# Patient Record
Sex: FEMALE | Race: WHITE | HISPANIC OR LATINO | Employment: PART TIME | URBAN - METROPOLITAN AREA
[De-identification: names, ages, dates, MRNs, and addresses within clinical notes are randomized per-mention and may not be internally consistent; named-entity substitution may affect disease eponyms.]

---

## 2017-03-02 ENCOUNTER — ALLSCRIPTS OFFICE VISIT (OUTPATIENT)
Dept: OTHER | Facility: OTHER | Age: 27
End: 2017-03-02

## 2017-03-02 ENCOUNTER — LAB REQUISITION (OUTPATIENT)
Dept: LAB | Facility: HOSPITAL | Age: 27
End: 2017-03-02
Payer: COMMERCIAL

## 2017-03-02 DIAGNOSIS — R30.0 DYSURIA: ICD-10-CM

## 2017-03-02 LAB
BACTERIA UR QL AUTO: NORMAL
CLUE CELL (HISTORICAL): NORMAL
GLUCOSE (HISTORICAL): NORMAL
HGB UR QL STRIP.AUTO: NORMAL
HYPHAL YEAST (HISTORICAL): NORMAL
KOH PREP (HISTORICAL): NORMAL
LEUKOCYTE ESTERASE UR QL STRIP: NORMAL
NITRITE UR QL STRIP: NORMAL
PH UR STRIP.AUTO: 4.5 [PH]
PROT UR STRIP-MCNC: NORMAL MG/DL
TRICHOMONAS (HISTORICAL): NORMAL
YEAST (HISTORICAL): NORMAL

## 2017-03-02 PROCEDURE — 87086 URINE CULTURE/COLONY COUNT: CPT | Performed by: NURSE PRACTITIONER

## 2017-03-02 PROCEDURE — 87591 N.GONORRHOEAE DNA AMP PROB: CPT | Performed by: NURSE PRACTITIONER

## 2017-03-02 PROCEDURE — 87491 CHLMYD TRACH DNA AMP PROBE: CPT | Performed by: NURSE PRACTITIONER

## 2017-03-03 LAB
CHLAMYDIA DNA CVX QL NAA+PROBE: NORMAL
N GONORRHOEA DNA GENITAL QL NAA+PROBE: NORMAL

## 2017-03-04 ENCOUNTER — GENERIC CONVERSION - ENCOUNTER (OUTPATIENT)
Dept: OTHER | Facility: OTHER | Age: 27
End: 2017-03-04

## 2017-03-04 LAB — BACTERIA UR CULT: NORMAL

## 2017-07-27 ENCOUNTER — ALLSCRIPTS OFFICE VISIT (OUTPATIENT)
Dept: OTHER | Facility: OTHER | Age: 27
End: 2017-07-27

## 2017-08-24 ENCOUNTER — ALLSCRIPTS OFFICE VISIT (OUTPATIENT)
Dept: OTHER | Facility: OTHER | Age: 27
End: 2017-08-24

## 2017-10-16 ENCOUNTER — GENERIC CONVERSION - ENCOUNTER (OUTPATIENT)
Dept: OTHER | Facility: OTHER | Age: 27
End: 2017-10-16

## 2017-10-16 DIAGNOSIS — Z11.3 ENCOUNTER FOR SCREENING FOR INFECTIONS WITH PREDOMINANTLY SEXUAL MODE OF TRANSMISSION: ICD-10-CM

## 2017-10-17 ENCOUNTER — LAB REQUISITION (OUTPATIENT)
Dept: LAB | Facility: HOSPITAL | Age: 27
End: 2017-10-17
Payer: COMMERCIAL

## 2017-10-17 DIAGNOSIS — Z11.3 ENCOUNTER FOR SCREENING FOR INFECTIONS WITH PREDOMINANTLY SEXUAL MODE OF TRANSMISSION: ICD-10-CM

## 2017-10-17 PROCEDURE — 87491 CHLMYD TRACH DNA AMP PROBE: CPT | Performed by: OBSTETRICS & GYNECOLOGY

## 2017-10-17 PROCEDURE — 87591 N.GONORRHOEAE DNA AMP PROB: CPT | Performed by: OBSTETRICS & GYNECOLOGY

## 2017-10-18 LAB
CHLAMYDIA DNA CVX QL NAA+PROBE: NORMAL
N GONORRHOEA DNA GENITAL QL NAA+PROBE: NORMAL

## 2018-01-09 NOTE — MISCELLANEOUS
Provider Comments  Provider Comments:   PATIENT NO SHOW FOR HER ANNUAL APPOINTMENT  CALLED PATIENT AND LEFT A VOICEMAIL TO RESCHEDULE        Signatures   Electronically signed by : Gil Simon, ; Aug 28 2017 10:28AM EST                       (Author)

## 2018-01-11 NOTE — RESULT NOTES
Message   Labs reviewed potassium slightly low patient advised to increase oral intake of potassium through diet  Remainder labs reviewed with patient     Verified Results  (1) RPR [id number] 04:36PM José Miguel March    Order Number: [id number]     Test Name Result Flag Reference   RPR Non-Reactive  Non-Reactive     (1) CHRONIC HEPATITIS PANEL 13Jun2016 04:36PM José Miguel [facility] [place] Order Number: [id number]  TW Order Number: [id number]     Test Name Result Flag Reference   HEPATITIS B SURFACE ANTIGEN Non-reactive  Non-reactive, NonReactive - Confirmed   HEPATITIS C ANTIBODY Non-reactive  Non-reactive   HEPATITIS B CORE IGM ANTIBODY Non-reactive  Non-reactive   HEPATITIS B CORE TOTAL ANTIBODY Non-reactive  Non-reactive     (1) RAPID HIV-1 AND HIV-2 ANTIBODIES 13Jun2016 04:36PM José Miguel March    Order Number: [id number]     Test Name Result Flag Reference   RAPID HIV 1 AND 2 Non-Reactive  Non-Reactive   HIV-1 P24 ANTIGEN SCREEN Non-Reactive  Non-Reactive   Negative for HIV-1 p24 Antigen  Negative for HIV-1 and/or HIV-2 Antibody  (1) CBC/PLT/DIFF 02DIG9028 04:36PM José iMguel March    Order Number: [id number]  TW Order Number: [id number]     Test Name Result Flag Reference   WBC COUNT 10 37 Thousand/uL H 4 31-10 16   RBC COUNT 4 40 Million/uL  3 81-5 12   HEMOGLOBIN 13 3 g/dL  11 5-15 4   HEMATOCRIT 36 9 %  34 8-46  1   MCV 84 fL  82-98   MCH 30 2 pg  26 8-34 3   MCHC 36 0 g/dL  31 4-37 4   RDW 12 5 %  11 6-15 1   MPV 9 9 fL  8 9-12 7   PLATELET COUNT 724 Thousands/uL  149-390   nRBC AUTOMATED 0 /100 WBCs     NEUTROPHILS RELATIVE PERCENT 61 %  43-75   LYMPHOCYTES RELATIVE PERCENT 32 %  14-44   MONOCYTES RELATIVE PERCENT 4 %  4-12   EOSINOPHILS RELATIVE PERCENT 3 %  0-6   BASOPHILS RELATIVE PERCENT 0 %  0-1   NEUTROPHILS ABSOLUTE COUNT 6 36 Thousands/?L  1 85-7 62   LYMPHOCYTES ABSOLUTE COUNT 3 31 Thousands/?L  0 60-4 47   MONOCYTES ABSOLUTE COUNT 0 36 Thousand/?L  0 17-1 22   EOSINOPHILS ABSOLUTE COUNT 0 29 Thousand/?L  0 00-0 61   BASOPHILS ABSOLUTE COUNT 0 03 Thousands/?L  0 00-0 10     (1) COMPREHENSIVE METABOLIC PANEL 11KFA6764 59:96JE José Miguel Dickey   TW Order Number: [id number]  TW Order Number: [id number] Order Number: [id number]     Test Name Result Flag Reference   GLUCOSE,RANDM 80 mg/dL     If the patient is fasting, the ADA then defines impaired fasting glucose as > 100 mg/dL and diabetes as > or equal to 123 mg/dL  SODIUM 138 mmol/L  136-145   POTASSIUM 3 4 mmol/L L 3 5-5 3   CHLORIDE 105 mmol/L  100-108   CARBON DIOXIDE 25 mmol/L  21-32   ANION GAP (CALC) 8 mmol/L  4-13   BLOOD UREA NITROGEN 9 mg/dL  5-25   CREATININE 0 53 mg/dL L 0 60-1 30   Standardized to IDMS reference method   CALCIUM 9 4 mg/dL  8 3-10 1   BILI, TOTAL 0 86 mg/dL  0 20-1 00   ALK PHOSPHATAS 113 U/L     ALT (SGPT) 28 U/L  12-78   AST(SGOT) 25 U/L  5-45   ALBUMIN 4 5 g/dL  3 5-5 0   TOTAL PROTEIN 8 3 g/dL H 6 4-8 2   eGFR Non-African American      >60 0 ml/min/1 73sq [facility] Disease Education Program recommendations are as follows:  GFR calculation is accurate only with a steady state creatinine  Chronic Kidney disease less than 60 ml/min/1 73 sq  meters  Kidney failure less than 15 ml/min/1 73 sq  meters  (1) LIPID PANEL, FASTING 13Jun2016 04:36PM José Miguel Dickey   TW Order Number: [id number]  TW Order Number: [id number] Order Number: [id number]     Test Name Result Flag Reference   CHOLESTEROL 171 mg/dL     HDL,DIRECT 39 mg/dL L 40-60   Specimen collection should occur prior to Metamizole administration due to the potential for falsely depressed results     LDL CHOLESTEROL CALCULATED 111 mg/dL H 0-100   Triglyceride:         Normal              <150 mg/dl       Borderline High    150-199 mg/dl       High               200-499 mg/dl       Very High >499 mg/dl  Cholesterol:         Desirable        <200 mg/dl      Borderline High  200-239 mg/dl      High             >239 mg/dl  HDL Cholesterol:        High    >59 mg/dL      Low     <41 mg/dL  LDL CALCULATED:    This screening LDL is a calculated result  It does not have the accuracy of the Direct Measured LDL in the monitoring of patients with hyperlipidemia and/or statin therapy  Direct Measure LDL (AKX804) must be ordered separately in these patients  TRIGLYCERIDES 107 mg/dL  <=150   Specimen collection should occur prior to N-Acetylcysteine or Metamizole administration due to the potential for falsely depressed results  Plan  Health Maintenance    · Follow-up visit in 6 months Evaluation and Treatment  Follow-up  Status: Complete   Done: 77TKD5029   · (1) CBC/PLT/DIFF; Status:Complete;   Done: 62CBC4405 04:36PM   · (1) COMPREHENSIVE METABOLIC PANEL; Status:Complete;   Done: 63OLR5620  04:36PM   · (1) LIPID PANEL, FASTING; Status:Complete;   Done: 92SPZ1940 04:36PM  Health Maintenance, Screen for STD (sexually transmitted disease)    · *1 - [facility] (Obstetrics/ Gynecology) Physician Referral  Consult  Status:  Active  Requested for: 02WSG2786  Care Summary provided  : Yes  Screen for STD (sexually transmitted disease)    · (1) CHLAMYDIA/GC AMPLIFIED DNA, PCR; Source:Urine, Unspecified Source;  Status:Active; Requested AQR:05MXU2456;    · (1) CHRONIC HEPATITIS PANEL; Status:Complete;   Done: 63WYF0811 04:36PM   · (1) RAPID HIV-1 AND HIV-2 ANTIBODIES; [Do Not Release];  Status:Complete;   Done:  75VHH0437 04:36PM   · (1) RPR; Status:Complete;   Done: 33CUJ8337 04:36PM    Signatures   Electronically signed by : Justino Billingsley MD; Paulo 15 2016  8:45AM EST                       (Author)

## 2018-01-11 NOTE — PROGRESS NOTES
Chief Complaint  Pt was given depo injection today  Left side  Active Problems    1  Birth control (V25 9) (Z30 9)   2  Encounter for Depo-Provera contraception (V25 49) (Z30 42)   3  Need for prophylactic vaccination and inoculation against influenza (V04 81) (Z23)   4  Pregnancy (V22 2) (Z33 1)   5  Screen for STD (sexually transmitted disease) (V74 5) (Z11 3)   6  Yeast infection of nipple, postpartum (675 04,112 89) (O91 02,B37 89)    Current Meds   1  MedroxyPROGESTERone Acetate 150 MG/ML Intramuscular Suspension; Inject 150mg   IM Q11 weeks; Recurring Schedule:17Nov2015 to (Exp:20Sep2016); Status: IN   PROGRESS - Order Generated Ordered   2  MedroxyPROGESTERone Acetate 150 MG/ML Intramuscular Suspension; Inject 150mg   IM Q11 weeks; To Be Done: 19Apr2016; Status: HOLD   FOR - Administration Ordered   3  Prenatal Vitamins TABS; Therapy: (Recorded:12Tlo6511) to Recorded    Allergies    1  No Known Drug Allergies    2  No Known Environmental Allergies   3   No Known Food Allergies    Signatures   Electronically signed by : JANUSZ Way ; Jun 22 2016  4:17PM EST

## 2018-01-14 VITALS
WEIGHT: 112.41 LBS | SYSTOLIC BLOOD PRESSURE: 90 MMHG | TEMPERATURE: 97.7 F | HEIGHT: 57 IN | DIASTOLIC BLOOD PRESSURE: 68 MMHG | BODY MASS INDEX: 24.25 KG/M2 | HEART RATE: 88 BPM

## 2018-01-14 VITALS — DIASTOLIC BLOOD PRESSURE: 71 MMHG | SYSTOLIC BLOOD PRESSURE: 111 MMHG | BODY MASS INDEX: 24.24 KG/M2 | WEIGHT: 114 LBS

## 2018-01-15 NOTE — PROGRESS NOTES
Chief Complaint  Patient here for depo  Next depo 4/6/16  History of Present Illness  Hospital Based Practices Required Assessment:   Pain Assessment   the patient states they do not have pain  Abuse And Domestic Violence Screen    Yes, the patient is safe at home  The patient states no one is hurting them  Active Problems    1  Birth control (V25 9) (Z30 9)   2  Need for prophylactic vaccination and inoculation against influenza (V04 81) (Z23)   3  Pregnancy (V22 2) (Z33 1)   4  Yeast infection of nipple, postpartum (675 04,112 89) (O91 019,B49)    Current Meds   1  MedroxyPROGESTERone Acetate 150 MG/ML Intramuscular Suspension; Inject 150mg   IM Q11 weeks; Recurring Schedule:17Nov2015 to (Exp:20Sep2016); Status: IN   PROGRESS - Order Generated Ordered   2  Nystatin 338236 UNIT/GM External Ointment; Apply sparingly to each nipple after breast   feeding; Therapy: 49KEM7916 to (Last Rx:20Oct2015)  Requested for: 20Oct2015 Ordered   3  Prenatal Vitamins TABS; Therapy: (Recorded:54Nhw1611) to Recorded    Allergies    1  No Known Drug Allergies    2  No Known Environmental Allergies   3   No Known Food Allergies    Vitals  Signs [Data Includes: Current Encounter]    Systolic: 847  Diastolic: 70  Height: 4 ft 10 in  Weight: 119 lb   BMI Calculated: 24 87  BSA Calculated: 1 46    Future Appointments    Date/Time Provider Specialty Site   02/03/2016 12:30 PM [address] [facility], Injection Schedule  [facility] [place]   04/05/2016 03:40 PM [facility] [facility], Injection Schedule  [facility] [place] [address]     Signatures   Electronically signed by : Tanja Jerome MD; Jan 25 2016  2:00PM EST

## 2018-01-17 NOTE — RESULT NOTES
Message   treated at time of visit       Verified Results  (1) URINE CULTURE 02Mar2017 08:32AM Esperanza Odom Order Number: [id number]     Test Name Result Flag Reference   CLINICAL REPORT (Report)     Test:        Urine culture  Specimen Type:   Urine  Specimen Date:   3/2/2017 8:32 AM  Result Date:    3/4/2017 10:58 AM  Result Status:   Final result  Resulting Lab:   [facility] [address] [place] [address]            Tel: [phone]      CULTURE                                       ------------------                                   50,000-59,000 cfu/ml Candida sp  presumptively albicans

## 2018-01-18 NOTE — PROGRESS NOTES
Assessment    1  Screen for STD (sexually transmitted disease) (V74 5) (Z11 3)   2  Encounter for preventive health examination (V70 0) (Z00 00)    Plan  Health Maintenance    · Follow-up visit in 6 months Evaluation and Treatment  Follow-up  Status: Hold For -  Scheduling  Requested for: 44VOG1566   · (1) CBC/PLT/DIFF; Status:Active; Requested USW:71DRT8041;    · (1) COMPREHENSIVE METABOLIC PANEL; Status:Active; Requested BBT:55HDG0420;    · (1) LIPID PANEL, FASTING; Status:Active; Requested XYX:65QHB4476; Health Maintenance, Screen for STD (sexually transmitted disease)    · *1 - [facility] (Obstetrics/ Gynecology) Physician Referral  Consult  Status:  Hold For - Scheduling  Requested for: 41YPC5921  Care Summary provided  : Yes  Screen for STD (sexually transmitted disease)    · (1) CHLAMYDIA/GC AMPLIFIED DNA, PCR; Source:Urine, Unspecified Source;  Status:Active; Requested HMF:98KRA3250;    · (1) CHRONIC HEPATITIS PANEL; Status:Active; Requested OWO:93WIZ7538;    · (1) RAPID HIV-1 AND HIV-2 ANTIBODIES; [Do Not Release]; Status:Active; Requested  TZV:24UXB5407;    · (1) RPR; Status:Active; Requested WRC:11DEJ3648;     Discussion/Summary    #1  We will screen for possibility of STD which included tests for syphilis, chlamydia, gonorrhea, HIV and hepatitis  Patient was advised to have protected sex  According to her, she doesn't have a relationship anymore with her ex-boyfriend who was a womanizer  #2  Health maintenance: We will send for basic labs  Patient needs to follow-up with us to continue with medical care  Chief Complaint  Patient is here for annual physical, denies any complaints  Patient is requesting referral for GYN and STD testing  History of Present Illness  HPI: The patient is a 76-year-old female, came into the office today to establish care with us  Patient told me that the only time she goes to a doctor is when she gave birth approximately 10 months ago   Otherwise, she hasn't seen a doctor for health maintenance needs  According to her, that is when she came in here is that she's worried because her ex-boyfriend she learned had been having sex with other women  Patient was to be tested for possibility of sexually transmitted diseases  Presently, patient denies any symptoms  Patient denies any significant vaginal discharge, vaginal pains, urinary problems like burning or pain on urination  Patient denies any fever or chills  Patient also asked for a referral to a gynecologist for a Pap smear test done  Otherwise, patient doesn't have any other complaints or any other symptoms at all  Review of Systems    Constitutional: No fever, no chills, feels well, no tiredness, no recent weight gain or weight loss  Eyes: No complaints of eye pain, no red eyes, no eyesight problems, no discharge, no dry eyes, no itching of eyes  ENT: no complaints of earache, no loss of hearing, no nose bleeds, no nasal discharge, no sore throat, no hoarseness  Cardiovascular: No complaints of slow heart rate, no fast heart rate, no chest pain, no palpitations, no leg claudication, no lower extremity edema  Respiratory: No complaints of shortness of breath, no wheezing, no cough, no SOB on exertion, no orthopnea, no PND  Gastrointestinal: No complaints of abdominal pain, no constipation, no nausea or vomiting, no diarrhea, no bloody stools  Genitourinary: No complaints of dysuria, no incontinence, no pelvic pain, no dysmenorrhea, no vaginal discharge or bleeding  Musculoskeletal: No complaints of arthralgias, no myalgias, no joint swelling or stiffness, no limb pain or swelling  Integumentary: No complaints of skin rash or lesions, no itching, no skin wounds, no breast pain or lump  Neurological: No complaints of headache, no confusion, no convulsions, no numbness, no dizziness or fainting, no tingling, no limb weakness, no difficulty walking     Psychiatric: Not suicidal, no sleep disturbance, no anxiety or depression, no change in personality, no emotional problems  Endocrine: No complaints of proptosis, no hot flashes, no muscle weakness, no deepening of the voice, no feelings of weakness  Hematologic/Lymphatic: No complaints of swollen glands, no swollen glands in the neck, does not bleed easily, does not bruise easily  ROS reviewed  Active Problems    1  Birth control (V25 9) (Z30 9)   2  Encounter for Depo-Provera contraception (V25 49) (Z30 42)   3  Need for prophylactic vaccination and inoculation against influenza (V04 81) (Z23)   4  Pregnancy (V22 2) (Z33 1)   5  Yeast infection of nipple, postpartum (675 04,112 89) (O91 02,B37 89)    Family History  Mother    · No pertinent family history    Social History    · Denied: History of Alcohol use   · Denied: History of Drug use   · Never a smoker    Current Meds   1  MedroxyPROGESTERone Acetate 150 MG/ML Intramuscular Suspension; Inject 150mg   IM Q11 weeks; Recurring Schedule:17Nov2015 to (Exp:20Sep2016); Status: IN   PROGRESS - Order Generated Ordered   2  MedroxyPROGESTERone Acetate 150 MG/ML Intramuscular Suspension; Inject 150mg   IM Q11 weeks; To Be Done: 19Apr2016; Status: HOLD   FOR - Administration Ordered   3  Prenatal Vitamins TABS; Therapy: (Recorded:44Fdm0873) to Recorded    Allergies    1  No Known Drug Allergies    2  No Known Environmental Allergies   3  No Known Food Allergies    Vitals   Recorded: 31PGN0294 03:23PM   Heart Rate 64, L Radial   Pulse Quality Normal, L Radial   Respiration 14   Respiration Quality Normal   Systolic 444, LUE, Sitting   Diastolic 68, LUE, Sitting   Height 4 ft 10 in   Weight 121 lb    BMI Calculated 25 29   BSA Calculated 1 47   O2 Saturation 99, RA     Physical Exam    Constitutional   General appearance: No acute distress, well appearing and well nourished  Head and Face   Head and face: Normal     Palpation of the face and sinuses: No sinus tenderness      Eyes Conjunctiva and lids: No swelling, erythema or discharge  Pupils and irises: Equal, round, reactive to light  Ears, Nose, Mouth, and Throat   External inspection of ears and nose: Normal     Otoscopic examination: Tympanic membranes translucent with normal light reflex  Canals patent without erythema  Hearing: Normal     Nasal mucosa, septum, and turbinates: Normal without edema or erythema  Lips, teeth, and gums: Normal, good dentition  Oropharynx: Normal with no erythema, edema, exudate or lesions  Neck   Neck: Supple, symmetric, trachea midline, no masses  Thyroid: Normal, no thyromegaly  Pulmonary   Respiratory effort: No increased work of breathing or signs of respiratory distress  Auscultation of lungs: Clear to auscultation  Cardiovascular   Auscultation of heart: Normal rate and rhythm, normal S1 and S2, no murmurs  Carotid pulses: 2+ bilaterally  Examination of extremities for edema and/or varicosities: Normal     Chest   Chest: Normal     Abdomen   Abdomen: Non-tender, no masses  Lymphatic   Palpation of lymph nodes in neck: No lymphadenopathy  Musculoskeletal   Gait and station: Normal     Digits and nails: Normal without clubbing or cyanosis  Joints, bones, and muscles: Normal     Range of motion: Normal     Stability: Normal     Muscle strength/tone: Normal     Skin   Skin and subcutaneous tissue: Normal without rashes or lesions  Palpation of skin and subcutaneous tissue: Normal turgor  Neurologic   Cranial nerves: Cranial nerves II-XII intact  Cortical function: Normal mental status  Sensation: No sensory loss  Coordination: Normal finger to nose and heel to shin  Psychiatric   Judgment and insight: Normal     Orientation to person, place, and time: Normal     Recent and remote memory: Intact      Mood and affect: Normal        Results/Data  PHQ-2 Adult Depression Screening 10Jun2016 03:25PM User, Rodney     Test Name Result Flag Reference PHQ-2 Adult Depression Score 0     Over the last two weeks, how often have you been bothered by any of the following problems?   Little interest or pleasure in doing things: Not at all - 0  Feeling down, depressed, or hopeless: Not at all - 0   PHQ-2 Adult Depression Screening Negative         Future Appointments    Date/Time Provider Specialty Site   06/21/2016 03:40 PM [address] [facility], Injection Schedule  [facility]  [address]     Signatures   Electronically signed by : JANUSZ Aguayo ; Paulo 10 2016  3:53PM EST                       (Author)

## 2018-01-22 VITALS
BODY MASS INDEX: 23.73 KG/M2 | DIASTOLIC BLOOD PRESSURE: 75 MMHG | HEIGHT: 57 IN | HEART RATE: 60 BPM | SYSTOLIC BLOOD PRESSURE: 111 MMHG | WEIGHT: 110 LBS

## 2018-02-02 LAB — EXTERNAL HIV-1 ANTIBODY: NORMAL

## 2018-04-01 ENCOUNTER — HOSPITAL ENCOUNTER (EMERGENCY)
Facility: HOSPITAL | Age: 28
Discharge: HOME | End: 2018-04-01
Attending: EMERGENCY MEDICINE | Admitting: EMERGENCY MEDICINE
Payer: COMMERCIAL

## 2018-04-01 VITALS
SYSTOLIC BLOOD PRESSURE: 115 MMHG | DIASTOLIC BLOOD PRESSURE: 64 MMHG | OXYGEN SATURATION: 98 % | HEART RATE: 80 BPM | RESPIRATION RATE: 18 BRPM | TEMPERATURE: 98.1 F | WEIGHT: 120 LBS | BODY MASS INDEX: 25.97 KG/M2

## 2018-04-01 DIAGNOSIS — H66.90 OTITIS MEDIA: Primary | ICD-10-CM

## 2018-04-01 PROCEDURE — 99282 EMERGENCY DEPT VISIT SF MDM: CPT

## 2018-04-01 RX ORDER — AMOXICILLIN 500 MG/1
500 CAPSULE ORAL EVERY 12 HOURS SCHEDULED
Qty: 14 CAPSULE | Refills: 0 | Status: SHIPPED | OUTPATIENT
Start: 2018-04-01 | End: 2018-04-01

## 2018-04-01 RX ORDER — AMOXICILLIN 500 MG/1
500 CAPSULE ORAL EVERY 12 HOURS SCHEDULED
Qty: 14 CAPSULE | Refills: 0 | Status: SHIPPED | OUTPATIENT
Start: 2018-04-01 | End: 2018-04-08

## 2018-04-01 NOTE — DISCHARGE INSTRUCTIONS
Otitis Media   LO QUE NECESITA SABER:   La otitis media es ike infección en el oído  INSTRUCCIONES SOBRE EL [facility]:   Medicamentos:  · El ibuprofeno o el acetaminofeno  ayuda a disminuir el dolor y la [place]  Están disponibles sin receta médica  Consulte con wilson médico cuál medicamento es el adecuado para usted  Pregunte la cantidad y la frecuencia con que debe tomarlos  Estos medicamentos pueden provocar sangrado estomacal si no se edith correctamente  El ibuprofeno puede provocar daño al Tanda Lord  No tome ibuprofeno si usted tiene enfermedad de los riñones, Cynda Bachelor o si es alérgico a la aspirina  El acetaminofeno puede dañar el hígado  No ingiera alcohol si lionel acetaminofeno  · Gotas para los oídos  ayudan a tratar el dolor de oído  · Antibióticos  ayudan a tratar la infección bacterial que provocó la infección de oído  · [employer] kelvin medicamentos cata se le haya indicado  Consulte con wilson médico si usted hannah que wilson medicamento no le está ayudando o si presenta efectos secundarios  Infórmele si es alérgico a cualquier medicamento  Mantenga ike lista actualizada de los [place], las vitaminas y los productos herbales que lionel  Incluya los siguientes datos de los medicamentos: cantidad, frecuencia y motivo de administración  Traiga con usted la lista o los envases de la píldoras a kelvin citas de seguimiento  Lleve la lista de los medicamentos con usted en riddhi de ike emergencia  Calor o hielo:   · El calor  puede usarse para disminuir wilson dolor  Coloque un pañuelo húmedo y tibio en el oído  Aplíquelo por 15 a 20 minutos, de 3 a 4 veces al día  · El hielo  ayuda a disminuir la inflamación y el dolor  Use ike bolsa con hielo o ponga hielo triturado en ike bolsa de plástico  Idalia Lolly la bolsa con ike toalla y colóquela en el oído por 15 a 20 minutos, de 3 a 4 veces por 2 días  Prevenga la otitis media:   · Lávese las [place] frecuentemente  Utilice agua y [place]   [address] usar el baño, cambiarle el pañal a un renee o estornudar  Lávese las raleigh antes de comer o preparar alimentos  · Aléjese de las personas enfermas  Algunos microbios se propagan fácil y rápidamente con el contacto  Regreso a la escuela o al Charmayne Shutter:  Roula Jimenez podría regresar al Charmayne Shutter o a la escuela cuando desaparezca la fiebre  Acuda a kelvin consultas de control con elam médico según le indicaron  Anote kelvin preguntas para que se acuerde de hacerlas catherine kelvin visitas  Pregúntele a elam Francoise Embs vitaminas y minerales son adecuados para usted  · El dolor del oído empeora o no desaparece, incluso después del [place]  · La pare exterior del oído está remedios o inflamada  · Tiene vómitos o diarrea  · Tiene líquido saliendo del oído  · Usted tiene preguntas o inquietudes acerca de elam condición o cuidado  Regrese a la hermilo de emergencias si:   · Usted sufre ike convulsión  · Tiene fiebre y rigidez en el ruth  © 2017 [address] [employer] Information is for End User's use only and may not be sold, redistributed or otherwise used for commercial purposes  All illustrations and images included in CareNotes® are the copyrighted property of A D A M , Inc  or Saul [place]brook  Esta información es sólo para uso en educación  Elam intención no es darle un consejo médico sobre enfermedades o tratamientos  Colsulte con elam Alessandra Cabot farmacéutico antes de seguir cualquier régimen médico para saber si es seguro y efectivo para usted

## 2018-04-01 NOTE — ED ATTENDING ATTESTATION
I, [address], DO, saw and evaluated the patient  I have discussed the patient with the resident/non-physician practitioner and agree with the resident's/non-physician practitioner's findings, Plan of Care, and MDM as documented in the resident's/non-physician practitioner's note, except where noted  All available labs and Radiology studies were reviewed  At this point I agree with the current assessment done in the Emergency Department  I have conducted an independent evaluation of this patient a history and physical is as follows:    30yo female presents with ear pain, worse on right  No fevers  Had uri symptoms  On exam - nad, right TM erythema, heart reg, no resp distress    Plan - treat for OM    Critical Care Time  CritCare Time    Procedures

## 2018-04-05 NOTE — ED PROVIDER NOTES
History  Chief Complaint   Patient presents with    Earache     pt has bilateral ear pain  Right ear hurts more then left  pt states she had pain for 1 day  HPI     25year old female presents for Right ear pain  Onset was this morning  Now has left ear pain as starting  Denies fever chills nausea vomiting diarrhea  No neck pain headache  No mastoid pain  No other modifying factors no other associated symptoms  On exam the patient has a right tympanic membrane that is erythematous with some bulging  No mastoid tenderness  Neck is supple  Throat is clear  Assessment plan:  Otitis Media treat  None       History reviewed  No pertinent past medical history  History reviewed  No pertinent surgical history  History reviewed  No pertinent family history  I have reviewed and agree with the history as documented  Social History   Substance Use Topics    Smoking status: Never Smoker    Smokeless tobacco: Never Used    Alcohol use No        Review of Systems   Constitutional: Negative for diaphoresis, fatigue and fever  HENT: Negative for facial swelling and nosebleeds  Eyes: Negative for pain and visual disturbance  Respiratory: Negative for apnea, cough, shortness of breath and wheezing  Cardiovascular: Negative for chest pain and leg swelling  Gastrointestinal: Negative for abdominal distention, abdominal pain, anal bleeding, blood in stool, nausea, rectal pain and vomiting  Genitourinary: Negative for difficulty urinating, dysuria and flank pain  Musculoskeletal: Negative for back pain, neck pain and neck stiffness  Neurological: Negative for dizziness, syncope, weakness, light-headedness and headaches  All other systems reviewed and are negative        Physical Exam  ED Triage Vitals [04/01/18 0628]   Temperature Pulse Respirations Blood Pressure SpO2   98 1 °F (36 7 °C) 80 18 115/64 98 %      Temp Source Heart Rate Source Patient Position - Orthostatic VS BP Location FiO2 (%)   Oral Monitor Sitting Left arm --      Pain Score       Worst Possible Pain           Orthostatic Vital Signs  Vitals:    04/01/18 0628   BP: 115/64   Pulse: 80   Patient Position - Orthostatic VS: Sitting       Physical Exam   Constitutional: She is oriented to person, place, and time  She appears well-developed and well-nourished  No distress  HENT:   Head: Normocephalic and atraumatic  Nose: Nose normal    Eyes: Conjunctivae and EOM are normal  Pupils are equal, round, and reactive to light  No scleral icterus  Neck: Normal range of motion  Neck supple  No JVD present  No tracheal deviation present  No thyromegaly present  Cardiovascular: Normal rate, regular rhythm, normal heart sounds and intact distal pulses  Exam reveals no gallop and no friction rub  Pulmonary/Chest: Effort normal and breath sounds normal  No respiratory distress  She has no wheezes  She has no rales  She exhibits no tenderness  Abdominal: Soft  Bowel sounds are normal  She exhibits no distension and no mass  There is no tenderness  There is no rebound and no guarding  No hernia  Musculoskeletal: Normal range of motion  She exhibits no edema, tenderness or deformity  Neurological: She is alert and oriented to person, place, and time  She has normal reflexes  No cranial nerve deficit  Coordination normal    Skin: Skin is warm and dry  She is not diaphoretic  No erythema  Psychiatric: She has a normal mood and affect  Her behavior is normal    Nursing note and vitals reviewed        ED Medications  Medications - No data to display    Diagnostic Studies  Results Reviewed     None                 No orders to display         Procedures  Procedures      Phone Consults  ED Phone Contact    ED Course  ED Course                                MDM  Number of Diagnoses or Management Options  Otitis media: new and requires workup    CritCare Time    Disposition  Final diagnoses:   Otitis media     Time reflects when diagnosis was documented in both MDM as applicable and the Disposition within this note     Time User Action Codes Description Comment    4/1/2018  6:36 AM Binta Matos Add [H66 90] Otitis media       ED Disposition     ED Disposition Condition Comment    Discharge  Shahid Riddle discharge to home/self care  Condition at discharge: Good        Follow-up Information    None       Discharge Medication List as of 4/1/2018  6:37 AM      START taking these medications    Details   amoxicillin (AMOXIL) 500 mg capsule Take 1 capsule (500 mg total) by mouth every 12 (twelve) hours for 7 days, Starting Sun 4/1/2018, Until Sun 4/8/2018, Normal           No discharge procedures on file  ED Provider  Attending physically available and evaluated Shahid Riddle I managed the patient along with the ED Attending      Electronically Signed by         Roc Bourgeois DO  04/05/18 0025

## 2018-07-24 ENCOUNTER — ULTRASOUND (OUTPATIENT)
Dept: OBGYN CLINIC | Facility: HOSPITAL | Age: 28
End: 2018-07-24
Payer: COMMERCIAL

## 2018-07-24 VITALS
HEART RATE: 82 BPM | HEIGHT: 58 IN | WEIGHT: 111 LBS | BODY MASS INDEX: 23.3 KG/M2 | SYSTOLIC BLOOD PRESSURE: 121 MMHG | DIASTOLIC BLOOD PRESSURE: 70 MMHG

## 2018-07-24 DIAGNOSIS — Z34.90 EARLY STAGE OF PREGNANCY: ICD-10-CM

## 2018-07-24 DIAGNOSIS — N91.2 AMENORRHEA: Primary | ICD-10-CM

## 2018-07-24 LAB — SL AMB POCT URINE HCG: POSITIVE

## 2018-07-24 PROCEDURE — 99213 OFFICE O/P EST LOW 20 MIN: CPT | Performed by: OBSTETRICS & GYNECOLOGY

## 2018-07-24 PROCEDURE — 81025 URINE PREGNANCY TEST: CPT | Performed by: OBSTETRICS & GYNECOLOGY

## 2018-07-24 NOTE — PROGRESS NOTES
This is a 25year old  who presents for viability scan  Her LMP was 16 which would put her at a gestational age of 6w9d  Today, she is accompanied by possible FOB who appears uncomfortable and in shock regarding this pregnancy  She endorses nausea and vomiting occurring every other day for the last month  She has tolerated PO intake and has not lost weight over this time  She denies fever, chills, CP, SOB, and abdominal pain  /70 (BP Location: Left arm)   Pulse 82   Ht 4' 10" (1 473 m)   Wt 50 3 kg (111 lb)   LMP 2018 (Approximate)   BMI 23 20 kg/m²       Ob Hx:   7664- Uncomplicated pregnancy ending in a Term 1LTCS for NRFHT  She is thinking about TOLAC, but it unsure at this time  Gyn Hx:  - No hx of abnormal pap smears or STDs  PMHx:  None    Meds:  Prenatal vitamin    Surg Hx:   LTCS x 1    Soc Hx:  - Never smoker  - Social drinker (no EtOH consumption since positive pregnancy test)  - No recreational drug use      Assessment: This is a 24 yo  at 6w9d by LMP giving her an RUTH 19 here for viability scan  Plan:  1   IUP at [facility] confirms LMP dating  - Patient instructed to continue taking Prenatal Vitamins  - Patient will return for prenatal intake and H&P

## 2018-07-24 NOTE — PROGRESS NOTES
I have reviewed the notes, assessments, and/or procedures performed by Dr Jessica Small, I concur with her/his documentation of Dorette Brunner Regan Spalding, MD

## 2018-07-30 ENCOUNTER — PATIENT OUTREACH (OUTPATIENT)
Dept: OBGYN CLINIC | Facility: HOSPITAL | Age: 28
End: 2018-07-30

## 2018-07-30 ENCOUNTER — INITIAL PRENATAL (OUTPATIENT)
Dept: OBGYN CLINIC | Facility: HOSPITAL | Age: 28
End: 2018-07-30
Payer: COMMERCIAL

## 2018-07-30 VITALS
DIASTOLIC BLOOD PRESSURE: 80 MMHG | HEART RATE: 99 BPM | WEIGHT: 109 LBS | SYSTOLIC BLOOD PRESSURE: 114 MMHG | HEIGHT: 59 IN | BODY MASS INDEX: 21.97 KG/M2

## 2018-07-30 DIAGNOSIS — Z3A.09 9 WEEKS GESTATION OF PREGNANCY: Primary | ICD-10-CM

## 2018-07-30 PROCEDURE — 99211 OFF/OP EST MAY X REQ PHY/QHP: CPT

## 2018-07-30 NOTE — PROGRESS NOTES
MHR=99    OB Intake  o Patient presents for OB intake interview  o Accompanied by: Mother, son and niece   o   - Hx of  delivery prior to 36 weeks 6 days: no  o LMP: Patient's last menstrual period was 2018 (approximate)  o U/S date: 2018   - 8 weeks  5 days on 2018  o Estimated Date of Delivery: 19    - confirmed by LMP   o Signs and Symptoms of pregnancy:  - breast tenderness, fatigue, frequent urination, nausea and positive home pregnancy test  - Constipation: yes  - Headaches: yes  - Cramping/spotting: yes- some mild cramping occasionally  - PICA cravings: no  - Diabetes: If you answer yes, please order 1 hr gtt testing, 50grams   History of gestational diabetes no   BMI >35  no   Advance maternal age >35 no   First degree relative with type 2 diabetes no   History of PCOS no   Current metformin use no   Prior history of macrosomia or LGA no  o Immunization Record  Immunization History   Administered Date(s) Administered    Influenza Quadrivalent Preservative Free 3 years and older IM 10/20/2015    Tdap 2015   -   o Tdap:  - Counseled to be given after 28 weeks  - Influenza vaccine discussed  o MRSA questionnaire: negative  o Dental visit within last 6 months  - No- If no, recommendations discussed  Nurse Family Partnership: No- not first baby  ONAF form submitted: No- pt is currently self-pay, is in the process of getting Medical Assistance         - Lab slips given for PN panel and Hgb Electrophoresis         - Referrals made to [facility] for sequential screen and Social Work (EPDS=16)  Keily Landeros, met with pt during PN intake to discuss pt's family issues  - Pt did not have enough time to finish filling out her Registration paperwork- she was instructed to bring the paperwork filled out to her next appt on 18  Interview education:  Tatyana Serrano Pregnancy Essentials booklet given to patient  Reviewed and explained      Handouts given at todays visit  o [facility] me phone application guide  o [facility] Me support center  o CDCs Response to [address] [place] Maternal Fetal Medicine  - Sequential screening pamphlet- info given and referral made to MFM  - Cystic fibrosis pamphlet- info given, pt declined testing at this time  o RUTH letter given    - [facility] activated (not 1518 years of age)  - No  - Code provided: Yes, on AVS
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
## 2018-07-30 NOTE — LETTER
Nic Beltrán is a patient and under our care in our office  Priscilla Pak Estimated Date of Delivery: 2/28/19  Any questions or concerns feel free to contact our office  Thank you,    [facility]  [facility], [address] [place] [address]  [phone]      [address] [place] [address]/Re[address]  [place]  [phone]     [facility]   [phone]      [facility]  [phone]     [place]   [address], [place]

## 2018-07-30 NOTE — LETTER
Joel Shea is a patient at our facility  Joel Shea Estimated Date of Delivery: 2/28/19       Any questions or concerns, please feel free to contact our office      Sincerely,     [facility]   [address]   [place], [place]   [phone]

## 2018-08-03 ENCOUNTER — APPOINTMENT (OUTPATIENT)
Dept: LAB | Facility: HOSPITAL | Age: 28
End: 2018-08-03
Payer: COMMERCIAL

## 2018-08-03 DIAGNOSIS — Z3A.09 9 WEEKS GESTATION OF PREGNANCY: ICD-10-CM

## 2018-08-03 LAB
ABO GROUP BLD: NORMAL
BASOPHILS # BLD AUTO: 0.02 THOUSANDS/ΜL (ref 0–0.1)
BASOPHILS NFR BLD AUTO: 0 % (ref 0–1)
BILIRUB UR QL STRIP: NEGATIVE
BLD GP AB SCN SERPL QL: NEGATIVE
CLARITY UR: CLEAR
COLOR UR: YELLOW
EOSINOPHIL # BLD AUTO: 0.25 THOUSAND/ΜL (ref 0–0.61)
EOSINOPHIL NFR BLD AUTO: 3 % (ref 0–6)
ERYTHROCYTE [DISTWIDTH] IN BLOOD BY AUTOMATED COUNT: 12.2 % (ref 11.6–15.1)
EXTERNAL HIV-1 ANTIBODY: NORMAL
GLUCOSE UR STRIP-MCNC: NEGATIVE MG/DL
HCT VFR BLD AUTO: 29.8 % (ref 34.8–46.1)
HGB BLD-MCNC: 10.4 G/DL (ref 11.5–15.4)
HGB UR QL STRIP.AUTO: NEGATIVE
IMM GRANULOCYTES # BLD AUTO: 0.03 THOUSAND/UL (ref 0–0.2)
IMM GRANULOCYTES NFR BLD AUTO: 0 % (ref 0–2)
KETONES UR STRIP-MCNC: NEGATIVE MG/DL
LEUKOCYTE ESTERASE UR QL STRIP: NEGATIVE
LYMPHOCYTES # BLD AUTO: 2.03 THOUSANDS/ΜL (ref 0.6–4.47)
LYMPHOCYTES NFR BLD AUTO: 26 % (ref 14–44)
MCH RBC QN AUTO: 30.7 PG (ref 26.8–34.3)
MCHC RBC AUTO-ENTMCNC: 34.9 G/DL (ref 31.4–37.4)
MCV RBC AUTO: 88 FL (ref 82–98)
MONOCYTES # BLD AUTO: 0.41 THOUSAND/ΜL (ref 0.17–1.22)
MONOCYTES NFR BLD AUTO: 5 % (ref 4–12)
NEUTROPHILS # BLD AUTO: 5.18 THOUSANDS/ΜL (ref 1.85–7.62)
NEUTS SEG NFR BLD AUTO: 66 % (ref 43–75)
NITRITE UR QL STRIP: NEGATIVE
NRBC BLD AUTO-RTO: 0 /100 WBCS
PH UR STRIP.AUTO: 7.5 [PH] (ref 4.5–8)
PLATELET # BLD AUTO: 270 THOUSANDS/UL (ref 149–390)
PMV BLD AUTO: 9.9 FL (ref 8.9–12.7)
PROT UR STRIP-MCNC: NEGATIVE MG/DL
RBC # BLD AUTO: 3.39 MILLION/UL (ref 3.81–5.12)
RH BLD: POSITIVE
RUBV IGG SERPL IA-ACNC: >175 IU/ML
SP GR UR STRIP.AUTO: 1.01 (ref 1–1.03)
SPECIMEN EXPIRATION DATE: NORMAL
UROBILINOGEN UR QL STRIP.AUTO: 0.2 E.U./DL
WBC # BLD AUTO: 7.92 THOUSAND/UL (ref 4.31–10.16)

## 2018-08-03 PROCEDURE — 87186 SC STD MICRODIL/AGAR DIL: CPT

## 2018-08-03 PROCEDURE — 81003 URINALYSIS AUTO W/O SCOPE: CPT

## 2018-08-03 PROCEDURE — 87086 URINE CULTURE/COLONY COUNT: CPT

## 2018-08-03 PROCEDURE — 87077 CULTURE AEROBIC IDENTIFY: CPT

## 2018-08-03 PROCEDURE — 83020 HEMOGLOBIN ELECTROPHORESIS: CPT

## 2018-08-03 PROCEDURE — 36415 COLL VENOUS BLD VENIPUNCTURE: CPT

## 2018-08-03 PROCEDURE — 80081 OBSTETRIC PANEL INC HIV TSTG: CPT

## 2018-08-04 LAB — HBV SURFACE AG SER QL: NORMAL

## 2018-08-05 LAB — BACTERIA UR CULT: ABNORMAL

## 2018-08-06 ENCOUNTER — ROUTINE PRENATAL (OUTPATIENT)
Dept: OBGYN CLINIC | Facility: HOSPITAL | Age: 28
End: 2018-08-06
Payer: COMMERCIAL

## 2018-08-06 VITALS — BODY MASS INDEX: 22.22 KG/M2 | SYSTOLIC BLOOD PRESSURE: 104 MMHG | WEIGHT: 110 LBS | DIASTOLIC BLOOD PRESSURE: 69 MMHG

## 2018-08-06 DIAGNOSIS — Z3A.10 10 WEEKS GESTATION OF PREGNANCY: ICD-10-CM

## 2018-08-06 DIAGNOSIS — Z98.891 HX OF CESAREAN SECTION: ICD-10-CM

## 2018-08-06 DIAGNOSIS — N30.00 ACUTE CYSTITIS WITHOUT HEMATURIA: Primary | ICD-10-CM

## 2018-08-06 DIAGNOSIS — Z11.3 SCREENING EXAMINATION FOR STD (SEXUALLY TRANSMITTED DISEASE): ICD-10-CM

## 2018-08-06 LAB
HIV 1+2 AB+HIV1 P24 AG SERPL QL IA: NORMAL
RPR SER QL: NORMAL

## 2018-08-06 PROCEDURE — 87591 N.GONORRHOEAE DNA AMP PROB: CPT | Performed by: OBSTETRICS & GYNECOLOGY

## 2018-08-06 PROCEDURE — 87491 CHLMYD TRACH DNA AMP PROBE: CPT | Performed by: OBSTETRICS & GYNECOLOGY

## 2018-08-06 PROCEDURE — 99213 OFFICE O/P EST LOW 20 MIN: CPT | Performed by: OBSTETRICS & GYNECOLOGY

## 2018-08-06 RX ORDER — NITROFURANTOIN 25; 75 MG/1; MG/1
100 CAPSULE ORAL 2 TIMES DAILY
Qty: 14 CAPSULE | Refills: 0 | Status: SHIPPED | OUTPATIENT
Start: 2018-08-06 | End: 2018-08-13

## 2018-08-06 NOTE — PROGRESS NOTES
Assessment/Plan:      Diagnoses and all orders for this visit:    Acute cystitis without hematuria  -     nitrofurantoin (MACROBID) 100 mg capsule; Take 1 capsule (100 mg total) by mouth 2 (two) times a day for 7 days    Screening examination for STD (sexually transmitted disease)  -     Chlamydia/GC amplified DNA by PCR; Future  -     Chlamydia/GC amplified DNA by PCR    10 weeks gestation of pregnancy  [facility] new prenatal   Follow-up genetic screening  Hx of  section  Patient desires to try for a vaginal delivery, readdress periodically during the pregnancy  She was counseled on risks of uterine rupture being 1%  Anemia:  Prenatal panel with hemoglobin of 10 4, recommend high iron diet, consider starting iron tablets if new CBC at 28 weeks is low  Subjective:     Patient ID: Nic Beltrán is a 25 y o  female  HPI   25-year-old  001 with history of prior  section in  for the indication of category 2 tracing and failure to progress  Patient otherwise healthy, denies any significant past medical history  Currently taking her prenatal vitamins  Patient desires to try for a vaginal delivery this time  She was counseled on options for genetic screening, she has information for the  Center and will call for an appointment  Patient has a history of a Pap smear on  which was normal, new Pap smear not indicated on this visit  Denies any significant nausea or vomiting  Denies any vaginal bleeding, loss of fluid or contractions  Patient was found to have urinary tract infection with more than 100,000 UFC on urine culture  Prenatal Panel labs within normal limits except for slightly low hemoglobin of 10 4, recommended high iron diet,  Blood type Rh positive with negative antibody  Review of Systems   Constitutional: Negative  HENT: Negative  Respiratory: Negative  Cardiovascular: Negative  Gastrointestinal: Negative  Genitourinary: Negative  All other systems reviewed and are negative  Objective:     Physical Exam   Constitutional: She is oriented to person, place, and time  She appears well-developed and well-nourished  HENT:   Head: Normocephalic and atraumatic  Neck: Normal range of motion  Neck supple  Cardiovascular: Normal rate, regular rhythm and normal heart sounds  Pulmonary/Chest: Effort normal and breath sounds normal  No respiratory distress  She has no wheezes  She has no rales  Abdominal: Soft  Bowel sounds are normal  She exhibits no distension  There is no tenderness  There is no rebound and no guarding  Genitourinary: Vagina normal and uterus normal    Genitourinary Comments: Normal appearing cervix, closed, no vaginal bleeding or masses  Musculoskeletal: Normal range of motion  She exhibits no edema or deformity  Neurological: She is alert and oriented to person, place, and time  No cranial nerve deficit         Transabdominal ultrasound with active fetus and a heart rate of 164 beats per minute

## 2018-08-07 LAB
DEPRECATED HGB OTHER BLD-IMP: 0 %
HGB A MFR BLD: 2.6 % (ref 1.8–3.2)
HGB A MFR BLD: 97.4 % (ref 96.4–98.8)
HGB C MFR BLD: 0 %
HGB F MFR BLD: 0 % (ref 0–2)
HGB FRACT BLD-IMP: NORMAL
HGB S BLD QL SOLY: NEGATIVE
HGB S MFR BLD: 0 %

## 2018-08-08 LAB
CHLAMYDIA DNA CVX QL NAA+PROBE: NORMAL
N GONORRHOEA DNA GENITAL QL NAA+PROBE: NORMAL

## 2018-08-20 ENCOUNTER — ROUTINE PRENATAL (OUTPATIENT)
Dept: PERINATAL CARE | Facility: CLINIC | Age: 28
End: 2018-08-20
Payer: COMMERCIAL

## 2018-08-20 VITALS
BODY MASS INDEX: 21.77 KG/M2 | HEIGHT: 59 IN | HEART RATE: 83 BPM | SYSTOLIC BLOOD PRESSURE: 105 MMHG | WEIGHT: 108 LBS | DIASTOLIC BLOOD PRESSURE: 67 MMHG

## 2018-08-20 DIAGNOSIS — Z3A.12 12 WEEKS GESTATION OF PREGNANCY: ICD-10-CM

## 2018-08-20 DIAGNOSIS — Z36.82 ENCOUNTER FOR NUCHAL TRANSLUCENCY TESTING: ICD-10-CM

## 2018-08-20 DIAGNOSIS — O34.211 MATERNAL CARE DUE TO LOW TRANSVERSE UTERINE SCAR FROM PREVIOUS CESAREAN DELIVERY: Primary | ICD-10-CM

## 2018-08-20 PROCEDURE — 76813 OB US NUCHAL MEAS 1 GEST: CPT | Performed by: OBSTETRICS & GYNECOLOGY

## 2018-08-20 PROCEDURE — 99201 PR OFFICE OUTPATIENT NEW 10 MINUTES: CPT | Performed by: OBSTETRICS & GYNECOLOGY

## 2018-08-20 PROCEDURE — 76801 OB US < 14 WKS SINGLE FETUS: CPT | Performed by: OBSTETRICS & GYNECOLOGY

## 2018-08-27 ENCOUNTER — PATIENT OUTREACH (OUTPATIENT)
Dept: OBGYN CLINIC | Facility: HOSPITAL | Age: 28
End: 2018-08-27

## 2018-08-27 NOTE — PROGRESS NOTES
SW MET WITH PT ON HER REQUEST TO ASSIST WITH RESOURCES FOR HER 3 Y/O SON  INFORMATION FOR EARLY EDUCATION GIVEN  PT REPORTED IS FEELING BETTER  ISSUES AT HOME ARE BETTER  HAS MA  STATES FOB IS NOT INVOLVED BUT SHE IS COPING WELL  PT WITH QUESTION REGARDING OUTSTANDING BILLS  WAS DIRECTED TO MEET WITH FIN  COUNSELOR TODAY TO ADDRESS THEM  NO OTHER CONCERN AT THIS TIME

## 2018-08-30 ENCOUNTER — TELEPHONE (OUTPATIENT)
Dept: PERINATAL CARE | Facility: CLINIC | Age: 28
End: 2018-08-30

## 2018-09-07 ENCOUNTER — HOSPITAL ENCOUNTER (EMERGENCY)
Facility: HOSPITAL | Age: 28
Discharge: HOME | End: 2018-09-07
Attending: EMERGENCY MEDICINE | Admitting: EMERGENCY MEDICINE
Payer: COMMERCIAL

## 2018-09-07 ENCOUNTER — ROUTINE PRENATAL (OUTPATIENT)
Dept: OBGYN CLINIC | Facility: HOSPITAL | Age: 28
End: 2018-09-07
Payer: COMMERCIAL

## 2018-09-07 VITALS
SYSTOLIC BLOOD PRESSURE: 106 MMHG | HEART RATE: 73 BPM | HEIGHT: 58 IN | RESPIRATION RATE: 17 BRPM | DIASTOLIC BLOOD PRESSURE: 60 MMHG | TEMPERATURE: 96 F | OXYGEN SATURATION: 100 % | BODY MASS INDEX: 23.79 KG/M2 | WEIGHT: 113.32 LBS

## 2018-09-07 VITALS
HEART RATE: 77 BPM | WEIGHT: 113.4 LBS | SYSTOLIC BLOOD PRESSURE: 93 MMHG | DIASTOLIC BLOOD PRESSURE: 56 MMHG | HEIGHT: 58 IN | BODY MASS INDEX: 23.8 KG/M2

## 2018-09-07 DIAGNOSIS — B35.3 ATHLETE'S FOOT: Primary | ICD-10-CM

## 2018-09-07 DIAGNOSIS — Z3A.15 15 WEEKS GESTATION OF PREGNANCY: Primary | ICD-10-CM

## 2018-09-07 DIAGNOSIS — B36.9 FUNGAL SKIN INFECTION: ICD-10-CM

## 2018-09-07 DIAGNOSIS — N39.0 URINARY TRACT INFECTION WITHOUT HEMATURIA, SITE UNSPECIFIED: ICD-10-CM

## 2018-09-07 PROCEDURE — 99282 EMERGENCY DEPT VISIT SF MDM: CPT

## 2018-09-07 PROCEDURE — 87086 URINE CULTURE/COLONY COUNT: CPT | Performed by: OBSTETRICS & GYNECOLOGY

## 2018-09-07 PROCEDURE — 99213 OFFICE O/P EST LOW 20 MIN: CPT | Performed by: OBSTETRICS & GYNECOLOGY

## 2018-09-07 RX ORDER — CLOTRIMAZOLE 1 %
CREAM (GRAM) TOPICAL
Qty: 15 G | Refills: 0 | Status: SHIPPED | OUTPATIENT
Start: 2018-09-07 | End: 2018-10-05 | Stop reason: ALTCHOICE

## 2018-09-07 NOTE — DISCHARGE INSTRUCTIONS
Pie de atleta   LO QUE NECESITA SABER:   El pie de atleta es ike infección del pie causada por un hongo  INSTRUCCIONES SOBRE EL [facility] HOSPITALARIA:   Regrese a la hermilo de emergencias si:   · Usted tiene fiebre o escalofríos  · Usted tiene ike mike remedios que le sube por la pierna  Pregúntele a wilson Daphney Rumps vitaminas y minerales son adecuados para usted  · El sarpullido se propaga a otras partes del cuerpo  · Wilson infección no ha carlie en 14 días o no ha desaparecido por completo en 90 días  · La piel en wilson pie o pierna se ve enrojecida y se siente caliente  · Usted tiene preguntas o inquietudes acerca de wilson condición o cuidado  Medicamentos:   · Los medicamentos antimicóticos  pueden administrarse en forma de crema o píldora  Usted podría necesitar ike receta médica para lance medicamento  Use el medicamento hasta que lo termine, aunque kelvin pies aparenten estar sanos  · [place] kelvin medicamentos cata se le haya indicado  Consulte con wilson médico si usted hannah que wilson medicamento no le está ayudando o si presenta efectos secundarios  Infórmele si es alérgico a cualquier medicamento  Mantenga ike lista actualizada de los [place], las vitaminas y los productos herbales que lionel  Incluya los siguientes datos de los medicamentos: cantidad, frecuencia y motivo de administración  Traiga con usted la lista o los envases de la píldoras a kelvin citas de seguimiento  Lleve la lista de los medicamentos con usted en riddhi de ike emergencia  Acuda a kelvin consultas de control con wilson médico según le indicaron  Anote kelvin preguntas para que se acuerde de hacerlas catherine kelvin visitas  Evite el contagio del pie de atleta:   · Evite la propagación de la infección a otras partes del cuerpo  Al ducharse, seque el área de la katherine y otras partes del cuerpo antes de [employer]  · Mantenga kelvin pies limpios y secos  Lávese los pies todos los días y séquelos hansa, especialmente entre kelvin dedos   Después que kelvin pies estén secos, aplique polvo en kelvin pies y [employer] de kelvin dedos  Use calcetines limpios de algodón o obed  Póngase los calcetines liz para que no propague la infección a otras áreas de elam cuerpo  Use sandalias, zapatillas de francoise u otros zapatos que permiten que fluya el aire a kelvin pies  Cantrall ayudará a mantener kelvin pies secos  No use zapatos apretados o de plástico o de goma  · Remoje los pies en ike solución astringente (que seca) cata se le haya indicado  si usted tiene ampollas  Es posible que deba hacerlo por 20 a 30 minutos, 2 veces al día para ayudar a que se [address]  · Use zapatos en áreas públicas  Use sandalias de baño o sandalias en áreas cálidas y húmedas  Cantrall incluye cabinas de duchas, cerca de piscinas y vestuarios  No comparta calcetines o zapatos  No utilice piscinas públicas  © 2017 [facility] Information is for End User's use only and may not be sold, redistributed or otherwise used for commercial purposes  All illustrations and images included in CareNotes® are the copyrighted property of A D A M , Inc  or Saul [place]  Esta información es sólo para uso en educación  Elam intención no es darle un consejo médico sobre enfermedades o tratamientos  Colsulte con elam [place] Maribell farmacéutico antes de seguir cualquier régimen médico para saber si es seguro y efectivo para usted
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
## 2018-09-07 NOTE — PROGRESS NOTES
Brianda Cash is a 25 y o  female being seen today for her obstetrical visit  She is at 15w1d gestation  Patient reports no bleeding, no contractions, no cramping and no leaking  Fetal movement: normal     First trimester ultrasound at [facility] done on 18 showing variable presentation, normal fetal growth, anterior placenta  First part of sequential screen was also done at that time  Pt to get anatomy scan at 20 weeks  Menstrual History:  OB History      Para Term  AB Living    2 1 1     1    SAB TAB Ectopic Multiple Live Births            1        Patient's last menstrual period was 2018 (approximate)  Review of Systems  Pertinent items are noted in HPI  Objective     LMP 2018 (Approximate)   Uterine Size: 14 cm    FHR:  165    Assessment      26 yo  with Pregnancy at 15 and 1/7 weeks     Plan    Problem list reviewed and updated  Labs reviewed  Follow up in 4 weeks  First trimester ultrasound at [facility] done on 18 showing variable presentation, normal fetal growth, anterior placenta  First part of sequential screen was also done at that time  Pt to get anatomy scan and 2nd part of sequential screen at 20 weeks  Hx of UTI treated with Macrobid 4 weeks ago  RAYMOND done today

## 2018-09-08 LAB — BACTERIA UR CULT: ABNORMAL

## 2018-10-05 ENCOUNTER — ROUTINE PRENATAL (OUTPATIENT)
Dept: OBGYN CLINIC | Facility: HOSPITAL | Age: 28
End: 2018-10-05
Payer: COMMERCIAL

## 2018-10-05 VITALS
HEART RATE: 80 BPM | WEIGHT: 120 LBS | BODY MASS INDEX: 25.19 KG/M2 | HEIGHT: 58 IN | DIASTOLIC BLOOD PRESSURE: 63 MMHG | SYSTOLIC BLOOD PRESSURE: 98 MMHG

## 2018-10-05 DIAGNOSIS — Z3A.19 19 WEEKS GESTATION OF PREGNANCY: Primary | ICD-10-CM

## 2018-10-05 DIAGNOSIS — Z98.891 HX OF CESAREAN SECTION: ICD-10-CM

## 2018-10-05 DIAGNOSIS — Z34.92 PRENATAL CARE IN SECOND TRIMESTER: ICD-10-CM

## 2018-10-05 PROBLEM — Z34.90 EARLY STAGE OF PREGNANCY: Status: RESOLVED | Noted: 2018-07-24 | Resolved: 2018-10-05

## 2018-10-05 PROCEDURE — 99213 OFFICE O/P EST LOW 20 MIN: CPT | Performed by: OBSTETRICS & GYNECOLOGY

## 2018-10-05 NOTE — ASSESSMENT & PLAN NOTE
Continue routine prenatal care  Level II ultrasound scheduled for 10/12/18 -- follow up results  Yeast and urinary symptoms have resolved  Will send RAYMOND urine culture  Patient to RTC in 4 weeks for prenatal appointment at 23 weeks

## 2018-10-05 NOTE — PROGRESS NOTES
Patient has no complaints today  Denies contractions, vaginal bleeding, and LOF  Reports some fetal movement  Not consistent  Problem List Items Addressed This Visit        Other    Hx of  section     Discussed TOLAC vs planned repeat  section with patient  First  due to arrest of dilation  MFMu  success probability calculator 47 4% successful   - Discussion had with patient regarding risks of TOLAC including risk of uterine rupture of 0 4% and increased risk with induction agents such as pitocin  Explained to patient that a uterine rupture would be an obstetrical emergency and she would require a second  section not only for fetal safety but also for maternal safety as well  Explained to patient that her probability of success is slightly decreased due to the previous  secondary to arrest of dilation, however it is not guaranteed to fail nor can we guarantee success with her TOLAC  Explained to patient that due to her increased risks with a vaginal labor course, she would be monitored closely during her labor course, and may have earlier intervention of IUPC placement to monitor her contraction strength especially if pitocin was to be used  Patient expressed understanding of all that was discussed  Patient remains unsure of route of delivery at this time  Information regarding  vs repeat  section provided to patient  Please address at future prenatal visits         19 weeks gestation of pregnancy - Primary     Continue routine prenatal care  Level II ultrasound scheduled for 10/12/18 -- follow up results  Yeast and urinary symptoms have resolved  Will send RAYMOND urine culture  Patient to RTC in 4 weeks for prenatal appointment at 23 weeks  Relevant Orders    Urine culture      Other Visit Diagnoses     Prenatal care in second trimester            Casi Quiñones MD  OBGYN, [address]  10/5/2018 8:50 AM

## 2018-10-05 NOTE — PATIENT INSTRUCTIONS
Choosing Between Vaginal Birth After  or Repeat    WHAT YOU NEED TO KNOW:   What may increase my chance of having a vaginal birth after  ()? · Your  incision is in the lower part of your abdomen  · You have not had other surgeries on your uterus  · You have had a normal pregnancy  · You are younger than 40 years  · You have had more than 1 vaginal delivery  · Your labor begins on its own without the help of medicines  What may decrease my chance of having a ? · You have had more than 1   · You have a high vertical (up and down) incision in your abdomen    · Your uterus has ruptured during a previous delivery  · Your baby is in the breech position (bottom facing down)  · You have pregnancy problems or a medical condition that makes a vaginal delivery dangerous  · Your baby is 9 pounds or larger  · You are past your due date  What are some benefits of a ? · You will have a faster recovery  You can often go home within a couple of days after delivery  You may have less pain, and it may go away sooner  Your body may recover more quickly  You may be able to return to your daily activities sooner  · There are fewer health risks  Infection and injury to your organs are less likely  There is a lower risk of heavy bleeding  You may be able to walk around sooner  This may decrease the risk for blood clots  What are some risks of a ? The  scar on your uterus may rupture during delivery  This can cause serious health problems for you and your baby  The delivery may not go as planned and you may need another   What are some benefits of a ? A  is a safer option if you have a vertical incision in your upper abdomen from your previous   It may also be a safer option if you have certain pregnancy problems or medical conditions   If you want your tubes tied to prevent future pregnancies, it may be done at the same time as the   What are some risks of a ? You may bleed more than expected or get an infection  Your bladder or intestines may be injured  This can cause bleeding and lead to problems with your unborn baby  You may get a blood clot in your leg  This may become life-threatening  Multiple C-sections increase your risk of problems in future pregnancies  Your risk for placenta previa is increased if you have had more than 1   Placenta previa is a condition that causes your placenta to cover your cervix  CARE AGREEMENT:   You have the right to help plan your care  Learn about your health condition and how it may be treated  Discuss treatment options with your caregivers to decide what care you want to receive  You always have the right to refuse treatment  The above information is an  only  It is not intended as medical advice for individual conditions or treatments  Talk to your doctor, nurse or pharmacist before following any medical regimen to see if it is safe and effective for you  ©  [employer]  Information is for End User's use only and may not be sold, redistributed or otherwise used for commercial purposes  All illustrations and images included in CareNotes® are the copyrighted property of A D A M [employer]  or [employer]

## 2018-10-05 NOTE — ASSESSMENT & PLAN NOTE
Discussed TOLAC vs planned repeat  section with patient  First  due to arrest of dilation  MFMu  success probability calculator 47 4% successful   - Discussion had with patient regarding risks of TOLAC including risk of uterine rupture of 0 4% and increased risk with induction agents such as pitocin  Explained to patient that a uterine rupture would be an obstetrical emergency and she would require a second  section not only for fetal safety but also for maternal safety as well  Explained to patient that her probability of success is slightly decreased due to the previous  secondary to arrest of dilation, however it is not guaranteed to fail nor can we guarantee success with her TOLAC  Explained to patient that due to her increased risks with a vaginal labor course, she would be monitored closely during her labor course, and may have earlier intervention of IUPC placement to monitor her contraction strength especially if pitocin was to be used  Patient expressed understanding of all that was discussed  Patient remains unsure of route of delivery at this time  Information regarding  vs repeat  section provided to patient       Please address at future prenatal visits

## 2018-10-12 ENCOUNTER — ROUTINE PRENATAL (OUTPATIENT)
Dept: PERINATAL CARE | Facility: CLINIC | Age: 28
End: 2018-10-12
Payer: COMMERCIAL

## 2018-10-12 ENCOUNTER — LAB (OUTPATIENT)
Dept: LAB | Facility: HOSPITAL | Age: 28
End: 2018-10-12
Attending: OBSTETRICS & GYNECOLOGY
Payer: COMMERCIAL

## 2018-10-12 ENCOUNTER — TRANSCRIBE ORDERS (OUTPATIENT)
Dept: LAB | Facility: HOSPITAL | Age: 28
End: 2018-10-12

## 2018-10-12 VITALS
WEIGHT: 120.2 LBS | HEART RATE: 76 BPM | SYSTOLIC BLOOD PRESSURE: 93 MMHG | BODY MASS INDEX: 25.23 KG/M2 | DIASTOLIC BLOOD PRESSURE: 59 MMHG | HEIGHT: 58 IN

## 2018-10-12 DIAGNOSIS — Z3A.20 20 WEEKS GESTATION OF PREGNANCY: ICD-10-CM

## 2018-10-12 DIAGNOSIS — Z33.1 PREGNANT STATE, INCIDENTAL: Primary | ICD-10-CM

## 2018-10-12 DIAGNOSIS — Z36.9 RESEARCH REQUESTED ANTENATAL ULTRASOUND SCAN: ICD-10-CM

## 2018-10-12 DIAGNOSIS — Z3A.19 19 WEEKS GESTATION OF PREGNANCY: ICD-10-CM

## 2018-10-12 DIAGNOSIS — Z11.3 ENCOUNTER FOR SCREENING FOR INFECTIONS WITH PREDOMINANTLY SEXUAL MODE OF TRANSMISSION: ICD-10-CM

## 2018-10-12 DIAGNOSIS — Z98.891 HX OF CESAREAN SECTION: ICD-10-CM

## 2018-10-12 DIAGNOSIS — Z33.1 PREGNANT STATE, INCIDENTAL: ICD-10-CM

## 2018-10-12 LAB — HBV SURFACE AG SER QL: NORMAL

## 2018-10-12 PROCEDURE — 99213 OFFICE O/P EST LOW 20 MIN: CPT | Performed by: OBSTETRICS & GYNECOLOGY

## 2018-10-12 PROCEDURE — 87389 HIV-1 AG W/HIV-1&-2 AB AG IA: CPT

## 2018-10-12 PROCEDURE — 76811 OB US DETAILED SNGL FETUS: CPT | Performed by: OBSTETRICS & GYNECOLOGY

## 2018-10-12 PROCEDURE — 87077 CULTURE AEROBIC IDENTIFY: CPT

## 2018-10-12 PROCEDURE — 87186 SC STD MICRODIL/AGAR DIL: CPT

## 2018-10-12 PROCEDURE — 87086 URINE CULTURE/COLONY COUNT: CPT

## 2018-10-12 PROCEDURE — 36415 COLL VENOUS BLD VENIPUNCTURE: CPT

## 2018-10-12 PROCEDURE — 76817 TRANSVAGINAL US OBSTETRIC: CPT | Performed by: OBSTETRICS & GYNECOLOGY

## 2018-10-12 PROCEDURE — 86592 SYPHILIS TEST NON-TREP QUAL: CPT

## 2018-10-12 PROCEDURE — 87340 HEPATITIS B SURFACE AG IA: CPT

## 2018-10-12 NOTE — PROGRESS NOTES
A transvaginal ultrasound was performed  Sonographer note on use of High Level Disinfection Process (Trophon) for transvaginal probe# 1 used, serial [id number]    Lauren Guerra RDMS

## 2018-10-13 LAB — SCAN RESULT: NORMAL

## 2018-10-14 LAB — BACTERIA UR CULT: ABNORMAL

## 2018-10-15 LAB
HIV 1+2 AB+HIV1 P24 AG SERPL QL IA: NORMAL
RPR SER QL: NORMAL

## 2018-10-16 ENCOUNTER — TELEPHONE (OUTPATIENT)
Dept: PERINATAL CARE | Facility: CLINIC | Age: 28
End: 2018-10-16

## 2018-11-02 ENCOUNTER — ULTRASOUND (OUTPATIENT)
Dept: PERINATAL CARE | Facility: CLINIC | Age: 28
End: 2018-11-02
Payer: COMMERCIAL

## 2018-11-02 ENCOUNTER — ROUTINE PRENATAL (OUTPATIENT)
Dept: OBGYN CLINIC | Facility: HOSPITAL | Age: 28
End: 2018-11-02
Payer: COMMERCIAL

## 2018-11-02 VITALS
HEIGHT: 58 IN | BODY MASS INDEX: 25.48 KG/M2 | HEART RATE: 82 BPM | SYSTOLIC BLOOD PRESSURE: 96 MMHG | WEIGHT: 121.4 LBS | DIASTOLIC BLOOD PRESSURE: 51 MMHG

## 2018-11-02 VITALS
WEIGHT: 121.6 LBS | DIASTOLIC BLOOD PRESSURE: 58 MMHG | HEART RATE: 87 BPM | BODY MASS INDEX: 25.53 KG/M2 | HEIGHT: 58 IN | SYSTOLIC BLOOD PRESSURE: 95 MMHG

## 2018-11-02 DIAGNOSIS — Z34.92 PRENATAL CARE IN SECOND TRIMESTER: ICD-10-CM

## 2018-11-02 DIAGNOSIS — O34.219 HISTORY OF CESAREAN DELIVERY, ANTEPARTUM: ICD-10-CM

## 2018-11-02 DIAGNOSIS — Z3A.23 23 WEEKS GESTATION OF PREGNANCY: ICD-10-CM

## 2018-11-02 DIAGNOSIS — IMO0002 EVALUATE ANATOMY NOT SEEN ON PRIOR SONOGRAM: Primary | ICD-10-CM

## 2018-11-02 DIAGNOSIS — Z3A.23 23 WEEKS GESTATION OF PREGNANCY: Primary | ICD-10-CM

## 2018-11-02 DIAGNOSIS — Z98.891 HX OF CESAREAN SECTION: ICD-10-CM

## 2018-11-02 PROCEDURE — 76816 OB US FOLLOW-UP PER FETUS: CPT | Performed by: OBSTETRICS & GYNECOLOGY

## 2018-11-02 PROCEDURE — 99213 OFFICE O/P EST LOW 20 MIN: CPT | Performed by: NURSE PRACTITIONER

## 2018-11-02 PROCEDURE — 90471 IMMUNIZATION ADMIN: CPT

## 2018-11-02 PROCEDURE — 90686 IIV4 VACC NO PRSV 0.5 ML IM: CPT

## 2018-11-02 NOTE — LETTER
November 2, 2018     Patient: Cesar Jon   YOB: 1990   Date of Visit: 11/2/2018       To Whom it May Concern:    Cesar Jon is under my professional care  She was seen in my office on 11/2/2018  She is currently 23 weeks pregnant  With an estimated due date of 2/28/19  We recommend all pregnant women have a well ventilated work space, wear supportive low healed shoes, working no more than 40 hr per week, had a 15 min break every 2 hr and at least 30 min for a meal daily, have easy access to bathrooms and water  Patient should have assistance lifting, moving or transferring any item greater than 20 lb  If you have any questions or concerns, please don't hesitate to call           Sincerely,          ASHISH Javier        CC: No Recipients

## 2018-11-02 NOTE — PROGRESS NOTES
Denies loss of fluid, vaginal bleeding and abdominal pain  Confirms frequent fetal movement  Tolerating prenatal vitamin well  Recommendations reviewed for flu vaccine, patient agreeable to administration today  Requesting letter for work to be more specific regarding requiring assistance lifting greater than 20 lb  Patient states she gave preprinted work letter however employer is not following it given wording of recommended   Center ultrasound reviewed- anatomy scan- breech presentation, normal appearing fetal growth, anterior placenta, RABIA -WNL and EFW 11 oz  Recommendations for follow-up in 3-4 weeks for missed anatomy  Plan:  1  Continue prenatal vitamins daily  2  Follow-up with  Center as scheduled for missed anatomy  3  Flu vaccine today  4  28 week labs provided  Reviewed with patient to have labs drawn 1-2 days prior to next appointment  Blood type is O positive, does not need RhoGAM  5  Birth plan provided- will review at next visit  6  Work note provided requesting assistance lifting, transferring any items greater than 20 lb  7   Prior  section-  written information provided regarding repeat  section versus TOLAC  8   Common discomforts of pregnancy and precautions including  labor reviewed  RTO 4 weeks f/u  28 week labs, birth plan, delivery plan and ultrasound

## 2018-11-02 NOTE — PATIENT INSTRUCTIONS
Choosing Between Vaginal Birth After  or Repeat    WHAT YOU NEED TO KNOW:   What may increase my chance of having a vaginal birth after  ()? · Your  incision is in the lower part of your abdomen  · You have not had other surgeries on your uterus  · You have had a normal pregnancy  · You are younger than 40 years  · You have had more than 1 vaginal delivery  · Your labor begins on its own without the help of medicines  What may decrease my chance of having a ? · You have had more than 1   · You have a high vertical (up and down) incision in your abdomen    · Your uterus has ruptured during a previous delivery  · Your baby is in the breech position (bottom facing down)  · You have pregnancy problems or a medical condition that makes a vaginal delivery dangerous  · Your baby is 9 pounds or larger  · You are past your due date  What are some benefits of a ? · You will have a faster recovery  You can often go home within a couple of days after delivery  You may have less pain, and it may go away sooner  Your body may recover more quickly  You may be able to return to your daily activities sooner  · There are fewer health risks  Infection and injury to your organs are less likely  There is a lower risk of heavy bleeding  You may be able to walk around sooner  This may decrease the risk for blood clots  What are some risks of a ? The  scar on your uterus may rupture during delivery  This can cause serious health problems for you and your baby  The delivery may not go as planned and you may need another   What are some benefits of a ? A  is a safer option if you have a vertical incision in your upper abdomen from your previous   It may also be a safer option if you have certain pregnancy problems or medical conditions   If you want your tubes tied to prevent future pregnancies, it may be done at the same time as the   What are some risks of a ? You may bleed more than expected or get an infection  Your bladder or intestines may be injured  This can cause bleeding and lead to problems with your unborn baby  You may get a blood clot in your leg  This may become life-threatening  Multiple C-sections increase your risk of problems in future pregnancies  Your risk for placenta previa is increased if you have had more than 1   Placenta previa is a condition that causes your placenta to cover your cervix  CARE AGREEMENT:   You have the right to help plan your care  Learn about your health condition and how it may be treated  Discuss treatment options with your caregivers to decide what care you want to receive  You always have the right to refuse treatment  The above information is an  only  It is not intended as medical advice for individual conditions or treatments  Talk to your doctor, nurse or pharmacist before following any medical regimen to see if it is safe and effective for you  ©  [address] [facility] Information is for End User's use only and may not be sold, redistributed or otherwise used for commercial purposes  All illustrations and images included in CareNotes® are the copyrighted property of [employer]  or [employer]  Pregnancy at 23 to 26 [address]:   What changes are happening in my body? You are now close to or at the beginning of the third trimester  The third trimester starts at 24 weeks and ends with delivery  As your baby gets larger, you may develop certain symptoms  These may include pain in your back or down the sides of your abdomen  You may also have stretch marks on your abdomen, breasts, thighs, or buttocks  You may also have constipation  How do I care for myself at this stage of my pregnancy? · Eat a variety of healthy foods    Healthy foods include fruits, vegetables, whole-grain breads, low-fat dairy foods, beans, lean meats, and fish  Drink liquids as directed  Ask how much liquid to drink each day and which liquids are best for you  Limit caffeine to less than 200 milligrams each day  Limit your intake of fish to 2 servings each week  Choose fish low in mercury such as canned light tuna, shrimp, salmon, cod, or tilapia  Do not  eat fish high in mercury such as swordfish, tilefish, batool mackerel, and shark  · Manage back pain  Do not stand for long periods of time or lift heavy items  Use good posture while you stand, squat, or bend  Wear low-heeled shoes with good support  Rest may also help to relieve back pain  Ask your healthcare provider about exercises you can do to strengthen your back muscles  · Take prenatal vitamins as directed  Your need for certain vitamins and minerals, such as folic acid, increases during pregnancy  Prenatal vitamins provide some of the extra vitamins and minerals you need  Prenatal vitamins may also help to decrease the risk of certain birth defects  · Talk to your healthcare provider about exercise  Moderate exercise can help you stay fit  Your healthcare provider will help you plan an exercise program that is safe for you during pregnancy  · Do not smoke  If you smoke, it is never too late to quit  Smoking increases your risk of a miscarriage and other health problems during your pregnancy  Smoking can cause your baby to be born too early or weigh less at birth  Ask your healthcare provider for information if you need help quitting  · Do not drink alcohol  Alcohol passes from your body to your baby through the placenta  It can affect your baby's brain development and cause fetal alcohol syndrome (FAS)  FAS is a group of conditions that causes mental, behavior, and growth problems  · Talk to your healthcare provider before you take any medicines    Many medicines may harm your baby if you take them when you are pregnant  Do not take any medicines, vitamins, herbs, or supplements without first talking to your healthcare provider  Never use illegal or street drugs (such as marijuana or cocaine) while you are pregnant  What are some safety tips during pregnancy? · Avoid hot tubs and saunas  Do not use a hot tub or sauna while you are pregnant, especially during your first trimester  Hot tubs and saunas may raise your baby's temperature and increase the risk of birth defects  · Avoid toxoplasmosis  This is an infection caused by eating raw meat or being around infected cat feces  It can cause birth defects, miscarriages, and other problems  Wash your hands after you touch raw meat  Make sure any meat is well-cooked before you eat it  Avoid raw eggs and unpasteurized milk  Use gloves or ask someone else to clean your cat's litter box while you are pregnant  What changes are happening with my baby? By 26 weeks, your baby will weigh about 2 pounds  Your baby will be about 10 inches long from the top of the head to the rump (baby's bottom)  Your baby's movements are much stronger now  Your baby's eyes are almost completely formed and can partially open  Your baby also sleeps and wakes up  What do I need to know about prenatal care? Your healthcare provider will check your blood pressure and weight  You may also need the following:  · A urine test  may also be done to check for sugar and protein  These can be signs of gestational diabetes or infection  Protein in your urine may also be a sign of preeclampsia  Preeclampsia is a condition that can develop during week 20 or later of your pregnancy  It causes high blood pressure, and it can cause problems with your kidneys and other organs  · Fundal height  is a measurement of your uterus to check your baby's growth  This number is usually the same as the number of weeks that you have been pregnant  · Your baby's heart rate  will be checked    When should I seek immediate care? · You develop a severe headache that does not go away  · You have new or increased vision changes, such as blurred or spotted vision  · You have new or increased swelling in your face or hands  · You have vaginal spotting or bleeding  · Your water broke or you feel warm water gushing or trickling from your vagina  When should I contact my healthcare provider? · You have abdominal cramps, pressure, or tightening  · You have a change in vaginal discharge  · You have light bleeding  · You have chills or a fever  · You have vaginal itching, burning, or pain  · You have yellow, green, white, or foul-smelling vaginal discharge  · You have pain or burning when you urinate, less urine than usual, or pink or bloody urine  · You have questions or concerns about your condition or care  CARE AGREEMENT:   You have the right to help plan your care  Learn about your health condition and how it may be treated  Discuss treatment options with your caregivers to decide what care you want to receive  You always have the right to refuse treatment  The above information is an  only  It is not intended as medical advice for individual conditions or treatments  Talk to your doctor, nurse or pharmacist before following any medical regimen to see if it is safe and effective for you  © 2017 [employer] [employer] Information is for End User's use only and may not be sold, redistributed or otherwise used for commercial purposes  All illustrations and images included in CareNotes® are the copyrighted property of A D A M [employer]  or [employer]

## 2018-11-23 ENCOUNTER — APPOINTMENT (OUTPATIENT)
Dept: LAB | Facility: HOSPITAL | Age: 28
End: 2018-11-23
Payer: COMMERCIAL

## 2018-11-23 DIAGNOSIS — Z3A.23 23 WEEKS GESTATION OF PREGNANCY: ICD-10-CM

## 2018-11-23 DIAGNOSIS — Z34.92 PRENATAL CARE IN SECOND TRIMESTER: ICD-10-CM

## 2018-11-23 LAB
BASOPHILS # BLD AUTO: 0.03 THOUSANDS/ΜL (ref 0–0.1)
BASOPHILS NFR BLD AUTO: 0 % (ref 0–1)
EOSINOPHIL # BLD AUTO: 0.41 THOUSAND/ΜL (ref 0–0.61)
EOSINOPHIL NFR BLD AUTO: 4 % (ref 0–6)
ERYTHROCYTE [DISTWIDTH] IN BLOOD BY AUTOMATED COUNT: 12.8 % (ref 11.6–15.1)
GLUCOSE 1H P 50 G GLC PO SERPL-MCNC: 103 MG/DL
HCT VFR BLD AUTO: 31.2 % (ref 34.8–46.1)
HGB BLD-MCNC: 10.4 G/DL (ref 11.5–15.4)
IMM GRANULOCYTES # BLD AUTO: 0.11 THOUSAND/UL (ref 0–0.2)
IMM GRANULOCYTES NFR BLD AUTO: 1 % (ref 0–2)
LYMPHOCYTES # BLD AUTO: 1.83 THOUSANDS/ΜL (ref 0.6–4.47)
LYMPHOCYTES NFR BLD AUTO: 19 % (ref 14–44)
MCH RBC QN AUTO: 31.7 PG (ref 26.8–34.3)
MCHC RBC AUTO-ENTMCNC: 33.3 G/DL (ref 31.4–37.4)
MCV RBC AUTO: 95 FL (ref 82–98)
MONOCYTES # BLD AUTO: 0.6 THOUSAND/ΜL (ref 0.17–1.22)
MONOCYTES NFR BLD AUTO: 6 % (ref 4–12)
NEUTROPHILS # BLD AUTO: 6.81 THOUSANDS/ΜL (ref 1.85–7.62)
NEUTS SEG NFR BLD AUTO: 70 % (ref 43–75)
NRBC BLD AUTO-RTO: 0 /100 WBCS
PLATELET # BLD AUTO: 275 THOUSANDS/UL (ref 149–390)
PMV BLD AUTO: 9.9 FL (ref 8.9–12.7)
RBC # BLD AUTO: 3.28 MILLION/UL (ref 3.81–5.12)
WBC # BLD AUTO: 9.79 THOUSAND/UL (ref 4.31–10.16)

## 2018-11-23 PROCEDURE — 36415 COLL VENOUS BLD VENIPUNCTURE: CPT

## 2018-11-23 PROCEDURE — 85025 COMPLETE CBC W/AUTO DIFF WBC: CPT

## 2018-11-23 PROCEDURE — 82950 GLUCOSE TEST: CPT

## 2018-11-23 PROCEDURE — 86592 SYPHILIS TEST NON-TREP QUAL: CPT

## 2018-11-24 LAB — RPR SER QL: NORMAL

## 2018-11-26 DIAGNOSIS — O99.012 ANEMIA DURING PREGNANCY IN SECOND TRIMESTER: Primary | ICD-10-CM

## 2018-11-26 RX ORDER — DOCUSATE SODIUM 100 MG/1
100 CAPSULE, LIQUID FILLED ORAL 2 TIMES DAILY
Qty: 60 CAPSULE | Refills: 4 | Status: SHIPPED | OUTPATIENT
Start: 2018-11-26 | End: 2019-01-25

## 2018-11-26 RX ORDER — FERROUS SULFATE TAB EC 324 MG (65 MG FE EQUIVALENT) 324 (65 FE) MG
324 TABLET DELAYED RESPONSE ORAL
Qty: 60 TABLET | Refills: 4 | Status: SHIPPED | OUTPATIENT
Start: 2018-11-26 | End: 2018-12-28 | Stop reason: SDUPTHER

## 2018-11-30 ENCOUNTER — ROUTINE PRENATAL (OUTPATIENT)
Dept: OBGYN CLINIC | Facility: HOSPITAL | Age: 28
End: 2018-11-30
Payer: COMMERCIAL

## 2018-11-30 VITALS
HEIGHT: 58 IN | WEIGHT: 129.4 LBS | DIASTOLIC BLOOD PRESSURE: 60 MMHG | HEART RATE: 89 BPM | BODY MASS INDEX: 27.16 KG/M2 | SYSTOLIC BLOOD PRESSURE: 101 MMHG

## 2018-11-30 DIAGNOSIS — Z23 NEED FOR TDAP VACCINATION: ICD-10-CM

## 2018-11-30 DIAGNOSIS — Z34.92 PRENATAL CARE IN SECOND TRIMESTER: ICD-10-CM

## 2018-11-30 DIAGNOSIS — O23.42 URINARY TRACT INFECTION IN MOTHER DURING SECOND TRIMESTER OF PREGNANCY: ICD-10-CM

## 2018-11-30 DIAGNOSIS — Z3A.27 27 WEEKS GESTATION OF PREGNANCY: Primary | ICD-10-CM

## 2018-11-30 DIAGNOSIS — O99.012 ANEMIA DURING PREGNANCY IN SECOND TRIMESTER: ICD-10-CM

## 2018-11-30 PROCEDURE — 90715 TDAP VACCINE 7 YRS/> IM: CPT

## 2018-11-30 PROCEDURE — 99213 OFFICE O/P EST LOW 20 MIN: CPT

## 2018-11-30 PROCEDURE — 90471 IMMUNIZATION ADMIN: CPT

## 2018-11-30 RX ORDER — NITROFURANTOIN 25; 75 MG/1; MG/1
100 CAPSULE ORAL 2 TIMES DAILY
Qty: 14 CAPSULE | Refills: 0 | Status: SHIPPED | OUTPATIENT
Start: 2018-11-30 | End: 2018-12-07

## 2018-11-30 NOTE — PROGRESS NOTES
Denies loss of fluid, vaginal bleeding and abdominal pain  Confirms frequent fetal movement  Tolerating prenatal vitamin well  Twenty-eight week labs reviewed-significant for maternal anemia  Reviewed recommendation for iron twice a day on an empty stomach with orange juice  Increase iron in diet  Colace as needed for constipation  Reviewed recommendations for Tdap vaccine, patient is agreeable to vaccination today  Positive nitrites on urine dip this morning  Patient denies urinary symptoms  Patient plans include both breast and formula feeding, repeat  section and Depo-Provera for postpartum contraception  Plan:  1  Continue prenatal vitamin daily  2  Maternal anemia     -start iron twice a day on an empty stomach with orange juice     -Colace as needed for constipation  3  Tdap vaccine today  4  UTI in pregnancy-Positive nitrites on urine dip in office today     -urine culture ordered     -Rx Macrobid 100 mg 1 tablet twice a day for 7 days  5  Follow-up  Center as scheduled 19  5  Common discomforts of pregnancy and precautions including  labor reviewed    RTO 2 weeks f/u birth plan

## 2018-11-30 NOTE — PATIENT INSTRUCTIONS
Pregnancy at 32 to 28 [address]:   What changes are happening in my body? You may notice new symptoms such as shortness of breath, heartburn, or swelling of your ankles and feet  You may also have trouble sleeping or contractions  How do I care for myself at this stage of my pregnancy? · Eat a variety of healthy foods  Healthy foods include fruits, vegetables, whole-grain breads, low-fat dairy foods, beans, lean meats, and fish  Drink liquids as directed  Ask how much liquid to drink each day and which liquids are best for you  Limit caffeine to less than 200 milligrams each day  Limit your intake of fish to 2 servings each week  Choose fish low in mercury such as canned light tuna, shrimp, salmon, cod, or tilapia  Do not  eat fish high in mercury such as swordfish, tilefish, [place] mackerel, and shark  · Manage heartburn  by eating 4 or 5 small meals each day instead of large meals  Avoid spicy food  · Manage swelling  by lying down and putting your feet up  · Take prenatal vitamins as directed  Your need for certain vitamins and minerals, such as folic acid, increases during pregnancy  Prenatal vitamins provide some of the extra vitamins and minerals you need  Prenatal vitamins may also help to decrease the risk of certain birth defects  · Talk to your healthcare provider about exercise  Moderate exercise can help you stay fit  Your healthcare provider will help you plan an exercise program that is safe for you during pregnancy  · Do not smoke  If you smoke, it is never too late to quit  Smoking increases your risk of a miscarriage and other health problems during your pregnancy  Smoking can cause your baby to be born too early or weigh less at birth  Ask your healthcare provider for information if you need help quitting  · Do not drink alcohol  Alcohol passes from your body to your baby through the placenta   It can affect your baby's brain development and cause fetal alcohol syndrome (FAS)  FAS is a group of conditions that causes mental, behavior, and growth problems  · Talk to your healthcare provider before you take any medicines  Many medicines may harm your baby if you take them when you are pregnant  Do not take any medicines, vitamins, herbs, or supplements without first talking to your healthcare provider  Never use illegal or street drugs (such as marijuana or cocaine) while you are pregnant  What are some safety tips during pregnancy? · Avoid hot tubs and saunas  Do not use a hot tub or sauna while you are pregnant, especially during your first trimester  Hot tubs and saunas may raise your baby's temperature and increase the risk of birth defects  · Avoid toxoplasmosis  This is an infection caused by eating raw meat or being around infected cat feces  It can cause birth defects, miscarriages, and other problems  Wash your hands after you touch raw meat  Make sure any meat is well-cooked before you eat it  Avoid raw eggs and unpasteurized milk  Use gloves or ask someone else to clean your cat's litter box while you are pregnant  What changes are happening with my baby? By 30 weeks, your baby may weigh more than 3 pounds  Your baby may be about 11 inches long from the top of the head to the rump (baby's bottom)  Your baby's eyes open and close now  Your baby's kicks and movements are more forceful at this time  What do I need to know about prenatal care? Your healthcare provider will check your blood pressure and weight  You may also need the following:  · Blood tests  may be done to check for anemia or blood type  · A urine test  may also be done to check for sugar and protein  These can be signs of gestational diabetes or infection  Protein in your urine may also be a sign of preeclampsia  Preeclampsia is a condition that can develop during week 20 or later of your pregnancy   It causes high blood pressure, and it can cause problems with your kidneys and other organs  · A Tdap vaccine and flu vaccine  may be recommended by your healthcare provider  · A gestational diabetes screen  will be done using an oral glucose tolerance test (OGTT)  An OGTT starts with a blood sugar level check after you have not eaten for 8 hours  You are then given a glucose drink  Your blood sugar level is checked after 1 hour, 2 hours, and sometimes 3 hours  Healthcare providers look at how much your blood sugar level increases from the first check  · Fundal height  is a measurement of your uterus to check your baby's growth  This number is usually the same as the number of weeks that you have been pregnant  Your healthcare provider may also check your baby's position  · Your baby's heart rate  will be checked  When should I seek immediate care? · You develop a severe headache that does not go away  · You have new or increased vision changes, such as blurred or spotted vision  · You have new or increased swelling in your face or hands  · You have vaginal spotting or bleeding  · Your water broke or you feel warm water gushing or trickling from your vagina  When should I contact my healthcare provider? · You have more than 5 contractions in 1 hour  · You notice any changes in your baby's movements  · You have abdominal cramps, pressure, or tightening  · You have a change in vaginal discharge  · You have chills or a fever  · You have vaginal itching, burning, or pain  · You have yellow, green, white, or foul-smelling vaginal discharge  · You have pain or burning when you urinate, less urine than usual, or pink or bloody urine  · You have questions or concerns about your condition or care  CARE AGREEMENT:   You have the right to help plan your care  Learn about your health condition and how it may be treated  Discuss treatment options with your caregivers to decide what care you want to receive   You always have the right to refuse treatment  The above information is an  only  It is not intended as medical advice for individual conditions or treatments  Talk to your doctor, nurse or pharmacist before following any medical regimen to see if it is safe and effective for you  2017 [address] Information is for End User's use only and may not be sold, redistributed or otherwise used for commercial purposes  All illustrations and images included in CareNotes® are the copyrighted property of [employer]  or [employer] [place]  Choosing Between Vaginal Birth After  or Repeat    WHAT YOU NEED TO KNOW:   What may increase my chance of having a vaginal birth after  ()? · Your  incision is in the lower part of your abdomen  · You have not had other surgeries on your uterus  · You have had a normal pregnancy  · You are younger than 40 years  · You have had more than 1 vaginal delivery  · Your labor begins on its own without the help of medicines  What may decrease my chance of having a ? · You have had more than 1   · You have a high vertical (up and down) incision in your abdomen    · Your uterus has ruptured during a previous delivery  · Your baby is in the breech position (bottom facing down)  · You have pregnancy problems or a medical condition that makes a vaginal delivery dangerous  · Your baby is 9 pounds or larger  · You are past your due date  What are some benefits of a ? · You will have a faster recovery  You can often go home within a couple of days after delivery  You may have less pain, and it may go away sooner  Your body may recover more quickly  You may be able to return to your daily activities sooner  · There are fewer health risks  Infection and injury to your organs are less likely  There is a lower risk of heavy bleeding  You may be able to walk around sooner   This may decrease the risk for blood clots  What are some risks of a ? The  scar on your uterus may rupture during delivery  This can cause serious health problems for you and your baby  The delivery may not go as planned and you may need another   What are some benefits of a ? A  is a safer option if you have a vertical incision in your upper abdomen from your previous   It may also be a safer option if you have certain pregnancy problems or medical conditions  If you want your tubes tied to prevent future pregnancies, it may be done at the same time as the   What are some risks of a ? You may bleed more than expected or get an infection  Your bladder or intestines may be injured  This can cause bleeding and lead to problems with your unborn baby  You may get a blood clot in your leg  This may become life-threatening  Multiple C-sections increase your risk of problems in future pregnancies  Your risk for placenta previa is increased if you have had more than 1   Placenta previa is a condition that causes your placenta to cover your cervix  CARE AGREEMENT:   You have the right to help plan your care  Learn about your health condition and how it may be treated  Discuss treatment options with your caregivers to decide what care you want to receive  You always have the right to refuse treatment  The above information is an  only  It is not intended as medical advice for individual conditions or treatments  Talk to your doctor, nurse or pharmacist before following any medical regimen to see if it is safe and effective for you  2017 [address]  Information is for End User's use only and may not be sold, redistributed or otherwise used for commercial purposes  All illustrations and images included in CareNotes® are the copyrighted property of [employer] M [employer]  or [employer]    Laxative, Stool Softeners (By mouth) Treats constipation by helping you have a bowel movement  Brand Name(s): Col-Rite, Colace, Colace Clear, DSS, Diocto, Diocto Liquid, Doc-Q-Lace, Docuprene, Docusil, Dok, Dulcolax, Fleet Sof-Lax, [facility] Docusate Calcium, [facility] Stool Softener, [facility] Stool Softner   There may be other brand names for this medicine  When This Medicine Should Not Be Used: You should not use this medicine if you have severe stomach pain, nausea, or vomiting  Stool softeners should not be used if you have severe stomach pain and do not know the cause  How to Use This Medicine:   Capsule, Tablet, Liquid, Liquid Filled Capsule  · Your doctor will tell you how much medicine to use  Do not use more than directed  · Follow the instructions on the medicine label if you are using this medicine without a prescription  · Drink 6 to 8 glasses of water daily while using any laxative  · To make the oral liquid taste better, you may mix it with one-half glass of milk or fruit juice  · Measure the oral liquid medicine with a marked measuring spoon, oral syringe, medicine cup, or medicine dropper  If a dose is missed:   · Use the missed dose as soon as possible  · If you do not remember the missed dose until the next day, skip the missed dose and go back to your regular dosing schedule  · You should not use two doses at the same time  How to Store and Dispose of This Medicine:   · Store the medicine in a tightly closed container at room temperature, away from heat and moisture  Do not store liquid-filled capsules in the refrigerator  · Keep all medicine out of the reach of children  Drugs and Foods to Avoid:   Ask your doctor or pharmacist before using any other medicine, including over-the-counter medicines, vitamins, and herbal products  · You should not use mineral oil while you are using a stool softener    · You should not use a stool softener within 2 hours before or after taking any other medicines  Laxatives can keep other medicines from working correctly  Warnings While Using This Medicine:   · If you are pregnant or breastfeeding, talk to your doctor before taking this medicine  · Do not give laxatives to children under 10years old unless you talk to your doctor  · You should not use this laxative for longer than 1 week unless approved by your doctor  Laxatives may be habit-forming and can harm your bowels if you use them too long  · Stool softeners usually work in 1 to 2 days, but for some people, results can take as long as 3 to 5 days  Possible Side Effects While Using This Medicine: If you notice these less serious side effects, talk with your doctor:  · Nausea  · Sore throat  · Skin rash  If you notice other side effects that you think are caused by this medicine, tell your doctor  Call your doctor for medical advice about side effects  You may report side effects to FDA at 7-626-FDA-9222  © 2017 [address] Information is for End User's use only and may not be sold, redistributed or otherwise used for commercial purposes  The above information is an  only  It is not intended as medical advice for individual conditions or treatments  Talk to your doctor, nurse or pharmacist before following any medical regimen to see if it is safe and effective for you  Iron Deficiency Anemia   WHAT YOU NEED TO KNOW:   What is iron deficiency anemia? Iron deficiency anemia (CALLY) is low levels of red blood cells and hemoglobin caused by a lack of iron in the blood  Iron helps make hemoglobin  Hemoglobin is part of your red blood cell and helps carry oxygen to your body  The most common causes are blood loss and not enough iron in the foods you eat  What increases my risk for CALLY?    · A woman's monthly period    · Donating blood more than 5 times a year    · Pregnancy and breastfeeding    · A vegan diet    · NSAIDs such as ibuprofen or aspirin    · Trauma or bleeding in your intestines  What are the signs and symptoms of CALLY? · Weakness and tiredness    · Shortness of breath with activity    · Fast heartbeat or dizziness    · Headaches or trouble concentrating    · Pale skin    · Sore or swollen tongue and mouth    · Nails that break easily    · An urge to eat ice, paint, starch, or dirt  How is CALLY diagnosed? · Blood tests  will show how much iron is in your blood and how your body uses the iron  · A bowel movement sample  will show any blood in your bowel movement  · An endoscopy  is a procedure used to check for bleeding in your esophagus or stomach  An endoscope is a bendable tube with a light and camera on the end  It is put into your esophagus through your mouth and throat  · A colonoscopy  is a procedure used to check for bleeding in your intestines  A scope is put into your rectum  How is CALLY treated? · Iron or folic acid supplements  help increase hemoglobin and red blood cell levels  · Bowel movement softeners  may be needed if the iron supplements cause constipation  · A blood transfusion  may be needed if your anemia is severe  This will help replace the blood and iron you have lost   How can I manage my symptoms? · Eat foods rich in iron and protein  Nuts, meat, dark leafy green vegetables, and beans are high in iron and protein  Do not drink coffee, tea, or other liquids with caffeine  Limit milk to 2 cups a day  You may need to meet with a dietitian to create the right food plan for you  · Drink liquids as directed  to help prevent constipation  Ask how much liquid to drink each day and which liquids are best for you  Call 911 for any of the following:   · You have shortness of breath, even when you rest     When should I seek immediate care? · You have dark or bloody bowel movements  · You vomit blood  · You are too dizzy to stand up      · You have trouble swallowing because of the pain in your mouth and throat  When should I contact my healthcare provider? · You have heartburn, constipation, or diarrhea  · You have nausea or are vomiting  · You are dizzy or very tired  · You have questions or concerns about your condition or care  CARE AGREEMENT:   You have the right to help plan your care  Learn about your health condition and how it may be treated  Discuss treatment options with your caregivers to decide what care you want to receive  You always have the right to refuse treatment  The above information is an  only  It is not intended as medical advice for individual conditions or treatments  Talk to your doctor, nurse or pharmacist before following any medical regimen to see if it is safe and effective for you  © 2017 [address]  Information is for End User's use only and may not be sold, redistributed or otherwise used for commercial purposes  All illustrations and images included in CareNotes® are the copyrighted property of [employer] M [employer]  or [employer]  Nitrofurantoin Combination (By mouth)   Nitrofurantoin Monohydrate (fnd-sdlu-fqqe-AN-toyn vqc-yz-VEA-drate), Nitrofurantoin, Macrocrystals (nff-nbdm-jlwa-AN-toyn GPH-gmn-vjpw-tals)  Treats urinary tract infections caused by bacteria  This medicine is an antibiotic  Brand Name(s): Macrobid   There may be other brand names for this medicine  When This Medicine Should Not Be Used: You should not use this medicine if you have had an allergic reaction to nitrofurantoin or if you are in your last weeks of pregnancy (week 38 or later)  You should not use this medicine if you have severe kidney disease, if you are unable to urinate, or if you have a decreased amount of urine  Do not use this medicine if you have a history of liver disease with nitrofurantoin  How to Use This Medicine:   Capsule  · Your doctor will tell you how much medicine to use  Do not use more than directed    · It is best to take this medicine with food or milk  · Take all of the medicine in your prescription to clear up your infection, even if you feel better after the first few doses  If a dose is missed:   · Take a dose as soon as you remember  If it is almost time for your next dose, wait until then and take a regular dose  Do not take extra medicine to make up for a missed dose  How to Store and Dispose of This Medicine:   · Store the medicine in a closed container at room temperature, away from heat, moisture, and direct light  · Ask your pharmacist, doctor, or health caregiver about the best way to dispose of any outdated medicine or medicine no longer needed  · Keep all medicine out of the reach of children  Never share your medicine with anyone  Drugs and Foods to Avoid:   Ask your doctor or pharmacist before using any other medicine, including over-the-counter medicines, vitamins, and herbal products  · Make sure your doctor knows if you are also using probenecid (Benemid®) or sulfinpyrazone (Anturane®)  · It is best not to use antacids containing magnesium trisilicate (such as Foamicon® or Gaviscon®) while you are using nitrofurantoin  Warnings While Using This Medicine:   · Make sure your doctor knows if you are pregnant or breastfeeding, or if you have liver disease, heart disease, lung disease, anemia, diabetes, a mineral imbalance in the blood, or vitamin B deficiency  Make sure your doctor knows if you have a condition called [id number]  · This medicine may cause your urine to be a brown color  This is normal and will not affect how the medicine works  · This medicine can cause diarrhea  Call your doctor if the diarrhea becomes severe, does not stop, or is bloody  Do not take any medicine to stop diarrhea until you have talked to your doctor  Diarrhea can occur 2 months or more after you stop taking this medicine  · Use this medicine only to treat the infection you now have   This medicine will not work for colds, flu, or other virus infections  Possible Side Effects While Using This Medicine:   Call your doctor right away if you notice any of these side effects:  · Allergic reaction: Itching or hives, swelling in your face or hands, swelling or tingling in your mouth or throat, chest tightness, trouble breathing  · Blisters, peeling, or red skin rash  · Cough, fever, chills, weakness, shortness of breath, or chest pain  · Dark-colored urine or pale stools  · Diarrhea or loose, watery stools that may contain blood  · Nausea, vomiting, loss of appetite, or pain in your upper stomach  · Numbness, tingling, or burning pain in your hands, arms, legs, or feet  · Yellowing of your skin or the whites of your eyes  If you notice these less serious side effects, talk with your doctor:   · Dizziness, headache, or blurred vision  · Hair loss  · Mild nausea, vomiting, constipation, stomach upset, or pain  · Vaginal itching or fluids  If you notice other side effects that you think are caused by this medicine, tell your doctor  Call your doctor for medical advice about side effects  You may report side effects to FDA at 2-940-FDA-1068  © 2017 [address] Information is for End User's use only and may not be sold, redistributed or otherwise used for commercial purposes  The above information is an  only  It is not intended as medical advice for individual conditions or treatments  Talk to your doctor, nurse or pharmacist before following any medical regimen to see if it is safe and effective for you

## 2018-12-14 ENCOUNTER — PATIENT OUTREACH (OUTPATIENT)
Dept: OBGYN CLINIC | Facility: HOSPITAL | Age: 28
End: 2018-12-14

## 2018-12-14 ENCOUNTER — ROUTINE PRENATAL (OUTPATIENT)
Dept: OBGYN CLINIC | Facility: HOSPITAL | Age: 28
End: 2018-12-14
Payer: COMMERCIAL

## 2018-12-14 VITALS
HEART RATE: 93 BPM | BODY MASS INDEX: 27.66 KG/M2 | HEIGHT: 58 IN | SYSTOLIC BLOOD PRESSURE: 107 MMHG | WEIGHT: 131.8 LBS | DIASTOLIC BLOOD PRESSURE: 67 MMHG

## 2018-12-14 DIAGNOSIS — Z98.891 HX OF CESAREAN SECTION: ICD-10-CM

## 2018-12-14 DIAGNOSIS — O99.013 ANEMIA DURING PREGNANCY IN THIRD TRIMESTER: ICD-10-CM

## 2018-12-14 DIAGNOSIS — Z34.93 PRENATAL CARE IN THIRD TRIMESTER: Primary | ICD-10-CM

## 2018-12-14 DIAGNOSIS — Z3A.29 29 WEEKS GESTATION OF PREGNANCY: ICD-10-CM

## 2018-12-14 PROCEDURE — 99215 OFFICE O/P EST HI 40 MIN: CPT | Performed by: NURSE PRACTITIONER

## 2018-12-14 NOTE — PATIENT INSTRUCTIONS
Kick Counts in Pregnancy   AMBULATORY CARE:   Kick counts  measure how much your baby is moving in your womb  A kick from your baby can be felt as a twist, turn, swish, roll, or jab  It is common to feel your baby kicking at 26 to 28 weeks of pregnancy  You may feel your baby kick as early as 20 weeks of pregnancy  Seek care immediately if:   · You feel your baby kick less as the day goes on      · You do not feel any kicks in a day  Contact your healthcare provider if:   · You feel a change in the number of kicks or movements of your baby  · You feel fewer than 10 kicks within 2 hours after counting twice  · You have questions or concerns about your baby's movements  Why measure kick counts:  Your baby's movement may provide information about your baby's health  He may move less, or not at all, if there are problems  He may move less if he does not have enough room to grow in your uterus (womb)  He may also move less if he is not getting enough oxygen or nutrition from the placenta  Tell your healthcare provider as soon as you feel a change in your baby's movements  Problems that are found earlier are easier to treat  When to measure kick counts:   · Measure kick counts at the same time every day  · Measure kick counts when your baby is awake and most active  Your baby may be most active in the evening  · Measure kick counts after a meal or snack  Your baby may be more active after you eat  Wait 2 hours after you drink liquids that contain caffeine  Caffeine can make your baby more active than usual     · You should not smoke while you are pregnant  Smoking increases the risk of health problems for you and for your baby during your pregnancy  If you do smoke, wait 2 hours to measure kick counts  Nicotine can make your baby more active than usual   How to measure kick counts:  Check that your baby is awake before you measure kick counts   You can wake up your baby by lightly pushing on your belly, walking, or drinking something cold  Your healthcare provider may tell you different ways to measure kick counts  He may tell you to do the following:  · Use a chart or clock to keep track of the time you start and finish counting  · Sit in a chair or lie on your left side  · Place your hands on the largest part of your belly  · Count until you reach 10 kicks  Write down how much time it takes to count 10 kicks  · It may take 30 minutes to 2 hours to count 10 kicks  It should not take more than 2 hours to count 10 kicks  · If you do not feel 10 kicks within 2 hours, wait 1 hour and count again  Your baby can sleep for up to 40 minutes at one time  Follow up with your healthcare provider as directed:  Write down your questions so you remember to ask them during your visits  © 2017 [employer] Information is for End User's use only and may not be sold, redistributed or otherwise used for commercial purposes  All illustrations and images included in CareNotes® are the copyrighted property of [employer]  or Saul Westbrook  The above information is an  only  It is not intended as medical advice for individual conditions or treatments  Talk to your doctor, nurse or pharmacist before following any medical regimen to see if it is safe and effective for you  Pregnancy at 32 to 30 [address]:   What changes are happening in my body? You may notice new symptoms such as shortness of breath, heartburn, or swelling of your ankles and feet  You may also have trouble sleeping or contractions  How do I care for myself at this stage of my pregnancy? · Eat a variety of healthy foods  Healthy foods include fruits, vegetables, whole-grain breads, low-fat dairy foods, beans, lean meats, and fish  Drink liquids as directed  Ask how much liquid to drink each day and which liquids are best for you   Limit caffeine to less than 200 milligrams each day  Limit your intake of fish to 2 servings each week  Choose fish low in mercury such as canned light tuna, shrimp, salmon, cod, or tilapia  Do not  eat fish high in mercury such as swordfish, tilefish, batool mackerel, and shark  · Manage heartburn  by eating 4 or 5 small meals each day instead of large meals  Avoid spicy food  · Manage swelling  by lying down and putting your feet up  · Take prenatal vitamins as directed  Your need for certain vitamins and minerals, such as folic acid, increases during pregnancy  Prenatal vitamins provide some of the extra vitamins and minerals you need  Prenatal vitamins may also help to decrease the risk of certain birth defects  · Talk to your healthcare provider about exercise  Moderate exercise can help you stay fit  Your healthcare provider will help you plan an exercise program that is safe for you during pregnancy  · Do not smoke  If you smoke, it is never too late to quit  Smoking increases your risk of a miscarriage and other health problems during your pregnancy  Smoking can cause your baby to be born too early or weigh less at birth  Ask your healthcare provider for information if you need help quitting  · Do not drink alcohol  Alcohol passes from your body to your baby through the placenta  It can affect your baby's brain development and cause fetal alcohol syndrome (FAS)  FAS is a group of conditions that causes mental, behavior, and growth problems  · Talk to your healthcare provider before you take any medicines  Many medicines may harm your baby if you take them when you are pregnant  Do not take any medicines, vitamins, herbs, or supplements without first talking to your healthcare provider  Never use illegal or street drugs (such as marijuana or cocaine) while you are pregnant  What are some safety tips during pregnancy? · Avoid hot tubs and saunas    Do not use a hot tub or sauna while you are pregnant, especially during your first trimester  Hot tubs and saunas may raise your baby's temperature and increase the risk of birth defects  · Avoid toxoplasmosis  This is an infection caused by eating raw meat or being around infected cat feces  It can cause birth defects, miscarriages, and other problems  Wash your hands after you touch raw meat  Make sure any meat is well-cooked before you eat it  Avoid raw eggs and unpasteurized milk  Use gloves or ask someone else to clean your cat's litter box while you are pregnant  What changes are happening with my baby? By 30 weeks, your baby may weigh more than 3 pounds  Your baby may be about 11 inches long from the top of the head to the rump (baby's bottom)  Your baby's eyes open and close now  Your baby's kicks and movements are more forceful at this time  What do I need to know about prenatal care? Your healthcare provider will check your blood pressure and weight  You may also need the following:  · Blood tests  may be done to check for anemia or blood type  · A urine test  may also be done to check for sugar and protein  These can be signs of gestational diabetes or infection  Protein in your urine may also be a sign of preeclampsia  Preeclampsia is a condition that can develop during week 20 or later of your pregnancy  It causes high blood pressure, and it can cause problems with your kidneys and other organs  · A Tdap vaccine and flu vaccine  may be recommended by your healthcare provider  · A gestational diabetes screen  will be done using an oral glucose tolerance test (OGTT)  An OGTT starts with a blood sugar level check after you have not eaten for 8 hours  You are then given a glucose drink  Your blood sugar level is checked after 1 hour, 2 hours, and sometimes 3 hours  Healthcare providers look at how much your blood sugar level increases from the first check  · Fundal height  is a measurement of your uterus to check your baby's growth   This number is usually the same as the number of weeks that you have been pregnant  Your healthcare provider may also check your baby's position  · Your baby's heart rate  will be checked  When should I seek immediate care? · You develop a severe headache that does not go away  · You have new or increased vision changes, such as blurred or spotted vision  · You have new or increased swelling in your face or hands  · You have vaginal spotting or bleeding  · Your water broke or you feel warm water gushing or trickling from your vagina  When should I contact my healthcare provider? · You have more than 5 contractions in 1 hour  · You notice any changes in your baby's movements  · You have abdominal cramps, pressure, or tightening  · You have a change in vaginal discharge  · You have chills or a fever  · You have vaginal itching, burning, or pain  · You have yellow, green, white, or foul-smelling vaginal discharge  · You have pain or burning when you urinate, less urine than usual, or pink or bloody urine  · You have questions or concerns about your condition or care  CARE AGREEMENT:   You have the right to help plan your care  Learn about your health condition and how it may be treated  Discuss treatment options with your caregivers to decide what care you want to receive  You always have the right to refuse treatment  The above information is an  only  It is not intended as medical advice for individual conditions or treatments  Talk to your doctor, nurse or pharmacist before following any medical regimen to see if it is safe and effective for you  © 2017 [employer]  Information is for End User's use only and may not be sold, redistributed or otherwise used for commercial purposes  All illustrations and images included in CareNotes® are the copyrighted property of [employer]  or Saul Westbrook

## 2018-12-14 NOTE — PROGRESS NOTES
Denies loss of fluid, vaginal bleeding and abdominal pain  Confirms frequent fetal movement  Tolerating prenatal vitamin and iron well  Has not needed to take Colace for constipation  Denies urinary symptoms  States she completed entire course of Macrobid  Urine culture reviewed [id number] CFU E coli  Plan: 1  Continue prenatal vitamin daily  2  Fetal kick counts reviewed, encouraged daily and written information provided  3  Maternal anemia-continue iron twice a day and Colace as needed for constipation  4   center follow-up scheduled for 19  5   Common discomforts of pregnancy and precautions including  labor reviewed  RTO 2 weeks f/u birth plan

## 2018-12-14 NOTE — PROGRESS NOTES
SW MET WITH PT FOR FOLLOW UP  PT REPORTED IS DOING GOOD  STATES FOB IS SOMEWHAT INVOLVED  PT DENIES CURRENT ISSUES AT HOME  STATES IS GETTING READY WITH BABY ITEMS FOR THE UNBORN BABY GIRL  PT DENIES CONCERN AT THIS TIME  SW WILL FOLLOW UP AS NEEDED

## 2018-12-28 ENCOUNTER — ROUTINE PRENATAL (OUTPATIENT)
Dept: OBGYN CLINIC | Facility: HOSPITAL | Age: 28
End: 2018-12-28
Payer: COMMERCIAL

## 2018-12-28 VITALS
DIASTOLIC BLOOD PRESSURE: 54 MMHG | SYSTOLIC BLOOD PRESSURE: 101 MMHG | WEIGHT: 132 LBS | BODY MASS INDEX: 27.71 KG/M2 | HEART RATE: 84 BPM | HEIGHT: 58 IN

## 2018-12-28 DIAGNOSIS — O34.219 PREVIOUS CESAREAN SECTION COMPLICATING PREGNANCY: Primary | ICD-10-CM

## 2018-12-28 DIAGNOSIS — Z3A.31 31 WEEKS GESTATION OF PREGNANCY: ICD-10-CM

## 2018-12-28 DIAGNOSIS — O99.012 ANEMIA DURING PREGNANCY IN SECOND TRIMESTER: ICD-10-CM

## 2018-12-28 PROCEDURE — 99215 OFFICE O/P EST HI 40 MIN: CPT | Performed by: NURSE PRACTITIONER

## 2018-12-28 RX ORDER — FERROUS SULFATE TAB EC 324 MG (65 MG FE EQUIVALENT) 324 (65 FE) MG
324 TABLET DELAYED RESPONSE ORAL
Qty: 60 TABLET | Refills: 0 | Status: SHIPPED | OUTPATIENT
Start: 2018-12-28 | End: 2019-03-15

## 2018-12-28 NOTE — PROGRESS NOTES
Denies loss of fluid, vaginal bleeding and abdominal pain  Confirms frequent fetal movement  Tolerating prenatal vitamin and iron well, needs refill of iron  Has not needed to take Colace for constipation  No questions or concerns today  Does not have completed birth plan at today's visit  Plan:  1  Continue prenatal vitamin daily  2  Continue fetal kick counts daily  3  Maternal anemia-continue iron twice a day and Colace as needed for constipation  Refill of iron sent electronically  4   center follow-up scheduled 19  5   Common discomforts of pregnancy and precautions including   labor reviewed  RTO 2 weeks f/u US and birth plan

## 2018-12-28 NOTE — PATIENT INSTRUCTIONS
Kick Counts in Pregnancy   AMBULATORY CARE:   Kick counts  measure how much your baby is moving in your womb  A kick from your baby can be felt as a twist, turn, swish, roll, or jab  It is common to feel your baby kicking at 26 to 28 weeks of pregnancy  You may feel your baby kick as early as 20 weeks of pregnancy  Seek care immediately if:   · You feel your baby kick less as the day goes on      · You do not feel any kicks in a day  Contact your healthcare provider if:   · You feel a change in the number of kicks or movements of your baby  · You feel fewer than 10 kicks within 2 hours after counting twice  · You have questions or concerns about your baby's movements  Why measure kick counts:  Your baby's movement may provide information about your baby's health  He may move less, or not at all, if there are problems  He may move less if he does not have enough room to grow in your uterus (womb)  He may also move less if he is not getting enough oxygen or nutrition from the placenta  Tell your healthcare provider as soon as you feel a change in your baby's movements  Problems that are found earlier are easier to treat  When to measure kick counts:   · Measure kick counts at the same time every day  · Measure kick counts when your baby is awake and most active  Your baby may be most active in the evening  · Measure kick counts after a meal or snack  Your baby may be more active after you eat  Wait 2 hours after you drink liquids that contain caffeine  Caffeine can make your baby more active than usual     · You should not smoke while you are pregnant  Smoking increases the risk of health problems for you and for your baby during your pregnancy  If you do smoke, wait 2 hours to measure kick counts  Nicotine can make your baby more active than usual   How to measure kick counts:  Check that your baby is awake before you measure kick counts   You can wake up your baby by lightly pushing on your belly, walking, or drinking something cold  Your healthcare provider may tell you different ways to measure kick counts  He may tell you to do the following:  · Use a chart or clock to keep track of the time you start and finish counting  · Sit in a chair or lie on your left side  · Place your hands on the largest part of your belly  · Count until you reach 10 kicks  Write down how much time it takes to count 10 kicks  · It may take 30 minutes to 2 hours to count 10 kicks  It should not take more than 2 hours to count 10 kicks  · If you do not feel 10 kicks within 2 hours, wait 1 hour and count again  Your baby can sleep for up to 40 minutes at one time  Follow up with your healthcare provider as directed:  Write down your questions so you remember to ask them during your visits  © 2017 [employer] Information is for End User's use only and may not be sold, redistributed or otherwise used for commercial purposes  All illustrations and images included in CareNotes® are the copyrighted property of [employer]  or Saul Westbrook  The above information is an  only  It is not intended as medical advice for individual conditions or treatments  Talk to your doctor, nurse or pharmacist before following any medical regimen to see if it is safe and effective for you  Pregnancy at [address] to [address]:   What changes are happening in my body? You may continue to have symptoms such as shortness of breath, heartburn, contractions, or swelling of your ankles and feet  You may be gaining about 1 pound a week now  How do I care for myself at this stage of my pregnancy? · Eat a variety of healthy foods  Healthy foods include fruits, vegetables, whole-grain breads, low-fat dairy foods, beans, lean meats, and fish  Drink liquids as directed  Ask how much liquid to drink each day and which liquids are best for you   Limit caffeine to less than 200 milligrams each day  Limit your intake of fish to 2 servings each week  Choose fish low in mercury such as canned light tuna, shrimp, salmon, cod, or tilapia  Do not  eat fish high in mercury such as swordfish, tilefish, batool mackerel, and shark  · Manage heartburn  by eating 4 or 5 small meals each day instead of large meals  Avoid spicy food  · Manage swelling  by lying down and putting your feet up  · Take prenatal vitamins as directed  Your need for certain vitamins and minerals, such as folic acid, increases during pregnancy  Prenatal vitamins provide some of the extra vitamins and minerals you need  Prenatal vitamins may also help to decrease the risk of certain birth defects  · Talk to your healthcare provider about exercise  Moderate exercise can help you stay fit  Your healthcare provider will help you plan an exercise program that is safe for you during pregnancy  · Do not smoke  If you smoke, it is never too late to quit  Smoking increases your risk of a miscarriage and other health problems during your pregnancy  Smoking can cause your baby to be born too early or weigh less at birth  Ask your healthcare provider for information if you need help quitting  · Do not drink alcohol  Alcohol passes from your body to your baby through the placenta  It can affect your baby's brain development and cause fetal alcohol syndrome (FAS)  FAS is a group of conditions that causes mental, behavior, and growth problems  · Talk to your healthcare provider before you take any medicines  Many medicines may harm your baby if you take them when you are pregnant  Do not take any medicines, vitamins, herbs, or supplements without first talking to your healthcare provider  Never use illegal or street drugs (such as marijuana or cocaine) while you are pregnant  What are some safety tips during pregnancy? · Avoid hot tubs and saunas    Do not use a hot tub or sauna while you are pregnant, especially during your first trimester  Hot tubs and saunas may raise your baby's temperature and increase the risk of birth defects  · Avoid toxoplasmosis  This is an infection caused by eating raw meat or being around infected cat feces  It can cause birth defects, miscarriages, and other problems  Wash your hands after you touch raw meat  Make sure any meat is well-cooked before you eat it  Avoid raw eggs and unpasteurized milk  Use gloves or ask someone else to clean your cat's litter box while you are pregnant  What changes are happening with my baby? By 34 weeks, your baby may weigh more than 5 pounds  Your baby will be about 12 ½ inches long from the top of the head to the rump (baby's bottom)  Your baby is gaining about ½ pound a week  Your baby's eyes open and close now  Your baby's kicks and movements are more forceful at this time  What do I need to know about prenatal care? Your healthcare provider will check your blood pressure and weight  You may also need the following:  · A urine test  may also be done to check for sugar and protein  These can be signs of gestational diabetes or infection  Protein in your urine may also be a sign of preeclampsia  Preeclampsia is a condition that can develop during week 20 or later of your pregnancy  It causes high blood pressure, and it can cause problems with your kidneys and other organs  · A Tdap vaccine  may be recommended by your healthcare provider  · Fundal height  is a measurement of your uterus to check your baby's growth  This number is usually the same as the number of weeks that you have been pregnant  Your healthcare provider may also check your baby's position  · Your baby's heart rate  will be checked  When should I seek immediate care? · You develop a severe headache that does not go away  · You have new or increased vision changes, such as blurred or spotted vision      · You have new or increased swelling in your face or hands     · You have vaginal spotting or bleeding  · Your water broke or you feel warm water gushing or trickling from your vagina  When should I contact my healthcare provider? · You have more than 5 contractions in 1 hour  · You notice any changes in your baby's movements  · You have abdominal cramps, pressure, or tightening  · You have a change in vaginal discharge  · You have chills or a fever  · You have vaginal itching, burning, or pain  · You have yellow, green, white, or foul-smelling vaginal discharge  · You have pain or burning when you urinate, less urine than usual, or pink or bloody urine  · You have questions or concerns about your condition or care  CARE AGREEMENT:   You have the right to help plan your care  Learn about your health condition and how it may be treated  Discuss treatment options with your caregivers to decide what care you want to receive  You always have the right to refuse treatment  The above information is an  only  It is not intended as medical advice for individual conditions or treatments  Talk to your doctor, nurse or pharmacist before following any medical regimen to see if it is safe and effective for you  © 2017 [employer] Information is for End User's use only and may not be sold, redistributed or otherwise used for commercial purposes  All illustrations and images included in CareNotes® are the copyrighted property of [employer]  or Saul Westbrook

## 2019-01-03 PROBLEM — Z3A.32 32 WEEKS GESTATION OF PREGNANCY: Status: ACTIVE | Noted: 2018-08-20

## 2019-01-04 ENCOUNTER — ULTRASOUND (OUTPATIENT)
Dept: PERINATAL CARE | Facility: CLINIC | Age: 29
End: 2019-01-04
Payer: COMMERCIAL

## 2019-01-04 VITALS
DIASTOLIC BLOOD PRESSURE: 59 MMHG | HEIGHT: 58 IN | BODY MASS INDEX: 28.42 KG/M2 | HEART RATE: 89 BPM | WEIGHT: 135.4 LBS | SYSTOLIC BLOOD PRESSURE: 95 MMHG

## 2019-01-04 DIAGNOSIS — O34.219 PREVIOUS CESAREAN SECTION COMPLICATING PREGNANCY: Primary | ICD-10-CM

## 2019-01-04 DIAGNOSIS — Z36.89 ENCOUNTER FOR ULTRASOUND TO CHECK FETAL GROWTH: ICD-10-CM

## 2019-01-04 DIAGNOSIS — Z3A.32 32 WEEKS GESTATION OF PREGNANCY: ICD-10-CM

## 2019-01-04 PROCEDURE — 76816 OB US FOLLOW-UP PER FETUS: CPT | Performed by: OBSTETRICS & GYNECOLOGY

## 2019-01-04 NOTE — PROGRESS NOTES
[id number] [facility] [[email or web address] [address]: Ms Carlisle Snellen was seen today at 32w1d for fetal growth assessment ultrasound  See ultrasound report under "OB Procedures" tab  Please don't hesitate to contact our office with any concerns or questions    Mel Kilpatrick MD

## 2019-01-04 NOTE — PATIENT INSTRUCTIONS
Thank you for choosing [facility] for your  care today  If you have any questions about your ultrasound or care, please do not hesitate to contact us or your primary obstetrician  At this time, no additional ultrasounds are advised through the  center, however, if your doctors would like you to have any additional ultrasounds, they will let us know

## 2019-01-11 ENCOUNTER — ROUTINE PRENATAL (OUTPATIENT)
Dept: OBGYN CLINIC | Facility: CLINIC | Age: 29
End: 2019-01-11

## 2019-01-11 VITALS
DIASTOLIC BLOOD PRESSURE: 55 MMHG | HEART RATE: 87 BPM | WEIGHT: 137.8 LBS | HEIGHT: 58 IN | SYSTOLIC BLOOD PRESSURE: 107 MMHG | BODY MASS INDEX: 28.92 KG/M2

## 2019-01-11 DIAGNOSIS — Z3A.33 33 WEEKS GESTATION OF PREGNANCY: Primary | ICD-10-CM

## 2019-01-11 PROCEDURE — 99213 OFFICE O/P EST LOW 20 MIN: CPT | Performed by: OBSTETRICS & GYNECOLOGY

## 2019-01-11 NOTE — PROGRESS NOTES
A/P    26 y/o Female  currently at 33w1d presents for prenatal appointment  1  Third Trimester Pregnancy  -Growth scan from 19: vertex, and anterior placenta  -FMLA form filled out and placed in resident basket    -continue with ferrous sulfate BID along with colace prn for constipation   -unsure of postpartum contraception at this time   -Precautions and kick counts reviewed with patient    F/u in 2 weeks  Subjective:   26 y/o F  presents for prenatal appointment  Patient denies any vaginal bleeding, vaginal discharge, LOF, or contractions  Fetal movement present  She brings papers to fill out for FMLA  She desires repeat   She is taking her prenatal vitamins and iron supplements  Objective:   Vitals:    19 1051   BP: 107/55   Pulse: 87     Physical Exam   Constitutional: She is oriented to person, place, and time  She appears well-developed and well-nourished  No distress  HENT:   Head: Normocephalic and atraumatic  Right Ear: External ear normal    Eyes: EOM are normal  Right eye exhibits no discharge  Left eye exhibits no discharge  Cardiovascular: Normal rate, regular rhythm and intact distal pulses  Pulmonary/Chest: Effort normal  No respiratory distress  Abdominal: Soft  She exhibits distension  There is no tenderness  FHR: 144  FH: 33   Musculoskeletal: Normal range of motion  She exhibits no edema  Neurological: She is alert and oriented to person, place, and time  Skin: Skin is warm  Psychiatric: She has a normal mood and affect   Her behavior is normal      OB History      Para Term  AB Living    2 1 1     1    SAB TAB Ectopic Multiple Live Births            1

## 2019-01-25 ENCOUNTER — ROUTINE PRENATAL (OUTPATIENT)
Dept: OBGYN CLINIC | Facility: CLINIC | Age: 29
End: 2019-01-25

## 2019-01-25 VITALS
WEIGHT: 137 LBS | BODY MASS INDEX: 28.76 KG/M2 | HEART RATE: 74 BPM | SYSTOLIC BLOOD PRESSURE: 102 MMHG | HEIGHT: 58 IN | DIASTOLIC BLOOD PRESSURE: 65 MMHG

## 2019-01-25 DIAGNOSIS — O23.43 URINARY TRACT INFECTION IN MOTHER DURING THIRD TRIMESTER OF PREGNANCY: ICD-10-CM

## 2019-01-25 DIAGNOSIS — Z3A.35 35 WEEKS GESTATION OF PREGNANCY: ICD-10-CM

## 2019-01-25 DIAGNOSIS — O99.013 ANEMIA DURING PREGNANCY IN THIRD TRIMESTER: Primary | ICD-10-CM

## 2019-01-25 DIAGNOSIS — O34.219 PREVIOUS CESAREAN SECTION COMPLICATING PREGNANCY: ICD-10-CM

## 2019-01-25 PROCEDURE — 87077 CULTURE AEROBIC IDENTIFY: CPT | Performed by: NURSE PRACTITIONER

## 2019-01-25 PROCEDURE — 87086 URINE CULTURE/COLONY COUNT: CPT | Performed by: NURSE PRACTITIONER

## 2019-01-25 PROCEDURE — 99215 OFFICE O/P EST HI 40 MIN: CPT | Performed by: NURSE PRACTITIONER

## 2019-01-25 PROCEDURE — 87186 SC STD MICRODIL/AGAR DIL: CPT | Performed by: NURSE PRACTITIONER

## 2019-01-25 RX ORDER — NITROFURANTOIN 25; 75 MG/1; MG/1
100 CAPSULE ORAL 2 TIMES DAILY
Qty: 14 CAPSULE | Refills: 0 | Status: SHIPPED | OUTPATIENT
Start: 2019-01-25 | End: 2019-02-01

## 2019-01-25 NOTE — PATIENT INSTRUCTIONS
Kick Counts in Pregnancy   AMBULATORY CARE:   Kick counts  measure how much your baby is moving in your womb  A kick from your baby can be felt as a twist, turn, swish, roll, or jab  It is common to feel your baby kicking at 26 to 28 weeks of pregnancy  You may feel your baby kick as early as 20 weeks of pregnancy  Seek care immediately if:   · You feel your baby kick less as the day goes on      · You do not feel any kicks in a day  Contact your healthcare provider if:   · You feel a change in the number of kicks or movements of your baby  · You feel fewer than 10 kicks within 2 hours after counting twice  · You have questions or concerns about your baby's movements  Why measure kick counts:  Your baby's movement may provide information about your baby's health  He may move less, or not at all, if there are problems  He may move less if he does not have enough room to grow in your uterus (womb)  He may also move less if he is not getting enough oxygen or nutrition from the placenta  Tell your healthcare provider as soon as you feel a change in your baby's movements  Problems that are found earlier are easier to treat  When to measure kick counts:   · Measure kick counts at the same time every day  · Measure kick counts when your baby is awake and most active  Your baby may be most active in the evening  · Measure kick counts after a meal or snack  Your baby may be more active after you eat  Wait 2 hours after you drink liquids that contain caffeine  Caffeine can make your baby more active than usual     · You should not smoke while you are pregnant  Smoking increases the risk of health problems for you and for your baby during your pregnancy  If you do smoke, wait 2 hours to measure kick counts  Nicotine can make your baby more active than usual   How to measure kick counts:  Check that your baby is awake before you measure kick counts   You can wake up your baby by lightly pushing on your belly, walking, or drinking something cold  Your healthcare provider may tell you different ways to measure kick counts  He may tell you to do the following:  · Use a chart or clock to keep track of the time you start and finish counting  · Sit in a chair or lie on your left side  · Place your hands on the largest part of your belly  · Count until you reach 10 kicks  Write down how much time it takes to count 10 kicks  · It may take 30 minutes to 2 hours to count 10 kicks  It should not take more than 2 hours to count 10 kicks  · If you do not feel 10 kicks within 2 hours, wait 1 hour and count again  Your baby can sleep for up to 40 minutes at one time  Follow up with your healthcare provider as directed:  Write down your questions so you remember to ask them during your visits  © 2017 [address] Information is for End User's use only and may not be sold, redistributed or otherwise used for commercial purposes  All illustrations and images included in CareNotes® are the copyrighted property of [employer]  or Saul Westbrook  The above information is an  only  It is not intended as medical advice for individual conditions or treatments  Talk to your doctor, nurse or pharmacist before following any medical regimen to see if it is safe and effective for you  Pregnancy at 28 to [address]:   What changes are happening in my body? You are considered full term at the beginning of 37 weeks  Your breathing may be easier if your baby has moved down into a head-down position  You may need to urinate more often because the baby may be pressing on your bladder  You may also feel more discomfort and get tired easily  How do I care for myself at this stage of my pregnancy? · Eat a variety of healthy foods  Healthy foods include fruits, vegetables, whole-grain breads, low-fat dairy foods, beans, lean meats, and fish   Drink liquids as directed  Ask how much liquid to drink each day and which liquids are best for you  Limit caffeine to less than 200 milligrams each day  Limit your intake of fish to 2 servings each week  Choose fish low in mercury such as canned light tuna, shrimp, salmon, cod, or tilapia  Do not  eat fish high in mercury such as swordfish, tilefish, [place] mackerel, and shark  · Take prenatal vitamins as directed  Your need for certain vitamins and minerals, such as folic acid, increases during pregnancy  Prenatal vitamins provide some of the extra vitamins and minerals you need  Prenatal vitamins may also help to decrease the risk of certain birth defects  · Rest as needed  Put your feet up if you have swelling in your ankles and feet  · Do not smoke  If you smoke, it is never too late to quit  Smoking increases your risk of a miscarriage and other health problems during your pregnancy  Smoking can cause your baby to be born too early or weigh less at birth  Ask your healthcare provider for information if you need help quitting  · Do not drink alcohol  Alcohol passes from your body to your baby through the placenta  It can affect your baby's brain development and cause fetal alcohol syndrome (FAS)  FAS is a group of conditions that causes mental, behavior, and growth problems  · Talk to your healthcare provider before you take any medicines  Many medicines may harm your baby if you take them when you are pregnant  Do not take any medicines, vitamins, herbs, or supplements without first talking to your healthcare provider  Never use illegal or street drugs (such as marijuana or cocaine) while you are pregnant  · Talk to your healthcare provider before you travel  You may not be able to travel in an airplane after 36 weeks  He may also recommend that you avoid long road trips  What are some safety tips during pregnancy? · Avoid hot tubs and saunas    Do not use a hot tub or sauna while you are pregnant, especially during your first trimester  Hot tubs and saunas may raise your baby's temperature and increase the risk of birth defects  · Avoid toxoplasmosis  This is an infection caused by eating raw meat or being around infected cat feces  It can cause birth defects, miscarriages, and other problems  Wash your hands after you touch raw meat  Make sure any meat is well-cooked before you eat it  Avoid raw eggs and unpasteurized milk  Use gloves or ask someone else to clean your cat's litter box while you are pregnant  · Ask your healthcare provider about travel  The most comfortable time to travel is during the second trimester  Ask your healthcare provider if you can travel after 36 weeks  You may not be able to travel in an airplane after 36 weeks  He may also recommend that you avoid long road trips  What changes are happening with my baby? By 38 weeks, your baby may weigh between 6 and 9 pounds  Your baby may be about 14 inches long from the top of the head to the rump (baby's bottom)  Your baby hears well enough to know your voice  As your baby gets larger, you may feel fewer kicks and more stretching and rolling  Your baby may move into a head-down position  Your baby will also rest lower in your abdomen  What do I need to know about prenatal care? Your healthcare provider will check your blood pressure and weight  You may also need the following:  · A urine test  may also be done to check for sugar and protein  These can be signs of gestational diabetes or infection  Protein in your urine may also be a sign of preeclampsia  Preeclampsia is a condition that can develop during week 20 or later of your pregnancy  It causes high blood pressure, and it can cause problems with your kidneys and other organs  · A blood test  may be done to check for anemia (low iron level)  · A Tdap vaccine  may be recommended by your healthcare provider       · A group B strep test  is a test that is done to check for group B strep infection  Group B strep is a type of bacteria that may be found in the vagina or rectum  It can be passed to your baby during delivery if you have it  Your healthcare provider will take swab your vagina or rectum and send the sample to the lab for tests  · Fundal height  is a measurement of your uterus to check your baby's growth  This number is usually the same as the number of weeks that you have been pregnant  Your healthcare provider may also check your baby's position  · Your baby's heart rate  will be checked  When should I seek immediate care? · You develop a severe headache that does not go away  · You have new or increased vision changes, such as blurred or spotted vision  · You have new or increased swelling in your face or hands  · You have vaginal spotting or bleeding  · Your water broke or you feel warm water gushing or trickling from your vagina  When should I contact my healthcare provider? · You have more than 5 contractions in 1 hour  · You notice any changes in your baby's movements  · You have abdominal cramps, pressure, or tightening  · You have a change in vaginal discharge  · You have chills or a fever  · You have vaginal itching, burning, or pain  · You have yellow, green, white, or foul-smelling vaginal discharge  · You have pain or burning when you urinate, less urine than usual, or pink or bloody urine  · You have questions or concerns about your condition or care  CARE AGREEMENT:   You have the right to help plan your care  Learn about your health condition and how it may be treated  Discuss treatment options with your caregivers to decide what care you want to receive  You always have the right to refuse treatment  The above information is an  only  It is not intended as medical advice for individual conditions or treatments   Talk to your doctor, nurse or pharmacist before following any medical regimen to see if it is safe and effective for you  © 2017 [address] Information is for End User's use only and may not be sold, redistributed or otherwise used for commercial purposes  All illustrations and images included in CareNotes® are the copyrighted property of [employer]  or [employer]brook

## 2019-01-25 NOTE — PROGRESS NOTES
Denies loss of fluid, vaginal bleeding and abdominal pain  Confirms frequent fetal movement  Tolerating prenatal vitamin and iron well  Has not needed to take Colace for constipation  No questions or concerns today  No questions or concerns today  Urine dip in office was positive for nitrates will send urine for culture and treat today  Plan:  1  Continue prenatal vitamin daily  2  Continue fetal kick counts daily  3  Maternal anemia-continue iron twice a day and Colace as needed for constipation  4  Suspected urinary tract infection     - clean-catch urine sent     - Rx Macrobid 100 mg 1 tablet every 12 hr x7 days  5  Desires repeat  section     - repeat  section scheduled 19 @10am     - preoperative soap and showering instructions provided  6   Common discomforts of pregnancy and precautions including   labor reviewed patient verbalized understanding how to reach [facility] on-call after office hours  RTO 1 weeks GBS

## 2019-01-25 NOTE — LETTER
2019     Patient: Maria Del Carmen Erickson   YOB: 1990   Date of Visit: 2019       To Whom it May Concern:    Maria Del Carmen Erickson is under my professional care  She was seen in my office on 2019  She is scheduled for repeat  section on 19 and will need off of work  If you have any questions or concerns, please don't hesitate to call           Sincerely,          ASHISH Reid        CC: No Recipients

## 2019-01-27 LAB — BACTERIA UR CULT: ABNORMAL

## 2019-02-01 ENCOUNTER — ROUTINE PRENATAL (OUTPATIENT)
Dept: OBGYN CLINIC | Facility: CLINIC | Age: 29
End: 2019-02-01

## 2019-02-01 VITALS
HEART RATE: 90 BPM | WEIGHT: 139 LBS | SYSTOLIC BLOOD PRESSURE: 103 MMHG | BODY MASS INDEX: 29.18 KG/M2 | DIASTOLIC BLOOD PRESSURE: 66 MMHG | HEIGHT: 58 IN

## 2019-02-01 DIAGNOSIS — O99.013 ANEMIA DURING PREGNANCY IN THIRD TRIMESTER: Primary | ICD-10-CM

## 2019-02-01 DIAGNOSIS — Z3A.36 36 WEEKS GESTATION OF PREGNANCY: ICD-10-CM

## 2019-02-01 DIAGNOSIS — O34.219 PREVIOUS CESAREAN SECTION COMPLICATING PREGNANCY: ICD-10-CM

## 2019-02-01 PROBLEM — Z34.93 PRENATAL CARE IN THIRD TRIMESTER: Status: RESOLVED | Noted: 2018-11-02 | Resolved: 2019-02-01

## 2019-02-01 PROCEDURE — 99215 OFFICE O/P EST HI 40 MIN: CPT | Performed by: NURSE PRACTITIONER

## 2019-02-01 PROCEDURE — 87653 STREP B DNA AMP PROBE: CPT | Performed by: NURSE PRACTITIONER

## 2019-02-01 RX ORDER — FERROUS SULFATE 325(65) MG
1 TABLET ORAL
Refills: 4 | COMMUNITY
Start: 2019-01-24 | End: 2019-02-01

## 2019-02-01 NOTE — PROGRESS NOTES
Denies loss of fluid, vaginal bleeding and abdominal pain  Confirms frequent fetal movement  Tolerating prenatal vitamin and iron well  Has not needed to take Colace for constipation  Currently taking Macrobid for UTI >100,000 CFU E coli  Has 2 or 3 days left  Will send urine at next visit  No questions or concerns today  Plan:  1  Continue prenatal vitamin daily  2  Continue fetal kick counts daily  3  Maternal anemia-continue iron twice a day and Colace as needed for constipation  4  UTI- >100,000 cfu E coli     - encouraged to complete antibiotic course  Will send urine at next visit  5  Desires repeat  section     - repeat  section scheduled 19 @10am  6  GBS collected-no penicillin allergy  7  Common discomforts of pregnancy and precautions including   labor reviewed patient verbalized understanding how to reach [facility] on-call after office hours  RTO 1 weeks

## 2019-02-01 NOTE — PATIENT INSTRUCTIONS
Group B Streptococcus screen, rapid   GENERAL INFORMATION:  What is this test?  This test rapidly detects the presence of bacteria called group B streptococcus (GBS)  It is used when GBS carrier status (to be infected with this bacteria and not show any symptoms) is suspected in women  What are other names for this test?  · Streptococcus agalactiae latex screen  · Streptococcus Group B latex screen  Why do I need this test?  Laboratory tests may be done for many reasons  Tests are performed for routine health screenings or if a disease or toxicity is suspected  Lab tests may be used to determine if a medical condition is improving or worsening  Lab tests may also be used to measure the success or failure of a medication or treatment plan  Lab tests may be ordered for professional or legal reasons  You may need this test if you have:   · Group B Streptococcus carrier  When and how often should I have this test?  When and how often laboratory tests are done may depend on many factors  The timing of laboratory tests may rely on the results or completion of other tests, procedures, or treatments  Lab tests may be performed immediately in an emergency, or tests may be delayed as a condition is treated or monitored  A test may be suggested or become necessary when certain signs or symptoms appear  Due to changes in the way your body naturally functions through the course of a day, lab tests may need to be performed at a certain time of day  If you have prepared for a test by changing your food or fluid intake, lab tests may be timed in accordance with those changes  Timing of tests may be based on increased and decreased levels of medications, drugs or other substances in the body  The age or gender of the person being tested may affect when and how often a lab test is required  Chronic or progressive conditions may need ongoing monitoring through the use of lab tests   Conditions that worsen and improve may also need frequent monitoring  Certain tests may be repeated to obtain a series of results, or tests may need to be repeated to confirm or disprove results  Timing and frequency of lab tests may vary if they are performed for professional or legal reasons  How should I get ready for the test?  Ask the healthcare worker for information about how to prepare for this test    How is the test done? A sample of vaginal cells and discharge, or anal cells may be collected for this test  Both of these samples may be needed for this test   Vaginal cells/discharge:   A vaginal swab is done to collect a sample from the lower part of your vagina  You will be asked to lie on your back with your legs spread and feet placed on stirrups  A special kind of swab will be inserted into your lower vagina or just near the entrance of the vagina  The swab will be rotated gently and then remain still for a few seconds before it is removed  This is done make sure enough secretions have been collected for the test  The sample is then sent for testing  Anal cells: An anal swab is done to collect a sample from your rear end  You may be asked to lie on your back with your legs spread and feet placed in stirrups  A special kind of swab will be inserted an inch into your rear end  The swab will be rotated gently before it is removed  The sample is then sent for testing  How will the test feel? The amount of discomfort you feel will depend on many factors, including your sensitivity to pain  Communicate how you are feeling with the person doing the test  Inform the person doing the test if you feel that you cannot continue with the test   Vaginal cells/discharge:   During a vaginal swab, you may feel discomfort when the swab moves in your vagina  Anal cells:   During an anal swab, you may feel discomfort when the swab moves in your rear end    What should I do after the test?  There are no special instructions to follow after this test    What are the risks? Vaginal cells/discharge: Ask the healthcare worker to explain any risks of this test to you before it is performed  Anal cells: Ask the healthcare worker to explain any risks of this test to you before it is performed  What are normal results for this test?  Laboratory test results may vary depending on your age, gender, health history, the method used for the test, and many other factors  If your results are different from the results suggested below, this may not mean that you have a disease  Contact your healthcare worker if you have any questions  The following is considered to be a normal result for this test:  What follow up should I do after this test?  Ask your healthcare worker how you will be informed of the test results  You may be asked to call for results, schedule an appointment to discuss results, or notified of results by mail  Follow up care varies depending on many factors related to your test  Sometimes there is no follow up after you have been notified of test results  At other times follow up may be suggested or necessary  Some examples of follow up care include changes to medication or treatment plans, referral to a specialist, more or less frequent monitoring, and additional tests or procedures  Talk with your healthcare worker about any concerns or questions you have regarding follow up care or instructions  Where can I get more information? Related Companies   ? Centers for Disease Control and Prevention (CDC) - [employer] it  ? American Academy [employer]icians - http://www  aafp org    CARE AGREEMENT:   You have the right to help plan your care  To help with this plan, you must learn about your health condition and how it may be treated  You can then discuss treatment options with your caregivers  Work with them to decide what care may be used to treat you  You always have the right to refuse treatment       © Copyright [employer] 2018  Information is for End User's use only and may not be sold, redistributed or otherwise used for commercial purposes  The above information is an  only  It is not intended as a substitute for medical advice for your individual conditions or treatments  Talk to your doctor, nurse or pharmacist before following any medical regimen to see if it is safe and effective for you  Kick Counts in Pregnancy   AMBULATORY CARE:   Kick counts  measure how much your baby is moving in your womb  A kick from your baby can be felt as a twist, turn, swish, roll, or jab  It is common to feel your baby kicking at 26 to 28 weeks of pregnancy  You may feel your baby kick as early as 20 weeks of pregnancy  Seek care immediately if:   · You feel your baby kick less as the day goes on      · You do not feel any kicks in a day  Contact your healthcare provider if:   · You feel a change in the number of kicks or movements of your baby  · You feel fewer than 10 kicks within 2 hours after counting twice  · You have questions or concerns about your baby's movements  Why measure kick counts:  Your baby's movement may provide information about your baby's health  He may move less, or not at all, if there are problems  He may move less if he does not have enough room to grow in your uterus (womb)  He may also move less if he is not getting enough oxygen or nutrition from the placenta  Tell your healthcare provider as soon as you feel a change in your baby's movements  Problems that are found earlier are easier to treat  When to measure kick counts:   · Measure kick counts at the same time every day  · Measure kick counts when your baby is awake and most active  Your baby may be most active in the evening  · Measure kick counts after a meal or snack  Your baby may be more active after you eat  Wait 2 hours after you drink liquids that contain caffeine   Caffeine can make your baby more active than usual     · You should not smoke while you are pregnant  Smoking increases the risk of health problems for you and for your baby during your pregnancy  If you do smoke, wait 2 hours to measure kick counts  Nicotine can make your baby more active than usual   How to measure kick counts:  Check that your baby is awake before you measure kick counts  You can wake up your baby by lightly pushing on your belly, walking, or drinking something cold  Your healthcare provider may tell you different ways to measure kick counts  He may tell you to do the following:  · Use a chart or clock to keep track of the time you start and finish counting  · Sit in a chair or lie on your left side  · Place your hands on the largest part of your belly  · Count until you reach 10 kicks  Write down how much time it takes to count 10 kicks  · It may take 30 minutes to 2 hours to count 10 kicks  It should not take more than 2 hours to count 10 kicks  · If you do not feel 10 kicks within 2 hours, wait 1 hour and count again  Your baby can sleep for up to 40 minutes at one time  Follow up with your healthcare provider as directed:  Write down your questions so you remember to ask them during your visits  © 2017 [facility] Information is for End User's use only and may not be sold, redistributed or otherwise used for commercial purposes  All illustrations and images included in CareNotes® are the copyrighted property of [employer] M [employer]  or [employer]brook  The above information is an  only  It is not intended as medical advice for individual conditions or treatments  Talk to your doctor, nurse or pharmacist before following any medical regimen to see if it is safe and effective for you  Pregnancy at [address] to [address]:   What changes are happening in my body? You are considered full term at the beginning of 37 weeks  Your breathing may be easier if your baby has moved down into a head-down position  You may need to urinate more often because the baby may be pressing on your bladder  You may also feel more discomfort and get tired easily  How do I care for myself at this stage of my pregnancy? · Eat a variety of healthy foods  Healthy foods include fruits, vegetables, whole-grain breads, low-fat dairy foods, beans, lean meats, and fish  Drink liquids as directed  Ask how much liquid to drink each day and which liquids are best for you  Limit caffeine to less than 200 milligrams each day  Limit your intake of fish to 2 servings each week  Choose fish low in mercury such as canned light tuna, shrimp, salmon, cod, or tilapia  Do not  eat fish high in mercury such as swordfish, tilefish, [place] mackerel, and shark  · Take prenatal vitamins as directed  Your need for certain vitamins and minerals, such as folic acid, increases during pregnancy  Prenatal vitamins provide some of the extra vitamins and minerals you need  Prenatal vitamins may also help to decrease the risk of certain birth defects  · Rest as needed  Put your feet up if you have swelling in your ankles and feet  · Do not smoke  If you smoke, it is never too late to quit  Smoking increases your risk of a miscarriage and other health problems during your pregnancy  Smoking can cause your baby to be born too early or weigh less at birth  Ask your healthcare provider for information if you need help quitting  · Do not drink alcohol  Alcohol passes from your body to your baby through the placenta  It can affect your baby's brain development and cause fetal alcohol syndrome (FAS)  FAS is a group of conditions that causes mental, behavior, and growth problems  · Talk to your healthcare provider before you take any medicines  Many medicines may harm your baby if you take them when you are pregnant  Do not take any medicines, vitamins, herbs, or supplements without first talking to your healthcare provider   Never use illegal or street drugs (such as marijuana or cocaine) while you are pregnant  · Talk to your healthcare provider before you travel  You may not be able to travel in an airplane after 36 weeks  He may also recommend that you avoid long road trips  What are some safety tips during pregnancy? · Avoid hot tubs and saunas  Do not use a hot tub or sauna while you are pregnant, especially during your first trimester  Hot tubs and saunas may raise your baby's temperature and increase the risk of birth defects  · Avoid toxoplasmosis  This is an infection caused by eating raw meat or being around infected cat feces  It can cause birth defects, miscarriages, and other problems  Wash your hands after you touch raw meat  Make sure any meat is well-cooked before you eat it  Avoid raw eggs and unpasteurized milk  Use gloves or ask someone else to clean your cat's litter box while you are pregnant  · Ask your healthcare provider about travel  The most comfortable time to travel is during the second trimester  Ask your healthcare provider if you can travel after 36 weeks  You may not be able to travel in an airplane after 36 weeks  He may also recommend that you avoid long road trips  What changes are happening with my baby? By 38 weeks, your baby may weigh between 6 and 9 pounds  Your baby may be about 14 inches long from the top of the head to the rump (baby's bottom)  Your baby hears well enough to know your voice  As your baby gets larger, you may feel fewer kicks and more stretching and rolling  Your baby may move into a head-down position  Your baby will also rest lower in your abdomen  What do I need to know about prenatal care? Your healthcare provider will check your blood pressure and weight  You may also need the following:  · A urine test  may also be done to check for sugar and protein  These can be signs of gestational diabetes or infection  Protein in your urine may also be a sign of preeclampsia   Preeclampsia is a condition that can develop during week 20 or later of your pregnancy  It causes high blood pressure, and it can cause problems with your kidneys and other organs  · A blood test  may be done to check for anemia (low iron level)  · A Tdap vaccine  may be recommended by your healthcare provider  · A group B strep test  is a test that is done to check for group B strep infection  Group B strep is a type of bacteria that may be found in the vagina or rectum  It can be passed to your baby during delivery if you have it  Your healthcare provider will take swab your vagina or rectum and send the sample to the lab for tests  · Fundal height  is a measurement of your uterus to check your baby's growth  This number is usually the same as the number of weeks that you have been pregnant  Your healthcare provider may also check your baby's position  · Your baby's heart rate  will be checked  When should I seek immediate care? · You develop a severe headache that does not go away  · You have new or increased vision changes, such as blurred or spotted vision  · You have new or increased swelling in your face or hands  · You have vaginal spotting or bleeding  · Your water broke or you feel warm water gushing or trickling from your vagina  When should I contact my healthcare provider? · You have more than 5 contractions in 1 hour  · You notice any changes in your baby's movements  · You have abdominal cramps, pressure, or tightening  · You have a change in vaginal discharge  · You have chills or a fever  · You have vaginal itching, burning, or pain  · You have yellow, green, white, or foul-smelling vaginal discharge  · You have pain or burning when you urinate, less urine than usual, or pink or bloody urine  · You have questions or concerns about your condition or care  CARE AGREEMENT:   You have the right to help plan your care   Learn about your health condition and how it may be treated  Discuss treatment options with your caregivers to decide what care you want to receive  You always have the right to refuse treatment  The above information is an  only  It is not intended as medical advice for individual conditions or treatments  Talk to your doctor, nurse or pharmacist before following any medical regimen to see if it is safe and effective for you  © 2017 [address] [employer] Information is for End User's use only and may not be sold, redistributed or otherwise used for commercial purposes  All illustrations and images included in CareNotes® are the copyrighted property of [employer] M [employer] Inc  or [employer] Westbrook

## 2019-02-03 LAB — GP B STREP DNA SPEC QL NAA+PROBE: ABNORMAL

## 2019-02-04 PROBLEM — B95.1 POSITIVE GBS TEST: Status: ACTIVE | Noted: 2019-02-04

## 2019-02-08 ENCOUNTER — PATIENT OUTREACH (OUTPATIENT)
Dept: OBGYN CLINIC | Facility: CLINIC | Age: 29
End: 2019-02-08

## 2019-02-08 ENCOUNTER — ROUTINE PRENATAL (OUTPATIENT)
Dept: OBGYN CLINIC | Facility: CLINIC | Age: 29
End: 2019-02-08

## 2019-02-08 VITALS
WEIGHT: 142.6 LBS | SYSTOLIC BLOOD PRESSURE: 100 MMHG | BODY MASS INDEX: 29.93 KG/M2 | DIASTOLIC BLOOD PRESSURE: 59 MMHG | HEART RATE: 80 BPM | HEIGHT: 58 IN

## 2019-02-08 DIAGNOSIS — Z3A.37 37 WEEKS GESTATION OF PREGNANCY: ICD-10-CM

## 2019-02-08 DIAGNOSIS — O99.013 ANEMIA DURING PREGNANCY IN THIRD TRIMESTER: ICD-10-CM

## 2019-02-08 DIAGNOSIS — O34.219 PREVIOUS CESAREAN SECTION COMPLICATING PREGNANCY: Primary | ICD-10-CM

## 2019-02-08 DIAGNOSIS — O23.42 URINARY TRACT INFECTION IN MOTHER DURING SECOND TRIMESTER OF PREGNANCY: ICD-10-CM

## 2019-02-08 PROCEDURE — 99215 OFFICE O/P EST HI 40 MIN: CPT | Performed by: NURSE PRACTITIONER

## 2019-02-08 PROCEDURE — 87077 CULTURE AEROBIC IDENTIFY: CPT | Performed by: NURSE PRACTITIONER

## 2019-02-08 PROCEDURE — 87186 SC STD MICRODIL/AGAR DIL: CPT | Performed by: NURSE PRACTITIONER

## 2019-02-08 PROCEDURE — 87086 URINE CULTURE/COLONY COUNT: CPT | Performed by: NURSE PRACTITIONER

## 2019-02-08 NOTE — PATIENT INSTRUCTIONS
Kick Counts in Pregnancy   AMBULATORY CARE:   Kick counts  measure how much your baby is moving in your womb  A kick from your baby can be felt as a twist, turn, swish, roll, or jab  It is common to feel your baby kicking at 26 to 28 weeks of pregnancy  You may feel your baby kick as early as 20 weeks of pregnancy  Seek care immediately if:   · You feel your baby kick less as the day goes on      · You do not feel any kicks in a day  Contact your healthcare provider if:   · You feel a change in the number of kicks or movements of your baby  · You feel fewer than 10 kicks within 2 hours after counting twice  · You have questions or concerns about your baby's movements  Why measure kick counts:  Your baby's movement may provide information about your baby's health  He may move less, or not at all, if there are problems  He may move less if he does not have enough room to grow in your uterus (womb)  He may also move less if he is not getting enough oxygen or nutrition from the placenta  Tell your healthcare provider as soon as you feel a change in your baby's movements  Problems that are found earlier are easier to treat  When to measure kick counts:   · Measure kick counts at the same time every day  · Measure kick counts when your baby is awake and most active  Your baby may be most active in the evening  · Measure kick counts after a meal or snack  Your baby may be more active after you eat  Wait 2 hours after you drink liquids that contain caffeine  Caffeine can make your baby more active than usual     · You should not smoke while you are pregnant  Smoking increases the risk of health problems for you and for your baby during your pregnancy  If you do smoke, wait 2 hours to measure kick counts  Nicotine can make your baby more active than usual   How to measure kick counts:  Check that your baby is awake before you measure kick counts   You can wake up your baby by lightly pushing on your belly, walking, or drinking something cold  Your healthcare provider may tell you different ways to measure kick counts  He may tell you to do the following:  · Use a chart or clock to keep track of the time you start and finish counting  · Sit in a chair or lie on your left side  · Place your hands on the largest part of your belly  · Count until you reach 10 kicks  Write down how much time it takes to count 10 kicks  · It may take 30 minutes to 2 hours to count 10 kicks  It should not take more than 2 hours to count 10 kicks  · If you do not feel 10 kicks within 2 hours, wait 1 hour and count again  Your baby can sleep for up to 40 minutes at one time  Follow up with your healthcare provider as directed:  Write down your questions so you remember to ask them during your visits  © 2017 [address] Information is for End User's use only and may not be sold, redistributed or otherwise used for commercial purposes  All illustrations and images included in CareNotes® are the copyrighted property of [employer]  or Saul Westbrook  The above information is an  only  It is not intended as medical advice for individual conditions or treatments  Talk to your doctor, nurse or pharmacist before following any medical regimen to see if it is safe and effective for you  Pregnancy at 28 to [address]:   You are considered full term at the beginning of 37 weeks  Your breathing may be easier if your baby has moved down into a head-down position  You may need to urinate more often because the baby may be pressing on your bladder  You may also feel more discomfort and get tired easily  DISCHARGE INSTRUCTIONS:   Seek care immediately if:   · You develop a severe headache that does not go away  · You have new or increased vision changes, such as blurred or spotted vision  · You have new or increased swelling in your face or hands      · You have vaginal spotting or bleeding  · Your water broke or you feel warm water gushing or trickling from your vagina  Contact your healthcare provider if:   · You have more than 5 contractions in 1 hour  · You notice any changes in your baby's movements  · You have abdominal cramps, pressure, or tightening  · You have a change in vaginal discharge  · You have chills or a fever  · You have vaginal itching, burning, or pain  · You have yellow, green, white, or foul-smelling vaginal discharge  · You have pain or burning when you urinate, less urine than usual, or pink or bloody urine  · You have questions or concerns about your condition or care  How to care for yourself at this stage of your pregnancy:   · Eat a variety of healthy foods  Healthy foods include fruits, vegetables, whole-grain breads, low-fat dairy foods, beans, lean meats, and fish  Drink liquids as directed  Ask how much liquid to drink each day and which liquids are best for you  Limit caffeine to less than 200 milligrams each day  Limit your intake of fish to 2 servings each week  Choose fish low in mercury such as canned light tuna, shrimp, salmon, cod, or tilapia  Do not  eat fish high in mercury such as swordfish, tilefish, batool mackerel, and shark  · Take prenatal vitamins as directed  Your need for certain vitamins and minerals, such as folic acid, increases during pregnancy  Prenatal vitamins provide some of the extra vitamins and minerals you need  Prenatal vitamins may also help to decrease the risk of certain birth defects  · Rest as needed  Put your feet up if you have swelling in your ankles and feet  · Do not smoke  If you smoke, it is never too late to quit  Smoking increases your risk of a miscarriage and other health problems during your pregnancy  Smoking can cause your baby to be born too early or weigh less at birth   Ask your healthcare provider for information if you need help quitting  · Do not drink alcohol  Alcohol passes from your body to your baby through the placenta  It can affect your baby's brain development and cause fetal alcohol syndrome (FAS)  FAS is a group of conditions that causes mental, behavior, and growth problems  · Talk to your healthcare provider before you take any medicines  Many medicines may harm your baby if you take them when you are pregnant  Do not take any medicines, vitamins, herbs, or supplements without first talking to your healthcare provider  Never use illegal or street drugs (such as marijuana or cocaine) while you are pregnant  · Talk to your healthcare provider before you travel  You may not be able to travel in an airplane after 36 weeks  He may also recommend that you avoid long road trips  Safety tips:   · Avoid hot tubs and saunas  Do not use a hot tub or sauna while you are pregnant, especially during your first trimester  Hot tubs and saunas may raise your baby's temperature and increase the risk of birth defects  · Avoid toxoplasmosis  This is an infection caused by eating raw meat or being around infected cat feces  It can cause birth defects, miscarriages, and other problems  Wash your hands after you touch raw meat  Make sure any meat is well-cooked before you eat it  Avoid raw eggs and unpasteurized milk  Use gloves or ask someone else to clean your cat's litter box while you are pregnant  · Ask your healthcare provider about travel  The most comfortable time to travel is during the second trimester  Ask your healthcare provider if you can travel after 36 weeks  You may not be able to travel in an airplane after 36 weeks  He may also recommend that you avoid long road trips  Changes that are happening with your baby:  By 38 weeks, your baby may weigh between 6 and 9 pounds  Your baby may be about 14 inches long from the top of the head to the rump (baby's bottom)  Your baby hears well enough to know your voice  As your baby gets larger, you may feel fewer kicks and more stretching and rolling  Your baby may move into a head-down position  Your baby will also rest lower in your abdomen  What you need to know about prenatal care: Your healthcare provider will check your blood pressure and weight  You may also need the following:  · A urine test  may also be done to check for sugar and protein  These can be signs of gestational diabetes or infection  Protein in your urine may also be a sign of preeclampsia  Preeclampsia is a condition that can develop during week 20 or later of your pregnancy  It causes high blood pressure, and it can cause problems with your kidneys and other organs  · A blood test  may be done to check for anemia (low iron level)  · A Tdap vaccine  may be recommended by your healthcare provider  · A group B strep test  is a test that is done to check for group B strep infection  Group B strep is a type of bacteria that may be found in the vagina or rectum  It can be passed to your baby during delivery if you have it  Your healthcare provider will take swab your vagina or rectum and send the sample to the lab for tests  · Fundal height  is a measurement of your uterus to check your baby's growth  This number is usually the same as the number of weeks that you have been pregnant  Your healthcare provider may also check your baby's position  · Your baby's heart rate  will be checked  © 2017 [address] Information is for End User's use only and may not be sold, redistributed or otherwise used for commercial purposes  All illustrations and images included in CareNotes® are the copyrighted property of [employer]  or Saul Westbrook  The above information is an  only  It is not intended as medical advice for individual conditions or treatments   Talk to your doctor, nurse or pharmacist before following any medical regimen to see if it is safe and effective for you

## 2019-02-08 NOTE — PROGRESS NOTES
SW MET WITH PATIENT FOR FOLLOW UP  PT REPORTED HAS NEEDED ITEMS FOR UNBORN BABY  FOB IS INVOLVED  SW DISCUSSED ACKNOWLEDGEMENT OF PATERNITY FORM AND POST PARTUM DEPRESSION SIGNS AND SYMPTOMS  PATIENT VERBALIZED UNDERSTANDING  DENIES CONCERN AT THIS TIME  SW WILL FOLLOW AFTER DELIVERY

## 2019-02-08 NOTE — PROGRESS NOTES
Denies loss of fluid, vaginal bleeding and abdominal pain  Confirms frequent fetal movement  Tolerating prenatal vitamin and iron well  No questions or concerns today  Plan:  1  Continue prenatal vitamin daily  2  Continue fetal kick counts daily  3  Maternal anemia-continue iron twice a day and Colace as needed  4  Urine RAYMOND sent today  5  GBS positive reviewed with patient  6  Common discomforts of pregnancy and precautions reviewed    RTO 1 week

## 2019-02-10 LAB — BACTERIA UR CULT: ABNORMAL

## 2019-02-11 DIAGNOSIS — O23.43 URINARY TRACT INFECTION IN MOTHER DURING THIRD TRIMESTER OF PREGNANCY: Primary | ICD-10-CM

## 2019-02-11 RX ORDER — NITROFURANTOIN 25; 75 MG/1; MG/1
100 CAPSULE ORAL 2 TIMES DAILY
Qty: 14 CAPSULE | Refills: 0 | Status: SHIPPED | OUTPATIENT
Start: 2019-02-11 | End: 2019-02-18

## 2019-02-15 ENCOUNTER — ROUTINE PRENATAL (OUTPATIENT)
Dept: OBGYN CLINIC | Facility: CLINIC | Age: 29
End: 2019-02-15

## 2019-02-15 VITALS
BODY MASS INDEX: 29.6 KG/M2 | HEIGHT: 58 IN | DIASTOLIC BLOOD PRESSURE: 72 MMHG | SYSTOLIC BLOOD PRESSURE: 112 MMHG | HEART RATE: 78 BPM | WEIGHT: 141 LBS

## 2019-02-15 DIAGNOSIS — O23.43 URINARY TRACT INFECTION IN MOTHER DURING THIRD TRIMESTER OF PREGNANCY: Primary | ICD-10-CM

## 2019-02-15 DIAGNOSIS — O34.219 PREVIOUS CESAREAN SECTION COMPLICATING PREGNANCY: ICD-10-CM

## 2019-02-15 DIAGNOSIS — Z3A.38 38 WEEKS GESTATION OF PREGNANCY: ICD-10-CM

## 2019-02-15 DIAGNOSIS — O99.013 ANEMIA DURING PREGNANCY IN THIRD TRIMESTER: ICD-10-CM

## 2019-02-15 PROCEDURE — 99215 OFFICE O/P EST HI 40 MIN: CPT | Performed by: NURSE PRACTITIONER

## 2019-02-15 NOTE — PROGRESS NOTES
Denies loss of fluid, vaginal bleeding and abdominal pain  Confirms frequent fetal movement  Tolerating prenatal vitamin and iron well  Reviewed results from urine culture positive greater than 100,000 CFU E coli  Reviewed with patient to take Macrobid twice a day for the next 7 days  Patient states she does not need the medication as she is not feeling symptoms  Reviewed with patient even without symptoms urine infection concern into a kidney infection, patient is agreeable to take medication will pick it up today  Plan:  1  Continue prenatal vitamins daily  2  Continue fetal kick counts daily  3  Maternal anemia-continue iron twice a day and Colace as needed  4  UTI     - >100,000 cfu E coli 19     - RAYMOND >100,000 cfu E coli 19     - encouraged patient to take Macrobid as ordered 1 tablet twice a day for 7 days  5  Repeat  section scheduled for 19 @ 10 am   6  Common discomforts of pregnancy and precautions reviewed    RTO 2 weeks for incision check

## 2019-02-15 NOTE — PATIENT INSTRUCTIONS
Kick Counts in Pregnancy   AMBULATORY CARE:   Kick counts  measure how much your baby is moving in your womb  A kick from your baby can be felt as a twist, turn, swish, roll, or jab  It is common to feel your baby kicking at 26 to 28 weeks of pregnancy  You may feel your baby kick as early as 20 weeks of pregnancy  Seek care immediately if:   · You feel your baby kick less as the day goes on      · You do not feel any kicks in a day  Contact your healthcare provider if:   · You feel a change in the number of kicks or movements of your baby  · You feel fewer than 10 kicks within 2 hours after counting twice  · You have questions or concerns about your baby's movements  Why measure kick counts:  Your baby's movement may provide information about your baby's health  He may move less, or not at all, if there are problems  He may move less if he does not have enough room to grow in your uterus (womb)  He may also move less if he is not getting enough oxygen or nutrition from the placenta  Tell your healthcare provider as soon as you feel a change in your baby's movements  Problems that are found earlier are easier to treat  When to measure kick counts:   · Measure kick counts at the same time every day  · Measure kick counts when your baby is awake and most active  Your baby may be most active in the evening  · Measure kick counts after a meal or snack  Your baby may be more active after you eat  Wait 2 hours after you drink liquids that contain caffeine  Caffeine can make your baby more active than usual     · You should not smoke while you are pregnant  Smoking increases the risk of health problems for you and for your baby during your pregnancy  If you do smoke, wait 2 hours to measure kick counts  Nicotine can make your baby more active than usual   How to measure kick counts:  Check that your baby is awake before you measure kick counts   You can wake up your baby by lightly pushing on your belly, walking, or drinking something cold  Your healthcare provider may tell you different ways to measure kick counts  He may tell you to do the following:  · Use a chart or clock to keep track of the time you start and finish counting  · Sit in a chair or lie on your left side  · Place your hands on the largest part of your belly  · Count until you reach 10 kicks  Write down how much time it takes to count 10 kicks  · It may take 30 minutes to 2 hours to count 10 kicks  It should not take more than 2 hours to count 10 kicks  · If you do not feel 10 kicks within 2 hours, wait 1 hour and count again  Your baby can sleep for up to 40 minutes at one time  Follow up with your healthcare provider as directed:  Write down your questions so you remember to ask them during your visits  © 2017 [facility] Information is for End User's use only and may not be sold, redistributed or otherwise used for commercial purposes  All illustrations and images included in CareNotes® are the copyrighted property of [employer]  or Saul Westbrook  The above information is an  only  It is not intended as medical advice for individual conditions or treatments  Talk to your doctor, nurse or pharmacist before following any medical regimen to see if it is safe and effective for you  Pregnancy at 44 to 40 Weeks   [address]:   What changes are happening in my body? You are now getting close to your due date  Your due date is just an estimate of when your baby will be born  Your baby may be born before or after your due date  Your breathing may be easier if your baby has moved down into a head-down position  You may need to urinate more often because the baby may be pressing on your bladder  You may also feel more discomfort and tire easily  You may also be having trouble sleeping  How do I care for myself at this stage of my pregnancy?    · Eat a variety of healthy foods   Healthy foods include fruits, vegetables, whole-grain breads, low-fat dairy foods, beans, lean meats, and fish  Drink liquids as directed  Ask how much liquid to drink each day and which liquids are best for you  Limit caffeine to less than 200 milligrams each day  Limit your intake of fish to 2 servings each week  Choose fish low in mercury such as canned light tuna, shrimp, crab, salmon, cod, or tilapia  Do not  eat fish high in mercury such as swordfish, tilefish, batool mackerel, and shark  · Take prenatal vitamins as directed  Your need for certain vitamins and minerals, such as folic acid, increases during pregnancy  Prenatal vitamins provide some of the extra vitamins and minerals you need  Prenatal vitamins may also help to decrease the risk of certain birth defects  · Rest as needed  Put your feet up if you have swelling in your ankles and feet  · Do not smoke  If you smoke, it is never too late to quit  Smoking increases your risk of a miscarriage and other health problems during your pregnancy  Smoking can cause your baby to be born too early or weigh less at birth  Ask your healthcare provider for information if you need help quitting  · Do not drink alcohol  Alcohol passes from your body to your baby through the placenta  It can affect your baby's brain development and cause fetal alcohol syndrome (FAS)  FAS is a group of conditions that causes mental, behavior, and growth problems  · Talk to your healthcare provider before you take any medicines  Many medicines may harm your baby if you take them when you are pregnant  Do not take any medicines, vitamins, herbs, or supplements without first talking to your healthcare provider  Never use illegal or street drugs (such as marijuana or cocaine) while you are pregnant  · Talk to your healthcare provider before you travel  You may not be able to travel in an airplane after 36 weeks   He may also recommend that you avoid long road trips  What are some safety tips during pregnancy? · Avoid hot tubs and saunas  Do not use a hot tub or sauna while you are pregnant, especially during your first trimester  Hot tubs and saunas may raise your baby's temperature and increase the risk of birth defects  · Avoid toxoplasmosis  This is an infection caused by eating raw meat or being around infected cat feces  It can cause birth defects, miscarriages, and other problems  Wash your hands after you touch raw meat  Make sure any meat is well-cooked before you eat it  Avoid raw eggs and unpasteurized milk  Use gloves or ask someone else to clean your cat's litter box while you are pregnant  · Ask your healthcare provider about travel  The most comfortable time to travel is during the second trimester  Ask your healthcare provider if you can travel after 36 weeks  You may not be able to travel in an airplane after 36 weeks  He may also recommend that you avoid long road trips  What changes are happening with my baby? Your baby is ready to be born  At birth, your baby may weigh about 6 to 9 pounds and be about 19 to 21 inches long  Your baby may be in a head-down position  Your baby will also rest lower in your abdomen  What do I need to know about prenatal care? Your healthcare provider will check your blood pressure and weight  You may also need the following:  · A urine test  may also be done to check for sugar and protein  These can be signs of gestational diabetes or infection  Protein in your urine may also be a sign of preeclampsia  Preeclampsia is a condition that can develop during week 20 or later of your pregnancy  It causes high blood pressure, and it can cause problems with your kidneys and other organs  · Your baby's heart rate  will be checked  When should I seek immediate care? · You develop a severe headache that does not go away      · You have new or increased vision changes, such as blurred or spotted vision  · You have new or increased swelling in your face or hands  · You have vaginal spotting or bleeding  · Your water broke or you feel warm water gushing or trickling from your vagina  When should I contact my healthcare provider? · You have more than 5 contractions in 1 hour  · You notice any changes in your baby's movements  · You have abdominal cramps, pressure, or tightening  · You have a change in vaginal discharge  · You have chills or a fever  · You have vaginal itching, burning, or pain  · You have yellow, green, white, or foul-smelling vaginal discharge  · You have pain or burning when you urinate, less urine than usual, or pink or bloody urine  · You have questions or concerns about your condition or care  CARE AGREEMENT:   You have the right to help plan your care  Learn about your health condition and how it may be treated  Discuss treatment options with your caregivers to decide what care you want to receive  You always have the right to refuse treatment  The above information is an  only  It is not intended as medical advice for individual conditions or treatments  Talk to your doctor, nurse or pharmacist before following any medical regimen to see if it is safe and effective for you  © 2017 [facility] Information is for End User's use only and may not be sold, redistributed or otherwise used for commercial purposes  All illustrations and images included in CareNotes® are the copyrighted property of A D A M [employer] Inc  or Saul Westbrook

## 2019-02-21 ENCOUNTER — HOSPITAL ENCOUNTER (INPATIENT)
Facility: HOSPITAL | Age: 29
LOS: 3 days | Discharge: HOME | DRG: 540 | End: 2019-02-24
Attending: OBSTETRICS & GYNECOLOGY | Admitting: OBSTETRICS & GYNECOLOGY
Payer: COMMERCIAL

## 2019-02-21 ENCOUNTER — ANESTHESIA EVENT (INPATIENT)
Dept: LABOR AND DELIVERY | Facility: HOSPITAL | Age: 29
DRG: 540 | End: 2019-02-21
Payer: COMMERCIAL

## 2019-02-21 DIAGNOSIS — Z98.891 S/P CESAREAN SECTION: Primary | ICD-10-CM

## 2019-02-21 DIAGNOSIS — Z3A.38 38 WEEKS GESTATION OF PREGNANCY: ICD-10-CM

## 2019-02-21 PROBLEM — Z3A.39 39 WEEKS GESTATION OF PREGNANCY: Status: ACTIVE | Noted: 2018-08-20

## 2019-02-21 LAB
ABO GROUP BLD: NORMAL
BASE EXCESS BLDCOA CALC-SCNC: -2.6 MMOL/L (ref 3–11)
BASE EXCESS BLDCOV CALC-SCNC: -3.4 MMOL/L (ref 1–9)
BLD GP AB SCN SERPL QL: NEGATIVE
ERYTHROCYTE [DISTWIDTH] IN BLOOD BY AUTOMATED COUNT: 13.3 % (ref 11.6–15.1)
HCO3 BLDCOA-SCNC: 22.9 MMOL/L (ref 17.3–27.3)
HCO3 BLDCOV-SCNC: 22.9 MMOL/L (ref 12.2–28.6)
HCT VFR BLD AUTO: 34.4 % (ref 34.8–46.1)
HGB BLD-MCNC: 11.9 G/DL (ref 11.5–15.4)
MCH RBC QN AUTO: 31.9 PG (ref 26.8–34.3)
MCHC RBC AUTO-ENTMCNC: 34.6 G/DL (ref 31.4–37.4)
MCV RBC AUTO: 92 FL (ref 82–98)
O2 CT VFR BLDCOA CALC: 8.8 ML/DL
OXYHGB MFR BLDCOA: 45.4 %
OXYHGB MFR BLDCOV: 62.3 %
PCO2 BLDCOA: 42.1 MM[HG] (ref 30–60)
PCO2 BLDCOV: 45.8 MM HG (ref 27–43)
PH BLDCOA: 7.35 [PH] (ref 7.23–7.43)
PH BLDCOV: 7.32 [PH] (ref 7.19–7.49)
PLATELET # BLD AUTO: 211 THOUSANDS/UL (ref 149–390)
PMV BLD AUTO: 9.7 FL (ref 8.9–12.7)
PO2 BLDCOA: 22.6 MM HG (ref 5–25)
PO2 BLDCOV: 29 MM HG (ref 15–45)
RBC # BLD AUTO: 3.73 MILLION/UL (ref 3.81–5.12)
RH BLD: POSITIVE
RPR SER QL: NORMAL
SAO2 % BLDCOV: 12.2 ML/DL
SPECIMEN EXPIRATION DATE: NORMAL
WBC # BLD AUTO: 6.95 THOUSAND/UL (ref 4.31–10.16)

## 2019-02-21 PROCEDURE — 86592 SYPHILIS TEST NON-TREP QUAL: CPT | Performed by: OBSTETRICS & GYNECOLOGY

## 2019-02-21 PROCEDURE — 82805 BLOOD GASES W/O2 SATURATION: CPT | Performed by: OBSTETRICS & GYNECOLOGY

## 2019-02-21 PROCEDURE — 4A1HXCZ MONITORING OF PRODUCTS OF CONCEPTION, CARDIAC RATE, EXTERNAL APPROACH: ICD-10-PCS | Performed by: OBSTETRICS & GYNECOLOGY

## 2019-02-21 PROCEDURE — 86901 BLOOD TYPING SEROLOGIC RH(D): CPT | Performed by: OBSTETRICS & GYNECOLOGY

## 2019-02-21 PROCEDURE — 86850 RBC ANTIBODY SCREEN: CPT | Performed by: OBSTETRICS & GYNECOLOGY

## 2019-02-21 PROCEDURE — 86900 BLOOD TYPING SEROLOGIC ABO: CPT | Performed by: OBSTETRICS & GYNECOLOGY

## 2019-02-21 PROCEDURE — 59515 CESAREAN DELIVERY: CPT | Performed by: OBSTETRICS & GYNECOLOGY

## 2019-02-21 PROCEDURE — 85027 COMPLETE CBC AUTOMATED: CPT | Performed by: OBSTETRICS & GYNECOLOGY

## 2019-02-21 RX ORDER — ONDANSETRON 2 MG/ML
4 INJECTION INTRAMUSCULAR; INTRAVENOUS EVERY 8 HOURS PRN
Status: DISCONTINUED | OUTPATIENT
Start: 2019-02-21 | End: 2019-02-21

## 2019-02-21 RX ORDER — HYDROMORPHONE HCL/PF 1 MG/ML
0.5 SYRINGE (ML) INJECTION
Status: DISCONTINUED | OUTPATIENT
Start: 2019-02-21 | End: 2019-02-24 | Stop reason: HOSPADM

## 2019-02-21 RX ORDER — OXYTOCIN/RINGER'S LACTATE 30/500 ML
62.5 PLASTIC BAG, INJECTION (ML) INTRAVENOUS CONTINUOUS
Status: ACTIVE | OUTPATIENT
Start: 2019-02-21 | End: 2019-02-21

## 2019-02-21 RX ORDER — NALOXONE HYDROCHLORIDE 0.4 MG/ML
0.1 INJECTION, SOLUTION INTRAMUSCULAR; INTRAVENOUS; SUBCUTANEOUS
Status: ACTIVE | OUTPATIENT
Start: 2019-02-21 | End: 2019-02-22

## 2019-02-21 RX ORDER — FENTANYL CITRATE 50 UG/ML
INJECTION, SOLUTION INTRAMUSCULAR; INTRAVENOUS
Status: COMPLETED
Start: 2019-02-21 | End: 2019-02-21

## 2019-02-21 RX ORDER — KETOROLAC TROMETHAMINE 30 MG/ML
30 INJECTION, SOLUTION INTRAMUSCULAR; INTRAVENOUS EVERY 6 HOURS SCHEDULED
Status: DISCONTINUED | OUTPATIENT
Start: 2019-02-21 | End: 2019-02-23

## 2019-02-21 RX ORDER — MORPHINE SULFATE 0.5 MG/ML
INJECTION, SOLUTION EPIDURAL; INTRATHECAL; INTRAVENOUS AS NEEDED
Status: DISCONTINUED | OUTPATIENT
Start: 2019-02-21 | End: 2019-02-21 | Stop reason: SURG

## 2019-02-21 RX ORDER — BUPIVACAINE HYDROCHLORIDE 7.5 MG/ML
INJECTION, SOLUTION EPIDURAL; RETROBULBAR AS NEEDED
Status: DISCONTINUED | OUTPATIENT
Start: 2019-02-21 | End: 2019-02-21 | Stop reason: SURG

## 2019-02-21 RX ORDER — FENTANYL CITRATE/PF 50 MCG/ML
50 SYRINGE (ML) INJECTION
Status: DISCONTINUED | OUTPATIENT
Start: 2019-02-21 | End: 2019-02-24 | Stop reason: HOSPADM

## 2019-02-21 RX ORDER — ACETAMINOPHEN 325 MG/1
650 TABLET ORAL EVERY 6 HOURS PRN
Status: DISCONTINUED | OUTPATIENT
Start: 2019-02-21 | End: 2019-02-24 | Stop reason: HOSPADM

## 2019-02-21 RX ORDER — METOCLOPRAMIDE HYDROCHLORIDE 5 MG/ML
5 INJECTION INTRAMUSCULAR; INTRAVENOUS EVERY 6 HOURS PRN
Status: ACTIVE | OUTPATIENT
Start: 2019-02-21 | End: 2019-02-22

## 2019-02-21 RX ORDER — SODIUM CHLORIDE, SODIUM LACTATE, POTASSIUM CHLORIDE, CALCIUM CHLORIDE 600; 310; 30; 20 MG/100ML; MG/100ML; MG/100ML; MG/100ML
125 INJECTION, SOLUTION INTRAVENOUS CONTINUOUS
Status: DISCONTINUED | OUTPATIENT
Start: 2019-02-21 | End: 2019-02-22

## 2019-02-21 RX ORDER — KETOROLAC TROMETHAMINE 30 MG/ML
INJECTION, SOLUTION INTRAMUSCULAR; INTRAVENOUS AS NEEDED
Status: DISCONTINUED | OUTPATIENT
Start: 2019-02-21 | End: 2019-02-21 | Stop reason: SURG

## 2019-02-21 RX ORDER — ONDANSETRON 2 MG/ML
4 INJECTION INTRAMUSCULAR; INTRAVENOUS EVERY 4 HOURS PRN
Status: DISPENSED | OUTPATIENT
Start: 2019-02-21 | End: 2019-02-22

## 2019-02-21 RX ORDER — OXYTOCIN/RINGER'S LACTATE 30/500 ML
PLASTIC BAG, INJECTION (ML) INTRAVENOUS CONTINUOUS PRN
Status: DISCONTINUED | OUTPATIENT
Start: 2019-02-21 | End: 2019-02-21 | Stop reason: SURG

## 2019-02-21 RX ORDER — DOCUSATE SODIUM 100 MG/1
100 CAPSULE, LIQUID FILLED ORAL 2 TIMES DAILY
Status: DISCONTINUED | OUTPATIENT
Start: 2019-02-21 | End: 2019-02-21 | Stop reason: SDUPTHER

## 2019-02-21 RX ORDER — OXYCODONE HYDROCHLORIDE AND ACETAMINOPHEN 5; 325 MG/1; MG/1
2 TABLET ORAL EVERY 4 HOURS PRN
Status: DISCONTINUED | OUTPATIENT
Start: 2019-02-22 | End: 2019-02-22

## 2019-02-21 RX ORDER — DEXAMETHASONE SODIUM PHOSPHATE 4 MG/ML
8 INJECTION, SOLUTION INTRA-ARTICULAR; INTRALESIONAL; INTRAMUSCULAR; INTRAVENOUS; SOFT TISSUE ONCE AS NEEDED
Status: ACTIVE | OUTPATIENT
Start: 2019-02-21 | End: 2019-02-22

## 2019-02-21 RX ORDER — OXYTOCIN/RINGER'S LACTATE 30/500 ML
62.5 PLASTIC BAG, INJECTION (ML) INTRAVENOUS ONCE
Status: COMPLETED | OUTPATIENT
Start: 2019-02-21 | End: 2019-02-21

## 2019-02-21 RX ORDER — METOCLOPRAMIDE HYDROCHLORIDE 5 MG/ML
10 INJECTION INTRAMUSCULAR; INTRAVENOUS EVERY 6 HOURS PRN
Status: DISCONTINUED | OUTPATIENT
Start: 2019-02-22 | End: 2019-02-24 | Stop reason: HOSPADM

## 2019-02-21 RX ORDER — OXYCODONE HYDROCHLORIDE AND ACETAMINOPHEN 5; 325 MG/1; MG/1
1 TABLET ORAL EVERY 4 HOURS PRN
Status: DISCONTINUED | OUTPATIENT
Start: 2019-02-22 | End: 2019-02-22

## 2019-02-21 RX ORDER — CEFAZOLIN SODIUM 1 G/50ML
1000 SOLUTION INTRAVENOUS ONCE
Status: DISCONTINUED | OUTPATIENT
Start: 2019-02-21 | End: 2019-02-21

## 2019-02-21 RX ORDER — CALCIUM CARBONATE 200(500)MG
1000 TABLET,CHEWABLE ORAL DAILY PRN
Status: DISCONTINUED | OUTPATIENT
Start: 2019-02-21 | End: 2019-02-24 | Stop reason: HOSPADM

## 2019-02-21 RX ORDER — DIAPER,BRIEF,INFANT-TODD,DISP
1 EACH MISCELLANEOUS DAILY PRN
Status: DISCONTINUED | OUTPATIENT
Start: 2019-02-21 | End: 2019-02-21 | Stop reason: SDUPTHER

## 2019-02-21 RX ORDER — SENNOSIDES 8.6 MG
1 TABLET ORAL DAILY
Status: DISCONTINUED | OUTPATIENT
Start: 2019-02-22 | End: 2019-02-24 | Stop reason: HOSPADM

## 2019-02-21 RX ORDER — DIPHENHYDRAMINE HYDROCHLORIDE 50 MG/ML
25 INJECTION INTRAMUSCULAR; INTRAVENOUS EVERY 6 HOURS PRN
Status: ACTIVE | OUTPATIENT
Start: 2019-02-21 | End: 2019-02-22

## 2019-02-21 RX ORDER — DOCUSATE SODIUM 100 MG/1
100 CAPSULE, LIQUID FILLED ORAL 2 TIMES DAILY
Status: DISCONTINUED | OUTPATIENT
Start: 2019-02-21 | End: 2019-02-24 | Stop reason: HOSPADM

## 2019-02-21 RX ORDER — IBUPROFEN 600 MG/1
600 TABLET ORAL EVERY 6 HOURS PRN
Status: DISCONTINUED | OUTPATIENT
Start: 2019-02-21 | End: 2019-02-21 | Stop reason: SDUPTHER

## 2019-02-21 RX ORDER — CEFAZOLIN SODIUM 2 G/50ML
SOLUTION INTRAVENOUS AS NEEDED
Status: DISCONTINUED | OUTPATIENT
Start: 2019-02-21 | End: 2019-02-21 | Stop reason: SURG

## 2019-02-21 RX ORDER — SIMETHICONE 80 MG
80 TABLET,CHEWABLE ORAL 4 TIMES DAILY PRN
Status: DISCONTINUED | OUTPATIENT
Start: 2019-02-21 | End: 2019-02-24 | Stop reason: HOSPADM

## 2019-02-21 RX ORDER — DIPHENHYDRAMINE HCL 25 MG
25 TABLET ORAL EVERY 6 HOURS PRN
Status: DISCONTINUED | OUTPATIENT
Start: 2019-02-22 | End: 2019-02-24 | Stop reason: HOSPADM

## 2019-02-21 RX ORDER — ONDANSETRON 2 MG/ML
INJECTION INTRAMUSCULAR; INTRAVENOUS AS NEEDED
Status: DISCONTINUED | OUTPATIENT
Start: 2019-02-21 | End: 2019-02-21 | Stop reason: SURG

## 2019-02-21 RX ORDER — IBUPROFEN 600 MG/1
600 TABLET ORAL EVERY 6 HOURS PRN
Status: DISCONTINUED | OUTPATIENT
Start: 2019-02-21 | End: 2019-02-24 | Stop reason: HOSPADM

## 2019-02-21 RX ORDER — DIAPER,BRIEF,INFANT-TODD,DISP
1 EACH MISCELLANEOUS DAILY PRN
Status: DISCONTINUED | OUTPATIENT
Start: 2019-02-21 | End: 2019-02-24 | Stop reason: HOSPADM

## 2019-02-21 RX ORDER — ACETAMINOPHEN 325 MG/1
650 TABLET ORAL EVERY 6 HOURS PRN
Status: DISCONTINUED | OUTPATIENT
Start: 2019-02-21 | End: 2019-02-21 | Stop reason: SDUPTHER

## 2019-02-21 RX ORDER — ONDANSETRON 2 MG/ML
4 INJECTION INTRAMUSCULAR; INTRAVENOUS EVERY 8 HOURS PRN
Status: DISCONTINUED | OUTPATIENT
Start: 2019-02-22 | End: 2019-02-24 | Stop reason: HOSPADM

## 2019-02-21 RX ORDER — OXYCODONE HYDROCHLORIDE AND ACETAMINOPHEN 5; 325 MG/1; MG/1
2 TABLET ORAL EVERY 4 HOURS PRN
Status: DISCONTINUED | OUTPATIENT
Start: 2019-02-21 | End: 2019-02-21 | Stop reason: SDUPTHER

## 2019-02-21 RX ORDER — OXYCODONE HYDROCHLORIDE AND ACETAMINOPHEN 5; 325 MG/1; MG/1
1 TABLET ORAL EVERY 6 HOURS PRN
Status: DISCONTINUED | OUTPATIENT
Start: 2019-02-21 | End: 2019-02-22

## 2019-02-21 RX ORDER — NALBUPHINE HCL 10 MG/ML
5 AMPUL (ML) INJECTION
Status: DISPENSED | OUTPATIENT
Start: 2019-02-21 | End: 2019-02-22

## 2019-02-21 RX ORDER — METOCLOPRAMIDE HYDROCHLORIDE 5 MG/ML
10 INJECTION INTRAMUSCULAR; INTRAVENOUS ONCE AS NEEDED
Status: DISCONTINUED | OUTPATIENT
Start: 2019-02-21 | End: 2019-02-24 | Stop reason: HOSPADM

## 2019-02-21 RX ORDER — HYDROMORPHONE HCL/PF 1 MG/ML
1 SYRINGE (ML) INJECTION EVERY 2 HOUR PRN
Status: DISCONTINUED | OUTPATIENT
Start: 2019-02-22 | End: 2019-02-24 | Stop reason: HOSPADM

## 2019-02-21 RX ORDER — ACETAMINOPHEN 325 MG/1
650 TABLET ORAL EVERY 6 HOURS
Status: DISCONTINUED | OUTPATIENT
Start: 2019-02-21 | End: 2019-02-24 | Stop reason: HOSPADM

## 2019-02-21 RX ORDER — OXYCODONE HYDROCHLORIDE AND ACETAMINOPHEN 5; 325 MG/1; MG/1
1 TABLET ORAL EVERY 4 HOURS PRN
Status: DISCONTINUED | OUTPATIENT
Start: 2019-02-21 | End: 2019-02-21 | Stop reason: SDUPTHER

## 2019-02-21 RX ORDER — DIPHENHYDRAMINE HYDROCHLORIDE 50 MG/ML
25 INJECTION INTRAMUSCULAR; INTRAVENOUS EVERY 6 HOURS PRN
Status: DISCONTINUED | OUTPATIENT
Start: 2019-02-21 | End: 2019-02-21 | Stop reason: SDUPTHER

## 2019-02-21 RX ADMIN — NALBUPHINE HYDROCHLORIDE 5 MG: 10 INJECTION, SOLUTION INTRAMUSCULAR; INTRAVENOUS; SUBCUTANEOUS at 17:44

## 2019-02-21 RX ADMIN — PHENYLEPHRINE HYDROCHLORIDE 40 MCG/MIN: 10 INJECTION INTRAVENOUS at 10:43

## 2019-02-21 RX ADMIN — SODIUM CHLORIDE, SODIUM LACTATE, POTASSIUM CHLORIDE, AND CALCIUM CHLORIDE 125 ML/HR: .6; .31; .03; .02 INJECTION, SOLUTION INTRAVENOUS at 22:22

## 2019-02-21 RX ADMIN — HYDROMORPHONE HYDROCHLORIDE 0.5 MG: 1 INJECTION, SOLUTION INTRAMUSCULAR; INTRAVENOUS; SUBCUTANEOUS at 13:27

## 2019-02-21 RX ADMIN — FENTANYL CITRATE 50 MCG: 50 INJECTION, SOLUTION INTRAMUSCULAR; INTRAVENOUS at 12:45

## 2019-02-21 RX ADMIN — KETOROLAC TROMETHAMINE 30 MG: 30 INJECTION, SOLUTION INTRAMUSCULAR at 17:45

## 2019-02-21 RX ADMIN — ONDANSETRON 4 MG: 2 INJECTION INTRAMUSCULAR; INTRAVENOUS at 18:37

## 2019-02-21 RX ADMIN — ACETAMINOPHEN 650 MG: 325 TABLET, FILM COATED ORAL at 17:45

## 2019-02-21 RX ADMIN — SODIUM CHLORIDE, SODIUM LACTATE, POTASSIUM CHLORIDE, AND CALCIUM CHLORIDE 125 ML/HR: .6; .31; .03; .02 INJECTION, SOLUTION INTRAVENOUS at 13:30

## 2019-02-21 RX ADMIN — CEFAZOLIN SODIUM 2000 MG: 2 SOLUTION INTRAVENOUS at 10:31

## 2019-02-21 RX ADMIN — NALBUPHINE HYDROCHLORIDE 5 MG: 10 INJECTION, SOLUTION INTRAMUSCULAR; INTRAVENOUS; SUBCUTANEOUS at 22:18

## 2019-02-21 RX ADMIN — Medication 250 MILLI-UNITS/MIN: at 11:05

## 2019-02-21 RX ADMIN — FENTANYL CITRATE 50 MCG: 50 INJECTION, SOLUTION INTRAMUSCULAR; INTRAVENOUS at 12:26

## 2019-02-21 RX ADMIN — HYDROMORPHONE HYDROCHLORIDE 0.5 MG: 1 INJECTION, SOLUTION INTRAMUSCULAR; INTRAVENOUS; SUBCUTANEOUS at 13:02

## 2019-02-21 RX ADMIN — MORPHINE SULFATE 0.25 MG: 0.5 INJECTION, SOLUTION EPIDURAL; INTRATHECAL; INTRAVENOUS at 10:42

## 2019-02-21 RX ADMIN — SODIUM CHLORIDE, SODIUM LACTATE, POTASSIUM CHLORIDE, AND CALCIUM CHLORIDE 125 ML/HR: .6; .31; .03; .02 INJECTION, SOLUTION INTRAVENOUS at 09:24

## 2019-02-21 RX ADMIN — NALBUPHINE HYDROCHLORIDE 5 MG: 10 INJECTION, SOLUTION INTRAMUSCULAR; INTRAVENOUS; SUBCUTANEOUS at 12:39

## 2019-02-21 RX ADMIN — ONDANSETRON 4 MG: 2 INJECTION INTRAMUSCULAR; INTRAVENOUS at 11:20

## 2019-02-21 RX ADMIN — Medication 62.5 MILLI-UNITS/MIN: at 13:29

## 2019-02-21 RX ADMIN — SODIUM CHLORIDE, SODIUM LACTATE, POTASSIUM CHLORIDE, AND CALCIUM CHLORIDE 1000 ML: .6; .31; .03; .02 INJECTION, SOLUTION INTRAVENOUS at 08:54

## 2019-02-21 RX ADMIN — SODIUM CHLORIDE, SODIUM LACTATE, POTASSIUM CHLORIDE, AND CALCIUM CHLORIDE: .6; .31; .03; .02 INJECTION, SOLUTION INTRAVENOUS at 10:32

## 2019-02-21 RX ADMIN — BUPIVACAINE HYDROCHLORIDE 1.6 ML: 7.5 INJECTION, SOLUTION EPIDURAL; RETROBULBAR at 10:42

## 2019-02-21 RX ADMIN — OXYCODONE HYDROCHLORIDE AND ACETAMINOPHEN 1 TABLET: 5; 325 TABLET ORAL at 22:33

## 2019-02-21 RX ADMIN — KETOROLAC TROMETHAMINE 30 MG: 30 INJECTION, SOLUTION INTRAMUSCULAR at 11:30
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
## 2019-02-21 NOTE — OP NOTE
OPERATIVE REPORT  PATIENT NAME: Stefany Soriano    :  1990  MRN: [id number]  Pt Location: [facility] OR ROOM 02    SURGERY DATE: 2019    Surgeon(s) and Role:     * Mandie tAkinson MD - Primary     * Jigar Mina MD - Assisting     * Donaldo Palacios DO - Assisting    Preop Diagnosis:  Pregnancy at 39 weeks 0 Days  Hx 1LTCS  Desire for RLTCS     Procedure(s) (LRB):   SECTION () REPEAT (N/A)    Specimen(s):  ID Type Source Tests Collected by Time Destination   A : cord gases Cord Blood Cord BLOOD GAS, VENOUS, CORD, BLOOD GAS, ARTERIAL, CORD Mandie Atkinson MD 2019 1104    B : placenta for storage Tissue (Placenta on Hold) OB Only Placenta PLACENTA IN STORAGE Mandie Atkinson MD 2019 1106        Estimated Blood Loss:   400 mL    Drains:  Urethral Catheter Non-latex 16 Fr  (Active)   Site Assessment Clean;Skin intact 2019 10:45 AM   Collection Container Standard drainage bag 2019 10:45 AM   Securement Method Securing device (Describe) 2019 10:45 AM   Output (mL) 150 mL 2019  2:01 PM   Number of days: 0       Anesthesia Type:   Spinal    Operative Indications:  Pregnancy at 39 weeks 0 Days  Hx 1LTCS  Desire for RLTCS     Findings:  1  Delivery of viable female on 19 at 1103, weight 8 lbs 8 oz;  Apgar scores of 9 at one minute and 9 at five minutes  Umbilical artery pH 2 753 (base excess -2 6)  2  Normal appearing placenta with centrally-inserted 3 vessel cord  3  Clear amniotic fluid  4  Grossly normal uterus, tubes, and ovaries    Specimens:   1  Arterial and venous cord gases  2  Cord blood  3  Segment of umbilical cord  4  Placenta to storage     Estimated Blood Loss:  400 mL    Drains: Ryan catheter           Complications:  None; patient tolerated the procedure well  Disposition: PACU            Condition: stable    Procedure Details   Decision was made to proceed with  section due to desire for RLTCS   Patient was made aware of these findings and the proposed plan  Risks, benefits, possible complications, alternate treatment options, and expected outcomes were discussed with the patient  The patient agreed with the proposed plan and gave informed consent  The patient was taken to the operating room where she was properly identified to the OR staff and attending physician  She received spinal anesthesia preoperatively  Fetal heart tones were appreciated and found to be appropriate  A Ryan catheter was aseptically inserted and SCDs were placed  The vagina was prepped with betadine and the abdomen was prepped with Chloraprep  Following appropriate drying time, the patient was draped in the usual sterile manner for a Pfannenstiel incision  The patient had received Ancef 1 g IV pre-operatively for prophylaxis  A Time Out was held and the above information confirmed  The patient was identified as Antwan Lopez and the procedure verified as  Delivery  A Pfannenstiel incision was made and carried down through the underlying subcutaneous tissue to the fascia using a scalpel  Rectus fascia was then incised in the midline and extended laterally using Martinez scissors  The superior edge of the fascia was grasped with Kocher clamps, tented upward, and the underlying muscle was bluntly dissected off  The inferior edge was grasped with Kocher clamps and cleared in similar fashion  All anatomic layers were well-demarcated  The rectus muscles were  and the peritoneum was identified and subsequently entered and extended longitudinally with blunt dissection  The vesicouterine peritoneum was identified  The bladder blade was inserted  A low transverse uterine incision was made with the scalpel and extended laterally with blunt dissection  The amnion was entered sharply    Surgeons hand was inserted through the hysterotomy and the fetal head was palpated, elevated, and delivered through the uterine incision with the assistance of fundal pressure  Baby had spontaneous cry with good color and tone  The umbilical cord was clamped and cut  The infant was handed off to the  providers  Arterial and venous cord gases, cord blood, and a segment of umbilical cord were obtained for evaluation and promptly sent to the lab  The placenta delivered spontaneously with uterine fundal massage and was noted to have a centrally inserted 3 vessel cord  The uterus was exteriorized and a moist lap sponge was used to clear the cavity of clots and products of conception  The uterine incision was closed with a running locked suture of Monocryl  A second layer of the same suture was used to imbricate the first   Good hemostasis was confirmed upon uterine closure  Warmed normal saline solution was used to irrigate the posterior culdesac and the uterus was returned to the abdomen  The paracolic gutters were inspected and cleared of all clots and debris with irrigation and moist lap sponges  The fascia was closed with a running suture of 0 Vicryl    The skin was closed with 4-0 Monocryl in a subcuticular fashion, along with histoacryl  Sterile dressing was applied and an abdominal binder was then placed  At the conclusion of the procedure, all needle, sponge, and instrument counts were noted to be correct x2  The patient tolerated the procedure well and was transferred to her the recovery room in stable condition  Dr Radu Mayen and Dr Az Short were both present and participated in all key portions of the case       I was present for the entire procedure    Patient Disposition:  PACU     SIGNATURE: Daphnie Ivy DO  DATE: 2019  TIME: 2:26 PM

## 2019-02-21 NOTE — ANESTHESIA POSTPROCEDURE EVALUATION
Post-Op Assessment Note    CV Status:  Stable    Pain management: adequate     Mental Status:  Alert and awake   Hydration Status:  Euvolemic   PONV Controlled:  Controlled   Airway Patency:  Patent   Post Op Vitals Reviewed: Yes      Staff: CRNA, Anesthesiologist           BP   112/60   Temp      Pulse  68   Resp   12   SpO2   100

## 2019-02-21 NOTE — ANESTHESIA PROCEDURE NOTES
Spinal Block    Patient location during procedure: OB  Start time: 2/21/2019 10:21 AM  Staffing  Anesthesiologist: Fortunato Vieira MD  Performed: anesthesiologist   Preanesthetic Checklist  Completed: patient identified, site marked, surgical consent, pre-op evaluation, timeout performed, IV checked, risks and benefits discussed and monitors and equipment checked  Spinal Block  Patient position: sitting  Prep: Betadine  Patient monitoring: heart rate, continuous pulse ox and frequent blood pressure checks  Approach: midline  Location: L3-4  Injection technique: single-shot  Needle  Needle type: pencil-tip   Needle gauge: 25 G  Assessment  Sensory level: T4  Injection Assessment:  negative aspiration for heme, no paresthesia on injection and positive aspiration for clear CSF    Post-procedure:  site cleaned

## 2019-02-21 NOTE — PLAN OF CARE
Problem: BIRTH - VAGINAL/ SECTION  Goal: Fetal and maternal status remain reassuring during the birth process  Description  INTERVENTIONS:  - Monitor vital signs  - Monitor fetal heart rate  - Monitor uterine activity  - Monitor labor progression (vaginal delivery)  - DVT prophylaxis  - Antibiotic prophylaxis  Outcome: Progressing  Goal: Emotionally satisfying birthing experience for mother/fetus  Description  Interventions:  - Assess, plan, implement and evaluate the nursing care given to the patient in labor  - Advocate the philosophy that each childbirth experience is a unique experience and support the family's chosen level of involvement and control during the labor process   - Actively participate in both the patient's and family's teaching of the birth process  - Consider cultural, Jewish and age-specific factors and plan care for the patient in labor  Outcome: Progressing

## 2019-02-21 NOTE — PLAN OF CARE
Problem: BIRTH - VAGINAL/ SECTION  Goal: Fetal and maternal status remain reassuring during the birth process  Description  INTERVENTIONS:  - Monitor vital signs  - Monitor fetal heart rate  - Monitor uterine activity  - Monitor labor progression (vaginal delivery)  - DVT prophylaxis  - Antibiotic prophylaxis  Outcome: Completed  Goal: Emotionally satisfying birthing experience for mother/fetus  Description  Interventions:  - Assess, plan, implement and evaluate the nursing care given to the patient in labor  - Advocate the philosophy that each childbirth experience is a unique experience and support the family's chosen level of involvement and control during the labor process   - Actively participate in both the patient's and family's teaching of the birth process  - Consider cultural, Zoroastrianism and age-specific factors and plan care for the patient in labor  Outcome: Completed

## 2019-02-21 NOTE — PLAN OF CARE
Problem: PAIN - ADULT  Goal: Verbalizes/displays adequate comfort level or baseline comfort level  Description  Interventions:  - Encourage patient to monitor pain and request assistance  - Assess pain using appropriate pain scale  - Administer analgesics based on type and severity of pain and evaluate response  - Implement non-pharmacological measures as appropriate and evaluate response  - Consider cultural and social influences on pain and pain management  - Notify physician/advanced practitioner if interventions unsuccessful or patient reports new pain  Outcome: Progressing     Problem: INFECTION - ADULT  Goal: Absence or prevention of progression during hospitalization  Description  INTERVENTIONS:  - Assess and monitor for signs and symptoms of infection  - Monitor lab/diagnostic results  - Monitor all insertion sites, i e  indwelling lines, tubes, and drains  - Monitor endotracheal (as able) and nasal secretions for changes in amount and color  - [place] appropriate cooling/warming therapies per order  - Administer medications as ordered  - Instruct and encourage patient and family to use good hand hygiene technique  - Identify and instruct in appropriate isolation precautions for identified infection/condition  Outcome: Progressing  Goal: Absence of fever/infection during neutropenic period  Description  INTERVENTIONS:  - Monitor WBC  - Implement neutropenic guidelines  Outcome: Progressing     Problem: SAFETY ADULT  Goal: Patient will remain free of falls  Description  INTERVENTIONS:  - Assess patient frequently for physical needs  -  Identify cognitive and physical deficits and behaviors that affect risk of falls    -  [place] fall precautions as indicated by assessment   - Educate patient/family on patient safety including physical limitations  - Instruct patient to call for assistance with activity based on assessment  - Modify environment to reduce risk of injury  - Consider OT/PT consult to assist with strengthening/mobility  Outcome: Progressing  Goal: Maintain or return to baseline ADL function  Description  INTERVENTIONS:  -  Assess patient's ability to carry out ADLs; assess patient's baseline for ADL function and identify physical deficits which impact ability to perform ADLs (bathing, care of mouth/teeth, toileting, grooming, dressing, etc )  - Assess/evaluate cause of self-care deficits   - Assess range of motion  - Assess patient's mobility; develop plan if impaired  - Assess patient's need for assistive devices and provide as appropriate  - Encourage maximum independence but intervene and supervise when necessary  ¯ Involve family in performance of ADLs  ¯ Assess for home care needs following discharge   ¯ Request OT consult to assist with ADL evaluation and planning for discharge  ¯ Provide patient education as appropriate  Outcome: Progressing  Goal: Maintain or return mobility status to optimal level  Description  INTERVENTIONS:  - Assess patient's baseline mobility status (ambulation, transfers, stairs, etc )    - Identify cognitive and physical deficits and behaviors that affect mobility  - Identify mobility aids required to assist with transfers and/or ambulation (gait belt, sit-to-stand, lift, walker, cane, etc )  - [place] fall precautions as indicated by assessment  - Record patient progress and toleration of activity level on Mobility SBAR; progress patient to next Phase/Stage  - Instruct patient to call for assistance with activity based on assessment  - Request Rehabilitation consult to assist with strengthening/weightbearing, etc   Outcome: Progressing     Problem: Knowledge Deficit  Goal: Patient/family/caregiver demonstrates understanding of disease process, treatment plan, medications, and discharge instructions  Description  Complete learning assessment and assess knowledge base    Interventions:  - Provide teaching at level of understanding  - Provide teaching via preferred learning methods  Outcome: Progressing     Problem: DISCHARGE PLANNING  Goal: Discharge to home or other facility with appropriate resources  Description  INTERVENTIONS:  - Identify barriers to discharge w/patient and caregiver  - Arrange for needed discharge resources and transportation as appropriate  - Identify discharge learning needs (meds, wound care, etc )  - Arrange for interpretive services to assist at discharge as needed  - Refer to Case Management Department for coordinating discharge planning if the patient needs post-hospital services based on physician/advanced practitioner order or complex needs related to functional status, cognitive ability, or social support system  Outcome: Progressing     Problem: POSTPARTUM  Goal: Experiences normal postpartum course  Description  INTERVENTIONS:  - Monitor maternal vital signs  - Assess uterine involution and lochia  Outcome: Progressing  Goal: Appropriate maternal -  bonding  Description  INTERVENTIONS:  - Identify family support  - Assess for appropriate maternal/infant bonding   -Encourage maternal/infant bonding opportunities  - Referral to  or  as needed  Outcome: Progressing  Goal: Establishment of infant feeding pattern  Description  INTERVENTIONS:  - Assess breast/bottle feeding  - Refer to lactation as needed  Outcome: Progressing  Goal: Incision(s), wounds(s) or drain site(s) healing without S/S of infection  Description  INTERVENTIONS  - Assess and document risk factors for skin impairment   - Assess and document dressing, incision, wound bed, drain sites and surrounding tissue  - Initiate Nutrition services consult and/or wound management as needed  Outcome: Progressing

## 2019-02-21 NOTE — DISCHARGE INSTRUCTIONS
Cesárea   LO QUE USTED DEBE SABER:   Un parto por cesárea es ike cirugía abdominal que se realiza para extraer a wilson bebé  Existen muchas razones por las que usted podría necesitar ike cesárea  · [place] cesárea podría programarse antes del parto si usted tuvo otra cesárea con wilson último bebé  Ésta podría programarse si wilson bebé no está en ike posición normal, o si usted está embarazada con más de 1 bebé  · Wilson médico podría realizar ike cesárea de emergencia catherine el parto para evitar complicaciones que pongan en riesgo wilson lc o la del bebé  Ike cesárea podría realizarse si wilson cerviz no se dilata después de varias horas de [place]  · Otras razones para realizar ike cesárea incluyen infecciones maternales o con la placenta  DESPUÉS DE SER DADO DE WILLIAM:   Medicamentos:   · Podrían recetarle medicamentos para el dolor  Pregunte cómo tomarse lance medicamento de [employer]  · El acetaminofeno  disminuye el dolor y la [place]  Está disponible sin receta médica  Pregunte la cantidad que debe allison y con que frecuencia  [address]  El acetaminofeno puede provocar daño al hígado si no se lionel correctamente  · Los AINEs  ayudan a disminuir la inflamación y el dolor o la fiebre  Lance medicamento está disponible con o sin receta médica  Los AINEs puede provocar sangrado estomacal o problemas del [place] en ciertas personas  Si usted lionel medicamentos anticoagulantes siempre  consulte con wilson ginecólogo si los AINEs son seguros para usted  Siempre shanti la etiqueta del medicamento y Conklin Nirali instrucciones  · Gann wilson medicamento cata se le indique  Comuníquese con wilson ginecólogo si usted piensa que wilson medicamento no lo está ayudando o si tiene efectos secundarios  Infórmele si usted es alérgico a cualquier medicamento  Mantenga ike lista de los medicamentos, vitaminas y hierbas que lionel  [address] cantidades, así cata cuándo y por qué los lionel   Sherrlyn Ates con usted la lista o los envases de los medicamentos a christi citas de seguimiento  Lleve consigo la lista del medicamento en riddhi de ike emergencia  Programe iek mecca con elam ginecólogo cata se le indique:  Es posible que usted necesite regresar para que le quiten los puntos de sutura o las grapas  Escriba las preguntas que tenga para que recuerde hacerlas catherine christi citas  Cuidado de la herida:  Claudette Kraft cuidadosamente elam herida con Bessie Fernandez y leroy diariamente  Mantenga elam [place] y seca  Use ropa suelta y cómoda que no roce la herida  Pregunte a elam ginecólogo acerca de bañarse o ducharse  Ingiera suficientes líquidos:  Usted puede disminuir elam riesgo de un coágulo de yaya si paras suficientes líquidos  Pregunte cuánto líquido debe ingerir cada día y cuáles son los mejores para usted  Limite la actividad hasta que usted se recupere completamente de la cirugía:   · Pregunte cuando estará usted hábil para manejar, subir escaleras, levantar objetos pesados y tener relaciones sexuales  · Pregunte cuando es apropiado que ejercite, y qué tipos de ejercicios puede realizar  Comience lentamente y cynthia más conforme se sienta más micah  Comuníquese con elam ginecólogo si:   · Usted presenta sangrado vaginal que empapa 1 toalla higiénica o más en 1 hora  · Usted tiene fiebre  · Elam incisión está inflamada, enrojecida o drena pus  · Usted tiene preguntas o inquietudes acerca de usted o de elam bebé  Busque atención médica inmediatamente o llame al 911 si:   · La yaya empapa elam vendaje  · Christi puntos de sutura se desprenden  · Usted se siente mareado, con falta de aliento y tiene dolor en el pecho  · Usted tose yaya  · Elam brazo o pierna se sienten calientes, sensibles, y dolorosos  Se podría nichole inflamado y coy  · Usted tiene sangrado vaginal abundante (usted empapa ike toalla sanitaria en 1 a 2 horas consecutivas)    © 2014 [address] is for End User's use only and may not be sold, redistributed or otherwise used for commercial purposes  All illustrations and images included in CareNotes® are the copyrighted property of A D A M , Inc  or Saul Westbrook  Esta información es sólo para uso en educación  Elam intención no es darle un consejo médico sobre enfermedades o tratamientos  Colsulte con elam Lesleigh Mark farmacéutico antes de seguir cualquier régimen médico para saber si es seguro y efectivo para usted  Sangrado posparto   LO QUE NECESITA SABER:   El sangrado posparto es un sangrado vaginal después de ramirez a gerardo al bebé  Inez sangrado es normal tanto si tuvo un parto vaginal cata si se trata de ike cesárea  Se compone de [place] y del tejido que recubrían el interior de elam útero cuando estaba Hossein Richer  Sean Doom [facility]:   Qué esperar cuando hay sangrado posparto:  El sangrado posparto usualmente tiene Pita duración de 10 carley y podría durar hasta 6 semanas  Elam sangrado puede variar de leve (paula no larry ike toalla higiénica) a ser profuso (satura ike toalla higiénica en 1 hora)  Usualmente, usted tiene un sangrado profuso lexis después del parto, el cual disminuye en las próximas de semanas hasta que cesa completamente  El sangrado es coy o ray oscuro con coágulos por los primeros 1 a 3 días  Después se torna elizondo por varias semanas y luego se vuelve un flujo bangura o amarillo hasta que cesa por completo  Programe ike mecca con elam obstétrico cata se le indique:  No tenga relaciones sexuales hasta que elam médico lo autorice  Anote kelvin preguntas para que se acuerde de hacerlas catherine kelvin visitas  Comuníquese con elam médico o obstreta si:   · El [place] o usted tiene ike hemorragia que satura ike toalla higiénica en 1 hora por 2 horas seguidas  · Le salen grandes coágulos de [place]      · Usted está respirando más rápido que lo acostumbrado o elam corazón late más rápido que lo habitual     · Usted está orinando menos que de costumbre, o nada en lo absoluto  · Usted se siente mareado  · Usted tiene preguntas o inquietudes acerca de elam condición o cuidado  Busque atención médica de inmediato o llame al 911 si:   · A usted de repente le hace falta el aire y se siente desvanecer  · Tiene dolor repentino en el pecho  © 2017 [address] Shyam Banda Information is for End User's use only and may not be sold, redistributed or otherwise used for commercial purposes  All illustrations and images included in CareNotes® are the copyrighted property of A D A M , Inc  or Saul Westbrook  Esta información es sólo para uso en educación  Elam intención no es darle un consejo médico sobre enfermedades o tratamientos  Colsulte con elam Mary Kev farmacéutico antes de seguir cualquier régimen médico para saber si es seguro y efectivo para usted  Juancho Castaneda a elam bebé   LO QUE NECESITA SABER:   La lactancia materna nilay beneficios para elam bebé y para usted  Los expertos recomiendan que usted alimente sólo con [place] a elam bebé hasta que tenga 6 meses de lc  La lactancia materna [facility] primeros 6 meses puede reducir el riesgo de que elam bebé padezca enfermedades  Estas enfermedades incluyen las infecciones respiratorias (en los pulmones), alergias, asma y problemas estomacales  Los expertos además recomiendan que usted siga amamantado a elam bebé hasta que tenga 12 meses de lc por lo menos aún después que empiece a comer alimentos sólidos  Usted puede amamantar a elam bebé por más tiempo si así lo desea  INSTRUCCIONES SOBRE [facility]ITAL[facility]:   Busque atención médica de inmediato si:   · Usted se siente muy deprimida o tiene pensamientos de hacerle daño a elam bebé  Pregúntele a elam [place] vitaminas y minerales son adecuados para usted  · Elam bebé se alimenta menos de 8 veces al día  · Elam bebé tiene 4 o más días de nacido y [employer] de 6 pañales cada día      · El bebé tiene 4 o [place] días de nacido y tiene menos de 3 a 4 evacuaciones intestinales al día  · A usted le duelen los pezones cuando amamanta o entre las sesiones de lactancia  · Christi pezones se antoinette enrojecidos, secos, agrietados o presentan costras  · Usted siente un bulto en el pecho que se encuentra sensible  · Christi senos se vuelven dolorosos e inflamados  · Wilson bebé se pone ictérico (wilson piel y la parte farzad de christi ojos se torna amarillenta)  Acuda a christi consultas de control con wilson médico según le indicaron  Anote christi preguntas para que se acuerde de hacerlas catherine christi visitas  Los beneficios de la lactancia para wilson bebé   · La leche materna le nilay a wilson bebé la mejor nutrición  Christi senos liz producirán calostro  El calostro es rico en anticuerpos (proteínas que protegen el sistema inmunológico de wilson bebé)  La leche materna empieza a reemplazar al calostro de 2 a 4 días después de nacido wilson bebé  [address] [place] [address] proteínas, grasas, azúcares, vitaminas y minerales que wilson bebé necesita para crecer  Wilson bebé requerirá un suplemento de vitamina D después de nacer  Consulte con el médico sobre la cantidad y tipo de suplemento de vitamina D que es el mejor para wilson bebé  · [address] [place] [address] y fácil de digerir para wilson bebé  La leche materna no requiere preparación  · La leche materna sirve para que wilson bebé desarrolle un sistema inmunológico micah  El sistema inmunológico ayuda a combatir infecciones  La leche materna contiene anticuerpos y otras sustancias que ayudan a proteger el sistema inmunológico de wilson bebé  La leche materna además sirve para proteger a wilson bebé contra alergias e infecciones  Los bebés [employer] tienen digna riesgo de problemas de alergias cata eczema  El eczema provoca enrojecimiento, picazón e inflamación en la piel  La leche materna también puede facilitar la protección de wilson bebé contra las infecciones de oído, [place] e infecciones pulmonares      · La leche materna reduce los riesgos de wilson bebé de sufrir algunas condiciones médicas  Los bebés Qraved podrían correr Snider [place] riesgo del síndrome de muerte súbita del lactante (SMSL)  También corren Snider [place] riesgo de ser obesos o desarrollar diabetes, colesterol alto o presión arterial luis  Los beneficios de la lactancia para usted:   · Amamantar puede facilitar wilson recuperación después del alumbramiento  La lactancia materna lexis después del nacimiento de wilson bebé ayuda a detener el sangrado de wilson útero  También ayuda a reducir wilson útero al tamaño que tenía antes del Carmie Hatch  Si usted sigue ike dieta [address] es posible que baje de [place]  [place] sucede debido al exceso de calorías que wilson cuerpo necesita para amamantar  · La lactancia podría reducir el riesgo de sufrir depresión postparto y cierto tipo de [place]  La lactancia materna podría reducir wilson riesgo de depresión postparto y cáncer de mama y de ovario  Amamantar también reduce wilson riesgo de diabetes tipo 2 si usted no sufrió de diabetes gestacional catherine wilson embarazo  La lactancia puede fortalecer kelvin huesos  [place] puede ayudar a prevenir la osteoporosis y futuras fracturas  · Amamantar tiene otros beneficios  Amamantar es ike experiencia especial y es iek buena manera de empezar a crear un vínculo con wilson bebé  La lactancia materna puede ahorrarle tiempo y dinero porque usted no tiene que comprar y preparar la leche  Con qué frecuencia debe amamantar a wilson paras:   · Es posible que wilson bebé le deje saber cuando él esté listo para ser Qraved  Es posible que esté más despierto y se [place]  Es posible que se ponga las [place] en la boca  El llanto es normalmente ike señal tardía de que wilson bebé tiene [place]  Usted debe amamantar a wilson bebé entre 8 y 15 veces al día  [place] incluye despertarse para amamantarlo catherine la noche   Si wilson bebé está durmiendo y es hora de [address], pase wilson dedo suavemente sobre los labios de [place] bebé     · Puede amamantar a wilson bebé por unos 15 a 20 minutos en cada pecho  Algunos bebés se alimentan catherine ike cantidad de [employer] o Snider Cannon  Deje que wilson bebé se alimente de cada pecho hasta que se detenga por wilson propia cuenta  La lactancia en un bebé prematuro:   · Algunos bebés prematuros son incapaces de comer por wilson propia cuenta y requieren alimentación a través de ike sonda  Aún si wilson bebé prematuro no es capaz de comer directamente de wilson pecho, todavía es posible proporcionarle [facility]  Se puede extraer la [place] con wilson mano o con un extractor de [place] y después alimentar a wilson bebé con un biberón  A medida que wilson bebé crezca y se desarrolle, podría aprender a amamantar  Sharri Beans que nazca wilson bebé, es importante extraer la [place] para que pueda recibir los anticuerpos del calostro  Al extraer la [place] de kelvin senos, se estimulan para que produzcan [facility]  · La leche materna es especialmente beneficiosa para los bebés prematuros que tienen un peso muy bajo al nacer  Los bebés prematuros corren riesgo de sufrir problemas médicos debido a que wilson sistema inmunológico no está completamente formado  Los anticuerpos y nutrientes que se encuentran en el calostro y la [place] materna pueden ayudar a proteger a un bebé prematuro contra problemas médicos  La leche materna facilita el desarrollo de los ojos, cerebro y sistema digestivo de wilson bebé  Cuándo no debe amamantar a wilson bebé:   · Wilson bebé sufre de galactosemía, ike condición que impide que wilson cuerpo metabolice la galactosa (un tipo de azúcar que se encuentra en la [employer])  · Usted sufre de tuberculosis activa (TB) que no ha sido tratada catherine un mínimo de 2 semanas  · Usted sufre del Thera Lulu  · Usted [place] drogas ilegales o lionel alcohol con frecuencia o en cantidades grandes  Evitando problemas con la lactancia:   · [place] con wilson médico o con un especialista en lactancia    Anote las preguntas o inquietudes que tenga acerca de la lactancia para que recuerde hacerlas catherine kelvin citas  · Pregunte [employer]  Consulte con elam médico antes de allison cualquier medicamento  Step[place] incluye todos los [place] de prescripción y los de [place]  Algunos medicamentos podrían reducir la cantidad de [employer] usted produce  Es posible que otros medicamentos pasen a elam [employer] y por lo tanto afecten a elam bebé  · No fume  La nicotina se transmite a elam [employer]  Elam bebé está expuesto a éstos químicos por medio de la [employer] y la inhalación del humo del cigarrillo  Fumar también puede reducir la producción de [employer]  No use cigarrillos electrónicos o tabaco sin humo en vez de cigarrillos o para tratar de dejar de fumar  Todos estos aún contienen nicotina  Pida a elam médico información si usted fuma actualmente y [place] para dejar de hacerlo  · Limite o no consuma bebidas alcohólicas  El alcohol pasa al bebé por la [place]  Si usted elige beber alcohol, amamante a elam bebé antes de beberlo  No amamante a elam bebé por lo menos por 2 horas después de allison 1 bebida  Ananya bebida de alcohol equivale a 12 onzas de cerveza, 4 onzas de vino o 1½ onzas de licor  Cuídese mientras usted está amamantando:   · Descanse lo suficiente  Es posible que le resulte difícil descansar mientras está cuidando de elam bebé recién nacido  Pida ayuda a kelvin familiares y amigos para obtener el descanso que usted necesita  · Consuma alimentos saludables  Un plan de comida saludable puede ayudarle a elam cuerpo a producir suficiente [employer]  Usted necesita consumir calorías adicionales cada día mientras esté amamantando  Es posible que elam médico le indique allison vitaminas, incluyendo vitaminas para el embarazo y vitamina D  Consulte con él antes de allison cualquier vitamina o suplemento  · Controle el estrés  Un aumento del estrés puede disminuir la cantidad de [place] materna que usted produce   Romie Leaf relajación puede ayudarle a disminuir el estrés y a sentirse mejor  La respiración profunda, la meditación y escuchar música también pueden ayudarle a lidiar con el estrés  Hable con elam médico acerca de otras formas de controlar el estrés  Para recibir [de-identified] y más información acerca de la lactancia materna:   · American [employer]  [address], [place] [address]  Phone: [phone]  Web Address: http://UXCam/  · [facility]  [address] [facility] [place]  Phone: [phone]  Phone: [phone]  Web Address: http://DATY/  org  © 2017 2600 [facility] Information is for End User's use only and may not be sold, redistributed or otherwise used for commercial purposes  All illustrations and images included in CareNotes® are the copyrighted property of A D A M , Inc  or Saul Westbrook  Esta información es sólo para uso en educación  Elam intención no es darle un consejo médico sobre enfermedades o tratamientos  Colsulte con elam Freeda Burk farmacéutico antes de seguir cualquier régimen médico para saber si es seguro y efectivo para usted

## 2019-02-21 NOTE — ANESTHESIA PREPROCEDURE EVALUATION
Review of Systems/Medical History  Patient summary reviewed  Chart reviewed  No history of anesthetic complications     Cardiovascular  Exercise tolerance (METS): >4,     Pulmonary       GI/Hepatic            Endo/Other     GYN  Currently pregnant ,          Hematology  Anemia iron deficiency anemia,     Musculoskeletal       Neurology   Psychology           Physical Exam    Airway    Mallampati score: II  TM Distance: >3 FB  Neck ROM: full     Dental   No notable dental hx     Cardiovascular      Pulmonary      Other Findings        Anesthesia Plan  ASA Score- 2     Anesthesia Type- spinal with ASA Monitors  Additional Monitors:   Airway Plan:         Plan Factors-    Induction-     Postoperative Plan-     Informed Consent- Anesthetic plan and risks discussed with patient  I personally reviewed this patient with the CRNA  Discussed and agreed on the Anesthesia Plan with the CRNA  Christie Torres
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
## 2019-02-21 NOTE — PROGRESS NOTES
Progress Note - OB/GYN  Post-Op Check  Xochitl Steven 25 y o  MRN: [id number]  Unit/Bed#: -01 Encounter: [id number]      A/P:  Post-Op day # 0 status post Repeat low transverse  section  1) Routine Post-op Care: Continue   2) Diet: Advance as tolerated  3) Jara: D/c 12 hrs post-op then f/u 1st void  4) UOP: 500 cc charted over 5 hrs; color: clear yellow  5) Labs: f/u CBC in am  6) DVT ppx: Cont  SCDs  7) Monitor vital signs    SUBJECTIVE:  Pain: minimal --at incision site only  Tolerating Oral Intake: tolerating PO liquids, plans to attempt PO solids  Voiding: jara inserted  Flatus: no  Bowel Movement: no  Ambulating: No (jara still inserted)  Breastfeeding: Breastfeeding  Chest Pain: no  Shortness of Breath: no  Leg Pain/Discomfort: no  Lochia: minimal    Other:       OBJECTIVE:     Vitals:   Vitals:    19 1303 19 1315 19 1333 19 1350   BP: 130/61 131/60 113/56 117/62   BP Location: Right arm Right arm Right arm Right arm   Pulse: 81 84 78 87   Resp: 16 16 16 16   Temp:   98 2 °F (36 8 °C)    TempSrc:   Temporal    SpO2: 98% 98% 95% 98%   Weight:       Height:           I/O        07 -  0700  07 -  0700  07 -  0700    I V  (mL/kg)   1500 (23 4)    Total Intake(mL/kg)   1500 (23 4)    Urine (mL/kg/hr)   500    Blood   400    Total Output   900    Net   +600                 Physical exam:  Physical Exam   Constitutional: She is oriented to person, place, and time  She appears well-developed and well-nourished  No distress  Cardiovascular: Normal rate, regular rhythm, normal heart sounds and intact distal pulses  Pulmonary/Chest: Effort normal and breath sounds normal  No stridor  No respiratory distress  Abdominal: Soft  Bowel sounds are normal  She exhibits no distension  There is no tenderness  Incision clean, dry, and intact  Closed with running absorbable sutures      Neurological: She is alert and oriented to person, place, and time    Skin: Skin is warm and dry  She is not diaphoretic  Psychiatric: She has a normal mood and affect   Her behavior is normal          Results from last 7 days   Lab Units 02/21/19  0855   WBC Thousand/uL 6 95   HEMOGLOBIN g/dL 11 9   MCV fL 92   PLATELETS Thousands/uL 211                   Invalid input(s): GLU, CA        Invalid input(s): ALP        MEDS:   Current Facility-Administered Medications   Medication Dose Route Frequency    acetaminophen (TYLENOL) tablet 650 mg  650 mg Oral Q6H    dexamethasone (DECADRON) injection 8 mg  8 mg Intravenous Once PRN    diphenhydrAMINE (BENADRYL) injection 25 mg  25 mg Intravenous Q6H PRN    fentaNYL (SUBLIMAZE) injection 50 mcg  50 mcg Intravenous Q3 min PRN    HYDROmorphone (DILAUDID) injection 0 5 mg  0 5 mg Intravenous Q5 Min PRN    ketorolac (TORADOL) injection 30 mg  30 mg Intravenous Q6H [address]    lactated ringers infusion  125 mL/hr Intravenous Continuous    lactated ringers infusion  125 mL/hr Intravenous Continuous    metoclopramide (REGLAN) injection 10 mg  10 mg Intravenous Once PRN    metoclopramide (REGLAN) injection 5 mg  5 mg Intravenous Q6H PRN    nalbuphine (NUBAIN) injection 5 mg  5 mg Intravenous Q3H PRN    naloxone (NARCAN) injection 0 1 mg  0 1 mg Intravenous Q3 min PRN    ondansetron (ZOFRAN) injection 4 mg  4 mg Intravenous Q4H PRN    oxyCODONE-acetaminophen (PERCOCET) 5-325 mg per tablet 1 tablet  1 tablet Oral Q6H PRN    oxytocin (PITOCIN) 30 Units in lactated ringers 500 mL infusion  62 5 sarah-units/min Intravenous Continuous     Invasive Devices     Peripheral Intravenous Line            Peripheral IV 02/21/19 Left Forearm less than 1 day          Drain            Urethral Catheter Non-latex 16 Fr  less than 1 day              Medication Administration - last 24 hours from 02/20/2019 1616 to 02/21/2019 1616       Date/Time Order Dose Route Action Action by     02/21/2019 0923 lactated ringers bolus 1,000 mL 0 mL Intravenous Stopped Mia Flannery RN     02/21/2019 3470 lactated ringers bolus 1,000 mL 1,000 mL Intravenous [facility]     02/21/2019 1329 lactated ringers infusion 0 mL/hr Intravenous Stopped Tal Gallagher RN     02/21/2019 1134 lactated ringers infusion   Intravenous Anesthesia Volume Adjustment Barby Murray Minus, CRNA     00/45/2100 1032 lactated ringers infusion   Intravenous New Bag Barby Murray Minus, CRNA     87/02/3766 1237 lactated ringers infusion 125 mL/hr Intravenous [address]01 [address]     02/21/2019 1329 oxytocin (PITOCIN) 30 Units in lactated ringers 500 mL infusion 62 5 sarah-units/min Intravenous New R [facility]     02/21/2019 1330 lactated ringers infusion 125 mL/hr Intravenous [facility]     02/21/2019 1245 fentaNYL (SUBLIMAZE) injection 50 mcg 50 mcg Intravenous Given Tal Gallagher RN     02/21/2019 1226 fentaNYL (SUBLIMAZE) injection 50 mcg 50 mcg Intravenous Given Tal Gallagher RN     02/21/2019 1327 HYDROmorphone (DILAUDID) injection 0 5 mg 0 5 mg Intravenous Given Tal Gallagher RN     02/21/2019 1302 HYDROmorphone (DILAUDID) injection 0 5 mg 0 5 mg Intravenous Given Tal Gallagher RN     02/21/2019 1239 nalbuphine (NUBAIN) injection 5 mg 5 mg Intravenous Given Tal Gallagher RN              Signature / Title: Evonne Coyle DO, Resident - Ob/Gyn    Date: 2/21/2019  Time: 4:16 PM

## 2019-02-21 NOTE — DISCHARGE SUMMARY
Discharge Summary - Xochitl Steven 25 y o  female MRN: [id number]    Unit/Bed#: LD PACU-01 Encounter: [id number]    Admission Date: 2019     Discharge Date: 2019    Admitting Diagnosis:   1  Pregnancy at 39w0d  2  Prior  section x1  3  Anemia in pregnancy  4  Positive GBS status    Discharge Diagnosis: same, delivered    Procedures: repeat  section, low transverse incision    Attending: Nicci Byrd MD    Hospital Course: Xochitl Steven is a 25 y o  Threasa Guise at 39w0d wks who was initially admitted for scheduled repeat  section  She delivered a viable female  on 2019 at 1103  Weight 8lbs 8oz via repeat  section, low transverse incision  Apgars were 9 (1 min) and 9 (5 min)   was transferred to  nursery  Patient tolerated the procedure well and was transferred to recovery in stable condition  On day of discharge, she was ambulating and able to reasonably perform all ADLs  She was voiding and had appropriate bowel function  Pain was well controlled  She was discharged home on post-operative day #3 without complications  Patient was instructed to follow up with her OB as an outpatient and was given appropriate warnings to call provider if she develops signs of infection or uncontrolled pain  Complications: none apparent    Condition at discharge: stable     Discharge instructions/Information to patient and family:   See after visit summary for information provided to patient and family  Provisions for Follow-Up Care:  See after visit summary for information related to follow-up care and any pertinent home health orders        Disposition: Home    Planned Readmission: No    Cole Filter, DO

## 2019-02-22 LAB
BASOPHILS # BLD AUTO: 0.02 THOUSANDS/ΜL (ref 0–0.1)
BASOPHILS NFR BLD AUTO: 0 % (ref 0–1)
EOSINOPHIL # BLD AUTO: 0.15 THOUSAND/ΜL (ref 0–0.61)
EOSINOPHIL NFR BLD AUTO: 1 % (ref 0–6)
ERYTHROCYTE [DISTWIDTH] IN BLOOD BY AUTOMATED COUNT: 13 % (ref 11.6–15.1)
HCT VFR BLD AUTO: 29.4 % (ref 34.8–46.1)
HGB BLD-MCNC: 10 G/DL (ref 11.5–15.4)
IMM GRANULOCYTES # BLD AUTO: 0.06 THOUSAND/UL (ref 0–0.2)
IMM GRANULOCYTES NFR BLD AUTO: 1 % (ref 0–2)
LYMPHOCYTES # BLD AUTO: 1.73 THOUSANDS/ΜL (ref 0.6–4.47)
LYMPHOCYTES NFR BLD AUTO: 16 % (ref 14–44)
MCH RBC QN AUTO: 31.5 PG (ref 26.8–34.3)
MCHC RBC AUTO-ENTMCNC: 34 G/DL (ref 31.4–37.4)
MCV RBC AUTO: 93 FL (ref 82–98)
MONOCYTES # BLD AUTO: 0.6 THOUSAND/ΜL (ref 0.17–1.22)
MONOCYTES NFR BLD AUTO: 6 % (ref 4–12)
NEUTROPHILS # BLD AUTO: 7.96 THOUSANDS/ΜL (ref 1.85–7.62)
NEUTS SEG NFR BLD AUTO: 76 % (ref 43–75)
NRBC BLD AUTO-RTO: 0 /100 WBCS
PLATELET # BLD AUTO: 215 THOUSANDS/UL (ref 149–390)
PMV BLD AUTO: 9.9 FL (ref 8.9–12.7)
RBC # BLD AUTO: 3.17 MILLION/UL (ref 3.81–5.12)
WBC # BLD AUTO: 10.52 THOUSAND/UL (ref 4.31–10.16)

## 2019-02-22 PROCEDURE — 99024 POSTOP FOLLOW-UP VISIT: CPT | Performed by: OBSTETRICS & GYNECOLOGY

## 2019-02-22 PROCEDURE — 85025 COMPLETE CBC W/AUTO DIFF WBC: CPT | Performed by: OBSTETRICS & GYNECOLOGY

## 2019-02-22 RX ORDER — OXYCODONE HYDROCHLORIDE 5 MG/1
5 TABLET ORAL EVERY 4 HOURS PRN
Status: DISCONTINUED | OUTPATIENT
Start: 2019-02-22 | End: 2019-02-24 | Stop reason: HOSPADM

## 2019-02-22 RX ORDER — OXYCODONE HYDROCHLORIDE AND ACETAMINOPHEN 5; 325 MG/1; MG/1
2 TABLET ORAL EVERY 4 HOURS PRN
Status: COMPLETED | OUTPATIENT
Start: 2019-02-22 | End: 2019-02-22

## 2019-02-22 RX ORDER — OXYCODONE HYDROCHLORIDE 10 MG/1
10 TABLET ORAL EVERY 4 HOURS PRN
Status: DISCONTINUED | OUTPATIENT
Start: 2019-02-22 | End: 2019-02-24 | Stop reason: HOSPADM

## 2019-02-22 RX ADMIN — DOCUSATE SODIUM 100 MG: 100 CAPSULE, LIQUID FILLED ORAL at 18:57

## 2019-02-22 RX ADMIN — NALBUPHINE HYDROCHLORIDE 5 MG: 10 INJECTION, SOLUTION INTRAMUSCULAR; INTRAVENOUS; SUBCUTANEOUS at 03:40

## 2019-02-22 RX ADMIN — KETOROLAC TROMETHAMINE 30 MG: 30 INJECTION, SOLUTION INTRAMUSCULAR at 11:40

## 2019-02-22 RX ADMIN — ACETAMINOPHEN 650 MG: 325 TABLET, FILM COATED ORAL at 00:36

## 2019-02-22 RX ADMIN — KETOROLAC TROMETHAMINE 30 MG: 30 INJECTION, SOLUTION INTRAMUSCULAR at 18:58

## 2019-02-22 RX ADMIN — OXYCODONE HYDROCHLORIDE AND ACETAMINOPHEN 2 TABLET: 5; 325 TABLET ORAL at 08:27

## 2019-02-22 RX ADMIN — DOCUSATE SODIUM 100 MG: 100 CAPSULE, LIQUID FILLED ORAL at 08:27

## 2019-02-22 RX ADMIN — KETOROLAC TROMETHAMINE 30 MG: 30 INJECTION, SOLUTION INTRAMUSCULAR at 06:32

## 2019-02-22 RX ADMIN — STANDARDIZED SENNA CONCENTRATE 8.6 MG: 8.6 TABLET ORAL at 08:26

## 2019-02-22 RX ADMIN — OXYCODONE HYDROCHLORIDE 5 MG: 5 TABLET ORAL at 21:35

## 2019-02-22 RX ADMIN — ACETAMINOPHEN 650 MG: 325 TABLET, FILM COATED ORAL at 11:40

## 2019-02-22 RX ADMIN — OXYCODONE HYDROCHLORIDE AND ACETAMINOPHEN 2 TABLET: 5; 325 TABLET ORAL at 16:48

## 2019-02-22 RX ADMIN — ACETAMINOPHEN 650 MG: 325 TABLET, FILM COATED ORAL at 06:31

## 2019-02-22 RX ADMIN — KETOROLAC TROMETHAMINE 30 MG: 30 INJECTION, SOLUTION INTRAMUSCULAR at 00:37

## 2019-02-22 RX ADMIN — OXYCODONE HYDROCHLORIDE AND ACETAMINOPHEN 2 TABLET: 5; 325 TABLET ORAL at 13:05

## 2019-02-22 RX ADMIN — ACETAMINOPHEN 650 MG: 325 TABLET, FILM COATED ORAL at 18:58

## 2019-02-22 NOTE — PLAN OF CARE
Problem: PAIN - ADULT  Goal: Verbalizes/displays adequate comfort level or baseline comfort level  Description  Interventions:  - Encourage patient to monitor pain and request assistance  - Assess pain using appropriate pain scale  - Administer analgesics based on type and severity of pain and evaluate response  - Implement non-pharmacological measures as appropriate and evaluate response  - Consider cultural and social influences on pain and pain management  - Notify physician/advanced practitioner if interventions unsuccessful or patient reports new pain  2/22/2019 0221 by Gabriella Villegas RN  Outcome: Progressing  2/22/2019 0221 by Gabriella Villegas RN  Outcome: Progressing     Problem: INFECTION - ADULT  Goal: Absence or prevention of progression during hospitalization  Description  INTERVENTIONS:  - Assess and monitor for signs and symptoms of infection  - Monitor lab/diagnostic results  - Monitor all insertion sites, i e  indwelling lines, tubes, and drains  - Monitor endotracheal (as able) and nasal secretions for changes in amount and color  - [place] appropriate cooling/warming therapies per order  - Administer medications as ordered  - Instruct and encourage patient and family to use good hand hygiene technique  - Identify and instruct in appropriate isolation precautions for identified infection/condition  2/22/2019 0221 by Gabriella Villegas RN  Outcome: Progressing  2/22/2019 0221 by Gabriella Villegas RN  Outcome: Progressing  Goal: Absence of fever/infection during neutropenic period  Description  INTERVENTIONS:  - Monitor WBC  - Implement neutropenic guidelines  2/22/2019 0221 by Gabriella Villegas RN  Outcome: Progressing  2/22/2019 0221 by Gabriella Villegas RN  Outcome: Progressing     Problem: SAFETY ADULT  Goal: Patient will remain free of falls  Description  INTERVENTIONS:  - Assess patient frequently for physical needs  -  Identify cognitive and physical deficits and behaviors that affect risk of falls    -  [place] fall precautions as indicated by assessment   - Educate patient/family on patient safety including physical limitations  - Instruct patient to call for assistance with activity based on assessment  - Modify environment to reduce risk of injury  - Consider OT/PT consult to assist with strengthening/mobility  2/22/2019 0221 by Amberly Comer RN  Outcome: Progressing  2/22/2019 0221 by Amberly Comer RN  Outcome: Progressing  Goal: Maintain or return to baseline ADL function  Description  INTERVENTIONS:  -  Assess patient's ability to carry out ADLs; assess patient's baseline for ADL function and identify physical deficits which impact ability to perform ADLs (bathing, care of mouth/teeth, toileting, grooming, dressing, etc )  - Assess/evaluate cause of self-care deficits   - Assess range of motion  - Assess patient's mobility; develop plan if impaired  - Assess patient's need for assistive devices and provide as appropriate  - Encourage maximum independence but intervene and supervise when necessary  ¯ Involve family in performance of ADLs  ¯ Assess for home care needs following discharge   ¯ Request OT consult to assist with ADL evaluation and planning for discharge  ¯ Provide patient education as appropriate  2/22/2019 0221 by Amberly Comer RN  Outcome: Progressing  2/22/2019 0221 by Amberly Comer RN  Outcome: Progressing  Goal: Maintain or return mobility status to optimal level  Description  INTERVENTIONS:  - Assess patient's baseline mobility status (ambulation, transfers, stairs, etc )    - Identify cognitive and physical deficits and behaviors that affect mobility  - Identify mobility aids required to assist with transfers and/or ambulation (gait belt, sit-to-stand, lift, walker, cane, etc )  - Bra[place] fall precautions as indicated by assessment  - Record patient progress and toleration of activity level on Mobility SBAR; progress patient to next Phase/Stage  - Instruct patient to call for assistance with activity based on assessment  - Request Rehabilitation consult to assist with strengthening/weightbearing, etc   2019 by Ridge Green RN  Outcome: Progressing  2019 by Ridge Green RN  Outcome: Progressing     Problem: Knowledge Deficit  Goal: Patient/family/caregiver demonstrates understanding of disease process, treatment plan, medications, and discharge instructions  Description  Complete learning assessment and assess knowledge base    Interventions:  - Provide teaching at level of understanding  - Provide teaching via preferred learning methods  2019 by Ridge Green RN  Outcome: Progressing  2019 by Ridge Green RN  Outcome: Progressing     Problem: DISCHARGE PLANNING  Goal: Discharge to home or other facility with appropriate resources  Description  INTERVENTIONS:  - Identify barriers to discharge w/patient and caregiver  - Arrange for needed discharge resources and transportation as appropriate  - Identify discharge learning needs (meds, wound care, etc )  - Arrange for interpretive services to assist at discharge as needed  - Refer to Case Management Department for coordinating discharge planning if the patient needs post-hospital services based on physician/advanced practitioner order or complex needs related to functional status, cognitive ability, or social support system  2019 by Ridge Green RN  Outcome: Progressing  2019 by Ridge Green RN  Outcome: Progressing     Problem: POSTPARTUM  Goal: Experiences normal postpartum course  Description  INTERVENTIONS:  - Monitor maternal vital signs  - Assess uterine involution and lochia  2019 by Ridge Green RN  Outcome: Progressing  2019 by Ridge Green RN  Outcome: Progressing  Goal: Appropriate maternal -  bonding  Description  INTERVENTIONS:  - Identify family support  - Assess for appropriate maternal/infant bonding   -Encourage maternal/infant bonding opportunities  - Referral to  or  as needed  2/22/2019 0221 by Joaquín Alexander RN  Outcome: Progressing  2/22/2019 0221 by Joaquín Alexander RN  Outcome: Progressing  Goal: Establishment of infant feeding pattern  Description  INTERVENTIONS:  - Assess breast/bottle feeding  - Refer to lactation as needed  2/22/2019 0221 by Joaquín Alexander RN  Outcome: Progressing  2/22/2019 0221 by Joaquín Alexander RN  Outcome: Progressing  Goal: Incision(s), wounds(s) or drain site(s) healing without S/S of infection  Description  INTERVENTIONS  - Assess and document risk factors for skin impairment   - Assess and document dressing, incision, wound bed, drain sites and surrounding tissue  - Initiate Nutrition services consult and/or wound management as needed  2/22/2019 0221 by Joaquín Alexander RN  Outcome: Progressing  2/22/2019 0221 by Joaquín Alexander RN  Outcome: Progressing     Problem: Prexisting or High Potential for Compromised Skin Integrity  Goal: Skin integrity is maintained or improved  Description  INTERVENTIONS:  - Identify patients at risk for skin breakdown  - Assess and monitor skin integrity  - Assess and monitor nutrition and hydration status  - Monitor labs (i e  albumin)  - Assess for incontinence   - Turn and reposition patient  - Assist with mobility/ambulation  - Relieve pressure over bony prominences  - Avoid friction and shearing  - Provide appropriate hygiene as needed including keeping skin clean and dry  - Evaluate need for skin moisturizer/barrier cream  - Collaborate with interdisciplinary team (i e  Nutrition, Rehabilitation, etc )   - Patient/family teaching  2/22/2019 0221 by Joaquín Alexander RN  Outcome: Progressing  2/22/2019 0221 by Joaquín Alexander RN  Outcome: Progressing     Problem: DISCHARGE PLANNING - CARE MANAGEMENT  Goal: Discharge to post-acute care or home with appropriate resources  Description  INTERVENTIONS:  - Conduct assessment to determine patient/family and health care team treatment goals, and need for post-acute services based on payer coverage, community resources, and patient preferences, and barriers to discharge  - Address psychosocial, clinical, and financial barriers to discharge as identified in assessment in conjunction with the patient/family and health care team  - Arrange appropriate level of post-acute services according to patient?s   needs and preference and payer coverage in collaboration with the physician and health care team  - Communicate with and update the patient/family, physician, and health care team regarding progress on the discharge plan  - Arrange appropriate transportation to post-acute venues  2/22/2019 0221 by Jc Carlos RN  Outcome: Progressing  2/22/2019 0221 by Jc Carlos RN  Outcome: Progressing
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
## 2019-02-23 PROCEDURE — 99024 POSTOP FOLLOW-UP VISIT: CPT | Performed by: OBSTETRICS & GYNECOLOGY

## 2019-02-23 RX ADMIN — OXYCODONE HYDROCHLORIDE 10 MG: 10 TABLET ORAL at 11:50

## 2019-02-23 RX ADMIN — SIMETHICONE CHEW TAB 80 MG 80 MG: 80 TABLET ORAL at 07:52

## 2019-02-23 RX ADMIN — DOCUSATE SODIUM 100 MG: 100 CAPSULE, LIQUID FILLED ORAL at 09:51

## 2019-02-23 RX ADMIN — SIMETHICONE CHEW TAB 80 MG 80 MG: 80 TABLET ORAL at 03:43

## 2019-02-23 RX ADMIN — OXYCODONE HYDROCHLORIDE 10 MG: 10 TABLET ORAL at 21:31

## 2019-02-23 RX ADMIN — OXYCODONE HYDROCHLORIDE 10 MG: 10 TABLET ORAL at 03:15

## 2019-02-23 RX ADMIN — OXYCODONE HYDROCHLORIDE 10 MG: 10 TABLET ORAL at 17:37

## 2019-02-23 RX ADMIN — ACETAMINOPHEN 650 MG: 325 TABLET, FILM COATED ORAL at 12:50

## 2019-02-23 RX ADMIN — STANDARDIZED SENNA CONCENTRATE 8.6 MG: 8.6 TABLET ORAL at 09:51

## 2019-02-23 RX ADMIN — OXYCODONE HYDROCHLORIDE 10 MG: 10 TABLET ORAL at 07:52

## 2019-02-23 RX ADMIN — SIMETHICONE CHEW TAB 80 MG 80 MG: 80 TABLET ORAL at 17:37

## 2019-02-23 RX ADMIN — ACETAMINOPHEN 650 MG: 325 TABLET, FILM COATED ORAL at 17:37

## 2019-02-23 RX ADMIN — DOCUSATE SODIUM 100 MG: 100 CAPSULE, LIQUID FILLED ORAL at 17:37

## 2019-02-23 NOTE — LACTATION NOTE
This note was copied from a baby's chart  CONSULT - LACTATION  Baby Girl  Irisqueenie Mason) Suha Campoverde 1 days female MRN: [id number]    [facility] Room / Bed: (N)/(N) Encounter: [id number]    Maternal Information     MOTHER:  Nisha Chong  Maternal Age: 25 y o    OB History: #: 1, Date: 08/28/15, Sex: Male, Weight: 4054 g (8 lb 15 oz), GA: 40w0d, Delivery: , Unspecified, Apgar1: None, Apgar5: None, Living: Living, Birth Comments: Per pt, she was told that she had  due to "placenta infection" and fetal heart rate was not stable  #: 2, Date: 19, Sex: Female, Weight: 3856 g (8 lb 8 oz), GA: 39w0d, Delivery: , Low Transverse, Apgar1: 9, Apgar5: 9, Living: Living, Birth Comments: None   Previouse breast reduction surgery? No    Lactation history:   Has patient previously breast fed: Yes   How long had patient previously breast fed: 8 months   Previous breast feeding complications: Low milk supply     Past Surgical History:   Procedure Laterality Date     SECTION  2015       Birth information:  YOB: 2021   Time of birth: 11:03 AM   Sex: female   Delivery type: , Low Transverse   Birth Weight: 3856 g (8 lb 8 oz)   Percent of Weight Change: -3%     Gestational Age: 39w0d   [unfilled]    Assessment     Breast and nipple assessment: normal assessment    [place] Assessment: normal assessment    Feeding assessment: feeding well  LATCH:  Latch: Grasps breast, tongue down, lips flanged, rhythmic sucking   Audible Swallowing: Spontaneous and intermittent (24 hours old)   Type of Nipple: Everted (After stimulation)   Comfort (Breast/Nipple): Soft/non-tender   Hold (Positioning): Partial assist, teach one side, mother does other, staff holds   LATCH Score: 9          Feeding recommendations:  breast feed on demand    Information on hand expression given   Discussed benefits of knowing how to manually express breast including stimulating milk supply, softening nipple for latch and evacuating breast in the event of engorgement  Met with mother  Provided mother with Ready, Set, Baby booklet  Discussed Skin to Skin contact an benefits to mom and baby  Talked about the delay of the first bath until baby has adjusted  Spoke about the benefits of rooming in  Feeding on cue and what that means for recognizing infant's hunger  Avoidance of pacifiers for the first month discussed  Talked about exclusive breastfeeding for the first 6 months  Positioning and latch reviewed as well as showing images of other feeding positions  Discussed the properties of a good latch in any position  Reviewed hand/manual expression  Discussed s/s that baby is getting enough milk and some s/s that breastfeeding dyad may need further help  Gave information on common concerns, what to expect the first few weeks after delivery, preparing for other caregivers, and how partners can help  Resources for support also provided  Encouraged parents to watch breastfeeding class in the education area of My Chart Bedside  Power point handout for breastfeeding class in My Chart Bedside given to family so they can follow along and write down questions they may have  Spent time working on different positions that would facilitate better transfer of breastmilk  Gave suggestions on how to accomplish deep latch by starting latch with infant's nose at the nipple  Then, stroke the upper lip with the nipple  As infant opens mouth, apply the areola and nipple on on top of the tongue so that the nipple impacts with the soft palate to increase comfort with the feeding and to keep infant interested in the feeding longer  Encouraged parents to call for assistance, questions, and concerns about breastfeeding  Extension provided    Karena Merritt RN 2/22/2019 7:09 PM

## 2019-02-23 NOTE — PLAN OF CARE
Problem: PAIN - ADULT  Goal: Verbalizes/displays adequate comfort level or baseline comfort level  Description  Interventions:  - Encourage patient to monitor pain and request assistance  - Assess pain using appropriate pain scale  - Administer analgesics based on type and severity of pain and evaluate response  - Implement non-pharmacological measures as appropriate and evaluate response  - Consider cultural and social influences on pain and pain management  - Notify physician/advanced practitioner if interventions unsuccessful or patient reports new pain  Outcome: Progressing     Problem: INFECTION - ADULT  Goal: Absence or prevention of progression during hospitalization  Description  INTERVENTIONS:  - Assess and monitor for signs and symptoms of infection  - Monitor lab/diagnostic results  - Monitor all insertion sites, i e  indwelling lines, tubes, and drains  - Monitor endotracheal (as able) and nasal secretions for changes in amount and color  - [place] appropriate cooling/warming therapies per order  - Administer medications as ordered  - Instruct and encourage patient and family to use good hand hygiene technique  - Identify and instruct in appropriate isolation precautions for identified infection/condition  Outcome: Progressing  Goal: Absence of fever/infection during neutropenic period  Description  INTERVENTIONS:  - Monitor WBC  - Implement neutropenic guidelines  Outcome: Progressing     Problem: SAFETY ADULT  Goal: Patient will remain free of falls  Description  INTERVENTIONS:  - Assess patient frequently for physical needs  -  Identify cognitive and physical deficits and behaviors that affect risk of falls    -  [place] fall precautions as indicated by assessment   - Educate patient/family on patient safety including physical limitations  - Instruct patient to call for assistance with activity based on assessment  - Modify environment to reduce risk of injury  - Consider OT/PT consult to assist with strengthening/mobility  Outcome: Progressing  Goal: Maintain or return to baseline ADL function  Description  INTERVENTIONS:  -  Assess patient's ability to carry out ADLs; assess patient's baseline for ADL function and identify physical deficits which impact ability to perform ADLs (bathing, care of mouth/teeth, toileting, grooming, dressing, etc )  - Assess/evaluate cause of self-care deficits   - Assess range of motion  - Assess patient's mobility; develop plan if impaired  - Assess patient's need for assistive devices and provide as appropriate  - Encourage maximum independence but intervene and supervise when necessary  ¯ Involve family in performance of ADLs  ¯ Assess for home care needs following discharge   ¯ Request OT consult to assist with ADL evaluation and planning for discharge  ¯ Provide patient education as appropriate  Outcome: Progressing  Goal: Maintain or return mobility status to optimal level  Description  INTERVENTIONS:  - Assess patient's baseline mobility status (ambulation, transfers, stairs, etc )    - Identify cognitive and physical deficits and behaviors that affect mobility  - Identify mobility aids required to assist with transfers and/or ambulation (gait belt, sit-to-stand, lift, walker, cane, etc )  - [place] fall precautions as indicated by assessment  - Record patient progress and toleration of activity level on Mobility SBAR; progress patient to next Phase/Stage  - Instruct patient to call for assistance with activity based on assessment  - Request Rehabilitation consult to assist with strengthening/weightbearing, etc   Outcome: Progressing     Problem: Knowledge Deficit  Goal: Patient/family/caregiver demonstrates understanding of disease process, treatment plan, medications, and discharge instructions  Description  Complete learning assessment and assess knowledge base    Interventions:  - Provide teaching at level of understanding  - Provide teaching via preferred learning methods  Outcome: Progressing     Problem: DISCHARGE PLANNING  Goal: Discharge to home or other facility with appropriate resources  Description  INTERVENTIONS:  - Identify barriers to discharge w/patient and caregiver  - Arrange for needed discharge resources and transportation as appropriate  - Identify discharge learning needs (meds, wound care, etc )  - Arrange for interpretive services to assist at discharge as needed  - Refer to Case Management Department for coordinating discharge planning if the patient needs post-hospital services based on physician/advanced practitioner order or complex needs related to functional status, cognitive ability, or social support system  Outcome: Progressing     Problem: POSTPARTUM  Goal: Experiences normal postpartum course  Description  INTERVENTIONS:  - Monitor maternal vital signs  - Assess uterine involution and lochia  Outcome: Progressing  Goal: Appropriate maternal -  bonding  Description  INTERVENTIONS:  - Identify family support  - Assess for appropriate maternal/infant bonding   -Encourage maternal/infant bonding opportunities  - Referral to  or  as needed  Outcome: Progressing  Goal: Establishment of infant feeding pattern  Description  INTERVENTIONS:  - Assess breast/bottle feeding  - Refer to lactation as needed  Outcome: Progressing  Goal: Incision(s), wounds(s) or drain site(s) healing without S/S of infection  Description  INTERVENTIONS  - Assess and document risk factors for skin impairment   - Assess and document dressing, incision, wound bed, drain sites and surrounding tissue  - Initiate Nutrition services consult and/or wound management as needed  Outcome: Progressing     Problem: Prexisting or High Potential for Compromised Skin Integrity  Goal: Skin integrity is maintained or improved  Description  INTERVENTIONS:  - Identify patients at risk for skin breakdown  - Assess and monitor skin integrity  - Assess and monitor nutrition and hydration status  - Monitor labs (i e  albumin)  - Assess for incontinence   - Turn and reposition patient  - Assist with mobility/ambulation  - Relieve pressure over bony prominences  - Avoid friction and shearing  - Provide appropriate hygiene as needed including keeping skin clean and dry  - Evaluate need for skin moisturizer/barrier cream  - Collaborate with interdisciplinary team (i e  Nutrition, Rehabilitation, etc )   - Patient/family teaching  Outcome: Progressing     Problem: DISCHARGE PLANNING - CARE MANAGEMENT  Goal: Discharge to post-acute care or home with appropriate resources  Description  INTERVENTIONS:  - Conduct assessment to determine patient/family and health care team treatment goals, and need for post-acute services based on payer coverage, community resources, and patient preferences, and barriers to discharge  - Address psychosocial, clinical, and financial barriers to discharge as identified in assessment in conjunction with the patient/family and health care team  - Arrange appropriate level of post-acute services according to patient?s   needs and preference and payer coverage in collaboration with the physician and health care team  - Communicate with and update the patient/family, physician, and health care team regarding progress on the discharge plan  - Arrange appropriate transportation to post-acute venues  Outcome: Progressing

## 2019-02-23 NOTE — PROGRESS NOTES
Progress Note - OB/GYN   Brionna Olivarez 25 y o  female MRN: [id number]  Unit/Bed#:  323-01 Encounter: [id number]    Assessment:  POD#2 s/p Repeat low transverse  section, stable    Plan:  Contraception: ongoing discussion  Continue routine postoperative care  Encourage ambulation  Encourage breastfeeding  Pain control as needed-aqua K pad for shoulder discomfort     Subjective/Objective   Chief Complaint:     POD#2 s/p Repeat low transverse  section    Subjective:     Pain: current 8/10, reporting discomfort at incision site and right shoulder  Tolerating PO: yes  Voiding: yes  Flatus: yes  BM: no  Ambulating: yes  Breastfeeding: Breastfeeding  Chest pain: no  Shortness of breath: no  Leg pain: no  Lochia: scant     Objective:     Vitals:  Vitals:    19 1607 19 2029 19 0000 19 0500   BP: 123/56 135/73 134/58 117/59   BP Location: Right arm Left arm Right arm Right arm   Pulse: 82 82 78 78   Resp: 18 18 18 18   Temp: 97 7 °F (36 5 °C) 98 5 °F (36 9 °C) 98 °F (36 7 °C) 98 2 °F (36 8 °C)   TempSrc: Oral Oral Oral Oral   SpO2:       Weight:       Height:           Physical Exam:     Physical Exam   Constitutional: She is oriented to person, place, and time  She appears well-developed and well-nourished  No distress  Cardiovascular: Normal rate, regular rhythm, normal heart sounds and intact distal pulses  Pulmonary/Chest: Effort normal and breath sounds normal  No stridor  No respiratory distress  Abdominal: Soft  Bowel sounds are normal  She exhibits no distension  There is no tenderness  Incision clean, dry, and intact  Closed with running absorbable    Neurological: She is alert and oriented to person, place, and time  Skin: Skin is warm  She is not diaphoretic  Psychiatric: She has a normal mood and affect   Her behavior is normal    Uterine fundus firm and non-tender, -1 cm below the umbilicus       Lab, Imaging and other studies: I have personally reviewed pertinent reports        Lab Results   Component Value Date    WBC 10 52 (H) 02/22/2019    HGB 10 0 (L) 02/22/2019    HCT 29 4 (L) 02/22/2019    MCV 93 02/22/2019     02/22/2019               Lindsay Postal, DO  02/23/19

## 2019-02-23 NOTE — PLAN OF CARE
Problem: PAIN - ADULT  Goal: Verbalizes/displays adequate comfort level or baseline comfort level  Description  Interventions:  - Encourage patient to monitor pain and request assistance  - Assess pain using appropriate pain scale  - Administer analgesics based on type and severity of pain and evaluate response  - Implement non-pharmacological measures as appropriate and evaluate response  - Consider cultural and social influences on pain and pain management  - Notify physician/advanced practitioner if interventions unsuccessful or patient reports new pain  Outcome: Progressing     Problem: INFECTION - ADULT  Goal: Absence or prevention of progression during hospitalization  Description  INTERVENTIONS:  - Assess and monitor for signs and symptoms of infection  - Monitor lab/diagnostic results  - Monitor all insertion sites, i e  indwelling lines, tubes, and drains  - Monitor endotracheal (as able) and nasal secretions for changes in amount and color  - [place] appropriate cooling/warming therapies per order  - Administer medications as ordered  - Instruct and encourage patient and family to use good hand hygiene technique  - Identify and instruct in appropriate isolation precautions for identified infection/condition  Outcome: Progressing  Goal: Absence of fever/infection during neutropenic period  Description  INTERVENTIONS:  - Monitor WBC  - Implement neutropenic guidelines  Outcome: Progressing     Problem: SAFETY ADULT  Goal: Patient will remain free of falls  Description  INTERVENTIONS:  - Assess patient frequently for physical needs  -  Identify cognitive and physical deficits and behaviors that affect risk of falls    -  [place] fall precautions as indicated by assessment   - Educate patient/family on patient safety including physical limitations  - Instruct patient to call for assistance with activity based on assessment  - Modify environment to reduce risk of injury  - Consider OT/PT consult to assist with strengthening/mobility  Outcome: Progressing  Goal: Maintain or return to baseline ADL function  Description  INTERVENTIONS:  -  Assess patient's ability to carry out ADLs; assess patient's baseline for ADL function and identify physical deficits which impact ability to perform ADLs (bathing, care of mouth/teeth, toileting, grooming, dressing, etc )  - Assess/evaluate cause of self-care deficits   - Assess range of motion  - Assess patient's mobility; develop plan if impaired  - Assess patient's need for assistive devices and provide as appropriate  - Encourage maximum independence but intervene and supervise when necessary  ¯ Involve family in performance of ADLs  ¯ Assess for home care needs following discharge   ¯ Request OT consult to assist with ADL evaluation and planning for discharge  ¯ Provide patient education as appropriate  Outcome: Progressing  Goal: Maintain or return mobility status to optimal level  Description  INTERVENTIONS:  - Assess patient's baseline mobility status (ambulation, transfers, stairs, etc )    - Identify cognitive and physical deficits and behaviors that affect mobility  - Identify mobility aids required to assist with transfers and/or ambulation (gait belt, sit-to-stand, lift, walker, cane, etc )  - [place] fall precautions as indicated by assessment  - Record patient progress and toleration of activity level on Mobility SBAR; progress patient to next Phase/Stage  - Instruct patient to call for assistance with activity based on assessment  - Request Rehabilitation consult to assist with strengthening/weightbearing, etc   Outcome: Progressing     Problem: Knowledge Deficit  Goal: Patient/family/caregiver demonstrates understanding of disease process, treatment plan, medications, and discharge instructions  Description  Complete learning assessment and assess knowledge base    Interventions:  - Provide teaching at level of understanding  - Provide teaching via preferred learning methods  Outcome: Progressing     Problem: DISCHARGE PLANNING  Goal: Discharge to home or other facility with appropriate resources  Description  INTERVENTIONS:  - Identify barriers to discharge w/patient and caregiver  - Arrange for needed discharge resources and transportation as appropriate  - Identify discharge learning needs (meds, wound care, etc )  - Arrange for interpretive services to assist at discharge as needed  - Refer to Case Management Department for coordinating discharge planning if the patient needs post-hospital services based on physician/advanced practitioner order or complex needs related to functional status, cognitive ability, or social support system  Outcome: Progressing     Problem: POSTPARTUM  Goal: Experiences normal postpartum course  Description  INTERVENTIONS:  - Monitor maternal vital signs  - Assess uterine involution and lochia  Outcome: Progressing  Goal: Appropriate maternal -  bonding  Description  INTERVENTIONS:  - Identify family support  - Assess for appropriate maternal/infant bonding   -Encourage maternal/infant bonding opportunities  - Referral to  or  as needed  Outcome: Progressing  Goal: Establishment of infant feeding pattern  Description  INTERVENTIONS:  - Assess breast/bottle feeding  - Refer to lactation as needed  Outcome: Progressing  Goal: Incision(s), wounds(s) or drain site(s) healing without S/S of infection  Description  INTERVENTIONS  - Assess and document risk factors for skin impairment   - Assess and document dressing, incision, wound bed, drain sites and surrounding tissue  - Initiate Nutrition services consult and/or wound management as needed  Outcome: Progressing     Problem: Prexisting or High Potential for Compromised Skin Integrity  Goal: Skin integrity is maintained or improved  Description  INTERVENTIONS:  - Identify patients at risk for skin breakdown  - Assess and monitor skin integrity  - Assess and monitor nutrition and hydration status  - Monitor labs (i e  albumin)  - Assess for incontinence   - Turn and reposition patient  - Assist with mobility/ambulation  - Relieve pressure over bony prominences  - Avoid friction and shearing  - Provide appropriate hygiene as needed including keeping skin clean and dry  - Evaluate need for skin moisturizer/barrier cream  - Collaborate with interdisciplinary team (i e  Nutrition, Rehabilitation, etc )   - Patient/family teaching  Outcome: Progressing     Problem: DISCHARGE PLANNING - CARE MANAGEMENT  Goal: Discharge to post-acute care or home with appropriate resources  Description  INTERVENTIONS:  - Conduct assessment to determine patient/family and health care team treatment goals, and need for post-acute services based on payer coverage, community resources, and patient preferences, and barriers to discharge  - Address psychosocial, clinical, and financial barriers to discharge as identified in assessment in conjunction with the patient/family and health care team  - Arrange appropriate level of post-acute services according to patient?s   needs and preference and payer coverage in collaboration with the physician and health care team  - Communicate with and update the patient/family, physician, and health care team regarding progress on the discharge plan  - Arrange appropriate transportation to post-acute venues  Outcome: Progressing

## 2019-02-23 NOTE — PLAN OF CARE
Problem: PAIN - ADULT  Goal: Verbalizes/displays adequate comfort level or baseline comfort level  Description  Interventions:  - Encourage patient to monitor pain and request assistance  - Assess pain using appropriate pain scale  - Administer analgesics based on type and severity of pain and evaluate response  - Implement non-pharmacological measures as appropriate and evaluate response  - Consider cultural and social influences on pain and pain management  - Notify physician/advanced practitioner if interventions unsuccessful or patient reports new pain  2/23/2019 0908 by Ileana Monteiro RN  Outcome: Progressing  2/22/2019 2030 by Ileana Monteiro RN  Outcome: Progressing     Problem: INFECTION - ADULT  Goal: Absence or prevention of progression during hospitalization  Description  INTERVENTIONS:  - Assess and monitor for signs and symptoms of infection  - Monitor lab/diagnostic results  - Monitor all insertion sites, i e  indwelling lines, tubes, and drains  - Monitor endotracheal (as able) and nasal secretions for changes in amount and color  - [place] appropriate cooling/warming therapies per order  - Administer medications as ordered  - Instruct and encourage patient and family to use good hand hygiene technique  - Identify and instruct in appropriate isolation precautions for identified infection/condition  2/23/2019 0908 by Ileana Monteiro RN  Outcome: Progressing  2/22/2019 2030 by Ileana Monteiro RN  Outcome: Progressing  Goal: Absence of fever/infection during neutropenic period  Description  INTERVENTIONS:  - Monitor WBC  - Implement neutropenic guidelines  2/23/2019 0908 by Ileana Monteiro RN  Outcome: Progressing  2/22/2019 2030 by Ileana Monteiro RN  Outcome: Progressing     Problem: SAFETY ADULT  Goal: Patient will remain free of falls  Description  INTERVENTIONS:  - Assess patient frequently for physical needs  -  Identify cognitive and physical deficits and behaviors that affect risk of falls    -  [place] fall precautions as indicated by assessment   - Educate patient/family on patient safety including physical limitations  - Instruct patient to call for assistance with activity based on assessment  - Modify environment to reduce risk of injury  - Consider OT/PT consult to assist with strengthening/mobility  2/23/2019 0908 by Sadaf Crandall RN  Outcome: Progressing  2/22/2019 2030 by Sadaf Crandall RN  Outcome: Progressing  Goal: Maintain or return to baseline ADL function  Description  INTERVENTIONS:  -  Assess patient's ability to carry out ADLs; assess patient's baseline for ADL function and identify physical deficits which impact ability to perform ADLs (bathing, care of mouth/teeth, toileting, grooming, dressing, etc )  - Assess/evaluate cause of self-care deficits   - Assess range of motion  - Assess patient's mobility; develop plan if impaired  - Assess patient's need for assistive devices and provide as appropriate  - Encourage maximum independence but intervene and supervise when necessary  ¯ Involve family in performance of ADLs  ¯ Assess for home care needs following discharge   ¯ Request OT consult to assist with ADL evaluation and planning for discharge  ¯ Provide patient education as appropriate  2/23/2019 0908 by Sadaf Crandall RN  Outcome: Progressing  2/22/2019 2030 by Sadaf Crandall RN  Outcome: Progressing  Goal: Maintain or return mobility status to optimal level  Description  INTERVENTIONS:  - Assess patient's baseline mobility status (ambulation, transfers, stairs, etc )    - Identify cognitive and physical deficits and behaviors that affect mobility  - Identify mobility aids required to assist with transfers and/or ambulation (gait belt, sit-to-stand, lift, walker, cane, etc )  - [place] fall precautions as indicated by assessment  - Record patient progress and toleration of activity level on Mobility SBAR; progress patient to next Phase/Stage  - Instruct patient to call for assistance with activity based on assessment  - Request Rehabilitation consult to assist with strengthening/weightbearing, etc   2019 by Henry Marshall RN  Outcome: Progressing  2019 by Henry Marshall RN  Outcome: Progressing     Problem: Knowledge Deficit  Goal: Patient/family/caregiver demonstrates understanding of disease process, treatment plan, medications, and discharge instructions  Description  Complete learning assessment and assess knowledge base    Interventions:  - Provide teaching at level of understanding  - Provide teaching via preferred learning methods  2019 by Henry Marshall RN  Outcome: Progressing  2019 by Henry Marshall RN  Outcome: Progressing     Problem: DISCHARGE PLANNING  Goal: Discharge to home or other facility with appropriate resources  Description  INTERVENTIONS:  - Identify barriers to discharge w/patient and caregiver  - Arrange for needed discharge resources and transportation as appropriate  - Identify discharge learning needs (meds, wound care, etc )  - Arrange for interpretive services to assist at discharge as needed  - Refer to Case Management Department for coordinating discharge planning if the patient needs post-hospital services based on physician/advanced practitioner order or complex needs related to functional status, cognitive ability, or social support system  2019 by Henry Marshall RN  Outcome: Progressing  2019 by Henry Marshall RN  Outcome: Progressing     Problem: POSTPARTUM  Goal: Experiences normal postpartum course  Description  INTERVENTIONS:  - Monitor maternal vital signs  - Assess uterine involution and lochia  2019 by Henry Marshall RN  Outcome: Progressing  2019 by Henry Marshall RN  Outcome: Progressing  Goal: Appropriate maternal -  bonding  Description  INTERVENTIONS:  - Identify family support  - Assess for appropriate maternal/infant bonding   -Encourage maternal/infant bonding opportunities  - Referral to social worker or  as needed  2/23/2019 0908 by Candelaria Taylor RN  Outcome: Progressing  2/22/2019 2030 by Candelaria Taylor RN  Outcome: Progressing  Goal: Establishment of infant feeding pattern  Description  INTERVENTIONS:  - Assess breast/bottle feeding  - Refer to lactation as needed  2/23/2019 0908 by Candelaria Taylor RN  Outcome: Progressing  2/22/2019 2030 by Candelaria Taylor RN  Outcome: Progressing  Goal: Incision(s), wounds(s) or drain site(s) healing without S/S of infection  Description  INTERVENTIONS  - Assess and document risk factors for skin impairment   - Assess and document dressing, incision, wound bed, drain sites and surrounding tissue  - Initiate Nutrition services consult and/or wound management as needed  2/23/2019 0908 by Candelaria Taylor RN  Outcome: Progressing  2/22/2019 2030 by Candelaria Taylor RN  Outcome: Progressing     Problem: Prexisting or High Potential for Compromised Skin Integrity  Goal: Skin integrity is maintained or improved  Description  INTERVENTIONS:  - Identify patients at risk for skin breakdown  - Assess and monitor skin integrity  - Assess and monitor nutrition and hydration status  - Monitor labs (i e  albumin)  - Assess for incontinence   - Turn and reposition patient  - Assist with mobility/ambulation  - Relieve pressure over bony prominences  - Avoid friction and shearing  - Provide appropriate hygiene as needed including keeping skin clean and dry  - Evaluate need for skin moisturizer/barrier cream  - Collaborate with interdisciplinary team (i e  Nutrition, Rehabilitation, etc )   - Patient/family teaching  2/23/2019 0908 by Candelaria Taylor RN  Outcome: Progressing  2/22/2019 2030 by Candelaria Taylor RN  Outcome: Progressing     Problem: DISCHARGE PLANNING - CARE MANAGEMENT  Goal: Discharge to post-acute care or home with appropriate resources  Description  INTERVENTIONS:  - Conduct assessment to determine patient/family and health care team treatment goals, and need for post-acute services based on payer coverage, community resources, and patient preferences, and barriers to discharge  - Address psychosocial, clinical, and financial barriers to discharge as identified in assessment in conjunction with the patient/family and health care team  - Arrange appropriate level of post-acute services according to patient?s   needs and preference and payer coverage in collaboration with the physician and health care team  - Communicate with and update the patient/family, physician, and health care team regarding progress on the discharge plan  - Arrange appropriate transportation to post-acute venues  2/23/2019 0908 by Yahaira Muñoz, RN  Outcome: Progressing  2/22/2019 2030 by Yahaira Muñoz, RN  Outcome: Progressing

## 2019-02-24 VITALS
HEART RATE: 82 BPM | OXYGEN SATURATION: 97 % | RESPIRATION RATE: 18 BRPM | WEIGHT: 141 LBS | HEIGHT: 58 IN | BODY MASS INDEX: 29.6 KG/M2 | SYSTOLIC BLOOD PRESSURE: 131 MMHG | DIASTOLIC BLOOD PRESSURE: 65 MMHG | TEMPERATURE: 98.1 F

## 2019-02-24 PROCEDURE — 99024 POSTOP FOLLOW-UP VISIT: CPT | Performed by: OBSTETRICS & GYNECOLOGY

## 2019-02-24 RX ORDER — DOCUSATE SODIUM 100 MG/1
100 CAPSULE, LIQUID FILLED ORAL 2 TIMES DAILY
Qty: 10 CAPSULE | Refills: 0 | Status: SHIPPED | OUTPATIENT
Start: 2019-02-24 | End: 2019-03-15

## 2019-02-24 RX ORDER — ACETAMINOPHEN 325 MG/1
650 TABLET ORAL EVERY 6 HOURS PRN
Qty: 30 TABLET | Refills: 0 | Status: SHIPPED | OUTPATIENT
Start: 2019-02-24 | End: 2021-03-26 | Stop reason: ALTCHOICE

## 2019-02-24 RX ORDER — DIAPER,BRIEF,INFANT-TODD,DISP
1 EACH MISCELLANEOUS DAILY PRN
Qty: 30 G | Refills: 0 | Status: SHIPPED | OUTPATIENT
Start: 2019-02-24 | End: 2019-03-15

## 2019-02-24 RX ORDER — OXYCODONE HYDROCHLORIDE 10 MG/1
10 TABLET ORAL EVERY 4 HOURS PRN
Qty: 10 TABLET | Refills: 0 | Status: SHIPPED | OUTPATIENT
Start: 2019-02-24 | End: 2019-03-06

## 2019-02-24 RX ADMIN — OXYCODONE HYDROCHLORIDE 10 MG: 10 TABLET ORAL at 05:39

## 2019-02-24 RX ADMIN — IBUPROFEN 600 MG: 600 TABLET ORAL at 08:44

## 2019-02-24 RX ADMIN — STANDARDIZED SENNA CONCENTRATE 8.6 MG: 8.6 TABLET ORAL at 08:44

## 2019-02-24 RX ADMIN — SIMETHICONE CHEW TAB 80 MG 80 MG: 80 TABLET ORAL at 08:44

## 2019-02-24 RX ADMIN — OXYCODONE HYDROCHLORIDE 10 MG: 10 TABLET ORAL at 01:31

## 2019-02-24 RX ADMIN — ACETAMINOPHEN 650 MG: 325 TABLET, FILM COATED ORAL at 08:45

## 2019-02-24 RX ADMIN — DOCUSATE SODIUM 100 MG: 100 CAPSULE, LIQUID FILLED ORAL at 08:44

## 2019-02-24 RX ADMIN — SIMETHICONE CHEW TAB 80 MG 80 MG: 80 TABLET ORAL at 05:39

## 2019-02-24 NOTE — PLAN OF CARE
Problem: PAIN - ADULT  Goal: Verbalizes/displays adequate comfort level or baseline comfort level  Description  Interventions:  - Encourage patient to monitor pain and request assistance  - Assess pain using appropriate pain scale  - Administer analgesics based on type and severity of pain and evaluate response  - Implement non-pharmacological measures as appropriate and evaluate response  - Consider cultural and social influences on pain and pain management  - Notify physician/advanced practitioner if interventions unsuccessful or patient reports new pain  Outcome: Completed     Problem: INFECTION - ADULT  Goal: Absence or prevention of progression during hospitalization  Description  INTERVENTIONS:  - Assess and monitor for signs and symptoms of infection  - Monitor lab/diagnostic results  - Monitor all insertion sites, i e  indwelling lines, tubes, and drains  - Monitor endotracheal (as able) and nasal secretions for changes in amount and color  - [place] appropriate cooling/warming therapies per order  - Administer medications as ordered  - Instruct and encourage patient and family to use good hand hygiene technique  - Identify and instruct in appropriate isolation precautions for identified infection/condition  Outcome: Completed  Goal: Absence of fever/infection during neutropenic period  Description  INTERVENTIONS:  - Monitor WBC  - Implement neutropenic guidelines  Outcome: Completed     Problem: SAFETY ADULT  Goal: Patient will remain free of falls  Description  INTERVENTIONS:  - Assess patient frequently for physical needs  -  Identify cognitive and physical deficits and behaviors that affect risk of falls    -  [place] fall precautions as indicated by assessment   - Educate patient/family on patient safety including physical limitations  - Instruct patient to call for assistance with activity based on assessment  - Modify environment to reduce risk of injury  - Consider OT/PT consult to assist with strengthening/mobility  Outcome: Completed  Goal: Maintain or return to baseline ADL function  Description  INTERVENTIONS:  -  Assess patient's ability to carry out ADLs; assess patient's baseline for ADL function and identify physical deficits which impact ability to perform ADLs (bathing, care of mouth/teeth, toileting, grooming, dressing, etc )  - Assess/evaluate cause of self-care deficits   - Assess range of motion  - Assess patient's mobility; develop plan if impaired  - Assess patient's need for assistive devices and provide as appropriate  - Encourage maximum independence but intervene and supervise when necessary  ¯ Involve family in performance of ADLs  ¯ Assess for home care needs following discharge   ¯ Request OT consult to assist with ADL evaluation and planning for discharge  ¯ Provide patient education as appropriate  Outcome: Completed  Goal: Maintain or return mobility status to optimal level  Description  INTERVENTIONS:  - Assess patient's baseline mobility status (ambulation, transfers, stairs, etc )    - Identify cognitive and physical deficits and behaviors that affect mobility  - Identify mobility aids required to assist with transfers and/or ambulation (gait belt, sit-to-stand, lift, walker, cane, etc )  - [place] fall precautions as indicated by assessment  - Record patient progress and toleration of activity level on Mobility SBAR; progress patient to next Phase/Stage  - Instruct patient to call for assistance with activity based on assessment  - Request Rehabilitation consult to assist with strengthening/weightbearing, etc   Outcome: Completed     Problem: Knowledge Deficit  Goal: Patient/family/caregiver demonstrates understanding of disease process, treatment plan, medications, and discharge instructions  Description  Complete learning assessment and assess knowledge base    Interventions:  - Provide teaching at level of understanding  - Provide teaching via preferred learning methods  Outcome: Completed     Problem: DISCHARGE PLANNING  Goal: Discharge to home or other facility with appropriate resources  Description  INTERVENTIONS:  - Identify barriers to discharge w/patient and caregiver  - Arrange for needed discharge resources and transportation as appropriate  - Identify discharge learning needs (meds, wound care, etc )  - Arrange for interpretive services to assist at discharge as needed  - Refer to Case Management Department for coordinating discharge planning if the patient needs post-hospital services based on physician/advanced practitioner order or complex needs related to functional status, cognitive ability, or social support system  Outcome: Completed     Problem: POSTPARTUM  Goal: Experiences normal postpartum course  Description  INTERVENTIONS:  - Monitor maternal vital signs  - Assess uterine involution and lochia  Outcome: Completed  Goal: Appropriate maternal -  bonding  Description  INTERVENTIONS:  - Identify family support  - Assess for appropriate maternal/infant bonding   -Encourage maternal/infant bonding opportunities  - Referral to  or  as needed  Outcome: Completed  Goal: Establishment of infant feeding pattern  Description  INTERVENTIONS:  - Assess breast/bottle feeding  - Refer to lactation as needed  Outcome: Completed  Goal: Incision(s), wounds(s) or drain site(s) healing without S/S of infection  Description  INTERVENTIONS  - Assess and document risk factors for skin impairment   - Assess and document dressing, incision, wound bed, drain sites and surrounding tissue  - Initiate Nutrition services consult and/or wound management as needed  Outcome: Completed     Problem: Prexisting or High Potential for Compromised Skin Integrity  Goal: Skin integrity is maintained or improved  Description  INTERVENTIONS:  - Identify patients at risk for skin breakdown  - Assess and monitor skin integrity  - Assess and monitor nutrition and hydration status  - Monitor labs (i e  albumin)  - Assess for incontinence   - Turn and reposition patient  - Assist with mobility/ambulation  - Relieve pressure over bony prominences  - Avoid friction and shearing  - Provide appropriate hygiene as needed including keeping skin clean and dry  - Evaluate need for skin moisturizer/barrier cream  - Collaborate with interdisciplinary team (i e  Nutrition, Rehabilitation, etc )   - Patient/family teaching  Outcome: Completed     Problem: DISCHARGE PLANNING - CARE MANAGEMENT  Goal: Discharge to post-acute care or home with appropriate resources  Description  INTERVENTIONS:  - Conduct assessment to determine patient/family and health care team treatment goals, and need for post-acute services based on payer coverage, community resources, and patient preferences, and barriers to discharge  - Address psychosocial, clinical, and financial barriers to discharge as identified in assessment in conjunction with the patient/family and health care team  - Arrange appropriate level of post-acute services according to patient?s   needs and preference and payer coverage in collaboration with the physician and health care team  - Communicate with and update the patient/family, physician, and health care team regarding progress on the discharge plan  - Arrange appropriate transportation to post-acute venues  Outcome: Completed

## 2019-02-24 NOTE — PROGRESS NOTES
Progress Note - OB/GYN   Rainer Pete 25 y o  female MRN: [id number]  Unit/Bed#:  323-01 Encounter: [id number]    Assessment:  POD#3 s/p Repeat low transverse  section, stable    Plan:  Contraception: will abstain for first 6 weeks, no current partner, plan for discussion at 1 week incision check  Continue routine postoperative care  Encourage ambulation  Encourage breastfeeding  Pain control as needed    Disposition: for discharge today    Subjective/Objective   Chief Complaint:     POD#3 s/p Repeat low transverse  section    Subjective:     Pain: currently rated 7/10,   Tolerating PO: yes  Voiding: yes  Flatus: yes  BM: yes  Ambulating: yes  Breastfeeding: Breastfeeding  Chest pain: no  Shortness of breath: no  Leg pain: no  Lochia: scant    Objective:     Vitals:  Vitals:    19 0500 19 0800 19 1600 19 0004   BP: 117/59 135/73 134/72 133/79   BP Location: Right arm      Pulse: 78 87 80 79   Resp: 18 18 16 20   Temp: 98 2 °F (36 8 °C) 98 1 °F (36 7 °C) 98 3 °F (36 8 °C) 98 8 °F (37 1 °C)   TempSrc: Oral Oral Oral Oral   SpO2:       Weight:       Height:           Physical Exam:     Physical Exam   Constitutional: She is oriented to person, place, and time  She appears well-developed and well-nourished  No distress  Cardiovascular: Normal rate, regular rhythm, normal heart sounds and intact distal pulses  Pulmonary/Chest: Effort normal and breath sounds normal  No stridor  No respiratory distress  Abdominal: Soft  Bowel sounds are normal  She exhibits no distension  There is no tenderness  Incision clean, dry, and intact   Neurological: She is alert and oriented to person, place, and time  Skin: Skin is warm and dry  She is not diaphoretic  Psychiatric: She has a normal mood and affect  Her behavior is normal    Uterine fundus firm and non-tender, -1 cm below the umbilicus       Lab, Imaging and other studies: I have personally reviewed pertinent reports  Lab Results   Component Value Date    WBC 10 52 (H) 02/22/2019    HGB 10 0 (L) 02/22/2019    HCT 29 4 (L) 02/22/2019    MCV 93 02/22/2019     02/22/2019               Cole Filter, DO  02/24/19

## 2019-02-24 NOTE — LACTATION NOTE
This note was copied from a baby's chart  Mom states feedings are going much better now  Given discharge breastfeeding pkt in [place] and same reviewed  Discussed expected changes in infant feeding patterns, engorgement relief measures, use of feeding log and when and where to call for additional assistance as needed

## 2019-02-25 NOTE — UTILIZATION REVIEW
Notification of Maternity Inpatient Admission/Maternity Inpatient Authorization Request  This is a Notification of Maternity Inpatient Admission/Maternity Inpatient Authorization Request to our facility [facility] [address]  Please be advised that this patient is currently in our facility under Inpatient Status  Below you will find the Birth/[place] Summary, Attending Physician and Facilitys information including NPI#  and contact information for the Utilization Review Department where the patient is receiving care services  Facility: [facility] [address]  Address: [address] [place] [address]  Phone: [phone] Tax ID: [id number]  NPI: [id number]  MEDICARE ID: [id number]    Place of Service Code: 24   Place of Service Name: Inpatient Hospital  Presentation Date & Time: 2019  8:07 AM  Inpatient Admission Date & Time: 19 5036  Discharge Date & Time: 2019  2:13 PM   Discharge Disposition (if discharged): Home/Self Care  Attending Physician & NPI: Buddy Carpenter Md [[id number]]  JANUSZ Davis  Specialty- Obstetrics and Gynecology  [facility] ID- [id number]  [address] [place] [address]  Phone 1: [phone]  Fax: [phone]  Mother of  Information: Rocio Azevedo   MRN: [id number] YOB: 1990   Estimated Date of Delivery: 19  Type of Delivery: , Low Transverse    Delivering clinician: Miki Chavez   OB History        2    Para   2    Term   2            AB        Living   2       SAB        TAB        Ectopic        Multiple   0    Live Births   2               [place] Name & MRN:   Information for the patient's :  Peg Arriola Girl  Cheryal Curling) [[id number]]      Delivery Information:  Sex: female  Delivered 2019 11:03 AM by , Low Transverse; Gestational Age: 36w0d     Measurements:  Weight: 8 lb 8 oz (3856 g);   Height: 19"    APGAR 1 minute 5 minutes 10 minutes   Totals: 9 9      Thank you,  Leighton Plein  Utilization Review Department  Phone: [phone]; Fax [phone]  ATTENTION: Please call with any questions or concerns to [phone]  and carefully follow the prompts so that you are directed to the right person  Send all requests for admission clinical reviews, approved or denied determinations and any other requests to fax [phone]   All voicemails are confidential

## 2019-02-26 ENCOUNTER — OFFICE VISIT (OUTPATIENT)
Dept: OBGYN CLINIC | Facility: CLINIC | Age: 29
End: 2019-02-26

## 2019-02-26 ENCOUNTER — APPOINTMENT (EMERGENCY)
Dept: RADIOLOGY | Facility: HOSPITAL | Age: 29
DRG: 561 | End: 2019-02-26
Payer: COMMERCIAL

## 2019-02-26 ENCOUNTER — HOSPITAL ENCOUNTER (INPATIENT)
Facility: HOSPITAL | Age: 29
LOS: 1 days | Discharge: HOME | DRG: 561 | End: 2019-02-28
Attending: EMERGENCY MEDICINE | Admitting: EMERGENCY MEDICINE
Payer: COMMERCIAL

## 2019-02-26 VITALS
SYSTOLIC BLOOD PRESSURE: 156 MMHG | DIASTOLIC BLOOD PRESSURE: 95 MMHG | BODY MASS INDEX: 27.08 KG/M2 | HEIGHT: 58 IN | WEIGHT: 129 LBS | OXYGEN SATURATION: 98 % | HEART RATE: 98 BPM | TEMPERATURE: 97.3 F

## 2019-02-26 DIAGNOSIS — R07.9 CHEST PAIN IN ADULT: Primary | ICD-10-CM

## 2019-02-26 DIAGNOSIS — N39.0 URINARY TRACT INFECTION WITHOUT HEMATURIA, SITE UNSPECIFIED: ICD-10-CM

## 2019-02-26 DIAGNOSIS — O14.90 PRE-ECLAMPSIA: Primary | ICD-10-CM

## 2019-02-26 PROBLEM — I10 HYPERTENSION: Status: ACTIVE | Noted: 2019-02-26

## 2019-02-26 LAB
ALBUMIN SERPL BCP-MCNC: 3.1 G/DL (ref 3.5–5)
ALP SERPL-CCNC: 222 U/L (ref 46–116)
ALT SERPL W P-5'-P-CCNC: 126 U/L (ref 12–78)
ANION GAP SERPL CALCULATED.3IONS-SCNC: 10 MMOL/L (ref 4–13)
APTT PPP: 33 SECONDS (ref 26–38)
AST SERPL W P-5'-P-CCNC: 145 U/L (ref 5–45)
BACTERIA UR QL AUTO: ABNORMAL /HPF
BASOPHILS # BLD AUTO: 0.03 THOUSANDS/ΜL (ref 0–0.1)
BASOPHILS NFR BLD AUTO: 0 % (ref 0–1)
BILIRUB SERPL-MCNC: 0.65 MG/DL (ref 0.2–1)
BILIRUB UR QL STRIP: NEGATIVE
BUN SERPL-MCNC: 8 MG/DL (ref 5–25)
CALCIUM SERPL-MCNC: 9.3 MG/DL (ref 8.3–10.1)
CHLORIDE SERPL-SCNC: 105 MMOL/L (ref 100–108)
CLARITY UR: ABNORMAL
CO2 SERPL-SCNC: 23 MMOL/L (ref 21–32)
COLOR UR: YELLOW
COLOR, POC: NORMAL
CREAT SERPL-MCNC: 0.42 MG/DL (ref 0.6–1.3)
CREAT UR-MCNC: <13 MG/DL
EOSINOPHIL # BLD AUTO: 0.27 THOUSAND/ΜL (ref 0–0.61)
EOSINOPHIL NFR BLD AUTO: 4 % (ref 0–6)
ERYTHROCYTE [DISTWIDTH] IN BLOOD BY AUTOMATED COUNT: 12.8 % (ref 11.6–15.1)
GFR SERPL CREATININE-BSD FRML MDRD: 138 ML/MIN/1.73SQ M
GLUCOSE SERPL-MCNC: 93 MG/DL (ref 65–140)
GLUCOSE UR STRIP-MCNC: NEGATIVE MG/DL
HCT VFR BLD AUTO: 36.5 % (ref 34.8–46.1)
HGB BLD-MCNC: 12.6 G/DL (ref 11.5–15.4)
HGB UR QL STRIP.AUTO: ABNORMAL
HYALINE CASTS #/AREA URNS LPF: ABNORMAL /LPF
IMM GRANULOCYTES # BLD AUTO: 0.03 THOUSAND/UL (ref 0–0.2)
IMM GRANULOCYTES NFR BLD AUTO: 0 % (ref 0–2)
INR PPP: 0.93 (ref 0.86–1.17)
KETONES UR STRIP-MCNC: NEGATIVE MG/DL
LEUKOCYTE ESTERASE UR QL STRIP: ABNORMAL
LYMPHOCYTES # BLD AUTO: 1.44 THOUSANDS/ΜL (ref 0.6–4.47)
LYMPHOCYTES NFR BLD AUTO: 21 % (ref 14–44)
MCH RBC QN AUTO: 31.6 PG (ref 26.8–34.3)
MCHC RBC AUTO-ENTMCNC: 34.5 G/DL (ref 31.4–37.4)
MCV RBC AUTO: 92 FL (ref 82–98)
MONOCYTES # BLD AUTO: 0.32 THOUSAND/ΜL (ref 0.17–1.22)
MONOCYTES NFR BLD AUTO: 5 % (ref 4–12)
NEUTROPHILS # BLD AUTO: 4.79 THOUSANDS/ΜL (ref 1.85–7.62)
NEUTS SEG NFR BLD AUTO: 70 % (ref 43–75)
NITRITE UR QL STRIP: POSITIVE
NON-SQ EPI CELLS URNS QL MICRO: ABNORMAL /HPF
NRBC BLD AUTO-RTO: 0 /100 WBCS
PH UR STRIP.AUTO: 7 [PH] (ref 4.5–8)
PLATELET # BLD AUTO: 374 THOUSANDS/UL (ref 149–390)
PMV BLD AUTO: 9.1 FL (ref 8.9–12.7)
POTASSIUM SERPL-SCNC: 3.8 MMOL/L (ref 3.5–5.3)
PROT SERPL-MCNC: 7.8 G/DL (ref 6.4–8.2)
PROT UR STRIP-MCNC: NEGATIVE MG/DL
PROT UR-MCNC: 7 MG/DL
PROT/CREAT UR: >0.54 MG/G{CREAT} (ref 0–0.1)
PROTHROMBIN TIME: 12.6 SECONDS (ref 11.8–14.2)
RBC # BLD AUTO: 3.99 MILLION/UL (ref 3.81–5.12)
RBC #/AREA URNS AUTO: ABNORMAL /HPF
SODIUM SERPL-SCNC: 138 MMOL/L (ref 136–145)
SP GR UR STRIP.AUTO: 1.01 (ref 1–1.03)
UROBILINOGEN UR QL STRIP.AUTO: 0.2 E.U./DL
WBC # BLD AUTO: 6.88 THOUSAND/UL (ref 4.31–10.16)
WBC #/AREA URNS AUTO: ABNORMAL /HPF

## 2019-02-26 PROCEDURE — 87077 CULTURE AEROBIC IDENTIFY: CPT

## 2019-02-26 PROCEDURE — 87186 SC STD MICRODIL/AGAR DIL: CPT

## 2019-02-26 PROCEDURE — 81001 URINALYSIS AUTO W/SCOPE: CPT

## 2019-02-26 PROCEDURE — 96375 TX/PRO/DX INJ NEW DRUG ADDON: CPT

## 2019-02-26 PROCEDURE — 99285 EMERGENCY DEPT VISIT HI MDM: CPT

## 2019-02-26 PROCEDURE — 84156 ASSAY OF PROTEIN URINE: CPT | Performed by: EMERGENCY MEDICINE

## 2019-02-26 PROCEDURE — 81003 URINALYSIS AUTO W/O SCOPE: CPT

## 2019-02-26 PROCEDURE — 85025 COMPLETE CBC W/AUTO DIFF WBC: CPT | Performed by: EMERGENCY MEDICINE

## 2019-02-26 PROCEDURE — 85610 PROTHROMBIN TIME: CPT | Performed by: EMERGENCY MEDICINE

## 2019-02-26 PROCEDURE — 85027 COMPLETE CBC AUTOMATED: CPT | Performed by: OBSTETRICS & GYNECOLOGY

## 2019-02-26 PROCEDURE — 80053 COMPREHEN METABOLIC PANEL: CPT | Performed by: EMERGENCY MEDICINE

## 2019-02-26 PROCEDURE — 85049 AUTOMATED PLATELET COUNT: CPT | Performed by: EMERGENCY MEDICINE

## 2019-02-26 PROCEDURE — 80053 COMPREHEN METABOLIC PANEL: CPT | Performed by: OBSTETRICS & GYNECOLOGY

## 2019-02-26 PROCEDURE — 85730 THROMBOPLASTIN TIME PARTIAL: CPT | Performed by: EMERGENCY MEDICINE

## 2019-02-26 PROCEDURE — 99220 PR INITIAL OBSERVATION CARE/DAY 70 MINUTES: CPT | Performed by: EMERGENCY MEDICINE

## 2019-02-26 PROCEDURE — 99252 IP/OBS CONSLTJ NEW/EST SF 35: CPT | Performed by: OBSTETRICS & GYNECOLOGY

## 2019-02-26 PROCEDURE — 99213 OFFICE O/P EST LOW 20 MIN: CPT | Performed by: OBSTETRICS & GYNECOLOGY

## 2019-02-26 PROCEDURE — 82570 ASSAY OF URINE CREATININE: CPT | Performed by: EMERGENCY MEDICINE

## 2019-02-26 PROCEDURE — 96365 THER/PROPH/DIAG IV INF INIT: CPT

## 2019-02-26 PROCEDURE — 71275 CT ANGIOGRAPHY CHEST: CPT

## 2019-02-26 PROCEDURE — 87086 URINE CULTURE/COLONY COUNT: CPT

## 2019-02-26 PROCEDURE — 93005 ELECTROCARDIOGRAM TRACING: CPT

## 2019-02-26 PROCEDURE — 36415 COLL VENOUS BLD VENIPUNCTURE: CPT | Performed by: EMERGENCY MEDICINE

## 2019-02-26 PROCEDURE — 96366 THER/PROPH/DIAG IV INF ADDON: CPT

## 2019-02-26 RX ORDER — MAGNESIUM SULFATE HEPTAHYDRATE 40 MG/ML
2 INJECTION, SOLUTION INTRAVENOUS CONTINUOUS
Status: DISCONTINUED | OUTPATIENT
Start: 2019-02-26 | End: 2019-02-27

## 2019-02-26 RX ORDER — LABETALOL 20 MG/4 ML (5 MG/ML) INTRAVENOUS SYRINGE
10 EVERY 4 HOURS PRN
Status: DISCONTINUED | OUTPATIENT
Start: 2019-02-26 | End: 2019-02-28 | Stop reason: HOSPADM

## 2019-02-26 RX ORDER — BUTALBITAL, ACETAMINOPHEN AND CAFFEINE 50; 325; 40 MG/1; MG/1; MG/1
2 TABLET ORAL ONCE
Status: COMPLETED | OUTPATIENT
Start: 2019-02-26 | End: 2019-02-26

## 2019-02-26 RX ORDER — HEPARIN SODIUM 5000 [USP'U]/ML
5000 INJECTION, SOLUTION INTRAVENOUS; SUBCUTANEOUS EVERY 8 HOURS SCHEDULED
Status: DISCONTINUED | OUTPATIENT
Start: 2019-02-26 | End: 2019-02-28 | Stop reason: HOSPADM

## 2019-02-26 RX ORDER — POTASSIUM CHLORIDE 20 MEQ/1
20 TABLET, EXTENDED RELEASE ORAL ONCE
Status: COMPLETED | OUTPATIENT
Start: 2019-02-26 | End: 2019-02-26

## 2019-02-26 RX ORDER — LABETALOL 20 MG/4 ML (5 MG/ML) INTRAVENOUS SYRINGE
10 ONCE
Status: COMPLETED | OUTPATIENT
Start: 2019-02-26 | End: 2019-02-26

## 2019-02-26 RX ORDER — MAGNESIUM SULFATE HEPTAHYDRATE 40 MG/ML
4 INJECTION, SOLUTION INTRAVENOUS ONCE
Status: DISCONTINUED | OUTPATIENT
Start: 2019-02-26 | End: 2019-02-26

## 2019-02-26 RX ORDER — CHLORHEXIDINE GLUCONATE 0.12 MG/ML
15 RINSE ORAL EVERY 12 HOURS SCHEDULED
Status: DISCONTINUED | OUTPATIENT
Start: 2019-02-26 | End: 2019-02-28 | Stop reason: HOSPADM

## 2019-02-26 RX ORDER — MAGNESIUM SULFATE HEPTAHYDRATE 40 MG/ML
4 INJECTION, SOLUTION INTRAVENOUS ONCE
Status: COMPLETED | OUTPATIENT
Start: 2019-02-26 | End: 2019-02-26

## 2019-02-26 RX ADMIN — IOHEXOL 85 ML: 350 INJECTION, SOLUTION INTRAVENOUS at 17:48

## 2019-02-26 RX ADMIN — MAGNESIUM SULFATE HEPTAHYDRATE 2 G/HR: 40 INJECTION, SOLUTION INTRAVENOUS at 21:13

## 2019-02-26 RX ADMIN — MAGNESIUM SULFATE HEPTAHYDRATE 4 G: 40 INJECTION, SOLUTION INTRAVENOUS at 17:05

## 2019-02-26 RX ADMIN — LABETALOL 20 MG/4 ML (5 MG/ML) INTRAVENOUS SYRINGE 10 MG: at 17:02

## 2019-02-26 RX ADMIN — BUTALBITAL, ACETAMINOPHEN, AND CAFFEINE 2 TABLET: 50; 325; 40 TABLET ORAL at 23:27

## 2019-02-26 RX ADMIN — HEPARIN SODIUM 5000 UNITS: 5000 INJECTION, SOLUTION INTRAVENOUS; SUBCUTANEOUS at 23:28

## 2019-02-26 RX ADMIN — POTASSIUM CHLORIDE 20 MEQ: 1500 TABLET, EXTENDED RELEASE ORAL at 23:27

## 2019-02-26 RX ADMIN — CHLORHEXIDINE GLUCONATE 15 ML: 1.2 RINSE ORAL at 23:27

## 2019-02-26 RX ADMIN — CEFTRIAXONE SODIUM 1000 MG: 10 INJECTION, POWDER, FOR SOLUTION INTRAVENOUS at 19:37

## 2019-02-26 NOTE — ED ATTENDING ATTESTATION
Shaniqua Gunderson MD, saw and evaluated the patient  All available labs and X-rays were ordered by me or the resident and have been reviewed by myself  I discussed the patient with the resident / non-physician and agree with the resident's / non-physician practitioner's findings and plan as documented in the resident's / non-physician practicitioner's note, except where noted  At this point, I agree with the current assessment done in the ED  I was present during key portions of all procedures performed unless otherwise stated  Chief Complaint   Patient presents with    Chest Pain     pt reports had a recent c section and is having head ache, SOB and chest pain     This is a 49-year-old female  presenting for evaluation of chest pain, shortness of breath, headache  The patient states that she had a normal  done 5 days ago  It was done because she had a previous  done for low pulse of gestation during her 1st pregnancy  She states that after the procedure was done she has been doing well  She is breastfeeding  Today about 4 hours ago she all of a sudden has some degree of chest pain stating that whenever she takes a deep breath it hurts her chest, feeling short of breath because again whenever she takes deep breath she started to have pain  She denies any associated fevers chills nausea vomiting dizziness lightheadedness numbness or tingling down the arms or legs  The belly pain that she does have is the same that she has had post  and is unchanged  No bowel or bladder incontinence  Denies any urinary tract infection symptoms including burning itching pain blood frequency  Denies cough congestion rhinorrhea sore throat  She does have a headache and described as a frontal more on the left side headache without any visual disturbances or focal neurologic deficits    Because of the symptoms, she is to follow up with the pediatrician as well as the OB Gyne today who measured her blood pressure as 923 systolic  Because of this she was sent in for further evaluation  She did not had any gestational hypertension  She has no history of eclampsia or preeclampsia in the past   No new medications  PMH:  - UTI  PSH:  -   No smoking drinking drugs  PE:  Vitals:    19 1236 19 1336 19 1436 19 1700   BP: 115/71 113/70 120/81 129/85   BP Location:    Right arm   Pulse: 102 96 96 (!) 108   Resp: (!) 26 17 15 (!) 28   Temp:       TempSrc:       SpO2: 98% 97% 97% 98%   Weight:       Height:       General: VSS, NAD, awake, alert  Well-nourished, well-developed  Appears stated age  Speaking normally in full sentences  Head: Normocephalic, atraumatic, nontender  Eyes: PERRL, EOM-I  No diplopia  No hyphema  No subconjunctival hemorrhages  Symmetrical lids  ENT: Atraumatic external nose and ears  MMM  No malocclusion  No stridor  Normal phonation  No drooling  Normal swallowing  Neck: Symmetric, trachea midline  No JVD  CV: RRR  +S1/S2  No murmurs or gallops  Peripheral pulses +2 throughout  No chest wall tenderness  Lungs:   Unlabored No retractions  CTAB, lungs sounds equal bilateral    No tachypnea  Abd: +BS, soft, NT/ND    MSK:   FROM   Back:   No rashes  Skin: Dry, intact  Neuro: AAOx3, GCS 15, CN II-XII grossly intact  Motor grossly intact  Sensory grossly intact    5/5  Normal gait  No objective puffy ankles but patient says it feels like ti is  Psychiatric/Behavioral: Appropriate mood and affect   Exam: deferred  A:  - HTN  - HA  - PLeuritic CP with SOB  P:  - CTA:  She is not a candidate for D-dimer as she is seemingly postpartum, just had a surgery  Her heart rate was recorded as 72 but in the room it is closer to 110   Technically moderate risk by well's however it would not be appropriate to apply to pregnant patient  -  will do cardiac workup  - 13 point ROS was performed and all are normal unless stated in the history above  - Nursing note reviewed  Vitals reviewed  - Orders placed by myself and/or advanced practitioner / resident     - Previous chart was reviewed  - No language barrier    - History obtained from patient  - There are no limitations to the history obtained  - Critical care time: Not applicable for this patient  Final Diagnosis:  1  Pre-eclampsia           Medications   chlorhexidine (PERIDEX) 0 12 % oral rinse 15 mL (15 mL Swish & Spit Not Given 2/27/19 1211)   heparin (porcine) subcutaneous injection 5,000 Units (5,000 Units Subcutaneous Given 2/27/19 1521)   cefTRIAXone (ROCEPHIN) 1,000 mg in dextrose 5 % 50 mL IVPB (has no administration in time range)   Labetalol HCl (NORMODYNE) injection 10 mg (has no administration in time range)   acetaminophen (TYLENOL) tablet 650 mg (650 mg Oral Given 2/27/19 1220)   white petrolatum-mineral oil (EUCERIN,HYDROCERIN) cream (has no administration in time range)   Labetalol HCl (NORMODYNE) injection 10 mg (10 mg Intravenous Given 2/26/19 1702)   magnesium sulfate 4 g/100 mL IVPB (premix) 4 g (0 g Intravenous Stopped 2/26/19 1829)   iohexol (OMNIPAQUE) 350 MG/ML injection (MULTI-DOSE) 100 mL (85 mL Intravenous Given 2/26/19 1748)   ceftriaxone (ROCEPHIN) 1 g/50 mL in dextrose IVPB (0 mg Intravenous Stopped 2/26/19 2035)   butalbital-acetaminophen-caffeine (FIORICET,ESGIC) -40 mg per tablet 2 tablet (2 tablets Oral Given 2/26/19 2327)   potassium chloride (K-DUR,KLOR-CON) CR tablet 20 mEq (20 mEq Oral Given 2/26/19 2327)   potassium chloride (K-DUR,KLOR-CON) CR tablet 40 mEq (40 mEq Oral Given 2/27/19 0929)     CTA ED chest PE study   Final Result      Trace left pleural effusion with bibasilar subsegmental atelectasis  Indeterminate 8 mm nodule left lower lobe possibly granuloma              Workstation performed: [id number]           Orders Placed This Encounter   Procedures    Urine culture    CTA ED chest PE study    Comprehensive metabolic panel    CBC and differential    Protime-INR    APTT    Protein / creatinine ratio, urine    Urine Microscopic    CBC    Comprehensive metabolic panel    Platelet count    CBC and differential    Comprehensive metabolic panel    Troponin I    Phosphorus    Magnesium    CBC and differential    Comprehensive metabolic panel    Diet Regular; Regular House    Straight cath    Instruct patient to report symptoms of    Nursing communcation For management of suspected magnesium toxicity in pre-eclampsia: 1) Turn off/interrupt Magnesium infusion  2) If maternal respiratory rate less than 10 per/min, apply oximeter and administer O2 at 8-12 LPM per tight face mask      Vital signs, reflexes, pulse oximetry and clonus checks while on Magnesium    Intake and Output; Call less than 60ml x 2 hours    Nursing Communication Fluid Restriction: 3000ml a day    Auscultate lung sounds    Deep tendon reflexes    Nursing communcation Continue IV as ordered     Qing Hurley Notify admitting physician    Notify admitting physician on arrival    24 Hour Telemetry Monitoring    Cardio-Pulmonary Monitoring (Critical Care & Step Down Only)    Vital Signs    Daily weights    I/O    CAM (ICU) Assessment    Nursing dysphagia assessment    Turn patient    Up with assistance    Neuro checks    Place sequential compression device    Incentive spirometry    Level 1-Full Code: all life saving measures are indicated    Consult to Case Management    Inpatient consult to Perinatology    EKG RESULTS    POCT urinalysis dipstick    ECG 12 lead    ECG 12 lead    ECG 12 lead    ECG 12 lead    Place in Observation (expected length of stay for this patient is less than two midnights)    Inpatient Admission    Fall precautions    Update level of care     Labs Reviewed   COMPREHENSIVE METABOLIC PANEL - Abnormal       Result Value Ref Range Status    Sodium 138  136 - 145 mmol/L Final    Potassium 3 8  3 5 - 5 3 mmol/L Final    Chloride 105  100 - 108 mmol/L Final    CO2 23  21 - 32 mmol/L Final    ANION GAP 10  4 - 13 mmol/L Final    BUN 8  5 - 25 mg/dL Final    Creatinine 0 42 (*) 0 60 - 1 30 mg/dL Final    Comment: Standardized to IDMS reference method    Glucose 93  65 - 140 mg/dL Final    Comment:   If the patient is fasting, the ADA then defines impaired fasting glucose as > 100 mg/dL and diabetes as > or equal to 123 mg/dL  Specimen collection should occur prior to Sulfasalazine administration due to the potential for falsely depressed results  Specimen collection should occur prior to Sulfapyridine administration due to the potential for falsely elevated results  Calcium 9 3  8 3 - 10 1 mg/dL Final     (*) 5 - 45 U/L Final    Comment:   Specimen collection should occur prior to Sulfasalazine administration due to the potential for falsely depressed results   (*) 12 - 78 U/L Final    Comment:   Specimen collection should occur prior to Sulfasalazine and/or Sulfapyridine administration due to the potential for falsely depressed results  Alkaline Phosphatase 222 (*) 46 - 116 U/L Final    Total Protein 7 8  6 4 - 8 2 g/dL Final    Albumin 3 1 (*) 3 5 - 5 0 g/dL Final    Total Bilirubin 0 65  0 20 - 1 00 mg/dL Final    eGFR 138  ml/min/1 73sq m Final    Narrative:     National Kidney Disease Education Program recommendations are as follows:  GFR calculation is accurate only with a steady state creatinine  Chronic Kidney disease less than 60 ml/min/1 73 sq  meters  Kidney failure less than 15 ml/min/1 73 sq  meters     PROTEIN / CREATININE RATIO, URINE - Abnormal    Creatinine, Ur <13 0  mg/dL Final    Protein Urine Random 7  mg/dL Final    Prot/Creat Ratio, Ur >0 54 (*) 0 00 - 0 10 Final   URINE MICROSCOPIC - Abnormal    RBC, UA 4-10 (*) None Seen, 0-5 /hpf Final    WBC, UA Innumerable (*) None Seen, 0-5, 5-55, 5-65 /hpf Final    Epithelial Cells None Seen  None Seen, Occasional /hpf Final    Bacteria, UA Innumerable (*) None Seen, Occasional /hpf Final    Hyaline Casts, UA None Seen  None Seen /lpf Final   ED URINE MACROSCOPIC - Abnormal    Color, UA Yellow   Final    Clarity, UA Cloudy   Final    pH, UA 7 0  4 5 - 8 0 Final    Leukocytes, UA Large (*) Negative Final    Nitrite, UA Positive (*) Negative Final    Protein, UA Negative  Negative mg/dl Final    Glucose, UA Negative  Negative mg/dl Final    Ketones, UA Negative  Negative mg/dl Final    Urobilinogen, UA 0 2  0 2, 1 0 E U /dl E U /dl Final    Bilirubin, UA Negative  Negative Final    Blood, UA Moderate (*) Negative Final    Specific [place], UA 1 015  1 003 - 1 030 Final    Narrative:     CLINITEK RESULT   PROTIME-INR - Normal    Protime 12 6  11 8 - 14 2 seconds Final    INR 0 93  0 86 - 1 17 Final   APTT - Normal    PTT 33  26 - 38 seconds Final    Comment: Therapeutic Heparin Range =  60-90 seconds   POCT URINALYSIS DIPSTICK - Normal    Color, UA See chart   Final   CBC AND DIFFERENTIAL    WBC 6 88  4 31 - 10 16 Thousand/uL Final    RBC 3 99  3 81 - 5 12 Million/uL Final    Hemoglobin 12 6  11 5 - 15 4 g/dL Final    Hematocrit 36 5  34 8 - 46 1 % Final    MCV 92  82 - 98 fL Final    MCH 31 6  26 8 - 34 3 pg Final    MCHC 34 5  31 4 - 37 4 g/dL Final    RDW 12 8  11 6 - 15 1 % Final    MPV 9 1  8 9 - 12 7 fL Final    Platelets 459  158 - 390 Thousands/uL Final    nRBC 0  /100 WBCs Final    Neutrophils Relative 70  43 - 75 % Final    Immat GRANS % 0  0 - 2 % Final    Lymphocytes Relative 21  14 - 44 % Final    Monocytes Relative 5  4 - 12 % Final    Eosinophils Relative 4  0 - 6 % Final    Basophils Relative 0  0 - 1 % Final    Neutrophils Absolute 4 79  1 85 - 7 62 Thousands/µL Final    Immature Grans Absolute 0 03  0 00 - 0 20 Thousand/uL Final    Lymphocytes Absolute 1 44  0 60 - 4 47 Thousands/µL Final    Monocytes Absolute 0 32  0 17 - 1 22 Thousand/µL Final    Eosinophils Absolute 0 27  0 00 - 0 61 Thousand/µL Final    Basophils Absolute 0 03 0 00 - 0 10 Thousands/µL Final     Time reflects when diagnosis was documented in both MDM as applicable and the Disposition within this note     Time User Action Codes Description Comment    2019  7:34 PM Cecelia Bobo Add [O14 [id number]] Pre-eclampsia       ED Disposition     ED Disposition Condition Date/Time Comment    Admit Stable giovany 2019  7:34 PM Patient discussed with Critical Care and was admitted for obs under the care of Dr Belkis Kolb    None       Current Discharge Medication List      CONTINUE these medications which have NOT CHANGED    Details   acetaminophen (TYLENOL) 325 mg tablet Take 2 tablets (650 mg total) by mouth every 6 (six) hours as needed for headaches  Qty: 30 tablet, Refills: 0    Associated Diagnoses: S/P  section      docusate sodium (COLACE) 100 mg capsule Take 1 capsule (100 mg total) by mouth 2 (two) times a day  Qty: 10 capsule, Refills: 0    Associated Diagnoses: S/P  section      ferrous sulfate 324 (65 Fe) mg Take 1 tablet (324 mg total) by mouth 2 (two) times a day before meals  Qty: 60 tablet, Refills: 0    Associated Diagnoses: Anemia during pregnancy in second trimester      hydrocortisone 1 % cream Apply 1 application topically daily as needed for irritation or rash  Qty: 30 g, Refills: 0    Associated Diagnoses: S/P  section      oxyCODONE (ROXICODONE) 10 MG TABS Take 1 tablet (10 mg total) by mouth every 4 (four) hours as needed for severe pain for up to 10 daysMax Daily Amount: 60 mg  Qty: 10 tablet, Refills: 0    Associated Diagnoses: S/P  section      Prenatal Vit-Fe Fumarate-FA (PRENATAL PO) Take by mouth      witch hazel-glycerin (TUCKS) topical pad Apply 1 pad topically every 4 (four) hours as needed for irritation  Refills: 0    Associated Diagnoses: S/P  section           No discharge procedures on file  Prior to Admission Medications   Prescriptions Last Dose Informant Patient Reported? Taking? Prenatal Vit-Fe Fumarate-FA (PRENATAL PO)  Self Yes Yes   Sig: Take by mouth   acetaminophen (TYLENOL) 325 mg tablet   No Yes   Sig: Take 2 tablets (650 mg total) by mouth every 6 (six) hours as needed for headaches   docusate sodium (COLACE) 100 mg capsule   No Yes   Sig: Take 1 capsule (100 mg total) by mouth 2 (two) times a day   ferrous sulfate 324 (65 Fe) mg   No Yes   Sig: Take 1 tablet (324 mg total) by mouth 2 (two) times a day before meals   hydrocortisone 1 % cream   No Yes   Sig: Apply 1 application topically daily as needed for irritation or rash   oxyCODONE (ROXICODONE) 10 MG TABS   No Yes   Sig: Take 1 tablet (10 mg total) by mouth every 4 (four) hours as needed for severe pain for up to 10 daysMax Daily Amount: 60 mg   witch hazel-glycerin (TUCKS) topical pad   No Yes   Sig: Apply 1 pad topically every 4 (four) hours as needed for irritation      Facility-Administered Medications: None       Portions of the record may have been created with voice recognition software  Occasional wrong word or "sound a like" substitutions may have occurred due to the inherent limitations of voice recognition software  Read the chart carefully and recognize, using context, where substitutions have occurred      Electronically signed by:  Bola Simmons

## 2019-02-26 NOTE — ED NOTES
Received phone call from pharmacy advising magnesium sulfate 2g/500 mL infusion is available in L&D accudose  As patient does not have a bed assignment at this time, requested dose to be sent to the ED        Deandra Vasquez RN  02/26/19 5329
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
## 2019-02-26 NOTE — PROGRESS NOTES
25year old  5 days post op from scheduled RLTCS presents to the clinic with left sided chest pain associated with SOB  Patient thought that her "chest pain" was secondary to her breasts being painful and engorged from lactation as her milk supply has not yet fully come in  She also is complaining of a "really bad headache" since early this am, which she attributed to not sleeping as she has a  at home  Patient denies any vision changes or abdominal pain  She is also still in pain secondary to her recent surgery  She denies any complications in her pregnancy, no preeclampsia or elevated BP  /95 Comment: Retake  Pulse 98   Temp (!) 97 3 °F (36 3 °C) (Tympanic)   Ht 4' 10" (1 473 m)   Wt 58 5 kg (129 lb)   LMP 2018 (Approximate)   SpO2 98%   BMI 26 96 kg/m²     First BP was 167/88    Physical Exam   Constitutional: She is oriented to person, place, and time  She appears well-developed and well-nourished  No distress  HENT:   Head: Normocephalic  Neck: Normal range of motion  Cardiovascular: Normal rate and normal heart sounds  No murmur heard  Pulmonary/Chest: Effort normal  No respiratory distress  She exhibits no tenderness  Bilateral breasts are engorged and mildly tender to palpation  No erythema or warmth, no signs of acute infection   Abdominal: Soft  There is tenderness  Musculoskeletal: Normal range of motion  Neurological: She is alert and oriented to person, place, and time  She displays normal reflexes  Skin: She is not diaphoretic  Psychiatric: She has a normal mood and affect  Vitals reviewed      Assessment  25year old  POD#5 s/p RLTCS with concern for PE vs Preeclampsia    Plan:  Chest pain/SOB:  -patient to be sent to ED promptly for evaluation to rule out PE  Headache:  -concerning for preeclampsia as her BP are elevated, recommend pre-eclampsia labs to be ordered in ED and if BP remain elevated in the severe range (160/110), to begin treatment with magnesium sulfate and for patient to be admitted   -ADT order placed to ED with instructions  -OB chief notified of patient and of the plan

## 2019-02-27 PROBLEM — R74.01 TRANSAMINITIS: Status: ACTIVE | Noted: 2019-02-27

## 2019-02-27 PROBLEM — N39.0 URINARY TRACT INFECTION: Status: ACTIVE | Noted: 2019-02-27

## 2019-02-27 LAB
ALBUMIN SERPL BCP-MCNC: 3.2 G/DL (ref 3.5–5)
ALBUMIN SERPL BCP-MCNC: 3.3 G/DL (ref 3.5–5)
ALBUMIN SERPL BCP-MCNC: 3.4 G/DL (ref 3.5–5)
ALP SERPL-CCNC: 217 U/L (ref 46–116)
ALP SERPL-CCNC: 230 U/L (ref 46–116)
ALP SERPL-CCNC: 236 U/L (ref 46–116)
ALT SERPL W P-5'-P-CCNC: 123 U/L (ref 12–78)
ALT SERPL W P-5'-P-CCNC: 126 U/L (ref 12–78)
ALT SERPL W P-5'-P-CCNC: 127 U/L (ref 12–78)
ANION GAP SERPL CALCULATED.3IONS-SCNC: 10 MMOL/L (ref 4–13)
ANION GAP SERPL CALCULATED.3IONS-SCNC: 11 MMOL/L (ref 4–13)
ANION GAP SERPL CALCULATED.3IONS-SCNC: 11 MMOL/L (ref 4–13)
AST SERPL W P-5'-P-CCNC: 122 U/L (ref 5–45)
AST SERPL W P-5'-P-CCNC: 122 U/L (ref 5–45)
AST SERPL W P-5'-P-CCNC: 125 U/L (ref 5–45)
ATRIAL RATE: 79 BPM
ATRIAL RATE: 89 BPM
BASOPHILS # BLD AUTO: 0.03 THOUSANDS/ΜL (ref 0–0.1)
BASOPHILS # BLD AUTO: 0.05 THOUSANDS/ΜL (ref 0–0.1)
BASOPHILS NFR BLD AUTO: 0 % (ref 0–1)
BASOPHILS NFR BLD AUTO: 1 % (ref 0–1)
BILIRUB SERPL-MCNC: 0.41 MG/DL (ref 0.2–1)
BILIRUB SERPL-MCNC: 0.59 MG/DL (ref 0.2–1)
BILIRUB SERPL-MCNC: 0.6 MG/DL (ref 0.2–1)
BUN SERPL-MCNC: 16 MG/DL (ref 5–25)
BUN SERPL-MCNC: 8 MG/DL (ref 5–25)
BUN SERPL-MCNC: 9 MG/DL (ref 5–25)
CALCIUM SERPL-MCNC: 7.4 MG/DL (ref 8.3–10.1)
CALCIUM SERPL-MCNC: 8.3 MG/DL (ref 8.3–10.1)
CALCIUM SERPL-MCNC: 8.8 MG/DL (ref 8.3–10.1)
CHLORIDE SERPL-SCNC: 102 MMOL/L (ref 100–108)
CHLORIDE SERPL-SCNC: 104 MMOL/L (ref 100–108)
CHLORIDE SERPL-SCNC: 104 MMOL/L (ref 100–108)
CO2 SERPL-SCNC: 19 MMOL/L (ref 21–32)
CO2 SERPL-SCNC: 21 MMOL/L (ref 21–32)
CO2 SERPL-SCNC: 22 MMOL/L (ref 21–32)
CREAT SERPL-MCNC: 0.38 MG/DL (ref 0.6–1.3)
CREAT SERPL-MCNC: 0.43 MG/DL (ref 0.6–1.3)
CREAT SERPL-MCNC: 0.54 MG/DL (ref 0.6–1.3)
EOSINOPHIL # BLD AUTO: 0.35 THOUSAND/ΜL (ref 0–0.61)
EOSINOPHIL # BLD AUTO: 0.38 THOUSAND/ΜL (ref 0–0.61)
EOSINOPHIL NFR BLD AUTO: 5 % (ref 0–6)
EOSINOPHIL NFR BLD AUTO: 5 % (ref 0–6)
ERYTHROCYTE [DISTWIDTH] IN BLOOD BY AUTOMATED COUNT: 12.7 % (ref 11.6–15.1)
ERYTHROCYTE [DISTWIDTH] IN BLOOD BY AUTOMATED COUNT: 12.9 % (ref 11.6–15.1)
ERYTHROCYTE [DISTWIDTH] IN BLOOD BY AUTOMATED COUNT: 12.9 % (ref 11.6–15.1)
GFR SERPL CREATININE-BSD FRML MDRD: 127 ML/MIN/1.73SQ M
GFR SERPL CREATININE-BSD FRML MDRD: 137 ML/MIN/1.73SQ M
GFR SERPL CREATININE-BSD FRML MDRD: 143 ML/MIN/1.73SQ M
GLUCOSE P FAST SERPL-MCNC: 88 MG/DL (ref 65–99)
GLUCOSE SERPL-MCNC: 101 MG/DL (ref 65–140)
GLUCOSE SERPL-MCNC: 113 MG/DL (ref 65–140)
GLUCOSE SERPL-MCNC: 88 MG/DL (ref 65–140)
HCT VFR BLD AUTO: 38.8 % (ref 34.8–46.1)
HCT VFR BLD AUTO: 42.3 % (ref 34.8–46.1)
HCT VFR BLD AUTO: 43.5 % (ref 34.8–46.1)
HGB BLD-MCNC: 13.4 G/DL (ref 11.5–15.4)
HGB BLD-MCNC: 14.5 G/DL (ref 11.5–15.4)
HGB BLD-MCNC: 15 G/DL (ref 11.5–15.4)
IMM GRANULOCYTES # BLD AUTO: 0.03 THOUSAND/UL (ref 0–0.2)
IMM GRANULOCYTES # BLD AUTO: 0.05 THOUSAND/UL (ref 0–0.2)
IMM GRANULOCYTES NFR BLD AUTO: 0 % (ref 0–2)
IMM GRANULOCYTES NFR BLD AUTO: 1 % (ref 0–2)
LYMPHOCYTES # BLD AUTO: 1.68 THOUSANDS/ΜL (ref 0.6–4.47)
LYMPHOCYTES # BLD AUTO: 2.29 THOUSANDS/ΜL (ref 0.6–4.47)
LYMPHOCYTES NFR BLD AUTO: 23 % (ref 14–44)
LYMPHOCYTES NFR BLD AUTO: 33 % (ref 14–44)
MAGNESIUM SERPL-MCNC: 7 MG/DL (ref 1.6–2.6)
MCH RBC QN AUTO: 31.3 PG (ref 26.8–34.3)
MCH RBC QN AUTO: 31.3 PG (ref 26.8–34.3)
MCH RBC QN AUTO: 31.8 PG (ref 26.8–34.3)
MCHC RBC AUTO-ENTMCNC: 34.3 G/DL (ref 31.4–37.4)
MCHC RBC AUTO-ENTMCNC: 34.5 G/DL (ref 31.4–37.4)
MCHC RBC AUTO-ENTMCNC: 34.5 G/DL (ref 31.4–37.4)
MCV RBC AUTO: 91 FL (ref 82–98)
MCV RBC AUTO: 91 FL (ref 82–98)
MCV RBC AUTO: 92 FL (ref 82–98)
MONOCYTES # BLD AUTO: 0.54 THOUSAND/ΜL (ref 0.17–1.22)
MONOCYTES # BLD AUTO: 0.55 THOUSAND/ΜL (ref 0.17–1.22)
MONOCYTES NFR BLD AUTO: 7 % (ref 4–12)
MONOCYTES NFR BLD AUTO: 8 % (ref 4–12)
NEUTROPHILS # BLD AUTO: 3.7 THOUSANDS/ΜL (ref 1.85–7.62)
NEUTROPHILS # BLD AUTO: 4.76 THOUSANDS/ΜL (ref 1.85–7.62)
NEUTS SEG NFR BLD AUTO: 53 % (ref 43–75)
NEUTS SEG NFR BLD AUTO: 64 % (ref 43–75)
NRBC BLD AUTO-RTO: 0 /100 WBCS
NRBC BLD AUTO-RTO: 0 /100 WBCS
P AXIS: 119 DEGREES
P AXIS: 60 DEGREES
PHOSPHATE SERPL-MCNC: 5.1 MG/DL (ref 2.7–4.5)
PLATELET # BLD AUTO: 401 THOUSANDS/UL (ref 149–390)
PLATELET # BLD AUTO: 402 THOUSANDS/UL (ref 149–390)
PLATELET # BLD AUTO: 438 THOUSANDS/UL (ref 149–390)
PLATELET # BLD AUTO: 511 THOUSANDS/UL (ref 149–390)
PMV BLD AUTO: 8.9 FL (ref 8.9–12.7)
PMV BLD AUTO: 8.9 FL (ref 8.9–12.7)
PMV BLD AUTO: 9 FL (ref 8.9–12.7)
PMV BLD AUTO: 9.1 FL (ref 8.9–12.7)
POTASSIUM SERPL-SCNC: 3.5 MMOL/L (ref 3.5–5.3)
POTASSIUM SERPL-SCNC: 3.8 MMOL/L (ref 3.5–5.3)
POTASSIUM SERPL-SCNC: 4.3 MMOL/L (ref 3.5–5.3)
PR INTERVAL: 134 MS
PR INTERVAL: 138 MS
PROT SERPL-MCNC: 7.8 G/DL (ref 6.4–8.2)
PROT SERPL-MCNC: 8.1 G/DL (ref 6.4–8.2)
PROT SERPL-MCNC: 8.2 G/DL (ref 6.4–8.2)
QRS AXIS: 121 DEGREES
QRS AXIS: 94 DEGREES
QRSD INTERVAL: 78 MS
QRSD INTERVAL: 83 MS
QT INTERVAL: 354 MS
QT INTERVAL: 371 MS
QTC INTERVAL: 405 MS
QTC INTERVAL: 452 MS
RBC # BLD AUTO: 4.28 MILLION/UL (ref 3.81–5.12)
RBC # BLD AUTO: 4.64 MILLION/UL (ref 3.81–5.12)
RBC # BLD AUTO: 4.72 MILLION/UL (ref 3.81–5.12)
SODIUM SERPL-SCNC: 132 MMOL/L (ref 136–145)
SODIUM SERPL-SCNC: 135 MMOL/L (ref 136–145)
SODIUM SERPL-SCNC: 137 MMOL/L (ref 136–145)
T WAVE AXIS: 136 DEGREES
T WAVE AXIS: 45 DEGREES
TROPONIN I SERPL-MCNC: <0.02 NG/ML
VENTRICULAR RATE: 79 BPM
VENTRICULAR RATE: 89 BPM
WBC # BLD AUTO: 6.99 THOUSAND/UL (ref 4.31–10.16)
WBC # BLD AUTO: 7.42 THOUSAND/UL (ref 4.31–10.16)
WBC # BLD AUTO: 7.82 THOUSAND/UL (ref 4.31–10.16)

## 2019-02-27 PROCEDURE — 87521 HEPATITIS C PROBE&RVRS TRNSC: CPT | Performed by: OBSTETRICS & GYNECOLOGY

## 2019-02-27 PROCEDURE — 85025 COMPLETE CBC W/AUTO DIFF WBC: CPT | Performed by: OBSTETRICS & GYNECOLOGY

## 2019-02-27 PROCEDURE — 84484 ASSAY OF TROPONIN QUANT: CPT | Performed by: EMERGENCY MEDICINE

## 2019-02-27 PROCEDURE — 80053 COMPREHEN METABOLIC PANEL: CPT | Performed by: OBSTETRICS & GYNECOLOGY

## 2019-02-27 PROCEDURE — 84100 ASSAY OF PHOSPHORUS: CPT | Performed by: FAMILY MEDICINE

## 2019-02-27 PROCEDURE — 99233 SBSQ HOSP IP/OBS HIGH 50: CPT | Performed by: OBSTETRICS & GYNECOLOGY

## 2019-02-27 PROCEDURE — 83735 ASSAY OF MAGNESIUM: CPT | Performed by: FAMILY MEDICINE

## 2019-02-27 PROCEDURE — 93010 ELECTROCARDIOGRAM REPORT: CPT | Performed by: INTERNAL MEDICINE

## 2019-02-27 PROCEDURE — 93005 ELECTROCARDIOGRAM TRACING: CPT

## 2019-02-27 PROCEDURE — 99233 SBSQ HOSP IP/OBS HIGH 50: CPT | Performed by: INTERNAL MEDICINE

## 2019-02-27 RX ORDER — ACETAMINOPHEN 325 MG/1
650 TABLET ORAL EVERY 6 HOURS PRN
Status: DISCONTINUED | OUTPATIENT
Start: 2019-02-27 | End: 2019-02-28 | Stop reason: HOSPADM

## 2019-02-27 RX ORDER — ACETAMINOPHEN 325 MG/1
975 TABLET ORAL EVERY 6 HOURS PRN
Status: DISCONTINUED | OUTPATIENT
Start: 2019-02-27 | End: 2019-02-27

## 2019-02-27 RX ORDER — NITROFURANTOIN 25; 75 MG/1; MG/1
100 CAPSULE ORAL 2 TIMES DAILY WITH MEALS
Status: DISCONTINUED | OUTPATIENT
Start: 2019-02-27 | End: 2019-02-27

## 2019-02-27 RX ORDER — POTASSIUM CHLORIDE 20 MEQ/1
40 TABLET, EXTENDED RELEASE ORAL ONCE
Status: COMPLETED | OUTPATIENT
Start: 2019-02-27 | End: 2019-02-27

## 2019-02-27 RX ORDER — NITROFURANTOIN 25; 75 MG/1; MG/1
100 CAPSULE ORAL 2 TIMES DAILY WITH MEALS
Status: DISCONTINUED | OUTPATIENT
Start: 2019-02-28 | End: 2019-02-28 | Stop reason: HOSPADM

## 2019-02-27 RX ORDER — LANOLIN ALCOHOL/MO/W.PET/CERES
CREAM (GRAM) TOPICAL AS NEEDED
Status: DISCONTINUED | OUTPATIENT
Start: 2019-02-27 | End: 2019-02-28 | Stop reason: HOSPADM

## 2019-02-27 RX ADMIN — CEFTRIAXONE SODIUM 1000 MG: 10 INJECTION, POWDER, FOR SOLUTION INTRAVENOUS at 20:18

## 2019-02-27 RX ADMIN — HEPARIN SODIUM 5000 UNITS: 5000 INJECTION, SOLUTION INTRAVENOUS; SUBCUTANEOUS at 05:49

## 2019-02-27 RX ADMIN — ACETAMINOPHEN 650 MG: 325 TABLET ORAL at 12:20

## 2019-02-27 RX ADMIN — MAGNESIUM SULFATE HEPTAHYDRATE 2 G/HR: 40 INJECTION, SOLUTION INTRAVENOUS at 07:46

## 2019-02-27 RX ADMIN — ACETAMINOPHEN 650 MG: 325 TABLET ORAL at 21:17

## 2019-02-27 RX ADMIN — CHLORHEXIDINE GLUCONATE 15 ML: 1.2 RINSE ORAL at 20:24

## 2019-02-27 RX ADMIN — HEPARIN SODIUM 5000 UNITS: 5000 INJECTION, SOLUTION INTRAVENOUS; SUBCUTANEOUS at 15:21

## 2019-02-27 RX ADMIN — MAGNESIUM SULFATE HEPTAHYDRATE 2 G/HR: 40 INJECTION, SOLUTION INTRAVENOUS at 17:46

## 2019-02-27 RX ADMIN — POTASSIUM CHLORIDE 40 MEQ: 1500 TABLET, EXTENDED RELEASE ORAL at 09:29

## 2019-02-27 RX ADMIN — HEPARIN SODIUM 5000 UNITS: 5000 INJECTION, SOLUTION INTRAVENOUS; SUBCUTANEOUS at 21:17
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
## 2019-02-27 NOTE — PLAN OF CARE
Problem: PAIN - ADULT  Goal: Verbalizes/displays adequate comfort level or baseline comfort level  Description  Interventions:  - Encourage patient to monitor pain and request assistance  - Assess pain using appropriate pain scale  - Administer analgesics based on type and severity of pain and evaluate response  - Implement non-pharmacological measures as appropriate and evaluate response  - Consider cultural and social influences on pain and pain management  - Notify physician/advanced practitioner if interventions unsuccessful or patient reports new pain  Outcome: Progressing     Problem: INFECTION - ADULT  Goal: Absence or prevention of progression during hospitalization  Description  INTERVENTIONS:  - Assess and monitor for signs and symptoms of infection  - Monitor lab/diagnostic results  - Monitor all insertion sites, i e  indwelling lines, tubes, and drains  - Monitor endotracheal (as able) and nasal secretions for changes in amount and color  - [place] appropriate cooling/warming therapies per order  - Administer medications as ordered  - Instruct and encourage patient and family to use good hand hygiene technique  - Identify and instruct in appropriate isolation precautions for identified infection/condition  Outcome: Progressing  Goal: Absence of fever/infection during neutropenic period  Description  INTERVENTIONS:  - Monitor WBC  - Implement neutropenic guidelines  Outcome: Progressing     Problem: SAFETY ADULT  Goal: Patient will remain free of falls  Description  INTERVENTIONS:  - Assess patient frequently for physical needs  -  Identify cognitive and physical deficits and behaviors that affect risk of falls    -  [place] fall precautions as indicated by assessment   - Educate patient/family on patient safety including physical limitations  - Instruct patient to call for assistance with activity based on assessment  - Modify environment to reduce risk of injury  - Consider OT/PT consult to assist with strengthening/mobility  Outcome: Progressing  Goal: Maintain or return to baseline ADL function  Description  INTERVENTIONS:  -  Assess patient's ability to carry out ADLs; assess patient's baseline for ADL function and identify physical deficits which impact ability to perform ADLs (bathing, care of mouth/teeth, toileting, grooming, dressing, etc )  - Assess/evaluate cause of self-care deficits   - Assess range of motion  - Assess patient's mobility; develop plan if impaired  - Assess patient's need for assistive devices and provide as appropriate  - Encourage maximum independence but intervene and supervise when necessary  ¯ Involve family in performance of ADLs  ¯ Assess for home care needs following discharge   ¯ Request OT consult to assist with ADL evaluation and planning for discharge  ¯ Provide patient education as appropriate  Outcome: Progressing  Goal: Maintain or return mobility status to optimal level  Description  INTERVENTIONS:  - Assess patient's baseline mobility status (ambulation, transfers, stairs, etc )    - Identify cognitive and physical deficits and behaviors that affect mobility  - Identify mobility aids required to assist with transfers and/or ambulation (gait belt, sit-to-stand, lift, walker, cane, etc )  - [place] fall precautions as indicated by assessment  - Record patient progress and toleration of activity level on Mobility SBAR; progress patient to next Phase/Stage  - Instruct patient to call for assistance with activity based on assessment  - Request Rehabilitation consult to assist with strengthening/weightbearing, etc   Outcome: Progressing     Problem: DISCHARGE PLANNING  Goal: Discharge to home or other facility with appropriate resources  Description  INTERVENTIONS:  - Identify barriers to discharge w/patient and caregiver  - Arrange for needed discharge resources and transportation as appropriate  - Identify discharge learning needs (meds, wound care, etc )  - Arrange for interpretive services to assist at discharge as needed  - Refer to Case Management Department for coordinating discharge planning if the patient needs post-hospital services based on physician/advanced practitioner order or complex needs related to functional status, cognitive ability, or social support system  Outcome: Progressing     Problem: Knowledge Deficit  Goal: Patient/family/caregiver demonstrates understanding of disease process, treatment plan, medications, and discharge instructions  Description  Complete learning assessment and assess knowledge base    Interventions:  - Provide teaching at level of understanding  - Provide teaching via preferred learning methods  Outcome: Progressing     Problem: RESPIRATORY - ADULT  Goal: Achieves optimal ventilation and oxygenation  Description  INTERVENTIONS:  - Assess for changes in respiratory status  - Assess for changes in mentation and behavior  - Position to facilitate oxygenation and minimize respiratory effort  - Oxygen administration by appropriate delivery method based on oxygen saturation (per order) or ABGs  - Initiate smoking cessation education as indicated  - Encourage broncho-pulmonary hygiene including cough, deep breathe, Incentive Spirometry  - Assess the need for suctioning and aspirate as needed  - Assess and instruct to report SOB or any respiratory difficulty  - Respiratory Therapy support as indicated  Outcome: Progressing     Problem: METABOLIC, FLUID AND ELECTROLYTES - ADULT  Goal: Electrolytes maintained within normal limits  Description  INTERVENTIONS:  - Monitor labs and assess patient for signs and symptoms of electrolyte imbalances  - Administer electrolyte replacement as ordered  - Monitor response to electrolyte replacements, including repeat lab results as appropriate  - Instruct patient on fluid and nutrition as appropriate  Outcome: Progressing

## 2019-02-27 NOTE — PROGRESS NOTES
Progress Note - Maternal-Fetal Medicine   Antwan Lopez 25 y o  female MRN: [id number]  Unit/Bed#: [facility] 01 Encounter: [id number]    Assessment:  25 y o  G2 now P5 s/p RLTCS, POD #6 with post-partum pre-eclampsia      PLAN:  Post-partum pre-eclampsia  - Magnesium started at [phone] on 2/26, plan for 24hr magnesium infusion  Will reevaluate at the 24hr ekta to determine further need for magnesium   - Monitor BPs  Overnight 216-522V systolic  - Transaminitis:    AST: 145 -> 122 -> 125   ALT: 126 -> 126 -> 123  - Plan to repeat labs tonight at 0600  - Symptoms: Headache: Fioricet overnight  Pt reports resolution this morning; fogginess reported likely secondary to magnesium    Chest pain: per critical care  Breastfeeding: Breastpump to be sent from L&D  UTI: cont CTX  DVT ppx: heparin 5k tid        Subjective/Objective   Chief Complaint: I still have a headache    Subjective: Pt reports feeling a headache today, but upon further questioning, pt reports a large amount of dizziness and fogginess  Does continue to report a headache, primarily frontal, that has improved since arrival  Denies visual changes, CP, SOB, extremity pain, RUQ pain, no acute changes in swelling  Pt did report episode of abdominal pain located at her incision site yesterday, but denies pain in that location today  Objective:   Vitals: Blood pressure 111/88, pulse 90, temperature (!) 97 4 °F (36 3 °C), temperature source Oral, resp  rate 15, height 4' 10" (1 473 m), weight 55 kg (121 lb 4 1 oz), last menstrual period 05/24/2018, SpO2 97 %, currently breastfeeding  ,Body mass index is 25 34 kg/m²        Intake/Output Summary (Last 24 hours) at 2/27/2019 1152  Last data filed at 2/27/2019 0801  Gross per 24 hour   Intake 2529 16 ml   Output --   Net 2529 16 ml       Invasive Devices     Peripheral Intravenous Line            Peripheral IV 02/26/19 Left Antecubital less than 1 day                Physical Exam:   Gen: AaOx3, NAD, pleasant, cooperative  Card: RRR, no murmurs, rubs, gallops  Pulm: CTAB, no wheezes, rales, rhonchi  Good inspiratory effort  Abd: Soft, nontender, nondistended  +BS  Incision is clean, dry, and intact  : Fundus firm, nontender, located well below umbilicus  Extremities: No edema, nontender, Negative Corinna's bilaterally  Patellar DTRs 3+      Lab, Imaging and other studies: I have personally reviewed pertinent reports              Jasmin Luna DO  OB/GYN, PGY3  2/27/2019, 1:03 PM
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
## 2019-02-27 NOTE — SOCIAL WORK
CM introduced self to pt, her father, and her child's grandmother at the bedside and explained role  CM received permission to speak in presence of current visitors  Pt's mother Evelia Richardson [phone] is her emergency contact; no POA available  Pt lives with her parents and 2 brothers in a 2  with no karen  Pt states she stays on 1st level of home  PTA pt was independent with ADL's and ambulation; no DME available  Pt states she have a PCP but has not gone since she was under the care of OB/GYN and does not know PCP's name  Preferred pharmacy is [facility] on [address] in [place]  Pt is employed and drives self  Pt denies hx of etoh/drugs abuse or tx and no mental health dx  No hx of VNA/HHC or IP Rehab admits  Pt's family will provide transportation when medically stable for d/c     CM reviewed d/c planning process including the following: identifying help at home, patient preference for d/c planning needs, Discharge Lounge, [facility] Meds to Bed program, availability of treatment team to discuss questions or concerns patient and/or family may have regarding understanding medications and recognizing signs and symptoms once discharged  CM also encouraged patient to follow up with all recommended appointments after discharge  Patient advised of importance for patient and family to participate in managing patients medical well being        Jameson London,  [phone]

## 2019-02-27 NOTE — ED NOTES
Magnesium is to be given regardless of BP readings as per Critical Care resident      Casandra Ambrocio, CIPRIANO  02/26/19 2009
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
## 2019-02-27 NOTE — PROGRESS NOTES
Progress Note - ICU Transfer to SD/MS tele   Luis Inman 25 y o  female MRN: [id number]  [address] [facility]   Unit/Bed#: MICU 01 Encounter: [id number]    Code Status: Level 1 - Full Code  POA:    POLST:      Reason for ICU admission: Preeclampsia without available bed in Obgyn    Active problems:   Principal Problem:    Preeclampsia  Active Problems:    Hypertension    Urinary tract infection    Transaminitis  Resolved Problems:    * No resolved hospital problems  *      Consultants: Obgyn    History of Present Illness: Luis Inman is a 25year old  PPD #6 who presented to the ED from [facility] for acute onset chest pain, shortness of breath, and severe headache  Chest pain is worse with inspiration and is an 8/10  Pain does not radiate and is localized in anterior left chest wall  Her headaches is bilateral frontal aching pain that is also 8/10  Patient denies fever, chills, diaphoresis, or visual changes  While at her ob, her blood pressure was found to be 364 systolic and was told to come to the hospital  Patient did not have gestational hypertension and no history of exlampsia or preeclampsia  Summary of clinical course: Upon presentation to the ED, patient received labetalol 10mg and started on Magnesium sulfate infusion  Due to patient shortness of breath, a CT PE study was performed and did not show PE  It did show bibasiler atelectasis,  trace left pleural effusion, and indeterminate 8mm nodule in left lower lobe possibly granuloma  Troponin was negative and ECG was normal  She was found to have an elevated protein creatinine ration of >0 54  It was found that the patient has a positve urine culture for >100,000 gram neg  Rods and she was started on rocephin  Patient was admitted to critical care because there were no available beds on L&D  Obgyn was consulted and patients blood pressures have been well controlled with labetalol   Patient is normotensive and stable for transfer  Recent or scheduled procedures: none    Outstanding/pending diagnostics: none    Cultures:     Results from last 7 days   Lab Units 02/26/19  1659   URINE CULTURE  >100,000 cfu/ml Gram Negative Alok Enteric Like*          Mobilization Plan: OOB with assistance    Nutrition Plan:          Diet Orders   (From admission, onward)            Start     Ordered    02/26/19 2244  Diet Regular; Regular House  Diet effective now     Question Answer Comment   Diet Type Regular    Regular Regular House    RD to adjust diet per protocol? Yes        02/26/19 2244            Discharge Plan:   Discharge upon resolution of symptoms  Home medications that are not reordered and reason why: none     Specific Diagnosis Plan:  1) Preeclampsia with severe features  · Continue maintenance Magnesium gtt  For 24 hours  · Reevaluate at 505pm  · lebetalol PRN  · Monitor blood pressures closely: <140 sbp  · OBgyn consulted: follow up recs     2) urinary tract infection  · Day 2 of Ceftriaxone  · Urine cx: >100,000 e coli  · UOP 1950 8 mL over past 24H (81 3 mL/hr)  · Monitor I&Os, net 1950 8 mL over past 24H  · Cr 0 43,     Spoke with ***  regarding transfer  Please call *** with any questions or concerns  Portions of the record may have been created with voice recognition software  Occasional wrong word or "sound a like" substitutions may have occurred due to the inherent limitations of voice recognition software  Read the chart carefully and recognize, using context, where substitutions have occurred      [address], DO

## 2019-02-27 NOTE — ED NOTES
Pt breast feeding at this time - OKd by Dr Yan Jean-Baptiste with medications         Leticia Dodge, CIPRIANO  02/26/19 2038

## 2019-02-28 VITALS
WEIGHT: 128.31 LBS | DIASTOLIC BLOOD PRESSURE: 70 MMHG | SYSTOLIC BLOOD PRESSURE: 98 MMHG | RESPIRATION RATE: 14 BRPM | BODY MASS INDEX: 26.93 KG/M2 | TEMPERATURE: 99.5 F | OXYGEN SATURATION: 97 % | HEART RATE: 80 BPM | HEIGHT: 58 IN

## 2019-02-28 PROBLEM — R74.01 TRANSAMINITIS: Status: RESOLVED | Noted: 2019-02-27 | Resolved: 2019-02-28

## 2019-02-28 PROBLEM — N39.0 URINARY TRACT INFECTION: Status: RESOLVED | Noted: 2019-02-27 | Resolved: 2019-02-28

## 2019-02-28 PROBLEM — I10 HYPERTENSION: Status: RESOLVED | Noted: 2019-02-26 | Resolved: 2019-02-28

## 2019-02-28 LAB
ALBUMIN SERPL BCP-MCNC: 3 G/DL (ref 3.5–5)
ALP SERPL-CCNC: 202 U/L (ref 46–116)
ALT SERPL W P-5'-P-CCNC: 102 U/L (ref 12–78)
ANION GAP SERPL CALCULATED.3IONS-SCNC: 8 MMOL/L (ref 4–13)
AST SERPL W P-5'-P-CCNC: 76 U/L (ref 5–45)
ATRIAL RATE: 97 BPM
BACTERIA UR CULT: ABNORMAL
BASOPHILS # BLD AUTO: 0.04 THOUSANDS/ΜL (ref 0–0.1)
BASOPHILS NFR BLD AUTO: 1 % (ref 0–1)
BILIRUB SERPL-MCNC: 0.32 MG/DL (ref 0.2–1)
BUN SERPL-MCNC: 20 MG/DL (ref 5–25)
CALCIUM SERPL-MCNC: 7.8 MG/DL (ref 8.3–10.1)
CHLORIDE SERPL-SCNC: 108 MMOL/L (ref 100–108)
CO2 SERPL-SCNC: 21 MMOL/L (ref 21–32)
CREAT SERPL-MCNC: 0.56 MG/DL (ref 0.6–1.3)
EOSINOPHIL # BLD AUTO: 0.49 THOUSAND/ΜL (ref 0–0.61)
EOSINOPHIL NFR BLD AUTO: 7 % (ref 0–6)
ERYTHROCYTE [DISTWIDTH] IN BLOOD BY AUTOMATED COUNT: 13.1 % (ref 11.6–15.1)
GFR SERPL CREATININE-BSD FRML MDRD: 126 ML/MIN/1.73SQ M
GLUCOSE SERPL-MCNC: 91 MG/DL (ref 65–140)
HCT VFR BLD AUTO: 43.4 % (ref 34.8–46.1)
HGB BLD-MCNC: 14.9 G/DL (ref 11.5–15.4)
IMM GRANULOCYTES # BLD AUTO: 0.02 THOUSAND/UL (ref 0–0.2)
IMM GRANULOCYTES NFR BLD AUTO: 0 % (ref 0–2)
LYMPHOCYTES # BLD AUTO: 2.17 THOUSANDS/ΜL (ref 0.6–4.47)
LYMPHOCYTES NFR BLD AUTO: 31 % (ref 14–44)
MCH RBC QN AUTO: 31.7 PG (ref 26.8–34.3)
MCHC RBC AUTO-ENTMCNC: 34.3 G/DL (ref 31.4–37.4)
MCV RBC AUTO: 92 FL (ref 82–98)
MONOCYTES # BLD AUTO: 0.38 THOUSAND/ΜL (ref 0.17–1.22)
MONOCYTES NFR BLD AUTO: 5 % (ref 4–12)
NEUTROPHILS # BLD AUTO: 3.93 THOUSANDS/ΜL (ref 1.85–7.62)
NEUTS SEG NFR BLD AUTO: 56 % (ref 43–75)
NRBC BLD AUTO-RTO: 0 /100 WBCS
P AXIS: 58 DEGREES
PLATELET # BLD AUTO: 488 THOUSANDS/UL (ref 149–390)
PMV BLD AUTO: 8.9 FL (ref 8.9–12.7)
POTASSIUM SERPL-SCNC: 4.3 MMOL/L (ref 3.5–5.3)
PR INTERVAL: 133 MS
PROT SERPL-MCNC: 7.7 G/DL (ref 6.4–8.2)
QRS AXIS: 102 DEGREES
QRSD INTERVAL: 71 MS
QT INTERVAL: 358 MS
QTC INTERVAL: 455 MS
RBC # BLD AUTO: 4.7 MILLION/UL (ref 3.81–5.12)
SODIUM SERPL-SCNC: 137 MMOL/L (ref 136–145)
T WAVE AXIS: 33 DEGREES
VENTRICULAR RATE: 97 BPM
WBC # BLD AUTO: 7.03 THOUSAND/UL (ref 4.31–10.16)

## 2019-02-28 PROCEDURE — 93005 ELECTROCARDIOGRAM TRACING: CPT

## 2019-02-28 PROCEDURE — 85025 COMPLETE CBC W/AUTO DIFF WBC: CPT | Performed by: OBSTETRICS & GYNECOLOGY

## 2019-02-28 PROCEDURE — 93010 ELECTROCARDIOGRAM REPORT: CPT | Performed by: INTERNAL MEDICINE

## 2019-02-28 PROCEDURE — 80053 COMPREHEN METABOLIC PANEL: CPT | Performed by: OBSTETRICS & GYNECOLOGY

## 2019-02-28 PROCEDURE — 99238 HOSP IP/OBS DSCHRG MGMT 30/<: CPT | Performed by: INTERNAL MEDICINE

## 2019-02-28 RX ORDER — NITROFURANTOIN 25; 75 MG/1; MG/1
100 CAPSULE ORAL 2 TIMES DAILY WITH MEALS
Qty: 10 CAPSULE | Refills: 0 | Status: SHIPPED | OUTPATIENT
Start: 2019-03-01 | End: 2019-03-06

## 2019-02-28 RX ORDER — NITROFURANTOIN 25; 75 MG/1; MG/1
100 CAPSULE ORAL 2 TIMES DAILY WITH MEALS
Qty: 12 CAPSULE | Refills: 0 | Status: SHIPPED | OUTPATIENT
Start: 2019-03-01 | End: 2019-02-28

## 2019-02-28 RX ORDER — IBUPROFEN 400 MG/1
400 TABLET ORAL EVERY 6 HOURS PRN
Status: DISCONTINUED | OUTPATIENT
Start: 2019-02-28 | End: 2019-02-28 | Stop reason: HOSPADM

## 2019-02-28 RX ADMIN — HEPARIN SODIUM 5000 UNITS: 5000 INJECTION, SOLUTION INTRAVENOUS; SUBCUTANEOUS at 14:58

## 2019-02-28 RX ADMIN — NITROFURANTOIN (MONOHYDRATE/MACROCRYSTALS) 100 MG: 75; 25 CAPSULE ORAL at 09:12

## 2019-02-28 RX ADMIN — HEPARIN SODIUM 5000 UNITS: 5000 INJECTION, SOLUTION INTRAVENOUS; SUBCUTANEOUS at 05:03

## 2019-02-28 RX ADMIN — ACETAMINOPHEN 650 MG: 325 TABLET ORAL at 14:58

## 2019-02-28 RX ADMIN — NITROFURANTOIN (MONOHYDRATE/MACROCRYSTALS) 100 MG: 75; 25 CAPSULE ORAL at 17:59

## 2019-02-28 NOTE — PROGRESS NOTES
[facility] Unit Transfer Note  Unit/Bed # @DBLINK ([facility],[address])@ Encounter: [id number]  SOD Team A          Xochitl Steven 25 y o  female [id number]      C/Dain Zarco 1106 Problems: Principal Problem:    Preeclampsia  Active Problems:    Hypertension    Urinary tract infection    Transaminitis    1  Preeclampsia  -Resolved, off Mg  -OB/MFM following    2  Hypertension  -Resolved  -PRN Labetalol for SBP>140    3  Urinary tract infection   -Urine culture  growing >100k GNR enteric like, with a previous urine culture 2019 growing E  Coli sensitive to macrobid and cefazolin  -On day 2 of Rocephin, being transitioned to 7 days of Macrobid starting in AM    4  Transaminitis  -most likely 2/2 pre-eclampsia  -no signs concerning for HELLP  -AM CMP  -OBGYN ordered a hepatitis C qualitative without antibody, pending  Last hepatitis C antibody on record was in , negative  5  Hyponatremia/hypokalemia  -AM CMP    VTE Pharmacologic Prophylaxis: Heparin  VTE Mechanical Prophylaxis: sequential compression device    Disposition: Patient requires Med/Surg    Hospital Course:     "Xochitl Steven is a 25 y o  female who is a  with no known medical history who recently gave birth 5 days ago via  who presents with chest pain/dyspnea/headache found to be due to preeclampsia  Patient delivered 5 days ago via  after an uncomplicated pregnancy  Patient delivered at 39 weeks  She was discharged on postop day 3 without complication  Patient says that she was in her normal state of health up until earlier today when she developed chest pain, dyspnea, headache  She says that she initially developed a substernal chest pain that she describes as a pressure  She tells me that the pain was pleuritic in nature and nonradiating  She denies any exertional or positional aspect of the pain  She denies any associated nausea, vomiting, diaphoresis  She does endorse dyspnea with chest pain    She then soon thereafter developed a frontal pressure headache  She says that the headache got progressively worse and as soon as the symptoms began she went to her OB  She denies that the headache was the worst of her life or maximal in onset  She denies any associated photophobia, phonophobia, vision changes  She denies any abdominal pain throughout this event     Patient went to her Ob who found the patient was hypertensive and she was referred to the emergency department  Patient was found to be hypertensive at that time with proteinuria  She is also noted to have a mild transaminitis  Patient was incidentally found to have a UTI  Patient was treated with magnesium drip and 1 dose of labetalol      Patient currently endorses improving headache currently rated a 6/10  She says chest pain and dyspnea has significantly improved as well      She denies any history of preeclampsia in her previous pregnancy  She denies any other medical history   " Mariam Young MD, 12:11AM       Subjective: The patient was seen in the evening of 19 and was in no acute distress  She reported significant improvement in her headache, which was currently 3/10 and frontal, and stated that she felt mildly fatigued  Objective:   Vitals:    19 1336 19 1436 19 1700 19   BP: 113/70 120/81 129/85 134/89   BP Location:   Right arm Right arm   Pulse: 96 96 (!) 108    Resp: 17 15 (!) 28    Temp:    98 6 °F (37 °C)   TempSrc:       SpO2: 97% 97% 98%    Weight:       Height:         I/O last 24 hours: In: 3753 3 [P O :2610; I V :1143 3]  Out: 1700 [Urine:1700]    Physical Exam   Constitutional: She is oriented to person, place, and time  She appears well-developed  HENT:   Head: Normocephalic  Eyes: Conjunctivae are normal    Neck: Neck supple  Cardiovascular: Normal rate and regular rhythm  Pulmonary/Chest: Effort normal and breath sounds normal    Abdominal: Soft   Bowel sounds are normal     incision site CDI   Musculoskeletal: She exhibits no edema  Neurological: She is alert and oriented to person, place, and time  Skin: Skin is warm and dry  Psychiatric: She has a normal mood and affect           Leticia Camarena MD

## 2019-02-28 NOTE — PROGRESS NOTES
02/28/19 [address]  Affiliation none   Current Gnosticism Involvement Patient not active with Confucianism   Spiritual Beliefs/Perceptions   Concept of God Uninvolved   Relationship with God Ambivalent   Support Systems Spouse/significant other;Parent   Stress Factors   Patient Stress Factors Health changes   Coping Responses   Patient Coping Anxiety;Open/discussion   Plan of Care   Comments provided chaplaincy education  cultivated a relationship of care and support  Listened empathically  Explored spiritual needs and resrouces-pt not religiious and seems to have no strong feeling for God  Explored relational needs and resources-pt talked about parents of baby's father looking after her children  Seems to have good support with baby  Explored emotional needs and resources-pt was processing her illness and what this means for her   concerned about going home and how she will cope  Encouraged self care  provided anxiety containment  Emotional resources utilized    Verbally processed emotions   Assessment Completed by: Unit visit

## 2019-02-28 NOTE — PROGRESS NOTES
Progress Note - Maternal-Fetal Medicine  Rainer Pete 25 y o  female MRN: [id number]  Unit/Bed#: MICU 01 Encounter: [id number]    Assessment:  25 y o  G2 now  s/p RLTCS, POD #7 with post-partum pre-eclampsia  Stable     PLAN:  Post-partum pre-eclampsia  - Magnesium 2/26 at 1705 until 2/27 at 1830  - BPs 100-120s/60-80s; cont to monitor  - Transaminitis:               AST: 145 -> 122 -> 125 -> 122 -> 76 this AM              ALT: 126 -> 126 -> 123 -> 127 -> 102 this AM  - Symptoms: Asymptomatic at this time     Chest pain: per critical care  Breastfeeding: Breastpump at bedside  Encouraged to use to stimulate milk supply  UTI: cont CTX  DVT ppx: heparin 5k tid          Subjective/Objective   Chief Complaint: I'm feeling better    Subjective: Pt reports feeling well today and improved from yesterday  Reports no further headaches  Denies fevers, chills  Ambulating  Tolerating PO  Voiding without difficulty  Denies visual changes, CP at rest, SOB, extremity pain  No changes in her swelling in hands and feet  Reports mild chest pain substernally with deep inspiration  Objective:   Vitals: Blood pressure 109/77, pulse 78, temperature 98 2 °F (36 8 °C), temperature source Oral, resp  rate 16, height 4' 10" (1 473 m), weight 58 2 kg (128 lb 4 9 oz), last menstrual period 05/24/2018, SpO2 98 %, currently breastfeeding  ,Body mass index is 26 82 kg/m²  Intake/Output Summary (Last 24 hours) at 2/28/2019 6440  Last data filed at 2/27/2019 2000  Gross per 24 hour   Intake 1444 17 ml   Output 2700 ml   Net -1255 83 ml       Invasive Devices     Peripheral Intravenous Line            Peripheral IV 02/26/19 Left Antecubital 1 day                Physical Exam:   Gen: AaOx3, NAD, pleasant, cooperative  Card: RRR, no murmurs, rubs, gallops  Pulm: CTAB, no wheezes, rales, rhonchi  Good inspiratory effort despite mild discomfort  Abd: Soft, nontender, nondistended  +BS  Incision is clean, dry, and intact   Fundus is several centimeters below umbilicus  No RUQ pain to light and deep palpation  Extremities: No edema, nontender, Negative Corinna's bilaterally  +2 patellar DTRs, no clonus      Lab, Imaging and other studies: I have personally reviewed pertinent reports            Rohit Bella DO  OB/GYN, PGY3  2/28/2019, 9:58 AM

## 2019-02-28 NOTE — PROGRESS NOTES
Progress Note - Critical Care   Brionna Olivarez 25 y o  female MRN: [id number]  Unit/Bed#: MICU 01 Encounter: [id number]    Attending Physician: Rhett Aragon MD      ______________________________________________________________________  Assessment and Plan:   Principal Problem:    Preeclampsia  Active Problems:    Hypertension    Urinary tract infection    Transaminitis  Resolved Problems:    * No resolved hospital problems  *     Neuro:      1  Headache  · Status post fioricet x1  · Improved with tylenol 650mg   · AAO x3, neurologically intact, GCS 15  · No other acute issues  · No visual changes/deficits     2  Sedation & Analgesia:  None     Neuro checks as per unit protocol  Take precautions to prevent ICU delirium  Monitor GCS     CV:   1) elevated blood pressures:  Preeclampsia  · Blood pressure is well controlled status post labetalol 10 mg x1  · Maintain SBP less than 140  · Continue observation for 24 hours post magnesium infusion     2) Chest pain  · worse with inhalation  · Troponins negative x1  · Follow-up EKG normal     Continuous telemetry      Pulm:   1) shortness of breath  · CT PE study: bibasiler atelectasis  · Dyspnea improving  · No other acute issues  · Incentive spirometry ordered     GI:  No active issues  · GI ppx:  None  · Bowel regimen p r n   · Transaminitis improving     :   1) Preeclampsia with severe features  · Status post maintenance Magnesium gtt  For 24 hours  · lebetalol 10mg PRN, none needed overnight  · Monitor blood pressures closely: <140 sbp  · OBgyn consulted: follow up recs     2) urinary tract infection  · Day 3 of Ceftriaxone  · Urine cx: >100,000 e coli  · UOP 2700 mL over past 24H (112 5 mL/hr)  · Monitor I&Os, net -677 5 mL over past 24H  · Cr 0 56, GFR 91     F/E/N:   1   Fluids/Electrolytes  · Fluid restriction discontinued  · Replete as needed    Hypokalemia: resolved  K: 3 8 --> 3 5 --> 433    2   Nutrition  · Regular diet     ID:   1) Urinary tract infection  afebrile, no leukocytosis  Day 3 of ceftriaxone        Heme:  No active issues  1  DVT ppx:  Heparin subcu, SCDs bilateral     Endo:  No acute issues                Msk/Skin:  · Out of bed with assistance     Disposition:  Continue ICU care and consider discharging home today     Code Status: Level 1 - Full Code      Code Status: Level 1 - Full Code    Counseling / Coordination of Care  Total Critical Care time spent 15 minutes excluding procedures, teaching and family updates  ______________________________________________________________________    Chief Complaint: Anterior chest pain that is worse with breathing deeply    24 Hour Events: None      ______________________________________________________________________    Physical Exam:   Physical Exam   Constitutional: She is oriented to person, place, and time  She appears well-developed and well-nourished  No distress  HENT:   Head: Normocephalic and atraumatic  Nose: Nose normal    Mouth/Throat: Oropharynx is clear and moist  No oropharyngeal exudate  Eyes: Pupils are equal, round, and reactive to light  EOM are normal    Neck: No JVD present  Cardiovascular: Normal rate, regular rhythm, normal heart sounds and intact distal pulses  Exam reveals no gallop and no friction rub  No murmur heard  Pulmonary/Chest: Effort normal and breath sounds normal  No respiratory distress  She has no wheezes  She has no rales  She exhibits no tenderness  Abdominal: Soft  Bowel sounds are normal  She exhibits no distension  There is no tenderness  There is no rebound and no guarding  Low transverse C section incision clean dry and intact, no erythema or drainage  Musculoskeletal: Normal range of motion  She exhibits no edema or tenderness  Lymphadenopathy:     She has no cervical adenopathy  Neurological: She is alert and oriented to person, place, and time  No cranial nerve deficit  Skin: Skin is warm and dry  She is not diaphoretic  Psychiatric: She has a normal mood and affect  Her behavior is normal          ______________________________________________________________________  Vitals:    19 0045 19 0145 19 0400 19 0600   BP: 117/79 125/80 109/77    BP Location:   Right arm    Pulse: 98 86 78    Resp:  16    Temp:   98 4 °F (36 9 °C)    TempSrc:   Oral    SpO2: 98% 98% 98%    Weight:    58 2 kg (128 lb 4 9 oz)   Height:           Temperature:   Temp (24hrs), Av 1 °F (36 7 °C), Min:97 4 °F (36 3 °C), Max:98 6 °F (37 °C)    Current Temperature: 98 4 °F (36 9 °C)  Weights:   IBW: 40 9 kg    Body mass index is 26 82 kg/m²  Weight (last 2 days)     Date/Time   Weight    19 0600   58 2 (128 31)    19 2245   55 (121 25)    19 1546   57 6 (127)            Hemodynamic Monitoring:  PAP:  , PAP mean:        Non-Invasive/Invasive Ventilation Settings:  Respiratory    Lab Data (Last 4 hours)    None         O2/Vent Data (Last 4 hours)    None              No results found for: PHART, HRI0ELJ, PO2ART, WVA2SCZ, F1SBUWGF, BEART, SOURCE  SpO2: SpO2: 98 %, SpO2 Activity: SpO2 Activity: At Rest, SpO2 Device: O2 Device: None (Room air), Capnography:    Intake and Outputs:  I/O       701 -  0700 701 -  0700    P  O  1410 1320    I V  (mL/kg) 440 8 (8) 702 5 (12 1)    IV Piggyback 100     Total Intake(mL/kg) 1950 8 (35 5) 2022 5 (34 8)    Urine (mL/kg/hr)  2700 (1 9)    Total Output  2700    Net +1950 8 -677 5          Unmeasured Urine Occurrence 2 x         UOP: 112 5/hour   Nutrition:        Diet Orders   (From admission, onward)            Start     Ordered    19  Diet Regular; Regular House  Diet effective now     Question Answer Comment   Diet Type Regular    Regular Regular House    RD to adjust diet per protocol?  Yes        19        Labs:   Results from last 7 days   Lab Units 19  1800 19  0552 19  2346 19  1640   WBC Thousand/uL 7 42 6  99 7 82 6 88   HEMOGLOBIN g/dL 15 0 14 5 13 4 12 6   HEMATOCRIT % 43 5 42 3 38 8 36 5   PLATELETS Thousands/uL 511* 438* 402*  401* 374   NEUTROS PCT % 64 53  --  70   MONOS PCT % 7 8  --  5     Results from last 7 days   Lab Units 02/27/19  1800 02/27/19  0552 02/26/19  2346   POTASSIUM mmol/L 4 3 3 5 3 8   CHLORIDE mmol/L 102 104 104   CO2 mmol/L 19* 21 22   BUN mg/dL 16 8 9   CREATININE mg/dL 0 54* 0 43* 0 38*   CALCIUM mg/dL 7 4* 8 3 8 8   ALK PHOS U/L 236* 230* 217*   ALT U/L 127* 123* 126*   AST U/L 122* 125* 122*     Results from last 7 days   Lab Units 02/27/19  0917   MAGNESIUM mg/dL 7 0*     Lab Results   Component Value Date    PHOS 5 1 (H) 02/27/2019      Results from last 7 days   Lab Units 02/26/19  1640   INR  0 93   PTT seconds 33     0   Lab Value Date/Time    TROPONINI <0 02 02/27/2019 0045         ABG:  Lab Results   Component Value Date    PHART 7 278 08/28/2015    [id number] 55 2 08/28/2015    PO2ART <10 0 08/28/2015    [id number] 25 2 08/28/2015    BEART -2 5 (L) 08/28/2015     Imaging:  I have personally reviewed pertinent reports      Micro:  Lab Results   Component Value Date    URINECX >100,000 cfu/ml Gram Negative Alok Enteric Like (A) 02/26/2019    URINECX >100,000 cfu/ml Escherichia coli (A) 02/08/2019    URINECX >100,000 cfu/ml Escherichia coli (A) 01/25/2019     Allergies: No Known Allergies  Medications:   Scheduled Meds:  Current Facility-Administered Medications:  acetaminophen 650 mg Oral Q6H PRN Brando Sakdiponephong, DO   chlorhexidine 15 mL Swish & Spit Q12H [facility] Dee Aldrich MD   heparin (porcine) 5,000 Units Subcutaneous [facility] Dee Aldrich MD   Labetalol HCl 10 mg Intravenous Q4H PRN Dee Aldrich MD   nitrofurantoin 100 mg Oral BID With Meals Fara Mcclellan MD   white petrolatum-mineral oil  Topical PRN Brando Sakdiponephong, DO     Continuous Infusions:   PRN Meds:    acetaminophen 650 mg Q6H PRN   Labetalol HCl 10 mg Q4H PRN   white petrolatum-mineral oil  PRN VTE Pharmacologic Prophylaxis: Heparin  VTE Mechanical Prophylaxis: sequential compression device  Invasive lines and devices: Invasive Devices     Peripheral Intravenous Line            Peripheral IV 02/26/19 Left Antecubital 1 day                     Portions of the record may have been created with voice recognition software  Occasional wrong word or "sound a like" substitutions may have occurred due to the inherent limitations of voice recognition software  Read the chart carefully and recognize, using context, where substitutions have occurred      [address] [place] [address], DO

## 2019-02-28 NOTE — PROGRESS NOTES
ASSESSMENT  25 y o  [id number] s/p RLTCS now POD #6 p/w post-partum pre-eclampsia with severe features  PLAN  Post-partum pre-eclampsia              Maternal seizure prophylaxis: s/p 24 hrs of Magnesium sulfate maintenance infusion              Headache: s/p Fioricet; resolved              Transaminitis: am CBC, CMP; HCV              Monitor BPs  Breastfeeding: cont Breastpump   UTI: s/p CTX x2; Macrobid 7 day course; urine cx+; refer to urology for recurrent UTI f/u per Dr Santi Dos Santos  DVT ppx: heparin 5k tid  Preconception counseling: counseled to present early in next pregnancy to start LD ASA    d/w CC resident: transfer pt to med surg    Pt examined with Dr Yanni Emery    Denies H/A, Visual changes consistent with retinal vasospasm, RUQ/epigastric pain, N&V, seizures    OBJECTIVE    Physical Exam:   Cardiovascular: regular rate, regular rhythm, no murmurs, rubs, or gallops   Lungs: clear to auscultation bilaterally, no wheezing, rhonchi, or rales   Abdomen: non-tender, no rebound, no guarding    uterine fundus: firm    fundal location: -3 cm below the umbilicus    incision: sutures: clean, dry, and intact    bowel sounds: regular   Lower Extremities: negative Corinna's sign bilaterally   Eyes: Pupils are equal, round, and reactive to light  Extraocular movements are intact  Scleral icterus is absent     Reflexes: patellar reflex 2/5; no clonus in LEs    Results from last 7 days   Lab Units 02/27/19  1800 02/27/19  0552 02/26/19  2346 02/26/19  1640 02/22/19  0544   WBC Thousand/uL 7 42 6 99 7 82 6 88 10 52*   NEUTROS ABS Thousands/µL 4 76 3 70  --  4 79 7 96*   HEMOGLOBIN g/dL 15 0 14 5 13 4 12 6 10 0*   MCV fL 92 91 91 92 93   PLATELETS Thousands/uL 511* 438* 402*  401* 374 215     Results from last 7 days   Lab Units 02/27/19  1800 02/27/19  0552 02/26/19  1640   NEUTROS PCT % 64 53 70   MONOS PCT % 7 8 5   EOS PCT % 5 5 4     Results from last 7 days   Lab Units 02/27/19  1800 02/27/19  0917 02/27/19  0552 02/26/19  2346 02/26/19  1640   POTASSIUM mmol/L 4 3  --  3 5 3 8 3 8   CHLORIDE mmol/L 102  --  104 104 105   CO2 mmol/L 19*  --  21 22 23   BUN mg/dL 16  --  8 9 8   CREATININE mg/dL 0 54*  --  0 43* 0 38* 0 42*   EGFR ml/min/1 73sq m 127  --  137 143 138   MAGNESIUM mg/dL  --  7 0*  --   --   --    PHOSPHORUS mg/dL  --  5 1*  --   --   --      Results from last 7 days   Lab Units 02/27/19  1800 02/27/19  0552 02/26/19  2346 02/26/19  1640   AST U/L 122* 125* 122* 145*   ALT U/L 127* 123* 126* 126*   ALK PHOS U/L 236* 230* 217* 222*     Results from last 7 days   Lab Units 02/27/19  1800 02/27/19  0552 02/26/19  2346 02/26/19  1640   ALBUMIN g/dL 3 3* 3 4* 3 2* 3 1*     Results from last 7 days   Lab Units 02/26/19  1659   CLARITY UA  Cloudy   SPEC GRAV UA  1 015   PH UA  7 0   LEUKOCYTES UA  Large*   NITRITE UA  Positive*   GLUCOSE UA mg/dl Negative   KETONES UA mg/dl Negative   UROBILINOGEN UA E U /dl 0 2   BILIRUBIN UA  Negative   BLOOD UA  Moderate*     Results from last 7 days   Lab Units 02/26/19  1659   RBC UA /hpf 4-10*   WBC UA /hpf Innumerable*   EPITHELIAL CELLS WET PREP /hpf None Seen   BACTERIA UA /hpf Innumerable*     Lab Results   Component Value Date    URINECX >100,000 cfu/ml Gram Negative Alok Enteric Like (A) 02/26/2019    URINECX >100,000 cfu/ml Escherichia coli (A) 02/08/2019    URINECX >100,000 cfu/ml Escherichia coli (A) 01/25/2019    URINECX 10,000-19,000 cfu/ml Escherichia coli (A) 10/12/2018    URINECX <10,000 cfu/ml Gram Negative Alok Enteric Like (A) 09/07/2018    URINECX >100,000 cfu/ml Escherichia coli (A) 08/03/2018    URINECX  03/02/2017     50,000-59,000 cfu/ml Candida sp  presumptively albicans       Patient Vitals for the past 24 hrs:   BP Temp Temp src Pulse Resp SpO2 Height Weight   02/27/19 1700 129/85 -- -- (!) 108 (!) 28 98 % -- --   02/27/19 1436 120/81 -- -- 96 15 97 % -- --   02/27/19 1336 113/70 -- -- 96 17 97 % -- --   02/27/19 1236 115/71 -- -- 102 (!) 26 98 % -- --   02/27/19 1136 126/77 -- -- (!) 108 21 97 % -- --   02/27/19 1036 136/79 -- -- 100 20 98 % -- --   02/27/19 0936 119/76 -- -- 100 19 98 % -- --   02/27/19 0836 111/88 -- -- 90 15 97 % -- --   02/27/19 0736 144/93 (!) 97 4 °F (36 3 °C) Oral 92 19 98 % -- --   02/27/19 0602 132/81 -- -- 88 16 98 % -- --   02/27/19 0437 131/79 -- -- 78 15 97 % -- --   02/27/19 0400 132/77 -- -- 84 15 98 % -- --   02/27/19 0357 -- 97 9 °F (36 6 °C) Oral 86 17 98 % -- --   02/27/19 0336 140/80 -- -- 82 15 98 % -- --   02/27/19 0236 126/79 -- -- 78 16 98 % -- --   02/27/19 0210 -- -- -- 80 17 98 % -- --   02/27/19 0030 123/85 -- -- 84 18 98 % -- --   02/27/19 0000 133/68 -- -- 80 17 98 % -- --   02/26/19 2330 145/81 -- -- 80 18 98 % -- --   02/26/19 2327 -- -- -- 86 18 98 % -- --   02/26/19 2300 131/79 97 9 °F (36 6 °C) Oral 84 16 98 % -- --   02/26/19 2245 -- -- -- -- -- -- 4' 10" (1 473 m) 55 kg (121 lb 4 1 oz)   02/26/19 2232 134/85 -- -- 88 (!) 23 97 % -- --   02/26/19 2200 155/97 -- -- 90 17 96 % -- --   02/26/19 2130 126/85 -- -- 86 20 97 % -- --   02/26/19 2115 158/95 -- -- 72 20 97 % -- --     Medication Administration - last 24 hours from 02/26/2019 2008 to 02/27/2019 2008       Date/Time Order Dose Route Action Action by     02/27/2019 1830 magnesium sulfate 20 g/500 mL infusion (premix) 0 g/hr Intravenous Stopped Gurpreet Rincon RN     02/27/2019 1746 magnesium sulfate 20 g/500 mL infusion (premix) 2 g/hr Intravenous [address]     02/27/2019 0746 magnesium sulfate 20 g/500 mL infusion (premix) 2 g/hr Intravenous [address] Shelbie Saavedra RN     02/27/2019 0745 magnesium sulfate 20 g/500 mL infusion (premix) 0 g/hr Intravenous Stopped Shelbie Saavedra RN     02/26/2019 2113 magnesium sulfate 20 g/500 mL infusion (premix) 2 g/hr Intravenous [address] Lester Vasquez RN     02/26/2019 2035 ceftriaxone (ROCEPHIN) 1 g/50 mL in dextrose IVPB 0 mg Intravenous Stopped Lester Vasquez RN     02/27/2019 9842 chlorhexidine (PERIDEX) 0 12 % oral rinse 15 mL 15 mL Swish & Spit Not Given Anne Louis, RN     02/26/2019 2327 chlorhexidine (PERIDEX) 0 12 % oral rinse 15 mL 15 mL Swish & Spit Given James Marquez RN     02/27/2019 1521 heparin (porcine) subcutaneous injection 5,000 Units 5,000 Units Subcutaneous Given Matt Gomez RN     02/27/2019 0549 heparin (porcine) subcutaneous injection 5,000 Units 5,000 Units Subcutaneous Given James Marquez RN     02/26/2019 2328 heparin (porcine) subcutaneous injection 5,000 Units 5,000 Units Subcutaneous Given James Marquez RN     02/26/2019 2327 butalbital-acetaminophen-caffeine (FIORICET,ESGIC) -40 mg per tablet 2 tablet 2 tablet Oral Given James Marquez RN     02/26/2019 2327 potassium chloride (K-DUR,KLOR-CON) CR tablet 20 mEq 20 mEq Oral Given James Marquez RN     02/27/2019 0929 potassium chloride (K-DUR,KLOR-CON) CR tablet 40 mEq 40 mEq Oral Given Anne Louis, RN     02/27/2019 1220 acetaminophen (TYLENOL) tablet 650 mg 650 mg Oral Given Anne Louis RN

## 2019-02-28 NOTE — DISCHARGE INSTRUCTIONS
Preeclampsia   WHAT YOU NEED TO KNOW:   Preeclampsia is a condition that can develop during week 20 or later of your pregnancy  Preeclampsia means you have high blood pressure and may have protein in your urine  Preeclampsia can cause mild to life-threatening health problems for you and your unborn baby  DISCHARGE INSTRUCTIONS:   Call 911 for any of the following:   · You have a seizure  · You have severe abdominal pain with nausea and vomiting  Return to the emergency department if:   · You develop a severe headache that does not go away  · You have blurred or spotted vision that does not go away  · You are bleeding from your vagina  · You have new or increased swelling in your face or hands  · You are urinating little or not at all  Contact your obstetrician if:   · You are urinating less than usual      · You do not feel your baby's movement as often as usual      · You have questions or concerns about your condition or care  Medicines:   · Blood pressure medicine  helps lower your blood pressure and protects your heart, lungs, brain, and kidneys  Take your blood pressure medicine exactly as directed  · Low doses of aspirin  may be recommended after 12 weeks of pregnancy if you are at high risk for preeclampsia  Aspirin may help prevent preeclampsia or problems that can happen from preeclampsia  Do not take aspirin unless directed by your healthcare provider  · Take your medicine as directed  Contact your healthcare provider if you think your medicine is not helping or if you have side effects  Tell him of her if you are allergic to any medicine  Keep a list of the medicines, vitamins, and herbs you take  Include the amounts, and when and why you take them  Bring the list or the pill bottles to follow-up visits  Carry your medicine list with you in case of an emergency  Follow up with your obstetrician as directed: You will need tests 1 to 2 times a week to check your condition  Tests include blood pressure checks, urine and blood tests, and fetal monitoring  Write down your questions so you remember to ask them during your visits  Steps to take if you have a seizure: You may have seizures if you develop eclampsia  You may feel confused, tense, or aggressive when the seizure ends  Tell your family, friends, or coworkers to do the following if you have a seizure:  · Clear the area to help prevent injuries from falls    · Place you on your left side so you do not choke    · Give oxygen if it is available    · Call 911     · Stay with you until medical help arrives  Blood pressure checks: You may need to check your blood pressure each day  Your obstetrician will teach you how to check your blood pressure at home  Measure your blood pressure on the same arm and in the same position each time  Write down the date and time you take your blood pressure, and bring your notes to your prenatal visits  Kick counts: You may need to keep track of how often your baby moves or kicks over a certain amount of time  Ask your obstetrician how to do kick counts and how often to do them  Daily weight:  Weigh yourself daily before breakfast  Weight gain can be a sign of extra fluid in your body  Call your obstetrician if you have gained 2 or more pounds in a week  Rest:  Your obstetrician may tell you to rest more often if you have mild symptoms of preeclampsia  You may need total bedrest if you have more severe symptoms  Try to lie on your left side  © 2017 [address] [employer]  Information is for End User's use only and may not be sold, redistributed or otherwise used for commercial purposes  All illustrations and images included in CareNotes® are the copyrighted property of [employer]  or Saul Westbrook  The above information is an  only  It is not intended as medical advice for individual conditions or treatments   Talk to your doctor, nurse or pharmacist before following any medical regimen to see if it is safe and effective for you

## 2019-02-28 NOTE — H&P
[facility] Unit Transfer Note  Unit/Bed # @DBLINK (UKANNETTE,37402)@ Encounter: [id number]  SOD Team A          David Dolan 25 y o  female [id number]      C/Dain Zarco 0660 Problems: Principal Problem:    Preeclampsia  Active Problems:    Hypertension    Urinary tract infection    Transaminitis    1  Preeclampsia  -Resolved, off Mg  -OB/MFM following    2  Hypertension  -Resolved  -PRN Labetalol for SBP>140    3  Urinary tract infection   -Urine culture  growing >100k GNR enteric like, with a previous urine culture 2019 growing E  Coli sensitive to macrobid and cefazolin  -On day 2 of Rocephin, being transitioned to 7 days of Macrobid starting in AM    4  Transaminitis  -most likely 2/2 pre-eclampsia  -no signs concerning for HELLP  -AM CMP  -OBGYN ordered a hepatitis C qualitative without antibody, pending  Last hepatitis C antibody on record was in , negative  5  Hyponatremia/hypokalemia  -AM CMP    VTE Pharmacologic Prophylaxis: Heparin  VTE Mechanical Prophylaxis: sequential compression device    Disposition: Patient requires Med/Surg    Hospital Course:     "David Dolan is a 25 y o  female who is a  with no known medical history who recently gave birth 5 days ago via  who presents with chest pain/dyspnea/headache found to be due to preeclampsia  Patient delivered 5 days ago via  after an uncomplicated pregnancy  Patient delivered at 39 weeks  She was discharged on postop day 3 without complication  Patient says that she was in her normal state of health up until earlier today when she developed chest pain, dyspnea, headache  She says that she initially developed a substernal chest pain that she describes as a pressure  She tells me that the pain was pleuritic in nature and nonradiating  She denies any exertional or positional aspect of the pain  She denies any associated nausea, vomiting, diaphoresis  She does endorse dyspnea with chest pain    She then soon thereafter developed a frontal pressure headache  She says that the headache got progressively worse and as soon as the symptoms began she went to her OB  She denies that the headache was the worst of her life or maximal in onset  She denies any associated photophobia, phonophobia, vision changes  She denies any abdominal pain throughout this event     Patient went to her Ob who found the patient was hypertensive and she was referred to the emergency department  Patient was found to be hypertensive at that time with proteinuria  She is also noted to have a mild transaminitis  Patient was incidentally found to have a UTI  Patient was treated with magnesium drip and 1 dose of labetalol      Patient currently endorses improving headache currently rated a 6/10  She says chest pain and dyspnea has significantly improved as well      She denies any history of preeclampsia in her previous pregnancy  She denies any other medical history   " Renaldo Valentin MD, 12:11AM       Subjective: The patient was seen in the evening of 19 and was in no acute distress  She reported significant improvement in her headache, which was currently 3/10 and frontal, and stated that she felt mildly fatigued  Objective:   Vitals:    19 1336 19 1436 19 1700 19   BP: 113/70 120/81 129/85 134/89   BP Location:   Right arm Right arm   Pulse: 96 96 (!) 108    Resp: 17 15 (!) 28    Temp:    98 6 °F (37 °C)   TempSrc:       SpO2: 97% 97% 98%    Weight:       Height:         I/O last 24 hours: In: 3753 3 [P O :2610; I V :1143 3]  Out: 1700 [Urine:1700]    Physical Exam   Constitutional: She is oriented to person, place, and time  She appears well-developed  HENT:   Head: Normocephalic  Eyes: Conjunctivae are normal    Neck: Neck supple  Cardiovascular: Normal rate and regular rhythm  Pulmonary/Chest: Effort normal and breath sounds normal    Abdominal: Soft   Bowel sounds are normal     incision site CDI   Musculoskeletal: She exhibits no edema  Neurological: She is alert and oriented to person, place, and time  Skin: Skin is warm and dry  Psychiatric: She has a normal mood and affect           Marcela Nolen MD

## 2019-03-01 ENCOUNTER — TELEPHONE (OUTPATIENT)
Dept: OTHER | Facility: OTHER | Age: 29
End: 2019-03-01

## 2019-03-01 ENCOUNTER — APPOINTMENT (EMERGENCY)
Dept: RADIOLOGY | Facility: HOSPITAL | Age: 29
End: 2019-03-01
Payer: COMMERCIAL

## 2019-03-01 ENCOUNTER — HOSPITAL ENCOUNTER (EMERGENCY)
Facility: HOSPITAL | Age: 29
Discharge: HOME | End: 2019-03-01
Attending: EMERGENCY MEDICINE | Admitting: EMERGENCY MEDICINE
Payer: COMMERCIAL

## 2019-03-01 VITALS
DIASTOLIC BLOOD PRESSURE: 82 MMHG | OXYGEN SATURATION: 98 % | SYSTOLIC BLOOD PRESSURE: 139 MMHG | BODY MASS INDEX: 25.19 KG/M2 | RESPIRATION RATE: 20 BRPM | HEIGHT: 58 IN | HEART RATE: 80 BPM | WEIGHT: 120 LBS | TEMPERATURE: 98 F

## 2019-03-01 DIAGNOSIS — R51.9 HEADACHE: Primary | ICD-10-CM

## 2019-03-01 PROCEDURE — 70450 CT HEAD/BRAIN W/O DYE: CPT

## 2019-03-01 PROCEDURE — 99284 EMERGENCY DEPT VISIT MOD MDM: CPT

## 2019-03-01 RX ORDER — KETOROLAC TROMETHAMINE 30 MG/ML
15 INJECTION, SOLUTION INTRAMUSCULAR; INTRAVENOUS ONCE
Status: COMPLETED | OUTPATIENT
Start: 2019-03-01 | End: 2019-03-01

## 2019-03-01 RX ORDER — METOCLOPRAMIDE HYDROCHLORIDE 5 MG/ML
10 INJECTION INTRAMUSCULAR; INTRAVENOUS ONCE
Status: COMPLETED | OUTPATIENT
Start: 2019-03-01 | End: 2019-03-01

## 2019-03-01 RX ADMIN — SODIUM CHLORIDE 1000 ML: 0.9 INJECTION, SOLUTION INTRAVENOUS at 03:50

## 2019-03-01 RX ADMIN — KETOROLAC TROMETHAMINE 15 MG: 30 INJECTION, SOLUTION INTRAMUSCULAR at 05:23

## 2019-03-01 RX ADMIN — METOCLOPRAMIDE 10 MG: 5 INJECTION, SOLUTION INTRAMUSCULAR; INTRAVENOUS at 05:23
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
## 2019-03-01 NOTE — DISCHARGE SUMMARY
Discharge Summary - James Ramirez 25 y o  female MRN: [id number]    Unit/Bed#: MICU 01 Encounter: [id number]    Admission Date: 2019  Discharge Date: 2019     Admission Diagnosis: Pre-eclampsia [O14 90]  Chest pain [R07 9]    Important Physician Related Follow Up:   · Tomorrow for BP check at Obgyn  · Repeat CMP, cbc, and u/a in 1 week  · Urology for recurrent UTI's    History of Present Illness: James Ramirez is a 25year old  PPD #6 who presented to the ED from [facility] for acute onset chest pain, shortness of breath, and severe headache  Chest pain is worse with inspiration and is an 8/10  Pain does not radiate and is localized in anterior left chest wall  Her headaches is bilateral frontal aching pain that is also 8/10  Patient denies fever, chills, diaphoresis, or visual changes  While at her ob, her blood pressure was found to be 778 systolic and was told to come to the hospital  Patient did not have gestational hypertension and no history of exlampsia or preeclampsia  Summary of clinical course: Upon presentation to the ED, patient received labetalol 10mg and started on Magnesium sulfate infusion  Due to patient shortness of breath, a CT PE study was performed and did not show PE  It did show bibasiler atelectasis,  trace left pleural effusion, and indeterminate 8mm nodule in left lower lobe possibly granuloma  Troponin was negative and ECG was normal  She was found to have an elevated protein creatinine ration of >0 54  It was found that the patient has a positve urine culture for >100,000 gram neg  Rods and she was started on rocephin  Patient was admitted to critical care because there were no available beds on L&D  Obgyn was consulted and patients blood pressures have been well controlled with labetalol  Patient is normotensive and stable for discharge    Procedures Performed: No orders of the defined types were placed in this encounter      · none    Significant Findings, Care, Treatment and Services Provided:   · none    Complications: none    Discharge Diagnosis:   Active Problems:    * No active hospital problems  *      Exam on Day of Discharge:  BP 98/70 (BP Location: Right arm)   Pulse 80   Temp 99 5 °F (37 5 °C) (Oral)   Resp 14   Ht 4' 10" (1 473 m)   Wt 58 2 kg (128 lb 4 9 oz)   LMP 05/24/2018 (Approximate)   SpO2 97%   Breastfeeding? Yes   BMI 26 82 kg/m²     General Appearance:    Alert, cooperative, no distress, appears stated age   Head:    Normocephalic, without obvious abnormality, atraumatic   Eyes:    PERRL, conjunctiva/corneas clear, EOM's intact  Nose:   Nares normal, septum midline, mucosa normal, no drainage    or sinus tenderness   Throat:   Lips, mucosa, and tongue normal; teeth and gums normal   Neck:   Supple, symmetrical, trachea midline, no adenopathy;     thyroid:  no enlargement/tenderness/nodules; no carotid    bruit or JVD   Back:     Symmetric, no curvature, ROM normal, no CVA tenderness   Lungs:     Clear to auscultation bilaterally, respirations unlabored   Chest Wall:    No tenderness or deformity    Heart:    Regular rate and rhythm, S1 and S2 normal, no murmur, rub   or gallop   Breast Exam:    No tenderness, masses, or nipple abnormality   Abdomen:     Soft, non-tender, bowel sounds active all four quadrants,     no masses, no organomegaly, Low transverse C section healing incision  Clean dry intact, without erythema or drainage             Extremities:   Extremities normal, atraumatic, no cyanosis or edema   Pulses:   2+ and symmetric all extremities   Skin:   Skin color, texture, turgor normal, no rashes or lesions   Lymph nodes:   Cervical, supraclavicular, and axillary nodes normal   Neurologic:   CNII-XII intact, normal strength, sensation and reflexes     throughout     Please see progress note from 2/28/2019    Condition at Discharge: good     Discharge instructions/Information to patient and family:   See after visit summary for information provided to patient and family  Provisions for Follow-Up Care:  See after visit summary for information related to follow-up care and any pertinent home health orders  Disposition: Home    Planned Readmission: No    Discharge Statement   I spent 30 minutes discharging the patient  This time was spent on the day of discharge  I had direct contact with the patient on the day of discharge  Additional documentation is required if more than 30 minutes were spent on discharge  Discharge Medications:  See after visit summary for reconciled discharge medications provided to patient and family   Of note significant medication changes made this admission:  · Start: Janisida Twila Aga 103 for 5 more days (total of 7)  · Stop: none  · Resume: all home medications    MD Lydia Gunn U  2  PGY1  2/28/2019  8:23 PM
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
## 2019-03-03 LAB — HCV RNA SERPL QL NAA+PROBE: NEGATIVE

## 2019-03-04 ENCOUNTER — TRANSITIONAL CARE MANAGEMENT (OUTPATIENT)
Dept: INTERNAL MEDICINE CLINIC | Facility: CLINIC | Age: 29
End: 2019-03-04

## 2019-03-04 ENCOUNTER — OFFICE VISIT (OUTPATIENT)
Dept: OBGYN CLINIC | Facility: CLINIC | Age: 29
End: 2019-03-04

## 2019-03-04 VITALS
WEIGHT: 125 LBS | HEIGHT: 58 IN | BODY MASS INDEX: 26.24 KG/M2 | SYSTOLIC BLOOD PRESSURE: 123 MMHG | DIASTOLIC BLOOD PRESSURE: 74 MMHG | HEART RATE: 88 BPM

## 2019-03-04 DIAGNOSIS — N39.0 RECURRENT UTI: ICD-10-CM

## 2019-03-04 DIAGNOSIS — Z98.891 S/P CESAREAN SECTION: Primary | ICD-10-CM

## 2019-03-04 PROCEDURE — 99213 OFFICE O/P EST LOW 20 MIN: CPT | Performed by: OBSTETRICS & GYNECOLOGY

## 2019-03-05 LAB — PLACENTA IN STORAGE: NORMAL

## 2019-03-05 NOTE — UTILIZATION REVIEW
Notification of Discharge  This is a Notification of Discharge from our facility [address]  Please be advised that this patient has been discharge from our facility  Below you will find the admission and discharge date and time including the patients disposition  PRESENTATION DATE: 2/26/2019  3:48 PM  IP ADMISSION DATE: 2/27/19 1352  DISCHARGE DATE: 2/28/2019  9:09 PM  DISPOSITION: [address] [facility] [address] Utilization Review Department  Phone: [phone]; Fax [phone]  [email or web address]  ATTENTION: Please call with any questions or concerns to [phone]  and carefully listen to the prompts so that you are directed to the right person  Send all requests for admission clinical reviews, approved or denied determinations and any other requests to fax [phone]   All voicemails are confidential

## 2019-03-11 ENCOUNTER — PATIENT OUTREACH (OUTPATIENT)
Dept: OBGYN CLINIC | Facility: CLINIC | Age: 29
End: 2019-03-11

## 2019-03-12 ENCOUNTER — TELEPHONE (OUTPATIENT)
Dept: UROLOGY | Facility: AMBULATORY SURGERY CENTER | Age: 29
End: 2019-03-12

## 2019-03-12 NOTE — TELEPHONE ENCOUNTER
Reason for appointment/Complaint/Diagnosis : Frequent UTI    Insurance: [place]    History of Cancer? no                       If yes, what kind? Previous urologist?     None                  Records requested/where? In [address] [place] [address] Rd    Outside testing/where? In [facility]    Location Preference for office visit?  [place]

## 2019-03-13 ENCOUNTER — TELEPHONE (OUTPATIENT)
Dept: UROLOGY | Facility: AMBULATORY SURGERY CENTER | Age: 29
End: 2019-03-13

## 2019-03-13 ENCOUNTER — OFFICE VISIT (OUTPATIENT)
Dept: UROLOGY | Facility: AMBULATORY SURGERY CENTER | Age: 29
End: 2019-03-13
Payer: COMMERCIAL

## 2019-03-13 VITALS
WEIGHT: 122.8 LBS | SYSTOLIC BLOOD PRESSURE: 118 MMHG | BODY MASS INDEX: 25.78 KG/M2 | HEART RATE: 62 BPM | DIASTOLIC BLOOD PRESSURE: 70 MMHG | HEIGHT: 58 IN

## 2019-03-13 DIAGNOSIS — N39.0 URINARY TRACT INFECTION WITHOUT HEMATURIA, SITE UNSPECIFIED: Primary | ICD-10-CM

## 2019-03-13 LAB
SL AMB  POCT GLUCOSE, UA: NORMAL
SL AMB LEUKOCYTE ESTERASE,UA: NORMAL
SL AMB POCT BILIRUBIN,UA: NORMAL
SL AMB POCT BLOOD,UA: NORMAL
SL AMB POCT CLARITY,UA: CLEAR
SL AMB POCT COLOR,UA: YELLOW
SL AMB POCT KETONES,UA: NORMAL
SL AMB POCT NITRITE,UA: NORMAL
SL AMB POCT PH,UA: 6
SL AMB POCT SPECIFIC GRAVITY,UA: 1.01
SL AMB POCT URINE PROTEIN: NORMAL
SL AMB POCT UROBILINOGEN: 0.2

## 2019-03-13 PROCEDURE — 99244 OFF/OP CNSLTJ NEW/EST MOD 40: CPT | Performed by: UROLOGY

## 2019-03-13 PROCEDURE — 81002 URINALYSIS NONAUTO W/O SCOPE: CPT | Performed by: UROLOGY

## 2019-03-13 NOTE — TELEPHONE ENCOUNTER
I called pt to inform her that I was able to find a 1 month f/u appt with Ching Palacios on 4/16/19 at 11:30am  There was no answer when I called pt so I did leave a voicemail and left all of the information regarding this appt  I also instructed pt to call our office back if this appt does not work for her and we can see if we can find anything else
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
## 2019-03-15 ENCOUNTER — OFFICE VISIT (OUTPATIENT)
Dept: OBGYN CLINIC | Facility: CLINIC | Age: 29
End: 2019-03-15

## 2019-03-15 ENCOUNTER — PATIENT OUTREACH (OUTPATIENT)
Dept: OBGYN CLINIC | Facility: CLINIC | Age: 29
End: 2019-03-15

## 2019-03-15 VITALS
SYSTOLIC BLOOD PRESSURE: 104 MMHG | BODY MASS INDEX: 25.9 KG/M2 | HEART RATE: 82 BPM | HEIGHT: 58 IN | DIASTOLIC BLOOD PRESSURE: 70 MMHG | WEIGHT: 123.4 LBS

## 2019-03-15 PROBLEM — O34.219 PREVIOUS CESAREAN SECTION COMPLICATING PREGNANCY: Status: RESOLVED | Noted: 2018-08-06 | Resolved: 2019-03-15

## 2019-03-15 PROBLEM — O99.013 ANEMIA DURING PREGNANCY IN THIRD TRIMESTER: Status: RESOLVED | Noted: 2018-11-26 | Resolved: 2019-03-15

## 2019-03-15 PROBLEM — Z98.891 S/P CESAREAN SECTION: Status: RESOLVED | Noted: 2018-08-20 | Resolved: 2019-03-15

## 2019-03-15 PROBLEM — B95.1 POSITIVE GBS TEST: Status: RESOLVED | Noted: 2019-02-04 | Resolved: 2019-03-15

## 2019-03-15 PROBLEM — O23.43 URINARY TRACT INFECTION IN MOTHER DURING THIRD TRIMESTER OF PREGNANCY: Status: RESOLVED | Noted: 2018-11-30 | Resolved: 2019-03-15

## 2019-03-15 PROCEDURE — 99213 OFFICE O/P EST LOW 20 MIN: CPT | Performed by: NURSE PRACTITIONER

## 2019-03-15 NOTE — PATIENT INSTRUCTIONS
Postpartum Bleeding   WHAT YOU NEED TO KNOW:   What is postpartum bleeding? Postpartum bleeding is vaginal bleeding after childbirth  This bleeding is normal, whether your baby was born vaginally or by   It contains blood and the tissue that lined the inside of your uterus when you were pregnant  What should I expect with postpartum bleeding? Postpartum bleeding usually lasts at least 10 days, and may last longer than 6 weeks  Your bleeding may range from light (barely staining a pad) to heavy (soaking a pad in 1 hour)  Usually, you have heavier bleeding right after childbirth, which slows over the next few weeks until it stops  The bleeding is red or dark brown with clots for the first 1 to 3 days  It then turns pink for several days, and then becomes a white or yellow discharge until it ends  When should I contact my healthcare provider? · Your bleeding increases, or you have heavy bleeding that soaks a pad in 1 hour for 2 hours in a row  · You pass large blood clots  · You are breathing faster than normal, or your heart is beating faster than normal     · You are urinating less than usual, or not at all  · You feel dizzy  · You have questions or concerns about your condition or care  When should I seek immediate care or call 911? · You are suddenly short of breath and feel lightheaded  · You have sudden chest pain  CARE AGREEMENT:   You have the right to help plan your care  Learn about your health condition and how it may be treated  Discuss treatment options with your caregivers to decide what care you want to receive  You always have the right to refuse treatment  The above information is an  only  It is not intended as medical advice for individual conditions or treatments  Talk to your doctor, nurse or pharmacist before following any medical regimen to see if it is safe and effective for you    © 2017 [employer] Information is for End User's use only and may not be sold, redistributed or otherwise used for commercial purposes  All illustrations and images included in CareNotes® are the copyrighted property of A D A M , Inc  or Saul Westbrook  Postpartum Depression   WHAT YOU NEED TO KNOW:   What is postpartum depression? Postpartum depression is a mood disorder that occurs after giving birth  A mood is an emotion or a feeling  Moods affect your behavior and how you feel about yourself and life in general  Depression is a sad mood that you cannot control  Women often feel sad, afraid, or nervous after their baby is born  These feelings are called postpartum blues or baby blues, and they usually go away in 1 to 2 weeks  With postpartum depression, these symptoms get worse and continue for more than 2 weeks  Postpartum depression is a serious condition that affects your daily activities and relationships  What causes postpartum depression? Healthcare providers do not know exactly what causes postpartum depression  It may be caused by a sudden drop in hormone levels after childbirth  A previous episode of postpartum depression or a family history of depression may increase your risk  Several things may trigger postpartum depression:  · Lack of support from the baby's father or other family members    · Feeling more tired than usual    · Stress, a poor diet, or lack of sleep    · Pain after childbirth or pain during breastfeeding    · Sudden change in lifestyle  How is postpartum depression diagnosed? Postpartum depression affects your daily activities and your relationships with other people  Healthcare providers will ask you questions about your signs and symptoms and how they are affecting your life  The symptoms of postpartum depression usually begin within 1 month after childbirth  You feel depressed or lose interest in activities you enjoy nearly every day for at least 2 weeks   You also have 4 or more of the following symptoms:  · You feel tired or have less energy than usual      · You feel unimportant or guilty most of the time  · You think about hurting or killing yourself  · Your appetite changes  You may lose your appetite and lose weight without trying  Your appetite may also increase and you may gain weight  · You are restless, irritable, or withdrawn  · You have trouble concentrating and remembering things  You have trouble doing daily tasks or making decisions  · You have trouble sleeping, even after the baby is asleep  How is postpartum depression treated? · Psychotherapy:  During therapy, you will talk with healthcare providers about how to cope with your feelings and moods  This can be done alone or in a group  It may also be done with family members or your partner  · Antidepressants: This medicine is given to decrease or stop the symptoms of depression  You usually need to take antidepressants for several weeks before you begin to feel better  Do not stop taking antidepressants unless your healthcare provider tells you to  Healthcare providers may try a different antidepressant if one type does not work  What can I do to feel better? · Rest:  Do not try to do everything all at the same time  Do only what is needed and let other things wait until later  Ask your family or friends for help, especially if you have other children  Ask your partner to help with night feedings or other baby care  Try to sleep when the baby naps  · Get emotional support:  Share your feelings with your partner, a friend, or another mother  · Take care of yourself:  Shower and dress each day  Do not skip meals  Try to get out of the house a little each day  Get regular exercise  Eat a healthy diet  Avoid alcohol because it can make your depression worse  Do not isolate yourself  Go for a walk or meet with a friend  It is also important that you have some time by yourself each day  How do I find support and more information? · [address] [facility], Public Information & Communication Branch  [address], [address], [address]  [place] [address]   Phone: [phone]  Phone: [phone]  Web Address: Jose tn  When should I contact my healthcare provider? · You cannot make it to your next visit  · Your depression does not get better with treatment or it gets worse  · You have questions or concerns about your condition or care  When should I seek immediate care or call 91? · You think about hurting or killing yourself, your baby, or someone else  · You feel like other people want to hurt you  · You hear voices telling you to hurt yourself or your baby  CARE AGREEMENT:   You have the right to help plan your care  Learn about your health condition and how it may be treated  Discuss treatment options with your caregivers to decide what care you want to receive  You always have the right to refuse treatment  The above information is an  only  It is not intended as medical advice for individual conditions or treatments  Talk to your doctor, nurse or pharmacist before following any medical regimen to see if it is safe and effective for you  © 2017 [facility] Information is for End User's use only and may not be sold, redistributed or otherwise used for commercial purposes  All illustrations and images included in CareNotes® are the copyrighted property of A D A M , Inc  or Saul Westbrook

## 2019-03-15 NOTE — PROGRESS NOTES
SW MET WITH PT FOR POST PARTUM FOLLOW UP  PT REPORTED SHE IS DOING BETTER NOW  STATES SHE WAS ADMITTED FOR PREECLAMPSIA IN POST PARTUM BUT NOW SHE IS FEELING BETTER  DENIES ANY DEPRESSION SIGNS  CLAIMED FOB IS SUPPORTING HIS CHILD  HE AND HIS MOM STEPPED IN TO CARE FOR THE BABY WHILE PT WAS ADMITTED  PT STATES SHE MAILED THE ACKNOWLEDGEMENT OF PATERNITY  WILL WAIT 2 WEEKS TO CALL FOR RECORD NUMBER  PT WILL CALL SW AS NEEDED

## 2019-03-15 NOTE — PROGRESS NOTES
Subjective     aDvid Cortez is a 30 y o  y o  female [id number] who presents for a postpartum visit  She is 3 weeks postpartum following a repeat  section on 19  The delivery was at 44 gestational weeks  Postpartum course has been complicated by maternal readmission for preeclampsia to ICU  Patient presented 1 week postop for incision check with complaints of severe headache, shortness of breath and chest pain  Was admitted for 2 days treated with labetalol, magnesium was discharged without medication  Denies headaches, shortness breath, chest pain, epigastric pain, swelling and shortness of breath  Was  found to have a  UTI during admission  Patient diagnosed with UTI during pregnancy x2  Was  treated with Irericardo Alarcon and provided urology referral   Patient has appointment with Urology on 3/20/19  Baby's course has been uncomplicated  Baby is feeding by both breast and formula  Bleeding staining only  Bowel function is normal  Bladder function is normal  Patient is not sexually active  Contraception method is abstinence  Patient not currently in a relationship, will call for appointment as needed  Postpartum depression screening: negative  [place] equals 4  Patient states she has a supportive family  The following portions of the patient's history were reviewed and updated as appropriate: allergies, current medications, past family history, past medical history, past social history, past surgical history and problem list     Review of Systems  Pertinent items are noted in HPI       Objective     /70   Wt 123lb   General:   appears stated age, cooperative, alert normal mood and affect   Neck: Neck: normal, supple,trachea midline, no masses   Heart: regular rate and rhythm, S1, S2 normal, no murmur, click, rub or gallop   Lungs: clear to auscultation bilaterally     Assessment/Plan     3 week postpartum exam    Pap smear 16 NILM    1   Contraception: abstinence      -reviewed all birth control options with patient including LARCs     -patient will call office prior to sexually activity  2  Postpartum preeclampsia-BP WNL  Denies signs and symptoms     - signs and symptoms to report reviewed  3  Recurrent UTIs     - encouraged to keep appointment with Urology 3/20/19  4  Patient verbalized understanding of support and educational opportunities available at [facility]  5   Signs and symptoms to report reviewed including preeclampsia    Follow up in: 7/2019 for annual exam with pap and sooner as needed

## 2019-03-19 ENCOUNTER — TRANSCRIBE ORDERS (OUTPATIENT)
Dept: ADMISSIONS | Facility: HOSPITAL | Age: 29
End: 2019-03-19

## 2019-03-20 ENCOUNTER — APPOINTMENT (OUTPATIENT)
Dept: LAB | Facility: HOSPITAL | Age: 29
End: 2019-03-20
Attending: UROLOGY
Payer: COMMERCIAL

## 2019-03-20 ENCOUNTER — HOSPITAL ENCOUNTER (OUTPATIENT)
Dept: RADIOLOGY | Facility: HOSPITAL | Age: 29
Discharge: HOME | End: 2019-03-20
Attending: UROLOGY
Payer: COMMERCIAL

## 2019-03-20 DIAGNOSIS — N39.0 URINARY TRACT INFECTION WITHOUT HEMATURIA, SITE UNSPECIFIED: ICD-10-CM

## 2019-03-20 LAB
BACTERIA UR QL AUTO: NORMAL /HPF
BILIRUB UR QL STRIP: NEGATIVE
CLARITY UR: CLEAR
COLOR UR: YELLOW
GLUCOSE UR STRIP-MCNC: NEGATIVE MG/DL
HGB UR QL STRIP.AUTO: NEGATIVE
HYALINE CASTS #/AREA URNS LPF: NORMAL /LPF
KETONES UR STRIP-MCNC: NEGATIVE MG/DL
LEUKOCYTE ESTERASE UR QL STRIP: NEGATIVE
NITRITE UR QL STRIP: NEGATIVE
NON-SQ EPI CELLS URNS QL MICRO: NORMAL /HPF
PH UR STRIP.AUTO: 7 [PH]
PROT UR STRIP-MCNC: NEGATIVE MG/DL
RBC #/AREA URNS AUTO: NORMAL /HPF
SP GR UR STRIP.AUTO: 1.01 (ref 1–1.03)
UROBILINOGEN UR QL STRIP.AUTO: 0.2 E.U./DL
WBC #/AREA URNS AUTO: NORMAL /HPF

## 2019-03-20 PROCEDURE — 87086 URINE CULTURE/COLONY COUNT: CPT

## 2019-03-20 PROCEDURE — 76770 US EXAM ABDO BACK WALL COMP: CPT

## 2019-03-20 PROCEDURE — 81001 URINALYSIS AUTO W/SCOPE: CPT | Performed by: UROLOGY

## 2019-03-21 LAB — BACTERIA UR CULT: NORMAL

## 2019-03-25 ENCOUNTER — TELEPHONE (OUTPATIENT)
Dept: UROLOGY | Facility: AMBULATORY SURGERY CENTER | Age: 29
End: 2019-03-25

## 2019-04-16 ENCOUNTER — OFFICE VISIT (OUTPATIENT)
Dept: UROLOGY | Facility: AMBULATORY SURGERY CENTER | Age: 29
End: 2019-04-16
Payer: COMMERCIAL

## 2019-04-16 VITALS
BODY MASS INDEX: 25.61 KG/M2 | DIASTOLIC BLOOD PRESSURE: 70 MMHG | HEIGHT: 58 IN | SYSTOLIC BLOOD PRESSURE: 112 MMHG | WEIGHT: 122 LBS | HEART RATE: 80 BPM

## 2019-04-16 DIAGNOSIS — N39.0 RECURRENT UTI: Primary | ICD-10-CM

## 2019-04-16 LAB
BACTERIA UR QL AUTO: NORMAL /HPF
BILIRUB UR QL STRIP: NEGATIVE
CLARITY UR: CLEAR
COLOR UR: YELLOW
GLUCOSE UR STRIP-MCNC: NEGATIVE MG/DL
HGB UR QL STRIP.AUTO: NEGATIVE
KETONES UR STRIP-MCNC: NEGATIVE MG/DL
LEUKOCYTE ESTERASE UR QL STRIP: NEGATIVE
NITRITE UR QL STRIP: NEGATIVE
NON-SQ EPI CELLS URNS QL MICRO: NORMAL /HPF
PH UR STRIP.AUTO: 5.5 [PH]
PROT UR STRIP-MCNC: NEGATIVE MG/DL
RBC #/AREA URNS AUTO: NORMAL /HPF
SP GR UR STRIP.AUTO: 1.02 (ref 1–1.03)
UROBILINOGEN UR QL STRIP.AUTO: 0.2 E.U./DL
WBC #/AREA URNS AUTO: NORMAL /HPF

## 2019-04-16 PROCEDURE — 87147 CULTURE TYPE IMMUNOLOGIC: CPT | Performed by: NURSE PRACTITIONER

## 2019-04-16 PROCEDURE — 87086 URINE CULTURE/COLONY COUNT: CPT | Performed by: NURSE PRACTITIONER

## 2019-04-16 PROCEDURE — 99213 OFFICE O/P EST LOW 20 MIN: CPT | Performed by: NURSE PRACTITIONER

## 2019-04-16 PROCEDURE — 81001 URINALYSIS AUTO W/SCOPE: CPT | Performed by: NURSE PRACTITIONER

## 2019-04-18 LAB
BACTERIA UR CULT: ABNORMAL
BACTERIA UR CULT: ABNORMAL

## 2019-04-23 ENCOUNTER — PATIENT OUTREACH (OUTPATIENT)
Dept: OBGYN CLINIC | Facility: CLINIC | Age: 29
End: 2019-04-23

## 2019-05-10 ENCOUNTER — OFFICE VISIT (OUTPATIENT)
Dept: INTERNAL MEDICINE CLINIC | Facility: CLINIC | Age: 29
End: 2019-05-10

## 2019-05-10 VITALS
HEIGHT: 58 IN | DIASTOLIC BLOOD PRESSURE: 74 MMHG | WEIGHT: 123.46 LBS | SYSTOLIC BLOOD PRESSURE: 120 MMHG | BODY MASS INDEX: 25.92 KG/M2 | TEMPERATURE: 97.9 F | HEART RATE: 72 BPM

## 2019-05-10 DIAGNOSIS — B35.1 ONYCHOMYCOSIS: Primary | ICD-10-CM

## 2019-05-10 PROCEDURE — 99213 OFFICE O/P EST LOW 20 MIN: CPT | Performed by: INTERNAL MEDICINE

## 2019-05-14 ENCOUNTER — OFFICE VISIT (OUTPATIENT)
Dept: OBGYN CLINIC | Facility: CLINIC | Age: 29
End: 2019-05-14

## 2019-05-14 VITALS
TEMPERATURE: 96.9 F | SYSTOLIC BLOOD PRESSURE: 101 MMHG | DIASTOLIC BLOOD PRESSURE: 69 MMHG | BODY MASS INDEX: 26.45 KG/M2 | HEIGHT: 58 IN | WEIGHT: 126 LBS | HEART RATE: 80 BPM

## 2019-05-14 DIAGNOSIS — B37.89 YEAST INFECTION OF NIPPLE, POSTPARTUM: Primary | ICD-10-CM

## 2019-05-14 PROCEDURE — 99213 OFFICE O/P EST LOW 20 MIN: CPT | Performed by: NURSE PRACTITIONER

## 2019-05-14 RX ORDER — CLOTRIMAZOLE 1 %
CREAM (GRAM) TOPICAL 2 TIMES DAILY
Qty: 45 G | Refills: 0 | Status: SHIPPED | OUTPATIENT
Start: 2019-05-14 | End: 2019-05-31 | Stop reason: SDUPTHER

## 2019-05-31 ENCOUNTER — OFFICE VISIT (OUTPATIENT)
Dept: OBGYN CLINIC | Facility: CLINIC | Age: 29
End: 2019-05-31

## 2019-05-31 ENCOUNTER — PATIENT OUTREACH (OUTPATIENT)
Dept: OBGYN CLINIC | Facility: CLINIC | Age: 29
End: 2019-05-31

## 2019-05-31 VITALS
DIASTOLIC BLOOD PRESSURE: 79 MMHG | WEIGHT: 126 LBS | BODY MASS INDEX: 26.45 KG/M2 | HEART RATE: 81 BPM | HEIGHT: 58 IN | SYSTOLIC BLOOD PRESSURE: 119 MMHG

## 2019-05-31 DIAGNOSIS — B37.89 YEAST INFECTION OF NIPPLE, POSTPARTUM: ICD-10-CM

## 2019-05-31 PROCEDURE — 99213 OFFICE O/P EST LOW 20 MIN: CPT | Performed by: NURSE PRACTITIONER

## 2019-05-31 RX ORDER — CLOTRIMAZOLE 1 %
CREAM (GRAM) TOPICAL 2 TIMES DAILY
Qty: 45 G | Refills: 0 | Status: SHIPPED | OUTPATIENT
Start: 2019-05-31 | End: 2020-05-15 | Stop reason: ALTCHOICE

## 2019-05-31 RX ORDER — LANOLIN 100 %
OINTMENT (GRAM) TOPICAL AS NEEDED
Qty: 454 G | Refills: 0 | Status: SHIPPED | OUTPATIENT
Start: 2019-05-31 | End: 2020-05-15 | Stop reason: ALTCHOICE

## 2019-05-31 RX ORDER — FLUCONAZOLE 150 MG/1
150 TABLET ORAL ONCE
Qty: 1 TABLET | Refills: 0 | Status: SHIPPED | OUTPATIENT
Start: 2019-05-31 | End: 2019-05-31

## 2020-01-10 ENCOUNTER — OFFICE VISIT (OUTPATIENT)
Dept: INTERNAL MEDICINE CLINIC | Facility: CLINIC | Age: 30
End: 2020-01-10

## 2020-01-10 VITALS
OXYGEN SATURATION: 98 % | HEART RATE: 72 BPM | DIASTOLIC BLOOD PRESSURE: 62 MMHG | HEIGHT: 59 IN | WEIGHT: 124.34 LBS | BODY MASS INDEX: 25.07 KG/M2 | SYSTOLIC BLOOD PRESSURE: 108 MMHG

## 2020-01-10 DIAGNOSIS — B35.1 ONYCHOMYCOSIS: Primary | Chronic | ICD-10-CM

## 2020-01-10 DIAGNOSIS — Z23 FLU VACCINE NEED: ICD-10-CM

## 2020-01-10 DIAGNOSIS — R79.89 LFTS ABNORMAL: ICD-10-CM

## 2020-01-10 PROCEDURE — 90686 IIV4 VACC NO PRSV 0.5 ML IM: CPT | Performed by: INTERNAL MEDICINE

## 2020-01-10 PROCEDURE — 90471 IMMUNIZATION ADMIN: CPT | Performed by: INTERNAL MEDICINE

## 2020-01-10 PROCEDURE — 99213 OFFICE O/P EST LOW 20 MIN: CPT | Performed by: INTERNAL MEDICINE

## 2020-01-10 RX ORDER — TERBINAFINE HYDROCHLORIDE 250 MG/1
250 TABLET ORAL DAILY
Qty: 84 TABLET | Refills: 0 | Status: SHIPPED | OUTPATIENT
Start: 2020-01-10 | End: 2020-04-03

## 2020-01-10 NOTE — PROGRESS NOTES
PHQ-9 Depression Screening    PHQ-9:    Frequency of the following problems over the past two weeks:       Feeling down, depressed, or hopeless:  0 - not at all       ASSESSMENT/PLAN:    Onychomycosis  -prescribe terbinafine  -ambulatory referral podiatry  -obtain CMP  -advised to decrease alcohol intake while on terbinafine  -advised patient that if she has financial issues/trouble paying for terbinafine, then she should 1st evaluated by Podiatry prior to filling terbinafine prescription    Health Maintenance:  Advised diet and exercise  Advised to refrain from tobacco, alcohol, illicit drug use  Advised medical compliance  Patient scored 0 on PHQ-9 depression scale  Patient has adequate follow-up with gynecology  Flu vaccine provided at today's visit    Schedule a follow-up appointment in 6 months with PCP- Dr Sofia Tripp:   Toenail fungus    HISTORY OF PRESENT ILLNESS:    Patient is a 33-year-old female with no significant past medical history who presents today for follow-up for onychomycosis  Patient was previously evaluated the clinic and was prescribed Lotrimin and Penlac topical solutions for onychomycosis because at that time was breast feeding  Patient states that she is currently done as reading and is interested in oral therapy  She also is interested in following up with Podiatry for onychomycosis have her nails evaluated and cleaned  Overall patient states she is doing well  The following portions of the patient's history were reviewed and updated as appropriate: allergies, current medications, past family history, past medical history, past social history, past surgical history and problem list     Review of Systems: Denies fever, chills, headaches, lightheadedness, dizziness, visual disturbances, sore throat, chest pain, palpitations, SOB, abdominal pain, nausea, vomiting, or trouble urinating/ defecating         OBJECTIVE:  Vitals:    01/10/20 0941   BP: 108/62 BP Location: Right arm   Patient Position: Sitting   Cuff Size: Adult   Pulse: 72   SpO2: 98%   Weight: 56 4 kg (124 lb 5 4 oz)   Height: 4' 11" (1 499 m)     Physical Exam   Constitutional: She is oriented to person, place, and time  She appears well-developed and well-nourished  No distress  HENT:   Head: Normocephalic and atraumatic  Right Ear: External ear normal    Left Ear: External ear normal    Nose: Nose normal    Eyes: Pupils are equal, round, and reactive to light  Conjunctivae and EOM are normal  Right eye exhibits no discharge  Left eye exhibits no discharge  No scleral icterus  Neck: Neck supple  No tracheal deviation present  Cardiovascular: Normal rate, regular rhythm, normal heart sounds and intact distal pulses  Exam reveals no gallop and no friction rub  No murmur heard  Pulmonary/Chest: Effort normal and breath sounds normal  No stridor  No respiratory distress  She has no wheezes  She has no rales  She exhibits no tenderness  Abdominal: Soft  Bowel sounds are normal  She exhibits no distension  There is no tenderness  There is no rebound and no guarding  Musculoskeletal:   Yellowish/deformed nails noted on left foot   Neurological: She is alert and oriented to person, place, and time  No cranial nerve deficit  Skin: Skin is warm and dry  She is not diaphoretic  Vitals reviewed          Current Outpatient Medications:     acetaminophen (TYLENOL) 325 mg tablet, Take 2 tablets (650 mg total) by mouth every 6 (six) hours as needed for headaches (Patient not taking: Reported on 1/10/2020), Disp: 30 tablet, Rfl: 0    ciclopirox (PENLAC) 8 % solution, Apply topically daily at bedtime (Patient not taking: Reported on 5/14/2019), Disp: 6 6 mL, Rfl: 0    clotrimazole (LOTRIMIN) 1 % cream, Apply topically 2 (two) times a day (Patient not taking: Reported on 1/10/2020), Disp: 45 g, Rfl: 0    lanolin ointment, Apply topically as needed for dry skin (Patient not taking: Reported on 1/10/2020), Disp: 171 g, Rfl: 0    Prenatal Vit-Fe Fumarate-FA (PRENATAL PO), Take by mouth, Disp: , Rfl:     Past Medical History:   Diagnosis Date    Anemia     Herpes labialis     last assessed: 10/12/2016    Urinary tract infection     Hx of UTI during last pregnancy    Varicella     Positive Hx     Past Surgical History:   Procedure Laterality Date     SECTION  2015    HI  DELIVERY ONLY N/A 2019    Procedure:  SECTION () REPEAT;  Surgeon: Marin Mendoza MD;  Location: [facility];  Service: Obstetrics     Social History     Socioeconomic History    Marital status: Single     Spouse name: Not on file    Number of children: Not on file    Years of education: Not on file    Highest education level: Not on file   Occupational History    Not on file   Social Needs    Financial resource strain: Not on file    Food insecurity:     Worry: Not on file     Inability: Not on file    Transportation needs:     Medical: Not on file     Non-medical: Not on file   Tobacco Use    Smoking status: Never Smoker    Smokeless tobacco: Never Used   Substance and Sexual Activity    Alcohol use: Not Currently     Alcohol/week: 0 0 standard drinks     Frequency: Never     Drinks per session: Patient refused     Binge frequency: Never     Comment: currently breast feeding     Drug use: No    Sexual activity: Not Currently     Partners: Male     Birth control/protection: None   Lifestyle    Physical activity:     Days per week: Not on file     Minutes per session: Not on file    Stress: Not on file   Relationships    Social connections:     Talks on phone: Not on file     Gets together: Not on file     Attends [employer] service: Not on file     Active member of club or organization: Not on file     Attends meetings of clubs or organizations: Not on file     Relationship status: Not on file    Intimate partner violence:     Fear of current or ex partner: Not on file Emotionally abused: Not on file     Physically abused: Not on file     Forced sexual activity: Not on file   Other Topics Concern    Not on file   Social History Narrative    Always uses seatbelts    Caffeine use     Exercises rarely    Lives with family    No secondhand smoke exposure     Family History   Problem Relation Age of Onset    Arthritis Mother     Hyperlipidemia Mother     Osteoporosis Mother     Hypertension Father     Heart disease Father     No Known Problems Sister     No Known Problems Brother     No Known Problems Son     No Known Problems Maternal Grandmother     No Known Problems Maternal Grandfather     No Known Problems Paternal Grandmother     No Known Problems Paternal Grandfather     No Known Problems Sister     No Known Problems Sister     No Known Problems Brother     No Known Problems Brother        ==  Jesús Beauchamp, DO [address] [facility] Internal Medicine PGY-3    [facility]  [address]   [facility] , [address] [address] [facility] [address] [facility]  Office: [phone]  Fax: [phone]

## 2020-02-07 ENCOUNTER — TELEPHONE (OUTPATIENT)
Dept: INTERNAL MEDICINE CLINIC | Facility: CLINIC | Age: 30
End: 2020-02-07

## 2020-02-07 NOTE — TELEPHONE ENCOUNTER
Patient came in and states she lost her hard copy for her prescription for the terbinafine (LamISIL  She would like a new one faxed to the pharmacy please  [phone]  Please call patient when this is done

## 2020-02-10 NOTE — TELEPHONE ENCOUNTER
This medications was sent to the pharmacy electronic  E-Prescribing Status     Outpatient Medication Detail     terbinafine (LamISIL) 250 mg tablet        Sig: Take 1 tablet (250 mg total) by mouth daily        Sent to pharmacy as: terbinafine (LAMISIL) 250 mg tablet        Class: Normal        Route: Oral        E-Prescribing Status: Receipt confirmed by pharmacy (1/10/2020 10:09 AM EST)          Left voicemail for patient with the information

## 2020-04-11 ENCOUNTER — HOSPITAL ENCOUNTER (EMERGENCY)
Facility: HOSPITAL | Age: 30
Discharge: HOME | End: 2020-04-11
Attending: EMERGENCY MEDICINE | Admitting: EMERGENCY MEDICINE

## 2020-04-11 VITALS
HEIGHT: 58 IN | BODY MASS INDEX: 25.19 KG/M2 | TEMPERATURE: 97.3 F | WEIGHT: 120 LBS | HEART RATE: 74 BPM | DIASTOLIC BLOOD PRESSURE: 71 MMHG | OXYGEN SATURATION: 99 % | SYSTOLIC BLOOD PRESSURE: 128 MMHG | RESPIRATION RATE: 18 BRPM

## 2020-04-11 DIAGNOSIS — J02.9 VIRAL PHARYNGITIS: Primary | ICD-10-CM

## 2020-04-11 DIAGNOSIS — J06.9 VIRAL URI WITH COUGH: ICD-10-CM

## 2020-04-11 PROCEDURE — 99284 EMERGENCY DEPT VISIT MOD MDM: CPT | Performed by: EMERGENCY MEDICINE

## 2020-04-11 PROCEDURE — 87635 SARS-COV-2 COVID-19 AMP PRB: CPT | Performed by: EMERGENCY MEDICINE

## 2020-04-11 PROCEDURE — 99283 EMERGENCY DEPT VISIT LOW MDM: CPT

## 2020-04-11 RX ORDER — ACETAMINOPHEN 325 MG/1
650 TABLET ORAL ONCE
Status: COMPLETED | OUTPATIENT
Start: 2020-04-11 | End: 2020-04-11

## 2020-04-11 RX ORDER — LIDOCAINE HYDROCHLORIDE 20 MG/ML
15 SOLUTION OROPHARYNGEAL ONCE
Status: COMPLETED | OUTPATIENT
Start: 2020-04-11 | End: 2020-04-11

## 2020-04-11 RX ADMIN — LIDOCAINE HYDROCHLORIDE 15 ML: 20 SOLUTION ORAL; TOPICAL at 06:42

## 2020-04-11 RX ADMIN — ACETAMINOPHEN 650 MG: 325 TABLET ORAL at 06:42

## 2020-04-12 LAB — SARS-COV-2 RNA SPEC QL NAA+PROBE: NOT DETECTED

## 2020-04-16 ENCOUNTER — TELEPHONE (OUTPATIENT)
Dept: UROLOGY | Facility: AMBULATORY SURGERY CENTER | Age: 30
End: 2020-04-16

## 2020-05-15 ENCOUNTER — OFFICE VISIT (OUTPATIENT)
Dept: OBGYN CLINIC | Facility: CLINIC | Age: 30
End: 2020-05-15

## 2020-05-15 VITALS
SYSTOLIC BLOOD PRESSURE: 117 MMHG | DIASTOLIC BLOOD PRESSURE: 82 MMHG | HEIGHT: 58 IN | WEIGHT: 118 LBS | BODY MASS INDEX: 24.77 KG/M2 | HEART RATE: 74 BPM | TEMPERATURE: 97.1 F

## 2020-05-15 DIAGNOSIS — Z30.013 ENCOUNTER FOR INITIAL PRESCRIPTION OF INJECTABLE CONTRACEPTIVE: Primary | ICD-10-CM

## 2020-05-15 LAB — SL AMB POCT URINE HCG: NEGATIVE

## 2020-05-15 PROCEDURE — 96372 THER/PROPH/DIAG INJ SC/IM: CPT

## 2020-05-15 PROCEDURE — 1036F TOBACCO NON-USER: CPT | Performed by: NURSE PRACTITIONER

## 2020-05-15 PROCEDURE — 81025 URINE PREGNANCY TEST: CPT | Performed by: NURSE PRACTITIONER

## 2020-05-15 PROCEDURE — 99213 OFFICE O/P EST LOW 20 MIN: CPT | Performed by: NURSE PRACTITIONER

## 2020-05-15 RX ORDER — MEDROXYPROGESTERONE ACETATE 150 MG/ML
150 INJECTION, SUSPENSION INTRAMUSCULAR
Status: DISCONTINUED | OUTPATIENT
Start: 2020-05-15 | End: 2021-03-26

## 2020-05-15 RX ORDER — MEDROXYPROGESTERONE ACETATE 150 MG/ML
150 INJECTION, SUSPENSION INTRAMUSCULAR
Qty: 1 ML | Refills: 0 | Status: SHIPPED | OUTPATIENT
Start: 2020-05-15 | End: 2021-03-26 | Stop reason: ALTCHOICE

## 2020-05-15 RX ADMIN — MEDROXYPROGESTERONE ACETATE 150 MG: 150 INJECTION, SUSPENSION INTRAMUSCULAR at 11:24

## 2020-06-12 ENCOUNTER — ANNUAL EXAM (OUTPATIENT)
Dept: OBGYN CLINIC | Facility: CLINIC | Age: 30
End: 2020-06-12

## 2020-06-12 ENCOUNTER — TELEPHONE (OUTPATIENT)
Dept: OBGYN CLINIC | Facility: CLINIC | Age: 30
End: 2020-06-12

## 2020-06-12 VITALS
HEART RATE: 77 BPM | WEIGHT: 116.8 LBS | DIASTOLIC BLOOD PRESSURE: 65 MMHG | SYSTOLIC BLOOD PRESSURE: 109 MMHG | TEMPERATURE: 98.4 F | HEIGHT: 58 IN | BODY MASS INDEX: 24.52 KG/M2

## 2020-06-12 DIAGNOSIS — Z01.419 WOMEN'S ANNUAL ROUTINE GYNECOLOGICAL EXAMINATION: Primary | ICD-10-CM

## 2020-06-12 PROCEDURE — 99395 PREV VISIT EST AGE 18-39: CPT | Performed by: OBSTETRICS & GYNECOLOGY

## 2020-06-12 PROCEDURE — 1036F TOBACCO NON-USER: CPT | Performed by: OBSTETRICS & GYNECOLOGY

## 2020-06-12 PROCEDURE — G0145 SCR C/V CYTO,THINLAYER,RESCR: HCPCS | Performed by: OBSTETRICS & GYNECOLOGY

## 2020-06-12 PROCEDURE — 87624 HPV HI-RISK TYP POOLED RSLT: CPT | Performed by: OBSTETRICS & GYNECOLOGY

## 2020-06-17 LAB
HPV HR 12 DNA CVX QL NAA+PROBE: NEGATIVE
HPV16 DNA CVX QL NAA+PROBE: NEGATIVE
HPV18 DNA CVX QL NAA+PROBE: NEGATIVE

## 2020-06-23 LAB
LAB AP GYN PRIMARY INTERPRETATION: NORMAL
Lab: NORMAL

## 2020-08-07 ENCOUNTER — TELEPHONE (OUTPATIENT)
Dept: OBGYN CLINIC | Facility: CLINIC | Age: 30
End: 2020-08-07

## 2020-08-07 ENCOUNTER — CLINICAL SUPPORT (OUTPATIENT)
Dept: OBGYN CLINIC | Facility: CLINIC | Age: 30
End: 2020-08-07

## 2020-08-07 DIAGNOSIS — Z30.42 ENCOUNTER FOR DEPO-PROVERA CONTRACEPTION: Primary | ICD-10-CM

## 2020-08-07 LAB — SL AMB POCT URINE HCG: NEGATIVE

## 2020-08-07 PROCEDURE — 96372 THER/PROPH/DIAG INJ SC/IM: CPT

## 2020-08-07 RX ADMIN — MEDROXYPROGESTERONE ACETATE 150 MG: 150 INJECTION, SUSPENSION INTRAMUSCULAR at 10:38

## 2020-08-07 NOTE — PROGRESS NOTES
Depo-Provera      [x]   Patient provided box    o 2 Refills remain   Last  Annual Date: 06/12/2020  [facility] Last Depo date: 05/15/2020   Side effects: yes, itching   HCG: yes  o if applicable: negative   Given by: Sim Mckeon RN      Site: Left deltoid        Calcium supplement daily teaching, condoms for 2 weeks following first injection dose

## 2021-02-25 ENCOUNTER — TELEPHONE (OUTPATIENT)
Dept: OBGYN CLINIC | Facility: CLINIC | Age: 31
End: 2021-02-25

## 2021-02-26 ENCOUNTER — ULTRASOUND (OUTPATIENT)
Dept: OBGYN CLINIC | Facility: CLINIC | Age: 31
End: 2021-02-26

## 2021-02-26 VITALS
DIASTOLIC BLOOD PRESSURE: 68 MMHG | SYSTOLIC BLOOD PRESSURE: 107 MMHG | BODY MASS INDEX: 24.81 KG/M2 | HEIGHT: 58 IN | WEIGHT: 118.2 LBS | HEART RATE: 79 BPM

## 2021-02-26 DIAGNOSIS — N91.2 AMENORRHEA: Primary | ICD-10-CM

## 2021-02-26 LAB — SL AMB POCT URINE HCG: POSITIVE

## 2021-02-26 NOTE — PROGRESS NOTES
Note on use of High Level Disinfection Process (Trophon) for transvaginal probe:     Sarah Bains    REF: [id number]   SN: [id number]     [address] [place] [address] [place] #[id number]

## 2021-02-26 NOTE — LETTER
Work Letter    Julisa Taylor  1990  [address]     Dear Julisa Taylor,      02/26/21        Your employee is a patient at [facility]  We recommend that all pregnant women:    1  Have a well-ventilated workspace  2  Wear low-heeled shoes  3  Work no more than 40 hours per week  4  Have a 15 minute break every 2 hours and at least 30 minutes for a meal break  5  Use good body mechanics by bending at your knees to avoid back strain and lift no more than 20 pounds without assistance  Will need assistance with lifting over 20 lbs  6  Have ready access to bathrooms and water  She may continue to work until her due date unless medical complications arise  We anticipate she may return to work in 6-8 weeks after delivery       Sincerely,  1 Alicia Banda

## 2021-02-26 NOTE — PROGRESS NOTES
Viability Scan    Yolonda Brunner is a 31y/o [id number] who presents today due to positive pregnancy test at home  LMP 12/22/2020, periods are regular  She currently has nausea but declines medication at this time  This was an unintended pregnancy and patient is unsure of how she feels about it  She is accompanied to this visit with the FOB  FIRST TRIMESTER OBSTETRIC ULTRASOUND     INDICATION: Amenorrhea, viability    COMPARISON: None  TECHNIQUE:   Transvaginal imaging was performed to assess the gestation, myometrial/endometrial architecture and ovarian parenchymal detail  The study includes volumetric sweeps and traditional still imaging technique  FINDINGS:     A single intrauterine gestation is identified  Cardiac activity is detected     YOLK SAC:  Present and normal in size and appearance  MEAN CROWN RUMP LENGTH:  2 40 cm = 9 weeks 1 days   AMNIOTIC FLUID/SAC SHAPE:  Within expected normal range  UTERUS/ADNEXA:   Adnexa not visualized  No free fluid identified  IMPRESSION:     Single intrauterine pregnancy of 9 weeks 1 gestational age  Fetal cardiac activity detected  A/P: 30y/p [id number] who presents today for viability scan  Final RUTH  9/28/2021 by LMP       - Continue prenatal vitamin  - RTC for nursing intake and prenatal H&P       Nancy Greenwood MD, PGY-3  2/26/2021  12:32 PM

## 2021-03-02 ENCOUNTER — TELEPHONE (OUTPATIENT)
Dept: OBGYN CLINIC | Facility: CLINIC | Age: 31
End: 2021-03-02

## 2021-03-02 NOTE — PATIENT INSTRUCTIONS
1 Alicia Banda would like to say Congratulations on your pregnancy! We are happy that you have chosen us to be your health care provider during your pregnancy  Your health, the health of your unborn child (children) and your family is important to us  Now, more than ever, your health will be most important to you  Your baby's growth and progress can be affected by how well you take care of yourself  It's a good idea to plan ahead  It is a known fact that women who receive care early in pregnancy and throughout their pregnancy have healthier babies  Visits will be scheduled for you throughout your pregnancy  It is your duty to keep your appointments and follow directions for your care  The number of visits will be decided by your doctor  Don't be afraid to ask questions  Talk with your nurses and providers about your plans for birth and any concerns you may have  Maternal Fetal Medicine (MFM) at [phone]   - 1 hour appointment, only 1 support person allowed, NO children     Blood work : Please complete prior to your H&P appointment  o You do NOT have to fast for any blood work unless instructed  - CBC, Blood type and antibody screen, Urinalysis, Random glucose level, HIV, Syphilis, Hepatitis B   History &Physical: H&P appointment   o Physical exam  o Pelvic exam: GC/CT testing  o If needed: Pap smear  - Routine prenatal   o Visits every 4 weeks until 28 weeks    Stay healthy during your pregnancy     Eat a variety of healthy foods  Healthy foods include fruits, vegetables, whole-grain breads, low-fat dairy foods, beans, lean meats, and fish  Drink liquids as directed  Ask how much liquid to drink each day and which liquids are best for you   Caffeine: It is not clear how caffeine affects pregnancy  Limit your intake of caffeine to avoid possible health problems  o Limit caffeine to less than 200 milligrams each day     - Caffeine may be found in coffee, tea, cola, sports drinks, and chocolate   Foods that contain mercury:  Mercury is naturally found in almost all types of fish and shellfish  Some types of fish absorb higher levels of mercury that can be harmful to an unborn baby  Eat only fish and shellfish that are low in mercury  o Limit your intake of fish to 2 servings each week  - Choose fish low in mercury such as canned light tuna, shrimp, crab, salmon, cod, or tilapia   Do not  eat fish high in mercury such as swordfish, tilefish, [place] mackerel, and shark   - Each week, you may eat up to 12 ounces of fish or shellfish that have low levels of mercury  These include shrimp, canned light tuna, salmon, pollock, and catfish    - Eat only 6 ounces of albacore (white) tuna per week  Albacore tuna has more mercury than canned tuna   Take prenatal vitamins as directed  Your need for certain vitamins and minerals, such as folic acid, increases during pregnancy  Prenatal vitamins provide some of the extra vitamins and minerals you need  Prenatal vitamins may also help to decrease the risk of certain birth defects   Weight: Ask how much weight you should gain during your pregnancy  Too much or too little weight gain can be unhealthy for you and your baby   Exercise: Talk to your healthcare provider about exercise  Moderate exercise can help you stay fit  Your healthcare provider will help you plan an exercise program that is safe for you during pregnancy  o Drink plenty of fluids while exercising to stay hydrated  Be careful to avoid overheating  o AVOID exercises that can make you lose your balance    o Activities that can put your baby at risk i e  horseback riding, scuba diving, skiing or snowboarding  o After your first trimester, avoid exercises that require you to lay flat on your back  o AVOID exceeding a heart rate greater than 140 beats per minute   As long as you are able to hold a conversation while exercising your heart rate is likely acceptable   ALWAYS wear your seat belt   o The shoulder belt should lay across your chest (between your breasts) and away from your neck    o Secure the lap belt below your belly so that it fits snugly across your hips and pelvic bone   Perform Kegel exercise  o Imagine yourself stopping the flow of urine, augustina the muscles of your pelvic floor  Hold that contraction for 10 seconds and release  - They can be repeated 10-20 times per day   Do not smoke  If you smoke, it is never too late to quit  Smoking increases your risk of a miscarriage and other health problems during your pregnancy  Smoking can cause your baby to be born too early or weigh less at birth  Ask your healthcare provider for information if you need help quitting   Do not drink alcohol  Alcohol passes from your body to your baby through the placenta  It can affect your baby's brain development and cause fetal alcohol syndrome (FAS)  FAS is a group of conditions that causes mental, behavior, and growth problems  o Alcohol:  Do not drink alcohol during pregnancy  Alcohol can increase your risk of a miscarriage (losing your baby)   Never use illegal or street drugs (such as marijuana or cocaine) while you are pregnant  Safety tips:   Avoid hot tubs and saunas  Do not use a hot tub or sauna while you are pregnant, especially during your first trimester  Hot tubs and saunas may raise your baby's temperature and increase the risk of birth defects   Avoid toxoplasmosis  This is an infection caused by eating raw meat or being around infected cat feces  It can cause birth defects, miscarriages, and other problems  Wash your hands after you touch raw meat  Make sure any meat is well-cooked before you eat it  Avoid raw eggs and unpasteurized milk  Use gloves or ask someone else to clean your cat's litter box while you are pregnant   Ask your healthcare provider about travel    The most comfortable time to travel is during the second trimester  Ask your healthcare provider if you can travel after 36 weeks  You may not be able to travel in an airplane after 36 weeks  He may also recommend that you avoid long road trips  Pregnancy Diet  WHAT YOU NEED TO KNOW:   What is a healthy diet during pregnancy? A healthy diet during pregnancy is a meal plan that provides the amount of calories and nutrients you need during pregnancy  Your body needs extra calories and nutrients to support your growing baby  You need to gain the right amount of weight for a healthy baby and pregnancy  Babies born at a healthy weight have a lower risk of certain health problems at birth and later in life  A healthy diet may help you avoid gaining too much weight  Too much weight gain may cause problems for you during your pregnancy and delivery   What should I AVOID while I am pregnant?   o Raw and undercooked foods: You should not eat undercooked or raw meat, poultry, eggs, fish, or shellfish (shrimp, crab, lobster)  Cook leftover foods and ready-to-eat foods such as hot dogs until they are steaming hot    o Unpasteurized food:  Unpasteurized foods are foods that have not gone through the heating process (pasteurization) that destroys bacteria  You should not drink milk, juice, or cheese that has not been pasteurized  This includes Brie, feta, Camembert, blue, and [place] cheeses   Which foods can I eat while I am pregnant? Eat a variety of foods from each of the food groups listed below  Your dietitian will tell you how many servings you should have from each food group each day to get enough calories  The amount of calories you need depends on your daily activity, your weight before pregnancy, and current weight gain  Healthcare providers divide pregnancy into 3 periods of time called trimesters  In the first trimester, you usually do not need extra calories   In the second and third trimesters, most women should eat about 300 extra calories each day   What vitamin and mineral supplements may I need? Your healthcare provider will tell you if you need a supplement and the type you should take  Talk to your healthcare provider before you take any other kind of supplement, including herbal (natural) supplements  o Prenatal vitamins:  Eat a variety of healthy foods, even if you take a prenatal vitamin  If you forget to take your vitamin, do not take double the amount the next day    o Folic acid:  You need at least 779 mcg of folic acid each day before you get pregnant  Folic acid helps to form your baby's brain and spinal cord in early pregnancy  During pregnancy, your daily need for folic acid increases to about 600 mcg  Get folic acid each day by eating citrus fruits and juices, green leafy vegetables, liver, or dried beans  Folic acid is also added to foods such as breakfast cereals, bread products, flour, and pasta    o Iron:  Iron is a mineral the body needs to make hemoglobin, which is a part of red blood cells  Hemoglobin helps your blood carry oxygen from the lungs to the rest of your body  Foods that are good sources of iron are meat, poultry, fish, beans, spinach, and fortified cereals and breads  Your body will absorb iron better from non-meat sources if you have a source of vitamin C at the same time  Drink tea and coffee separately from iron-fortified foods and iron supplements  You need about 30 mg of iron each day during pregnancy  o Calcium and vitamin D:  Women who do not eat dairy products may need a calcium and vitamin D supplement  Talk to your healthcare provider about calcium supplements if you do not regularly eat good sources of calcium  The amount of calcium you need is about 1,300 mg if you are between 15and 25years old and 1,000 mg if you are 23to 48years old   What other healthy guidelines should I follow? o Vegetarians and vegans:   If you are a vegetarian or vegan, get enough protein, vitamin B12, and iron during your pregnancy  Some non-meat sources of these nutrients are fortified cereals, nut butters, soy products (tofu and soymilk), nuts, grains, and legumes  These nutrients are also found in eggs and milk products  o Cravings: You may have cravings for certain foods during your pregnancy  Foods that are high in calories, fat, and sugar should not replace healthy food choices  Some women have cravings for unusual substances such as seng, dirt, laundry starch, ice, and chalk  This condition is called pica  These may lead to health problems such as anemia and cause other health problems  Nausea and Vomiting in Pregnancy  Nausea and vomiting in pregnancy  can happen any time of day  These symptoms usually start before the 9th week of pregnancy, and end by the 14th week (second trimester)  Some women can have nausea and vomiting for a longer time  These symptoms can affect some women throughout the entire pregnancy  Nausea and vomiting do not harm your baby  These symptoms can make it hard for you to do your daily activities   Seek care immediately if:   o You have signs of dehydration  Examples are dark yellow urine, dry mouth and lips, dry  skin, fast heartbeat, and urinating less than usual   o You have severe abdominal pain   o You feel too weak or dizzy to stand up   o You see blood in your vomit or bowel movements  o Contact your healthcare provider if:   o You vomit more than 4 times in 1 day   o You have not been able to keep liquids down for more than 1 day   o You lose more than 2 pounds   o You have a fever   o Your nausea and vomiting continue longer than 14 weeks    o You have questions or concerns about your condition or care  Treatment  for nausea and vomiting in pregnancy is usually not needed  Talk to your healthcare provider if your symptoms do not decrease with the changes suggested below  You may need vitamin B6 and medicine if these changes do not help, or your symptoms become severe  Nutrition changes you can make to manage nausea and vomiting:    Eat small meals throughout the day instead of 3 large meals  You may be more likely to have nausea and vomiting when your stomach is empty  Eat foods that are low in fat and high in protein  Examples are lean meat, beans, turkey, and chicken without the skin  Eat a small snack, such as crackers, dry cereal, or a small sandwich before you go to bed   Eat some crackers or dry toast before you get out of bed in the morning  Get out of bed slowly  Sudden movements could cause you to get dizzy and nauseated   Eat bland foods when you feel nauseated  Examples of bland foods include dry toast, dry cereal, plain pasta, white rice, and bread  Other bland foods include saltine crackers, bananas, gelatin, and pretzels  Avoid spicy, greasy, and fried foods  Avoid any other foods that make you feel nauseated   Drink liquids that contain ginger  Drink ginger ale made with real ginger or ginger tea made with fresh grated ginger  Ginger capsules or ginger candies may also help to decrease nausea and vomiting   Drink liquids between meals instead of with meals  Wait at least 30 minutes after you eat to drink liquids  Drink small amounts of liquids often throughout the day to prevent dehydration  Ask how much liquid you should drink each day   Other changes you can make to manage nausea and vomiting:    Avoid smells that bother you  Strong odors may cause nausea and vomiting to start, or make it worse  Take a short walk, turn on a fan, or try to sleep with the window open to get fresh air  When you are cooking, open windows to get rid of smells that may cause nausea   Do not brush your teeth right after you eat  if it makes you nauseated   Rest when you need to  Start activity slowly and work up to your usual routine as you start to feel better   Talk to your healthcare provider about your prenatal vitamins    Prenatal vitamins can cause nausea for some women  Try taking your prenatal vitamin at night or with a snack  If this change does not help, your healthcare provider may recommend a different type of vitamin   Do not use any medicines, vitamins, or supplements to manage your symptoms without asking your healthcare provider  Many medicines can harm an unborn baby   Light to moderate exercise  may help to decrease your symptoms  It may also help you to sleep better at night  Ask your healthcare provider about the best exercise plan for you  Breastfeeding    Benefits of breastfeeding  o Are less likely to develop allergies  o Have increased protection against bacterial meningitis  o Have fewer middle ear infections  o Have fewer learning disabilities  o Have fewer behavioral difficulties  o Have higher IQ's  o Have lower rates of respiratory illness  o Have lower obesity  o Are less likely to develop juvenile diabetes and some childhood cancers  o Are less likely to die from Sudden Infant Death Syndrome  o A healthier baby means fewer visits to the doctor and the pharmacy  o Breastfeeding is environmentally friendly  There is nothing to throw away    o Breastfeeding is free; formula can cost over $1000 for the first year of life  o There is less vaginal bleeding immediately after delivery and a faster return to your pre-pregnant size  - Mothers who breastfeed a lifetime total of 2 years have:   A 40% decreased risk of breast cancer    A decreased risk of ovarian cancer   Lower rates of osteoporosis, diabetes and heart disease   Importance of exclusive breastfeeding  o By providing the ideal food for the healthy growth and development of infants, exclusive  infants receive only breast milk    o Exclusive breastfeeding for the first 6 months is very important to improve an infant's health and reduce childhood illness        Exclusive breastfeeding for the first 6 months  o The American Academy of Pediatrics recommends exclusive breastfeeding for the first six months and continued breastfeeding with supplementation of solids for the next 6 months   Early initiation of breastfeeding  o Women who feed their infants within the first hour of birth is referred to as Ayush Tapia initiation of breastfeeding and ensures that the  receives colostrum  o Colostrum is rich in antibodies and essential nutrients   Rooming-In  o May stabilize 's breathing and heart rate  o Reduce crying  o Improve ability for  to maintain temperature and blood sugar levels  o Early stimulation of baby's immune system  o Calming effects  o Easier and faster bonding   o Rooming-in promotes getting to know your    o Rooming-in makes breastfeeding easier  o Women who room-in with their newborns make more milk and produce a good milk supply earlier  o Becomes easier to recognize baby's feeding cues  o Infants have longer breastfeeding sessions  o Women who room-in with their newborns are more likely to exclusively breastfeed compared to women who are  from their newborns   Skin-to-Skin  o Skin to skin contact should happen regardless of a woman's feeding preference or the mode of delivery  o After the delivery of your baby, it's important that a healthy mother and her baby have uninterrupted skin-to-skin contact   o Skin to skin contact should occur immediately after birth for a least one-two hours and until after the first feeding for breastfeeding mothers  o Skin-to-skin contact means placing dried, unclothes newborns on their mother's bare chest, with warm blankets covering the baby's back  o All routine procedures such as maternal and  assessments can take place during skin-to-skin contact or can be delayed until after the sensitive period immediately after birth  We are proud to offer extensive educational and support services to encourage mothers to breastfeed    This service offers information, education, and support for women who want to breast feed  Mothers can leave a message or breastfeeding question on the line anytime of the day  Nurses will respond within 72 hours  The Breast Feeding Answer Line is [phone] ([phone])  Please DO NOT leave urgent or emergent messages on this message line  Please DO contact your physician DIRECTLY if you have any urgent questions or concerns  In the event of a suspected emergency medical condition please CALL 911 or [address]      Attaching your baby at the Breast (English): [email or web address]    Attaching your baby at the Breast (Armenian):  [email or web address]        Coronavirus (COVID-19) pregnancy FAQs: Medical experts answer your questions  From [employer] cy  com/0_[email or web address]-covid-19-pregnancy-faq-medical-rvbafgf-jmbhxm-gx_33078854  bc (courtesy of [facility])  As of 3/18/20  By Arnold Isaac 37  Medically reviewed by Society for Maternal-Fetal Medicine       We asked parents-to-be to send us their most pressing questions about coronavirus (COVID-19)  Among them: Is it still safe to deliver in a hospital? What if my ob-gyn has the virus? We sent those questions to the top docs at the Society for Maternal-Fetal Medicine  Here are their answers  The coronavirus (COVID-19) pandemic has been declared a national emergency in the [facility] by [employer]  Moms-to-be like you are concerned about everything from getting medicines to managing disruptions at work  But above and beyond any worry about lifestyle changes is a focus on your health and the impact of COVID-19 on your pregnancy  We asked obstetrics doctors who handle the most complicated pregnancy issues to answer your questions about the coronavirus   Here are their responses, provided by Dr Rossy Cruz and her colleagues at the Society for [address]  Am I at more risk for COVID-19 if I'm pregnant? We don't know  Pregnancy does change your immune system in ways that might make you more susceptible to viral respiratory infections like COVID-19  If you become infected, you might also be at higher risk for more severe illness compared to the general population  Although this does not appear to be the case with COVID-19, based on limited data so far, a higher risk has been true for pregnant women with other coronavirus infections (SARS-CoV and MERS-CoV) and other viral respiratory infections, such as flu  It's important to take preventive actions to avoid infection, such as washing your hands often and avoiding people who are sick  How might coronavirus affect my pregnancy? Again, we don't know  Women with other coronavirus infections (such as SARS-CoV) did not have miscarriage or stillbirth at higher rates than the general population  We know that having other respiratory viral infections during pregnancy, such as flu, has been associated with problems like low birth weight and  birth  Also, having a high fever early in pregnancy may increase the risk of certain birth defects  Could I transmit coronavirus to my baby during pregnancy or delivery? We don't know whether you could transmit COVID-19 to your baby before or during delivery  However, among the few case studies of infants born to mothers with COVID-23 published in peer-reviewed literature, none of the infants tested positive for the virus  Also, there was no virus detected in samples of amniotic fluid or breast milk  There have been a few reports of newborns as young as a few days old with infection, suggesting that a mother can transmit the infection to her infant through close contact after delivery    There have been no reports of mother-to-baby transmission for other coronaviruses (MERS-CoV and SARS-CoV), although only limited information is available  Is it safe for me to deliver at a hospital where there have been COVID-19 cases? Yes  We know that COVID-19 is a very scary virus  The good news is that hospitals are taking great precautions to keep patients and healthcare providers safe  When a patient is even suspected to have COVID-19, they are placed in a negative pressure room  (Think of these rooms as vacuums that suck and filter the air so it's safe for the other people in the hospital)  This makes it possible for you to deliver at the hospital without putting you or your baby at risk  Hospitals are also implementing stricter visiting policies to keep patients safe  It's worth calling your hospital to check if there are any new regulations to be aware of  What plans should I make now in case the hospital system is overwhelmed when it's time for me to deliver? This is a great question to talk with your doctor or midwife about when you're 34 to 36 weeks pregnant  Every hospital is making different plans for dealing with this scenario  I work in healthcare  Should I ask my doctor to excuse me from work until the baby is born? What if I work in a school, the travel [address], or some other high-risk setting? Healthcare facilities should take care to limit the exposure of pregnant employees to patients with confirmed or suspected COVID-19, just as they would with other infectious cases  If you continue working, be sure to follow the CDC's risk assessment and infection control guidelines  If you work in a school, [employer], or other high-risk setting, talk with your employer about what it's doing to protect employees and minimize infection risks  Wash your hands often  What if my OB gets COVID-19? If your doctor or midwife tests positive for COVID-19, they will need to be quarantined until they recover and are no longer at risk of transmitting the virus   In this case, you'll be assigned to another OB in your doctor's practice (or you may choose another practitioner yourself)  Ask your new OB or your doctor's office if you should self-quarantine or be tested for the virus  (It will depend on when you last saw your provider and when that person tested positive )    Should we hold off on trying to conceive because of COVID-19? At this time, there's no reason to hold off on trying to get pregnant, but the data we have is really limited  For example, we don't think the virus causes birth defects or increases your risk of miscarriage  But we don't know whether you could transmit COVID-19 to your baby before or during delivery  We also don't know if the virus lives in semen or can be sexually transmitted  We have a babymoon scheduled in the next few months - should we cancel? If you're planning to travel internationally or on a cruise, you should strongly consider canceling  At this time, the virus has reached more than 140 countries, and there are travel bans to [place], most of [place], and [place]  Places where large numbers of people gather are at highest risk, especially airports and cruise ships  If you're planning travel in the [place] , note that any travel setting increases your risk of exposure, and there may be travel bans even in [place] by the time you're scheduled to go  Even if you're state is not blocked, you'll definitely want to avoid traveling to communities where the virus is spreading  To find out where these places are, check The [place] Times map based on CDC data  For the most current advice to help you avoid exposure, check the CDC's COVID-19 travel page  Will the hospital separate me from my  and keep the baby in quarantine?   If you test positive for COVID-19 or have been exposed but have no symptoms, the CDC, [employer] of Obstetricians and Gynecologists, and the Society for [place] [employer] all recommend that you be  from your baby to decrease the risk of transmission to the baby  This would last until you are no longer at risk of transmitting the virus  If you do not have COVID-19 and have not been exposed to the virus, the hospital will not separate you from your   My hospital is restricting visitors and only allowing one support person  If my support person leaves after the delivery, will they be allowed to come back? Every hospital has different policies  Contact your hospital or labor and delivery unit a week or so before delivery to get the most up-to-date restrictions  In general, if your support person needs to leave, they would be allowed back unless they knew they were exposed to COVID-19 after leaving your company  Annmarie understands that the coronavirus (COVID-19) pandemic is an evolving story and that your questions will change over time  We'll continue asking moms and dads in our Community what they want to know, and we'll get the answers from experts to keep them - and you - informed and supported  My mom was planning to fly here to help me care for my new baby after delivery  Should I tell her not to come? Yes  If your mom is over 61 or has any serious chronic medical conditions (such as heart disease, lung disease, or diabetes), she is at higher risk of serious illness from COVID-19 and should avoid air travel  And remember that any travel setting increases a person's risk of exposure  So, it may be risky to have her around the baby after she has been traveling  For the most current advice on traveling, check the CDC's COVID-19 travel page  Annmarie understands that the coronavirus pandemic is an evolving story and that your questions will change over time  We'll continue asking moms and dads in our Community what they want to know, and we'll get the answers from experts to keep them - and you - informed and supported

## 2021-03-03 ENCOUNTER — PATIENT OUTREACH (OUTPATIENT)
Dept: OBGYN CLINIC | Facility: CLINIC | Age: 31
End: 2021-03-03

## 2021-03-03 ENCOUNTER — INITIAL PRENATAL (OUTPATIENT)
Dept: OBGYN CLINIC | Facility: CLINIC | Age: 31
End: 2021-03-03

## 2021-03-03 VITALS
BODY MASS INDEX: 24.98 KG/M2 | DIASTOLIC BLOOD PRESSURE: 65 MMHG | WEIGHT: 119 LBS | HEART RATE: 83 BPM | SYSTOLIC BLOOD PRESSURE: 96 MMHG | HEIGHT: 58 IN

## 2021-03-03 DIAGNOSIS — Z34.91 PREGNANT AND NOT YET DELIVERED IN FIRST TRIMESTER: ICD-10-CM

## 2021-03-03 DIAGNOSIS — Z78.9 LANGUAGE BARRIER: ICD-10-CM

## 2021-03-03 DIAGNOSIS — O09.299 HX OF PREECLAMPSIA, PRIOR PREGNANCY, CURRENTLY PREGNANT: ICD-10-CM

## 2021-03-03 DIAGNOSIS — Z3A.10 10 WEEKS GESTATION OF PREGNANCY: Primary | ICD-10-CM

## 2021-03-03 PROCEDURE — 99211 OFF/OP EST MAY X REQ PHY/QHP: CPT

## 2021-03-03 NOTE — LETTER
Dentist Letter    Yifan Perez  1990  [address] [id number]          03/02/21    We have had several requests from local dentist requesting permission to perform procedures on our patients who are pregnant  We wish to respond with this letter regarding some of the more routine procedures that we have been asked about  The following procedures may be performed on our obstetric patients:   1  Administration of local anesthesia   2  Administration of antibiotics such as PCN, Ampicillin, and Erythromycin  3  Administration of pain medications such as Tylenol, Tylenol with codeine, and if needed Percocet  4  Shielded X-rays    Should you have any questions, please do not hesitate to contact at [phone]          Sincerely,    1 Alicia Banda   [phone]

## 2021-03-03 NOTE — LETTER
Work Letter    Yifan Perez  1990  [address] [place],      03/02/21        Your employee is a patient at [facility]  We recommend that all pregnant women:    1  Have a well-ventilated workspace  2  Wear low-heeled shoes  3  Work no more than 40 hours per week  4  Have a 15 minute break every 2 hours and at least 30 minutes for a meal break  5  Use good body mechanics by bending at your knees to avoid back strain and lift no more than 20 pounds without assistance  Will need assistance with lifting over 20 lbs  6  Have ready access to bathrooms and water  She may continue to work until her due date unless medical complications arise  We anticipate she may return to work in 6-8 weeks after delivery       Sincerely,  1 Alicia Banda

## 2021-03-03 NOTE — PROGRESS NOTES
OB INTAKE INTERVIEW  Pt presents for OB intake  [id number]  OB History    Para Term  AB Living   3 2 2     2   SAB TAB Ectopic Multiple Live Births         0 2      # Outcome Date GA Lbr Escobar/2nd Weight Sex Delivery Anes PTL Lv   3 Current            2 Term 19 39w0d  3856 g (8 lb 8 oz) F CS-LTranv Spinal N MARY   1 Term 08/28/15 40w0d  4054 g (8 lb 15 oz) M CS-Unspec  N MARY      Birth Comments: Per pt, she was told that she had  due to "placenta infection" and fetal heart rate was not stable  Obstetric Comments   Menstrual cycle: 15     Hx of  delivery prior to 36 weeks 6 days:  No  Last Menstrual Period:    Patient's last menstrual period was 2020 (exact date)  Ultrasound date: 2021  9 weeks 1 days     Estimated Date of Delivery: 2021 by LMP   H&P visit scheduled  2021 @ 9am  with Dr Mir Newton     Last pap smear: 2020  Findings; lab pap smear results: no abnormalities    Current Issues:  Constipation :   Yes  Headaches :   No  Cramping:  No  Spotting :   No  PICA cravings :  No  FOB Involved:   Yes  Planned pregnancy:  No  I have these concerns about this prenatal patient:   1  10 weeks gestation of pregnancy  - Ambulatory referral to social work care management program; Future  - Ambulatory Referral to Maternal Fetal Medicine; Future   Sequential  at 8am   Level 2  May 14 at 8:45am    2  Pregnant and not yet delivered in first trimester  - Prenatal Panel; Future  - QUAD Screen; Future    - May 24    3  Language barrier  Pt can speak and understand English but prefers written language Cymro  4  Hx of preeclampsia, prior pregnancy, currently pregnant  BP today 096/65  - Comprehensive metabolic panel; Future  - Protein / creatinine ratio, urine    Pt was demanding a letter for work restrictions  Provided bttn work letter with recommendations   Pt stated she has missed work for the past month because she cannot work there pregnant  "it's too much"  Asked pt to have [employer] HR send me a provider form and will see if the providers would write specific recommendations  Provided my work e-mail and fax number for HR to send the medical form  Pt stood up " whatever, this is annoying, this is why I came here"  Asked if she wanted to change her H&P appt to sooner date, pt declined and walked out  Interview education   [facility] Pregnancy Essentials reviewed and discussed    [facility] Handout   [facility] Handout   Discussed genetic testing   Prenatal lab work: Scripts printed and given to pt   Influenza vaccine given today: No   Discussed Tdap vaccine  Immunizations:   Immunization History   Administered Date(s) Administered    INFLUENZA 11/02/2018    Influenza Quadrivalent Preservative Free 3 years and older IM 10/20/2015    Influenza, injectable, quadrivalent, preservative free 0 5 mL 11/02/2018, 01/10/2020    Tdap 06/08/2015, 08/01/2015, 11/30/2018     Depression Screening Follow-up Plan: Patient's depression screening was negative with an [place] score of  1  Clinically patient does not have depression  No treatment is required     Nurse/Family Partnership- referral placed:  N/A   If yes, place referral for nurse family partnership  BMI Counseling: Body mass index is 24 87 kg/m²  Tobacco Cessation Counseling: non-smoker  Infection Screening: Does the pt have a hx of MRSA? No  The patient was oriented to our practice and all questions were answered    Interviewed by: Pat Farnsworth RN 03/03/21

## 2021-03-03 NOTE — LETTER
[facility] Letter    Tori Machado  1990  [address]       03/02/21          Tori Machado is a patient and under our care in our office  Donal Mirza's Estimated Date of Delivery: 09/28/2021  Any questions or concerns feel free to contact our office       Thank you,  [address]  [id number] [facility]/[place]  [address] [facility]/Alana Hayden [address] [place] [address]/[address]  [phone]

## 2021-03-03 NOTE — LETTER
Proof of Pregnancy Letter    Alex Soto  1990  [address]        03/02/21      Alex Soto is a patient at our facility  Alex Soto Estimated Date of Delivery: 09/28/2021       Any questions or concerns, please feel free to contact our office      Sincerely,    [facility]    [address]   [place], [address] [facility]   [phone]

## 2021-03-03 NOTE — PROGRESS NOTES
SHERRY ELI spoke with 25 y/o- S- G3:P2-  Bilingual woman for pre  assessment  Pt resides with FOB and her 2 kids  Pt reported pregnancy was not intended but welcomed  Pt denies any usage of drug, alcohol or smoking  Pt denies any mental health or domestic violence history  Pt works at [employer]  Pt is applying for MA and WIC  Pt denies transportation problems  Pt claimed has good support from FOB and family  Pt denies any question or concern at this time  SHERRY explained her role and gave contact information  Pt was encouraged to call SHERRY at any time needed

## 2021-03-22 NOTE — PROGRESS NOTES
OB/GYN  PRENATAL H&P VISIT  Aureliano Aguilar  3/26/2021  11:08 AM  Dr Antony Briggs MD      SUBJECTIVE  Patient is a [id number] at 13w3d here for initial prenatal H&P  This is an unintended but welcomed pregnancy  Patient is currently doing well  She reports some nausea but denies need for medication  She is here unaccompanied today  Her previous pregnancies were complicated  She delivered via  section (x2)  She was treated for postpartum preeclampsia after her last delivery  She did not need antihypertensives after but was treated with Magnesium  She currently works at a [employer]  She has a good support system at home  She feels safe at home with her brother, FOB, and nieces  She denies hx of STD/STI, denies a hx of TB or close contacts with persons with TB  She denies a family history of inheritable conditions such as physical or intellectual disabilities, birth defects, blood disorders, heart or neural tube defects  She has not had MRSA  She denies recent travel or travel planned in the near future  She denies use of nicotine, tobacco, alcohol, or recreational drug use  She denies vaginal bleeding, cramping, leakage, abnormal discharge       OB History    Para Term  AB Living   3 2 2 0 0 2   SAB TAB Ectopic Multiple Live Births   0 0 0 0 2      # Outcome Date GA Lbr Escobar/2nd Weight Sex Delivery Anes PTL Lv   3 Current            2 Term 19 39w0d  3856 g (8 lb 8 oz) F CS-LTranv Spinal N MARY      Name: LAURA CHAVES,BABY GIRL  (CALLY)      Apgar1: 9  Apgar5: 9   1 Term 08/28/15 40w0d  4054 g (8 lb 15 oz) M CS-LTranv  N MARY      Birth Comments: NRFHT      Name: Sara Boston      Obstetric Comments   Menstrual cycle: 15       Past Medical History:   Diagnosis Date    Anemia     History of cold sores     last assessed: 10/12/2016     Hypertension     History of pre-eclampsia     Urinary tract infection     Hx of UTI during last pregnancy    Varicella     Positive Hx     Past Surgical History:   Procedure Laterality Date     SECTION  2015    IN  DELIVERY ONLY N/A 2019    Procedure:  SECTION () REPEAT;  Surgeon: Sami Alamo MD;  [facility];  Service: Obstetrics       Social History     Socioeconomic History    Marital status: Single     Spouse name: Not on file    Number of children: 2    Years of education: 15    Highest education level: Some college, no degree   Occupational History    Not on file   Social Needs    Financial resource strain: Not hard at all   [place] insecurity     Worry: Never true     Inability: Never true   [place] needs     Medical: No     Non-medical: No   Tobacco Use    Smoking status: Never Smoker    Smokeless tobacco: Never Used   Substance and Sexual Activity    Alcohol use: Not Currently     Alcohol/week: 0 0 standard drinks     Frequency: Never     Drinks per session: Patient refused     Binge frequency: Never    Drug use: No    Sexual activity: Yes     Partners: Male     Birth control/protection: None   Lifestyle    Physical activity     Days per week: 0 days     Minutes per session: 0 min    Stress: Only a little   Relationships    Social connections     Talks on phone: More than three times a week     Gets together: Once a week     Attends Anglican service: Never     Active member of club or organization: No     Attends meetings of clubs or organizations: Never     Relationship status: Living with partner    Intimate partner violence     Fear of current or ex partner: No     Emotionally abused: No     Physically abused: No     Forced sexual activity: No   Other Topics Concern    Not on file   Social History Narrative    Always uses seatbelts    Caffeine use     Exercises rarely    Lives with family    No secondhand smoke exposure       OBJECTIVE  Vitals:    21 0914   BP: 142/67   Pulse: 76     Physical Exam  Vitals signs reviewed  Exam conducted with a chaperone present  Constitutional:       General: She is not in acute distress  Appearance: Normal appearance  She is well-developed  She is not ill-appearing, toxic-appearing or diaphoretic  Cardiovascular:      Rate and Rhythm: Normal rate and regular rhythm  Heart sounds: Normal heart sounds  No murmur  No friction rub  No gallop  Pulmonary:      Effort: Pulmonary effort is normal  No respiratory distress  Breath sounds: Normal breath sounds  No stridor  No wheezing  Chest:      Chest wall: No tenderness  Breasts: Breasts are symmetrical          Right: No inverted nipple, mass or skin change  Left: No inverted nipple, mass or skin change  Comments: Dense Outer upper quadrants bilaterally  Abdominal:      General: There is no distension  Palpations: Abdomen is soft  Tenderness: There is no abdominal tenderness  Genitourinary:     Exam position: Lithotomy position  Labia:         Right: No rash, tenderness or lesion  Left: No rash, tenderness or lesion  Vagina: Normal  No signs of injury and foreign body  No vaginal discharge, erythema, tenderness, bleeding, lesions or prolapsed vaginal walls  Cervix: No cervical motion tenderness, discharge (Thin, white), friability, lesion, erythema, cervical bleeding or eversion  Rectum: No external hemorrhoid  Skin:     General: Skin is warm and dry  Neurological:      Mental Status: She is alert and oriented to person, place, and time  Psychiatric:         Behavior: Behavior normal          FHT 136bpm by Doppler    ASSESSMENT AND PLAN    33 y o , , with /67   Pulse 76   Ht 4' 10" (1 473 m)   Wt 55 3 kg (122 lb)   LMP 2020 (Exact Date) , at 13w3d here for her prenatal H&P  Problem List Items Addressed This Visit        Unprioritized    13 weeks gestation of pregnancy - Primary     - Pregnancy: H&P completed today  Patient will complete her prenatal labs today   Labor expectations discussed with patient, including appointment schedule, nutrition, weight gain, exercise, medications, sexual intercourse, and nausea/vomiting    - Screening: Pap smear was wnl on 2020  GC/CT collected  Screening options reviewed with patient: She had an appointment with [facility] this morning for sequential screening but was measuring too far for screening today  No abnormalities noted on early ultrasound - full report pending  She will apply for MA and then potentially is interested in cfDNA vs  Quad screening  Leukocytes and Nitrites on Urine dip  Culture sent and will treat  Patient denies symptoms    - Consents: Delivery process including potential OVD and  reviewed  Sign delivery consent form at 28 weeks  - Labor: Patient requests a RLTCS  - Postpartum: Patient plans on breastfeeding  Patient expressed her desire for tubal sterilization at time of  section  She was counseled regarding the permanence and irreversibility  Will sign MA 31 at 28 weeks  - Follow up: RTC in 4 weeks  Precautions regarding labor, leakage, bleeding, and fetal movement reviewed             Relevant Orders    POCT urine dip (Completed)    Chlamydia/GC amplified DNA by PCR    UA w Reflex to Microscopic w Reflex to Culture (Completed)    Urine Microscopic    Urine culture      Other Visit Diagnoses     Hematuria, unspecified type        Relevant Orders    Urine culture            Onur Garrido MD  3/26/2021  11:08 AM

## 2021-03-26 ENCOUNTER — APPOINTMENT (OUTPATIENT)
Dept: LAB | Facility: HOSPITAL | Age: 31
End: 2021-03-26
Payer: COMMERCIAL

## 2021-03-26 ENCOUNTER — ROUTINE PRENATAL (OUTPATIENT)
Dept: OBGYN CLINIC | Facility: CLINIC | Age: 31
End: 2021-03-26

## 2021-03-26 ENCOUNTER — ROUTINE PRENATAL (OUTPATIENT)
Dept: PERINATAL CARE | Facility: CLINIC | Age: 31
End: 2021-03-26
Payer: COMMERCIAL

## 2021-03-26 VITALS
DIASTOLIC BLOOD PRESSURE: 67 MMHG | HEIGHT: 58 IN | WEIGHT: 122 LBS | BODY MASS INDEX: 25.61 KG/M2 | SYSTOLIC BLOOD PRESSURE: 142 MMHG | HEART RATE: 76 BPM

## 2021-03-26 VITALS
BODY MASS INDEX: 25.19 KG/M2 | HEART RATE: 78 BPM | SYSTOLIC BLOOD PRESSURE: 93 MMHG | DIASTOLIC BLOOD PRESSURE: 52 MMHG | WEIGHT: 120 LBS | HEIGHT: 58 IN

## 2021-03-26 DIAGNOSIS — O09.291 HX OF PREECLAMPSIA, PRIOR PREGNANCY, CURRENTLY PREGNANT, FIRST TRIMESTER: ICD-10-CM

## 2021-03-26 DIAGNOSIS — R31.9 HEMATURIA, UNSPECIFIED TYPE: ICD-10-CM

## 2021-03-26 DIAGNOSIS — O34.211 MATERNAL CARE DUE TO LOW TRANSVERSE UTERINE SCAR FROM PREVIOUS CESAREAN DELIVERY: Primary | ICD-10-CM

## 2021-03-26 DIAGNOSIS — Z34.91 PREGNANT AND NOT YET DELIVERED IN FIRST TRIMESTER: ICD-10-CM

## 2021-03-26 DIAGNOSIS — Z3A.13 13 WEEKS GESTATION OF PREGNANCY: Primary | ICD-10-CM

## 2021-03-26 DIAGNOSIS — Z3A.13 13 WEEKS GESTATION OF PREGNANCY: ICD-10-CM

## 2021-03-26 DIAGNOSIS — Z78.9 LANGUAGE BARRIER: ICD-10-CM

## 2021-03-26 DIAGNOSIS — O09.299 HX OF PREECLAMPSIA, PRIOR PREGNANCY, CURRENTLY PREGNANT: ICD-10-CM

## 2021-03-26 DIAGNOSIS — Z36.82 NUCHAL TRANSLUCENCY OF FETUS ON PRENATAL ULTRASOUND: ICD-10-CM

## 2021-03-26 DIAGNOSIS — Z3A.10 10 WEEKS GESTATION OF PREGNANCY: ICD-10-CM

## 2021-03-26 LAB
ABO GROUP BLD: NORMAL
ALBUMIN SERPL BCP-MCNC: 3.2 G/DL (ref 3.5–5)
ALP SERPL-CCNC: 63 U/L (ref 46–116)
ALT SERPL W P-5'-P-CCNC: 12 U/L (ref 12–78)
ANION GAP SERPL CALCULATED.3IONS-SCNC: 8 MMOL/L (ref 4–13)
AST SERPL W P-5'-P-CCNC: 9 U/L (ref 5–45)
BACTERIA UR QL AUTO: ABNORMAL /HPF
BASOPHILS # BLD AUTO: 0.03 THOUSANDS/ΜL (ref 0–0.1)
BASOPHILS NFR BLD AUTO: 0 % (ref 0–1)
BILIRUB SERPL-MCNC: 0.34 MG/DL (ref 0.2–1)
BILIRUB UR QL STRIP: NEGATIVE
BLD GP AB SCN SERPL QL: NEGATIVE
BUN SERPL-MCNC: 7 MG/DL (ref 5–25)
CALCIUM ALBUM COR SERPL-MCNC: 9.4 MG/DL (ref 8.3–10.1)
CALCIUM SERPL-MCNC: 8.8 MG/DL (ref 8.3–10.1)
CHLORIDE SERPL-SCNC: 109 MMOL/L (ref 100–108)
CLARITY UR: ABNORMAL
CO2 SERPL-SCNC: 21 MMOL/L (ref 21–32)
COLOR UR: YELLOW
CREAT SERPL-MCNC: 0.38 MG/DL (ref 0.6–1.3)
CREAT UR-MCNC: 132 MG/DL
EOSINOPHIL # BLD AUTO: 0.19 THOUSAND/ΜL (ref 0–0.61)
EOSINOPHIL NFR BLD AUTO: 2 % (ref 0–6)
ERYTHROCYTE [DISTWIDTH] IN BLOOD BY AUTOMATED COUNT: 12.4 % (ref 11.6–15.1)
GFR SERPL CREATININE-BSD FRML MDRD: 140 ML/MIN/1.73SQ M
GLUCOSE P FAST SERPL-MCNC: 71 MG/DL (ref 65–99)
GLUCOSE UR STRIP-MCNC: NEGATIVE MG/DL
HBV SURFACE AG SER QL: NORMAL
HCT VFR BLD AUTO: 29.9 % (ref 34.8–46.1)
HGB BLD-MCNC: 10.6 G/DL (ref 11.5–15.4)
HGB UR QL STRIP.AUTO: NEGATIVE
HYALINE CASTS #/AREA URNS LPF: ABNORMAL /LPF
IMM GRANULOCYTES # BLD AUTO: 0.04 THOUSAND/UL (ref 0–0.2)
IMM GRANULOCYTES NFR BLD AUTO: 0 % (ref 0–2)
KETONES UR STRIP-MCNC: NEGATIVE MG/DL
LEUKOCYTE ESTERASE UR QL STRIP: ABNORMAL
LYMPHOCYTES # BLD AUTO: 1.81 THOUSANDS/ΜL (ref 0.6–4.47)
LYMPHOCYTES NFR BLD AUTO: 20 % (ref 14–44)
MCH RBC QN AUTO: 31.4 PG (ref 26.8–34.3)
MCHC RBC AUTO-ENTMCNC: 35.5 G/DL (ref 31.4–37.4)
MCV RBC AUTO: 89 FL (ref 82–98)
MONOCYTES # BLD AUTO: 0.38 THOUSAND/ΜL (ref 0.17–1.22)
MONOCYTES NFR BLD AUTO: 4 % (ref 4–12)
NEUTROPHILS # BLD AUTO: 6.55 THOUSANDS/ΜL (ref 1.85–7.62)
NEUTS SEG NFR BLD AUTO: 74 % (ref 43–75)
NITRITE UR QL STRIP: NEGATIVE
NON-SQ EPI CELLS URNS QL MICRO: ABNORMAL /HPF
NRBC BLD AUTO-RTO: 0 /100 WBCS
PH UR STRIP.AUTO: 8.5 [PH]
PLATELET # BLD AUTO: 289 THOUSANDS/UL (ref 149–390)
PMV BLD AUTO: 9.9 FL (ref 8.9–12.7)
POTASSIUM SERPL-SCNC: 3.6 MMOL/L (ref 3.5–5.3)
PROT SERPL-MCNC: 7 G/DL (ref 6.4–8.2)
PROT UR STRIP-MCNC: ABNORMAL MG/DL
PROT UR-MCNC: 24 MG/DL
PROT/CREAT UR: 0.18 MG/G{CREAT} (ref 0–0.1)
RBC # BLD AUTO: 3.38 MILLION/UL (ref 3.81–5.12)
RBC #/AREA URNS AUTO: ABNORMAL /HPF
RH BLD: POSITIVE
RPR SER QL: NORMAL
RUBV IGG SERPL IA-ACNC: >175 IU/ML
SL AMB  POCT GLUCOSE, UA: ABNORMAL
SL AMB LEUKOCYTE ESTERASE,UA: ABNORMAL
SL AMB POCT BLOOD,UA: ABNORMAL
SL AMB POCT CLARITY,UA: ABNORMAL
SL AMB POCT COLOR,UA: ABNORMAL
SL AMB POCT KETONES,UA: ABNORMAL
SL AMB POCT NITRITE,UA: POSITIVE
SL AMB POCT PH,UA: 7.5
SL AMB POCT URINE PROTEIN: ABNORMAL
SODIUM SERPL-SCNC: 138 MMOL/L (ref 136–145)
SP GR UR STRIP.AUTO: 1.02 (ref 1–1.03)
SPECIMEN EXPIRATION DATE: NORMAL
UROBILINOGEN UR QL STRIP.AUTO: 1 E.U./DL
WBC # BLD AUTO: 9 THOUSAND/UL (ref 4.31–10.16)
WBC #/AREA URNS AUTO: ABNORMAL /HPF

## 2021-03-26 PROCEDURE — 87186 SC STD MICRODIL/AGAR DIL: CPT

## 2021-03-26 PROCEDURE — 87186 SC STD MICRODIL/AGAR DIL: CPT | Performed by: OBSTETRICS & GYNECOLOGY

## 2021-03-26 PROCEDURE — 36415 COLL VENOUS BLD VENIPUNCTURE: CPT

## 2021-03-26 PROCEDURE — 76813 OB US NUCHAL MEAS 1 GEST: CPT | Performed by: OBSTETRICS & GYNECOLOGY

## 2021-03-26 PROCEDURE — 87077 CULTURE AEROBIC IDENTIFY: CPT | Performed by: OBSTETRICS & GYNECOLOGY

## 2021-03-26 PROCEDURE — 80081 OBSTETRIC PANEL INC HIV TSTG: CPT

## 2021-03-26 PROCEDURE — 82570 ASSAY OF URINE CREATININE: CPT

## 2021-03-26 PROCEDURE — 76801 OB US < 14 WKS SINGLE FETUS: CPT | Performed by: OBSTETRICS & GYNECOLOGY

## 2021-03-26 PROCEDURE — 81002 URINALYSIS NONAUTO W/O SCOPE: CPT | Performed by: OBSTETRICS & GYNECOLOGY

## 2021-03-26 PROCEDURE — 99213 OFFICE O/P EST LOW 20 MIN: CPT | Performed by: OBSTETRICS & GYNECOLOGY

## 2021-03-26 PROCEDURE — 87086 URINE CULTURE/COLONY COUNT: CPT | Performed by: OBSTETRICS & GYNECOLOGY

## 2021-03-26 PROCEDURE — 87591 N.GONORRHOEAE DNA AMP PROB: CPT | Performed by: OBSTETRICS & GYNECOLOGY

## 2021-03-26 PROCEDURE — 87086 URINE CULTURE/COLONY COUNT: CPT

## 2021-03-26 PROCEDURE — 80053 COMPREHEN METABOLIC PANEL: CPT

## 2021-03-26 PROCEDURE — 84156 ASSAY OF PROTEIN URINE: CPT

## 2021-03-26 PROCEDURE — 99241 PR OFFICE CONSULTATION NEW/ESTAB PATIENT 15 MIN: CPT | Performed by: OBSTETRICS & GYNECOLOGY

## 2021-03-26 PROCEDURE — 87077 CULTURE AEROBIC IDENTIFY: CPT

## 2021-03-26 PROCEDURE — 87491 CHLMYD TRACH DNA AMP PROBE: CPT | Performed by: OBSTETRICS & GYNECOLOGY

## 2021-03-26 PROCEDURE — 81001 URINALYSIS AUTO W/SCOPE: CPT | Performed by: OBSTETRICS & GYNECOLOGY

## 2021-03-26 RX ORDER — ASPIRIN 81 MG/1
162 TABLET, CHEWABLE ORAL DAILY
Qty: 60 TABLET | Refills: 6 | Status: SHIPPED | OUTPATIENT
Start: 2021-03-26 | End: 2021-09-26 | Stop reason: HOSPADM

## 2021-03-26 NOTE — LETTER
2021     MD Steven [place] [address]    Patient: Chaim Valle   YOB: 1990   Date of Visit: 3/26/2021       Dear Dr Morgan Cannon: Thank you for referring Chaim Valle to me for evaluation  Below are my notes for this consultation  If you have questions, please do not hesitate to call me  I look forward to following your patient along with you  Sincerely,        Handy Redd MD        CC: No Recipients  Handy Redd MD  3/26/2021  9:27 PM  Sign when Signing Visit  OFFICE CONSULT  Referring physician:   Md Steven Harveyry,  [address] [place] [address]      Dear Dr Morgan Cannon      Thank you for requesting a  consultation on your patient Ms Chaim Valle for the following indications:  Genetic screening    History  Medications:  Prenatal vitamin  Allergies to medications:  none  Past medical history:  History of cold sores and preeclampsia with a previous pregnancy  Past surgical history:  History of a  section x 2  Past obstetrical history:    17 at 39 weeks she had an 8 pound 8 ounce baby by repeat low-transverse  without complication  She then developed postpartum preeclampsia and required an ICU admission as there was no beds on Labor and delivery and she was started on magnesium  8/28/15 at 40 weeks she had a baby that  weighed 8 pounds 15 ounces and had a low-transverse  due to nonreassuring fetal testing  Social history:  Denies use of cigarettes, alcohol or illicit drugs  First generation family history:  Hypertension in her father    Ultrasound findings: The ultrasound shows a fetus concordant with dates  The nasal bone and nuchal translucency appears normal  No malformations are seen on today's early ultrasound  The patient was informed of the findings and counseled about the limitations of the exam in detecting all forms of fetal congenital abnormalities      She does not report any vaginal bleeding or uterine cramping or contractions  Specific counseling was provided on the following problems:  1  We discussed the options for genetic screening which include invasive testing on the fetal placenta or on fetal skin cells within the amniotic fluid and compared this to noninvasive testing which includes cell free DNA screening and the quad screen  She is measuring too far for the sequential screen  She currently is applying for medical assistance and waiting for an answer before having testing  2  History of prior  and she is not planning on having any other babies  Her placenta is not near her  scar  3  Her chart today stated that she has history of herpes labialis which was an error  This diagnosis was entered into epic when she was being seen for oral cold sores in an emergency room visit on 10/9/16  I corrected this error  Future tests recommended: none    Future ultrasounds ordered today:   1  Fetal Level II ultrasound imaging is recommended at 19-20 weeks' gestation  2  She is measuring too far for the sequential screen  She has a quad screen ordered through her OB office  3  Recommend she start on baby aspirin 162 milligrams daily for the rest of the pregnancy to lessen her risk for developing preeclampsia  She has baseline preeclamptic labs ordered that she needs to complete  The face to face time, in addition to time spent discussing ultrasound results, was approximately 15 minutes, greater than 50% of which was spent during counseling and coordination of care      Elias Holden MD

## 2021-03-26 NOTE — PATIENT INSTRUCTIONS
El embarazo de la semana 11 a la 14   LO QUE NECESITA SABER:   Ahora usted está al término del primer trimestre y entrando al carol trimestre  Los [employer] matutinos por lo general desaparecen para [employer]  Es posible que usted tenga otros síntomas cata fatiga, orinar con frecuencia y javid de Thana [place]  Usted podría ruben aumentado de 2 a 4 libras hasta ahora  INSTRUCCIONES SOBRE [facility]:   Busque atención médica de inmediato si:  · Usted tiene dolor o cólicos en el abdomen o la parte baja de la espalda  · Usted tiene sangrado vaginal abundante o coágulos  · Le sale un material que parece tejido o coágulos grandes  Recolecte el material y tráigalo con usted  Llame a wilson médico u obstetra si:  · Usted no puede retener alimentos ni líquidos y está perdiendo [place]  · Usted tiene un sangrado leve  · Usted tiene escalofríos o fiebre  · Usted tiene comezón, ardor o dolor vaginal     · Usted tiene ike secreción vaginal amarillenta, verdosa, farzad o de [employer]  · Usted tiene dolor o ardor al Tonna Genre, orina menos de lo habitual o tiene [place] rosada o sanguinolenta  · Usted tiene preguntas o inquietudes acerca de wilson condición o cuidado  Cómo cuidarse en esta etapa de wilson embarazo:  · Descanse lo suficiente  Es posible que usted se sienta más cansada de lo normal  Usted podría necesitar allison siestas o acostarse más temprano  · Controle la náusea y el vómito  Evite los alimentos grasosos y picantes  Coma comidas pequeñas catherine el día en vez de porciones grandes  El jengibre puede ayudar a [employer]  Consulte con wilson médico acerca de otras formas para disminuir las náuseas y el vómito  · Consuma alimentos saludables y variados  Alimentos saludables incluyen frutas, verduras, panes de kalia integral, alimentos lácteos bajos en grasa, frijoles, dari magras y pescado  [place] líquidos cata se le haya indicado   Pregunte cuánto líquido debe allison cada día y cuáles líquidos son los más adecuados para usted  Limite el consumo de cafeína a menos de [address]  Limite el consumo de pescado a 2 porciones cada semana  Escoja pescado con concentraciones bajas de alejandro cata atún al natural enlatado, camarón, salmón, bacalao o tilapia  No coma pescado con concentraciones altas de alejandro cata pez shad, caballa gigante, pargo rayado y tiburón  · [id number] [place]  Wilson necesidad de ciertas vitaminas y [facility], cata el ácido fólico, aumenta catherine el [facility]  Las vitaminas prenatales proporcionan algunas de las vitaminas y minerales adicionales que usted necesita  Las vitaminas prenatales también podrían ayudar a disminuir el riesgo de ciertos defectos de nacimiento  · No fume  Fumar aumenta el riesgo de aborto espontáneo y otros problemas de marry catherine wilson [facility]  Fumar puede causar que wilson bebé nazca antes de tiempo o que pese menos al nacer  Solicite información a wilson médico si usted necesita ayuda para dejar de fumar  · No consuma alcohol  El alcohol pasa de wilson cuerpo al bebé a través de la placenta  Puede afectar el desarrollo del cerebro de wilson bebé y provocar el síndrome de alcoholismo fetal (SAF)  El SAF es un anusha de condiciones que causan problemas [place], de comportamiento y de crecimiento  · Consulte con wilson médico antes de allison cualquier medicamento  Muchos medicamentos pueden perjudicar a wilson bebé si usted los lionel [address] [place] [address]  No tome ningún medicamento, vitaminas, hierbas o suplementos sin liz consultar con wilson Glenice Finders  use drogas ilegales o de la rodriguez (cata marihuana o cocaína) mientras está embarazada  Consejos de seguridad catherine el embarazo:  · Evite jacuzzis y saunas  No use un jacuzzi o un sauna mientras usted está embarazada, especialmente catherine el primer trimestre   Los [employer] y los saunas aumentan la temperatura de wilson bebé y el riesgo de defectos de nacimiento  · Evite la toxoplasmosis  [place] es ike infección causada por comer carne cruda o estar cerca del excremento de un bozena infectado  Nokomis puede causar malformaciones congénitas, aborto espontáneo y Jagdeep Schein  Lávese las [place] después de tocar carne cruda  Asegúrese de que la carne esté hansa cocida antes de comerla  Evite los huevos crudos y la [place] Re  Use guantes o pida que alguien la ayude a limpiar la caja de arena del bozena mientras usted Carolyn Distel  Cambios que ocurren con wilson bebé: Wilson bebé tiene completamente formadas las uñas de kelvin [place] y pies  Ahora wilson latido se puede escuchar  Pregunte a wilson médico si usted puede escuchar el latido cardíaco de wilson bebé  Para la semana 14, wilson bebé mide más de 4 pulgadas desde la punta de la claire hasta la rabadilla (parte inferior del bebé)  Wilson bebé pesa más de 3 onzas  Lo que necesita saber acerca del cuidado prenatal: El cuidado prenatal se trata de ike serie de visitas con wilson médico a lo ruma del embarazo  Jarad las primeras 28 semanas de wilson [place], usted tendrá citas con wilson médico ike vez al mes  El cuidado prenatal puede ayudar a evitar problemas jarad Barnie All y Asya  Wilson médico le revisará wilson presión arterial y [place]  También se controlará la frecuencia cardíaca de wilson bebé  Es posible que usted también necesite lo siguiente en algunas de kelvin citas:  · Un examen pélvico le permite a wilson médico observar wilson ruth uterino (la parte inferior de wilson útero)  Wilson médico usará un espéculo para abrir la vagina  Herb Beatriz tamaño y la forma de wilson [place]  · Los análisis de yaya podrían realizarse para buscar si hay signos de lo siguiente:     ? Diabetes gestacional o anemia (bajo nivel de ashwini)    ? Tipo de yaya o factor Rh, o ciertos defectos congénitos    ? Inmunidad a ciertas enfermedades, cata la varicela o la rubéola    ?  Ike infección, cata ike infección de transmisión sexual, VIH o hepatitis B    · La hepatitis B puede necesitar prevención o tratamiento  La hepatitis B es la inflamación del hígado causada por el virus de la hepatitis B (VHB)  El VHB puede transmitirse de Son a wilson bebé catherine el parto  Se le hará ike prueba de detección del VHB lo antes posible catherine el primer trimestre de cada embarazo  Usted necesita la prueba incluso si recibió la vacuna contra la hepatitis B o si se la hicieron antes  Es posible que necesite que le traten ike infección por el VHB antes de ramirez a gerardo  · Análisis de orina también podría realizarse para revisarle el azúcar y la proteína  Estas son señales de diabetes gestacional o preeclampsia  También podrían realizarle análisis de orina para revisar si hay signos de infección  · [place] ecografía del feto [place] imágenes del bebé dentro de wilson Larisa Dat  Las imágenes se utilizan para verificar el desarrollo, el movimiento y la posición del bebé  · Se le pueden ofrecer pruebas de detección de trastornos genéticos  Estos exámenes revisan el riesgo de wilson bebé de trastornos genéticos cata el síndrome de [place]  Un examen de detección incluye análisis de ayya y un ultrasonido  Acuda a kelvin consultas de control con wilson médico u obstetra según le indicaron: Sarah Beth Sample a todas las visitas de control prenatal  Anote kelvin preguntas para que se acuerde de hacerlas catherine kelvin visitas  © Copyright [facility] [employer] Information is for End User's use only and may not be sold, redistributed or otherwise used for commercial purposes  All illustrations and images included in CareNotes® are the copyrighted property of [employer] , [employer]  or [address] [place] [address] es sólo para uso en educación  Wilson intención no es darle un consejo médico sobre enfermedades o tratamientos  Colsulte con wilson [place]en [place] farmacéutico antes de seguir cualquier régimen médico para saber si es seguro y efectivo para usted

## 2021-03-26 NOTE — ASSESSMENT & PLAN NOTE
- Pregnancy: H&P completed today  Patient will complete her prenatal labs today  Labor expectations discussed with patient, including appointment schedule, nutrition, weight gain, exercise, medications, sexual intercourse, and nausea/vomiting    - Screening: Pap smear was wnl on 2020  GC/CT collected  Screening options reviewed with patient: She had an appointment with MFM this morning for sequential screening but was measuring too far for screening today  No abnormalities noted on early ultrasound - full report pending  She will apply for MA and then potentially is interested in cfDNA vs  Quad screening  Leukocytes and Nitrites on Urine dip  Culture sent and will treat  Patient denies symptoms    - Consents: Delivery process including potential OVD and  reviewed  Sign delivery consent form at 28 weeks  - Labor: Patient requests a RLTCS  - Postpartum: Patient plans on breastfeeding  Patient expressed her desire for tubal sterilization at time of  section  She was counseled regarding the permanence and irreversibility  Will sign MA 31 at 28 weeks  - Follow up: RTC in 4 weeks  Precautions regarding labor, leakage, bleeding, and fetal movement reviewed

## 2021-03-26 NOTE — PROGRESS NOTES
OFFICE CONSULT  Referring physician:   Ann Cai Md  [address]      Dear Dr Mark Cuevas      Thank you for requesting a  consultation on your patient Ms Sarah Valentin for the following indications:  Genetic screening    History  Medications:  Prenatal vitamin  Allergies to medications:  none  Past medical history:  History of cold sores and preeclampsia with a previous pregnancy  Past surgical history:  History of a  section x 2  Past obstetrical history:    17 at 39 weeks she had an 8 pound 8 ounce baby by repeat low-transverse  without complication  She then developed postpartum preeclampsia and required an ICU admission as there was no beds on Labor and delivery and she was started on magnesium  8/28/15 at 40 weeks she had a baby that  weighed 8 pounds 15 ounces and had a low-transverse  due to nonreassuring fetal testing  Social history:  Denies use of cigarettes, alcohol or illicit drugs  First generation family history:  Hypertension in her father    Ultrasound findings: The ultrasound shows a fetus concordant with dates  The nasal bone and nuchal translucency appears normal  No malformations are seen on today's early ultrasound  The patient was informed of the findings and counseled about the limitations of the exam in detecting all forms of fetal congenital abnormalities  She does not report any vaginal bleeding or uterine cramping or contractions  Specific counseling was provided on the following problems:  1  We discussed the options for genetic screening which include invasive testing on the fetal placenta or on fetal skin cells within the amniotic fluid and compared this to noninvasive testing which includes cell free DNA screening and the quad screen  She is measuring too far for the sequential screen  She currently is applying for medical assistance and waiting for an answer before having testing    2  History of prior  and she is not planning on having any other babies  Her placenta is not near her  scar  3  Her chart today stated that she has history of herpes labialis which was an error  This diagnosis was entered into epic when she was being seen for oral cold sores in an emergency room visit on 10/9/16  I corrected this error  Future tests recommended: none    Future ultrasounds ordered today:   1  Fetal Level II ultrasound imaging is recommended at 19-20 weeks' gestation  2  She is measuring too far for the sequential screen  She has a quad screen ordered through her OB office  3  Recommend she start on baby aspirin 162 milligrams daily for the rest of the pregnancy to lessen her risk for developing preeclampsia  She has baseline preeclamptic labs ordered that she needs to complete  The face to face time, in addition to time spent discussing ultrasound results, was approximately 15 minutes, greater than 50% of which was spent during counseling and coordination of care      Jonah Wadsworth MD

## 2021-03-27 LAB
C TRACH DNA SPEC QL NAA+PROBE: NEGATIVE
N GONORRHOEA DNA SPEC QL NAA+PROBE: NEGATIVE

## 2021-03-28 LAB
BACTERIA UR CULT: ABNORMAL

## 2021-03-29 LAB — HIV 1+2 AB+HIV1 P24 AG SERPL QL IA: NORMAL

## 2021-03-31 ENCOUNTER — TELEPHONE (OUTPATIENT)
Dept: OBGYN CLINIC | Facility: CLINIC | Age: 31
End: 2021-03-31

## 2021-03-31 DIAGNOSIS — R82.71 ASYMPTOMATIC BACTERIURIA DURING PREGNANCY IN FIRST TRIMESTER: Primary | ICD-10-CM

## 2021-03-31 DIAGNOSIS — O99.891 ASYMPTOMATIC BACTERIURIA DURING PREGNANCY IN FIRST TRIMESTER: Primary | ICD-10-CM

## 2021-03-31 RX ORDER — CEPHALEXIN 500 MG/1
500 CAPSULE ORAL EVERY 6 HOURS SCHEDULED
Qty: 20 CAPSULE | Refills: 0 | Status: SHIPPED | OUTPATIENT
Start: 2021-03-31 | End: 2021-04-05

## 2021-03-31 NOTE — TELEPHONE ENCOUNTER
----- Message from Stephen Melgar MD sent at 3/31/2021 12:14 PM EDT -----  Please let Nayely Bailey know that her urine culture showed an infection (UTI)  I have prescribed Keflex and she should take it every 6 hours for 5 days  The remainder of her prenatal labs were wnl  Thanks!

## 2021-04-16 ENCOUNTER — APPOINTMENT (OUTPATIENT)
Dept: LAB | Facility: HOSPITAL | Age: 31
End: 2021-04-16
Payer: COMMERCIAL

## 2021-04-16 DIAGNOSIS — Z3A.10 10 WEEKS GESTATION OF PREGNANCY: ICD-10-CM

## 2021-04-16 DIAGNOSIS — Z34.91 PREGNANT AND NOT YET DELIVERED IN FIRST TRIMESTER: ICD-10-CM

## 2021-04-16 PROCEDURE — 86336 INHIBIN A: CPT

## 2021-04-16 PROCEDURE — 84702 CHORIONIC GONADOTROPIN TEST: CPT

## 2021-04-16 PROCEDURE — 82677 ASSAY OF ESTRIOL: CPT

## 2021-04-16 PROCEDURE — 82105 ALPHA-FETOPROTEIN SERUM: CPT

## 2021-04-19 LAB
2ND TRIMESTER 4 SCREEN SERPL-IMP: NORMAL
2ND TRIMESTER 4 SCREEN SERPL-IMP: NORMAL
AFP ADJ MOM SERPL: 0.92
AFP SERPL-MCNC: 39 NG/ML
AGE AT DELIVERY: 33.5 YR
FET TS 18 RISK FROM MAT AGE: NORMAL
FET TS 21 RISK FROM MAT AGE: 405
GA METHOD: NORMAL
GA: 16.4 WEEKS
HCG ADJ MOM SERPL: 0.66
HCG SERPL-ACNC: NORMAL MIU/ML
IDDM PATIENT QL: NO
INHIBIN A ADJ MOM SERPL: 1.49
INHIBIN A SERPL-MCNC: 272.09 PG/ML
KARYOTYP BLD/T: NORMAL
MULTIPLE PREGNANCY: NO
NEURAL TUBE DEFECT RISK FETUS: NORMAL %
SERVICE CMNT-IMP: NORMAL
TS 18 RISK FETUS: NORMAL
TS 21 RISK FETUS: 1824
U ESTRIOL ADJ MOM SERPL: 1.43
U ESTRIOL SERPL-MCNC: 1.6 NG/ML

## 2021-04-22 ENCOUNTER — TELEPHONE (OUTPATIENT)
Dept: OBGYN CLINIC | Facility: CLINIC | Age: 31
End: 2021-04-22

## 2021-04-22 NOTE — TELEPHONE ENCOUNTER
----- Message from Carson Brunson MD sent at 4/21/2021 11:38 AM EDT -----  Please let Zoanne Schlatter know that her QUAD screen is negative  Thanks!

## 2021-04-23 ENCOUNTER — ROUTINE PRENATAL (OUTPATIENT)
Dept: OBGYN CLINIC | Facility: CLINIC | Age: 31
End: 2021-04-23

## 2021-04-23 VITALS
HEART RATE: 80 BPM | HEIGHT: 58 IN | DIASTOLIC BLOOD PRESSURE: 57 MMHG | BODY MASS INDEX: 26.45 KG/M2 | SYSTOLIC BLOOD PRESSURE: 98 MMHG | WEIGHT: 126 LBS

## 2021-04-23 DIAGNOSIS — Z34.92 SECOND TRIMESTER PREGNANCY: ICD-10-CM

## 2021-04-23 DIAGNOSIS — O09.299 HX OF PREECLAMPSIA, PRIOR PREGNANCY, CURRENTLY PREGNANT: Primary | ICD-10-CM

## 2021-04-23 DIAGNOSIS — Z3A.17 17 WEEKS GESTATION OF PREGNANCY: ICD-10-CM

## 2021-04-23 DIAGNOSIS — Z98.891 HISTORY OF CESAREAN DELIVERY: ICD-10-CM

## 2021-04-23 LAB
BACTERIA UR QL AUTO: ABNORMAL /HPF
BILIRUB UR QL STRIP: NEGATIVE
CLARITY UR: ABNORMAL
COLOR UR: YELLOW
GLUCOSE UR STRIP-MCNC: NEGATIVE MG/DL
HGB UR QL STRIP.AUTO: NEGATIVE
KETONES UR STRIP-MCNC: NEGATIVE MG/DL
LEUKOCYTE ESTERASE UR QL STRIP: ABNORMAL
NITRITE UR QL STRIP: POSITIVE
NON-SQ EPI CELLS URNS QL MICRO: ABNORMAL /HPF
PH UR STRIP.AUTO: 6.5 [PH]
PROT UR STRIP-MCNC: NEGATIVE MG/DL
RBC #/AREA URNS AUTO: ABNORMAL /HPF
SP GR UR STRIP.AUTO: 1.02 (ref 1–1.03)
UROBILINOGEN UR QL STRIP.AUTO: 0.2 E.U./DL
WBC #/AREA URNS AUTO: ABNORMAL /HPF

## 2021-04-23 PROCEDURE — 87086 URINE CULTURE/COLONY COUNT: CPT | Performed by: NURSE PRACTITIONER

## 2021-04-23 PROCEDURE — 87077 CULTURE AEROBIC IDENTIFY: CPT | Performed by: NURSE PRACTITIONER

## 2021-04-23 PROCEDURE — 99213 OFFICE O/P EST LOW 20 MIN: CPT | Performed by: NURSE PRACTITIONER

## 2021-04-23 PROCEDURE — 81001 URINALYSIS AUTO W/SCOPE: CPT | Performed by: NURSE PRACTITIONER

## 2021-04-23 PROCEDURE — 87186 SC STD MICRODIL/AGAR DIL: CPT | Performed by: NURSE PRACTITIONER

## 2021-04-23 NOTE — PATIENT INSTRUCTIONS
El embarazo de la semana 19 a la 22   CUIDADO AMBULATORIO:   Qué cambios están ocurriendo en wilson cuerpo: Ahora que usted está en wilson carol trimestre, tiene [place]  Es posible que también sienta más [place] de lo normal  Es posible que usted esté aumentando de ½ a 1 pavel por [place], y que wilson embarazo se empiece a notar  Posiblemente usted necesite comenzar a usar ropa de maternidad  Conforme wilson bebé está creciendo, usted podría presentar otros síntomas  Estos podrían incluir dolor corporal o estrías en wilson abdomen, senos, muslos y glúteos  Busque atención médica de inmediato si:  · Usted presenta un micah dolor de claire que no desaparece  · Usted tiene cambios en la visión nuevos o en aumento, cata visión borrosa o con manchas  · Usted tiene inflamación nueva o creciente en wilson ammon o [place]  · Usted tiene manchado o sangrado vaginal     · Usted rompe candy o siente un chorro o gotas de agua tibia que le está bajando por wilson vagina  Comuníquese con wilson médico si:  · Usted tiene calambres, presión o tensión abdominal     · Usted tiene un cambio en la secreción vaginal     · Usted no puede retener alimentos ni líquidos y está perdiendo [place]  · Usted tiene escalofríos o fiebre  · Usted tiene comezón, ardor o dolor vaginal     · Usted tiene ike secreción vaginal amarillenta, verdosa, farzad o de [employer]  · Usted tiene dolor o ardor al Germania [place], orina menos de lo habitual o tiene [place] rosada o sanguinolenta  · Usted tiene preguntas o inquietudes acerca de wilson condición o cuidado  Cómo cuidarse en esta etapa de wilson embarazo:  · Consuma alimentos saludables y variados  Alimentos saludables incluyen frutas, verduras, panes de kalia integral, alimentos lácteos bajos en grasa, frijoles, dari magras y pescado  [place] líquidos cata se le haya indicado  Pregunte cuánto líquido debe allison cada día y cuáles líquidos son los más adecuados para usted   Limite el consumo de cafeína a menos de 200 miligramos cada día  Limite el consumo de pescado a 2 porciones cada semana  Escoja pescado con concentraciones bajas de alejandro cata atún al natural enlatado, camarón, salmón, bacalao o tilapia  No coma pescado con concentraciones altas de alejandro cata pez shad, caballa gigante, pargo rayado y tiburón  · [address] [place]  Wilson necesidad de ciertas vitaminas y [facility], cata el ácido fólico, aumenta catherine el [facility]  Las vitaminas prenatales proporcionan algunas de las vitaminas y minerales adicionales que usted necesita  Las vitaminas prenatales también podrían ayudar a disminuir el riesgo de ciertos defectos de nacimiento  · Consulte con wilson médico acerca de hacer ejercicio  El ejercicio moderado puede ayudarla a mantenerse en forma  Wilson médico la ayudará a planear un programa de ejercicios que sea seguro para usted catherine wilson [facility]  · No fume  Fumar aumenta el riesgo de aborto espontáneo y otros problemas de marry catherine wilson [facility]  Fumar puede causar que wilson bebé nazca antes de tiempo o que pese menos al nacer  Solicite información a wilson médico si usted necesita ayuda para dejar de fumar  · No consuma alcohol  El alcohol pasa de wilson cuerpo al bebé a través de la placenta  Puede afectar el desarrollo del cerebro de wilson bebé y provocar el síndrome de alcoholismo fetal (SAF)  El SAF es un anusha de condiciones que causan problemas [place], de comportamiento y de crecimiento  · Consulte con wilson médico antes de allison cualquier medicamento  Muchos medicamentos pueden perjudicar a wilson bebé si usted los lionel [address] [place] [address]  No tome ningún medicamento, vitaminas, hierbas o suplementos sin liz consultar con wilson Azell Taiwo  use drogas ilegales o de la rodriguez (cata marihuana o cocaína) mientras está embarazada  Consejos de seguridad catherine el embarazo:  · Evite jacuzzis y saunas   No use un jacuzzi o un sauna mientras usted está [place], especialmente catherine el primer trimestre  Los [facility] y los saunas aumentan la temperatura de elam bebé y el riesgo de defectos de nacimiento  · Evite la toxoplasmosis  [place] es ike infección causada por comer carne cruda o estar cerca del excremento de un bozena infectado  [place] puede causar malformaciones congénitas, aborto espontáneo y Jagdeep Schein  Lávese las [place] después de tocar carne cruda  Asegúrese de que la carne esté hansa cocida antes de comerla  Evite los huevos crudos y la [place]  Use guantes o pida que alguien la ayude a limpiar la caja de arena del bozena mientras usted [place]  Cambios que están ocurriendo con elam bebé: Para las 22 semanas, elam bebé mide alrededor de 8 pulgadas de ruma desde la punta de la claire hasta la rabadilla (parte inferior del bebé)  Elam bebé también pesa alrededor de 1 pavel  Elam bebé se está volviendo [address]  Es posible que pueda sentir a elam bebé moviéndose dentro de usted para [employer]  Podría ser que los primeros movimientos no lina tan notorios  Los movimientos podrían sentirse cata ike sensación de revoloteo  Conforme pasa el tiempo, los movimientos de elam bebé podrían volverse más martha y notorios  Lo que necesita saber acerca del cuidado prenatal: Catherine las primeras 29 semanas de elam embarazo, usted tendrá citas mensuales con elam médico  Elam médico le revisará elam presión arterial y peso  Es posible que también necesite lo siguiente:  · Un examen de orina también podría realizarse para revisarle el azúcar y la proteína  Estas son señales de diabetes gestacional o de infección  La proteína en elam orina también podría ser ike señal de preeclampsia  La preeclampsia es ike condición que puede desarrollarse catherine la semana 21 o después en elam embarazo  Esta provoca presión arterial luis y [employer] con kelvin riñones y [place]  · La altura uterina es ike medición del útero para controlar el desarrollo de elam bebé  [employer] por lo general es igual al número de [address] que usted tiene de embarazo  · [place] ecografía del feto [place] imágenes del bebé dentro de wilson [place]  Muestra el desarrollo de wilson bebé  El movimiento y posición del bebé también se pueden observar  Es posible que wilson médico pueda decirle cuál es el sexo de wilson bebé catherine la ecografía  · El ritmo cardíaco de wilson bebé será revisado  © Copyright [employer] Information is for End User's use only and may not be sold, redistributed or otherwise used for commercial purposes  All illustrations and images included in CareNotes® are the copyrighted property of A D A M "[employer]  or 0 [facility] es sólo para uso en educación  Wilson intención no es darle un consejo médico sobre enfermedades o tratamientos  Colsulte con wilson [facility] farmacéutico antes de seguir cualquier régimen médico para saber si es seguro y efectivo para usted  El embarazo de la semana 15 a la 18   CUIDADO AMBULATORIO:   Qué cambios están ocurriendo en wilson cuerpo: Ahora que usted está en wilson carol trimestre, tiene [place]  Es posible que también sienta más [place] de lo normal  Usted podría comenzar a experimentar otros síntomas, cata acidez o mareos  Es posible que usted esté aumentando de ½ a 1 pavel por semana, y que wilson embarazo se empiece a notar  Posiblemente usted necesite comenzar a usar ropa de maternidad  Busque atención médica de inmediato si:  · Usted tiene dolor o cólicos en el abdomen o la parte baja de la espalda  · Usted tiene sangrado vaginal abundante o coágulos  · Le sale un material que parece tejido o coágulos grandes  Recolecte el material y tráigalo con usted  Comuníquese con wilson médico si:  · Usted no puede retener alimentos ni líquidos y está perdiendo [place]  · Usted tiene un sangrado leve  · Usted tiene escalofríos o fiebre      · Usted tiene comezón, ardor o dolor vaginal     · Usted tiene ike secreción vaginal amarillenta, verdosa, farzad o de [employer]  · Usted tiene dolor o ardor al [place], orina menos de lo habitual o tiene [place] rosada o sanguinolenta  · Usted tiene preguntas o inquietudes acerca de wilson condición o cuidado  Cómo cuidarse en esta etapa de wilson embarazo:  · Controle la acidez comiendo 4 o 5 comidas pequeñas cada día en vez de comidas grandes  Evite los alimentos picantes  Evite comer lexis antes de irse a la cama  · Controle la náusea y el vómito  Evite los alimentos grasosos y picantes  Coma comidas pequeñas catherine el día en vez de porciones grandes  El jengibre puede ayudar a [employer]  Consulte con wilson médico acerca de otras formas para disminuir las náuseas y el vómito  · Consuma alimentos saludables y variados  Alimentos saludables incluyen frutas, verduras, panes de kalia integral, alimentos lácteos bajos en grasa, frijoles, dari magras y pescado  [place] líquidos cata se le haya indicado  Pregunte cuánto líquido debe allison cada día y cuáles líquidos son los más adecuados para usted  Limite el consumo de cafeína a menos de [place] [address]  Limite el consumo de pescado a 2 porciones cada semana  Escoja pescado con concentraciones bajas de alejandro cata atún al natural enlatado, camarón, salmón, bacalao o tilapia  No coma pescado con concentraciones altas de alejandro cata pez shad, caballa gigante, pargo rayado y tiburón  · [id number] [address]  Wilson necesidad de ciertas vitaminas y [address], cata el ácido fólico, aumenta catherine el [facility]  Las vitaminas prenatales proporcionan algunas de las vitaminas y minerales adicionales que usted necesita  Las vitaminas prenatales también podrían ayudar a disminuir el riesgo de ciertos defectos de nacimiento  · No fume  Fumar aumenta el riesgo de aborto espontáneo y otros problemas de marry catherine wilson [place]   Fumar puede causar que wilson bebé nazca antes de tiempo o que pese menos al nacer  Solicite información a wilson médico si usted necesita ayuda para dejar de fumar  · No consuma alcohol  El alcohol pasa de wilson cuerpo al bebé a través de la placenta  Puede afectar el desarrollo del cerebro de wilson bebé y provocar el síndrome de alcoholismo fetal (SAF)  El SAF es un anusha de condiciones que causan problemas [place], de comportamiento y de crecimiento  · Consulte con wilson médico antes de allison cualquier medicamento  Muchos medicamentos pueden perjudicar a wilson bebé si usted los lionel [address]  No tome ningún medicamento, vitaminas, hierbas o suplementos sin liz consultar con wilson Lavera Poor  use drogas ilegales o de la rodriguez (cata marihuana o cocaína) mientras está embarazada  Consejos de seguridad catherine el embarazo:  · Evite jacuzzis y saunas  No use un jacuzzi o un sauna mientras usted está embarazada, especialmente catherine el primer trimestre  Los Lopez [facility] y los saunas aumentan la temperatura de wilson bebé y el riesgo de defectos de nacimiento  · Evite la toxoplasmosis  [place] es ike infección causada por comer carne cruda o estar cerca del excremento de un bozena infectado  [place] puede causar malformaciones congénitas, aborto espontáneo y Jagdeep Schein  Lávese las [place] después de tocar carne cruda  Asegúrese de que la carne esté hansa cocida antes de comerla  Evite los huevos crudos y la [place]ine [place]  Use guantes o pida que alguien la ayude a limpiar la caja de arena del bozena mientras usted [place]  Cambios que están ocurriendo con wilson bebé: Para las 18 semanas, wilson bebé podría medir alrededor de 6 pulgadas de ruma desde la lily hasta la rabadilla (el cóccix)  Es posible que wilson bebé pese alrededor de 11 onzas  Usted podría sentir los movimientos de wilson bebé aproximadamente a las 18 semanas o después  Podría ser que los primeros movimientos no lina tan notorios  Los movimientos podrían sentirse cata ike sensación de revoloteo   Wilson bebé también hace movimientos de succión y puede escuchar ciertos sonidos  Lo que necesita saber acerca del cuidado prenatal: Catherine las primeras 29 semanas de wilson embarazo, usted tendrá citas mensuales con wilson médico  Wilson médico le revisará wilson presión arterial y peso  Es posible que también necesite alguno de los siguientes tratamientos:  · Un examen de orina también podría realizarse para revisarle el azúcar y la proteína  Estas son señales de diabetes gestacional o de infección  · Los análisis de yaya se pueden realizar para revisar si tiene signos de anemia (nivel bajo del ashwini)  · La revisión de la altura del fondo uterino es ike medición del útero para controlar el desarrollo de wilson bebé  [employer] por lo general es igual al número de [facility] que usted tiene de embarazo  · Ike ecografía podría realizarse para revisar el desarrollo de wilson bebé  Es posible que wilson médico pueda decirle cuál es el sexo de wilson bebé catherine la ecografía  · El ritmo cardíaco de wilson bebé será revisado  © Copyright [address] Information is for End User's use only and may not be sold, redistributed or otherwise used for commercial purposes  All illustrations and images included in CareNotes® are the copyrighted property of A D A [employer]  or [address] [place] [address] es sólo para uso en educación  Wilson intención no es darle un consejo médico sobre enfermedades o tratamientos  Colsulte con wilson Charna Heckler farmacéutico antes de seguir cualquier régimen médico para saber si es seguro y efectivo para usted

## 2021-04-23 NOTE — PROGRESS NOTES
Rojas Ferguson presents today for routine OB visit at 17w3d  Blood Pressure: 98/57  Wt=57 2 kg (126 lb); Body mass index is 26 33 kg/m² ; TWG=Not found  Fetal Heart Rate: 142; Fundal Height (cm): 16 cm  Abdomen: gravid, soft, non-tender  She denies any urinary symptoms  Denies uterine contractions or persistent cramping  Denies vaginal bleeding or leaking of fluid  Reports fetal movement  Previous C/S x2- repeat c/S with tubal  Hx of preeclampsia postpartum- LDASA 162 mgs  Prenatal labs done 3/26/21 showed Ecoli UTI and was treated, f/u culture needed- obtained today, hgb 10 6, urine protein creatinine ratio was 0 18  QS-WNL  Last US 3/26/2021  Scheduled for ultrasound at 20wks  Level 2 scheduled for-2021  Contraception- desires tubal- consent at 28 wks  Plans on epidural  Plans on breast-feeding  Reviewed common discomforts of pregnancy in second trimester and warning signs  Advised to continue medications and return in 4 weeks  COVID 19 precautions were discussed with patient, reviewed symptoms, masking, hygiene, social distancing, avoid high risk gatherings, patient to call office with questions or concerns  Reviewed Covid vaccine in pregnancy, Pt plans on getting vaccine    Jayne Atkins was seen today for routine prenatal visit  Diagnoses and all orders for this visit:    Hx of preeclampsia, prior pregnancy, currently pregnant    Second trimester pregnancy    History of  delivery    17 weeks gestation of pregnancy  -     POCT urine dip      Current Outpatient Medications   Medication Instructions    aspirin 162 mg, Oral, Daily    Prenatal Vit-Fe Fumarate-FA (PRENATAL 19 PO) Oral         Pregnancy Problems (from 21 to present)     No problems associated with this episode

## 2021-04-25 DIAGNOSIS — O23.42 URINARY TRACT INFECTION IN MOTHER DURING SECOND TRIMESTER OF PREGNANCY: Primary | ICD-10-CM

## 2021-04-25 LAB — BACTERIA UR CULT: ABNORMAL

## 2021-04-25 RX ORDER — NITROFURANTOIN 25; 75 MG/1; MG/1
100 CAPSULE ORAL 2 TIMES DAILY
Qty: 14 CAPSULE | Refills: 0 | Status: SHIPPED | OUTPATIENT
Start: 2021-04-25 | End: 2021-05-02

## 2021-05-14 ENCOUNTER — ROUTINE PRENATAL (OUTPATIENT)
Dept: PERINATAL CARE | Facility: CLINIC | Age: 31
End: 2021-05-14
Payer: COMMERCIAL

## 2021-05-14 VITALS
SYSTOLIC BLOOD PRESSURE: 97 MMHG | BODY MASS INDEX: 27.04 KG/M2 | HEART RATE: 84 BPM | DIASTOLIC BLOOD PRESSURE: 52 MMHG | HEIGHT: 58 IN | WEIGHT: 128.8 LBS

## 2021-05-14 DIAGNOSIS — Z36.3 ENCOUNTER FOR ANTENATAL SCREENING FOR MALFORMATIONS: Primary | ICD-10-CM

## 2021-05-14 DIAGNOSIS — Z36.86 ENCOUNTER FOR ANTENATAL SCREENING FOR CERVICAL LENGTH: ICD-10-CM

## 2021-05-14 DIAGNOSIS — O35.8XX0 PYELECTASIS OF FETUS ON PRENATAL ULTRASOUND: ICD-10-CM

## 2021-05-14 DIAGNOSIS — O34.219 HISTORY OF CESAREAN DELIVERY, ANTEPARTUM: ICD-10-CM

## 2021-05-14 DIAGNOSIS — O09.299 HX OF PREECLAMPSIA, PRIOR PREGNANCY, CURRENTLY PREGNANT: ICD-10-CM

## 2021-05-14 PROBLEM — O35.EXX0 PYELECTASIS OF FETUS ON PRENATAL ULTRASOUND: Status: ACTIVE | Noted: 2021-05-14

## 2021-05-14 PROCEDURE — 76817 TRANSVAGINAL US OBSTETRIC: CPT | Performed by: OBSTETRICS & GYNECOLOGY

## 2021-05-14 PROCEDURE — 76811 OB US DETAILED SNGL FETUS: CPT | Performed by: OBSTETRICS & GYNECOLOGY

## 2021-05-14 PROCEDURE — 99213 OFFICE O/P EST LOW 20 MIN: CPT | Performed by: OBSTETRICS & GYNECOLOGY

## 2021-05-14 PROCEDURE — 1036F TOBACCO NON-USER: CPT | Performed by: OBSTETRICS & GYNECOLOGY

## 2021-05-14 NOTE — PROGRESS NOTES
[id number]: Ms Zackary Hebert was seen today at 20w3d for anatomic survey and cervical length screening ultrasound  See ultrasound report under "OB Procedures" tab  Please don't hesitate to contact our office with any concerns or questions    Juan Ratliff MD

## 2021-05-14 NOTE — PATIENT INSTRUCTIONS
Thank you for choosing us for your  care today  If you have any questions about your ultrasound or care, please do not hesitate to contact us or your primary obstetrician  Some general instructions for your pregnancy are:     Protect against coronavirus: Continue to practice social distancing, wear a mask, and wash your hands often  Pregnant women are increased risk of severe COVID  Notify your primary care doctor if you have any symptoms including cough, shortness of breath or difficulty breathing, fever, chills, muscle pain, sore throat, or loss of taste or smell  Pregnant women can receive the coronavirus vaccine   Exercise: Aim for 22 minutes per day (150 minutes per week) of regular exercise  Walking is great!  Nutrition: aim for calcium-rich and iron-rich foods as well as healthy sources of protein   Protect against the flu: get yourself and your entire household vaccinated against influenza  This will protect your baby   Learn about Preeclampsia: preeclampsia is a common, serious high blood pressure complication in pregnancy  A blood pressure of 435LMDG (systolic or top number) or 69JPOJ (diastolic or bottom number) is not normal and needs evaluation by your doctor   If you smoke, try to reduce how many cigarettes you smoke or try to quit completely  Do not vape   Other warning signs to watch out for in pregnancy or postpartum: chest pain, obstructed breathing or shortness of breath, seizures, thoughts of hurting yourself or your baby, bleeding, a painful or swollen leg, fever, or headache (see AWHONN POST-BIRTH Warning Signs campaign)  If these happen call 911  Itching is also not normal in pregnancy and if you experience this, especially over your hands and feet, potentially worse at night, notify your doctors     Lastly, if you are contacted regarding participation in a survey about your experience in our office, please know that we take any feedback you provide seriously and use it to improve how we deliver care through our center

## 2021-05-14 NOTE — PROGRESS NOTES
Ultrasound Probe Disinfection    A transvaginal ultrasound was performed  Prior to use, disinfection was performed with High Level Disinfection Process (Trophon)  Probe serial number [id number]: [id number] was used        Rufina Snider  05/14/21  8:19 AM

## 2021-05-25 ENCOUNTER — ROUTINE PRENATAL (OUTPATIENT)
Dept: OBGYN CLINIC | Facility: CLINIC | Age: 31
End: 2021-05-25

## 2021-05-25 VITALS
HEART RATE: 88 BPM | BODY MASS INDEX: 27.29 KG/M2 | DIASTOLIC BLOOD PRESSURE: 57 MMHG | SYSTOLIC BLOOD PRESSURE: 88 MMHG | HEIGHT: 58 IN | WEIGHT: 130 LBS

## 2021-05-25 DIAGNOSIS — Z3A.22 22 WEEKS GESTATION OF PREGNANCY: Primary | ICD-10-CM

## 2021-05-25 PROBLEM — Z3A.21 21 WEEKS GESTATION OF PREGNANCY: Status: ACTIVE | Noted: 2021-03-26

## 2021-05-25 PROCEDURE — 99213 OFFICE O/P EST LOW 20 MIN: CPT | Performed by: OBSTETRICS & GYNECOLOGY

## 2021-05-25 NOTE — LETTER
May 25, 2021     Patient: Rainer Pete   YOB: 1990   Date of Visit: 5/25/2021       To Whom it May Concern:    Rainer Pete is under my professional care  She was seen in my office on 5/25/2021  She may return to work with limitations no lifting more than 20 lbs       If you have any questions or concerns, please don't hesitate to call           Sincerely,          Resident Kika Posada        CC: Rainer Juan R

## 2021-05-25 NOTE — PROGRESS NOTES
TARIQ Routine prenatal    Patient is a 34 yo  @ 22w0d here for routine prenatal  She has no concerns at this time  Denies headaches, change in vision, SOB, contractions, VB, LOF  FM present    FH 21 cm      Plan:    -C/Sx2  Plan for repeat   - US at 20 weeks normal  -Counseled to call the office if any obstetric concerns  -BP soft   Encouraged hydration  -Follow up in 4 weeks

## 2021-06-08 ENCOUNTER — OFFICE VISIT (OUTPATIENT)
Dept: INTERNAL MEDICINE CLINIC | Facility: CLINIC | Age: 31
End: 2021-06-08

## 2021-06-08 VITALS
TEMPERATURE: 97.5 F | BODY MASS INDEX: 28.13 KG/M2 | OXYGEN SATURATION: 97 % | HEART RATE: 80 BPM | DIASTOLIC BLOOD PRESSURE: 60 MMHG | SYSTOLIC BLOOD PRESSURE: 100 MMHG | HEIGHT: 58 IN | WEIGHT: 134 LBS

## 2021-06-08 DIAGNOSIS — B35.3 TINEA PEDIS OF BOTH FEET: ICD-10-CM

## 2021-06-08 DIAGNOSIS — N39.0 URINARY TRACT INFECTION WITHOUT HEMATURIA, SITE UNSPECIFIED: Primary | ICD-10-CM

## 2021-06-08 PROCEDURE — 99214 OFFICE O/P EST MOD 30 MIN: CPT | Performed by: INTERNAL MEDICINE

## 2021-06-08 PROCEDURE — 3008F BODY MASS INDEX DOCD: CPT | Performed by: OBSTETRICS & GYNECOLOGY

## 2021-06-08 RX ORDER — CLOTRIMAZOLE 1 %
CREAM (GRAM) TOPICAL 2 TIMES DAILY
Qty: 30 G | Refills: 1 | Status: SHIPPED | OUTPATIENT
Start: 2021-06-08 | End: 2022-01-05

## 2021-06-08 NOTE — PROGRESS NOTES
INTERNAL MEDICINE OFFICE VISIT  [facility]  [address] [facility]    NAME: Isma Nolasco  AGE: 33 y o  SEX: female    DATE OF ENCOUNTER: 6/8/2021    Assessment and Plan     1  Urinary tract infection without hematuria, site unspecified  - UA w Reflex to Microscopic w Reflex to Culture -Lab Collect    2  Tinea pedis of both feet  Clotrimazole Cream 1% apply twice a day b/l feet       No orders of the defined types were placed in this encounter  Chief Complaint     Chief Complaint   Patient presents with    Bilateral itchiness on her feet     Since long time  Patient was seen before for this and was prescribed some medication  Patient didn't came back for follow up becuase was with out insurance  History of Present Illness     HPI  Patient 34 yo female 21 w 6 d , [id number] ,With past medical history significant for preeclampsia, UTI, onychomycosis, anemia who presents for same day visit w complain of itching feet , L more than R; had similar symptoms about a year ago  Patient notes some intermittent b/l  Discomfort and trace/mild swelling in the feet more when she stand for long periods; However she notes more and worsening itching and the sole of her left foot  On exam noted peeling skin lesions between toys more in the left foot as well as the bottoms the left foot consistent with tinea pedis and the above plan of clotrimazole cream discussed with the patient and she is agreeable and to follow-up and arrange visit if not improving  patient also notes having intermittent burning sensation when urinating,  With recent UTI E coli treated with 2 different antibiotics for shortness review likely with the Keflex and nitrofurantoin;  Agreeable to have urinalysis with microscopy and reflex to culture to confirm clearing the UTI given the patient's symptoms and currently pregnant       The following portions of the patient's history were reviewed and updated as appropriate: allergies, current medications, past family history, past medical history, past social history, past surgical history and problem list     Review of Systems     Review of Systems  - GENERAL: Negative for any nausea, vomiting, fevers, chills, or weight loss  - HEENT: Negative for any head/Neck trauma, pain, double/blurry vision, sinusitis, rhinitis, nose bleeding   - CARDIAC:  Positive for intermittent trace bilateral feet swelling/discomfort; Negative for any chest pain, palpitation, Dyspnea on exertion,   - PULMONARY: Negative for any SOB, cough, wheezing    - GASTROINTESTINAL: Negative for any abdominal pain, N/V/D/C, blood in stool    - GENITOURINARY:  Positive for dysuria; Negative for any hematuria, incontinence   - NEUROLOGIC: Negative for any muscle weakness, numbness/tingling, memory changes  - MUSCULOSKELETAL: Negative for any joint pains/swelling, limited ROM  - INTEGUMENTARY:  Positive for itching left foot more than the right; Negative for any rashes, cuts/ lesions   - HEMATOLOGIC: Negative for any abnormal bruising, frequent infections or bleeding    Active Problem List     Patient Active Problem List   Diagnosis    Onychomycosis    History of cold sores    22 weeks gestation of pregnancy    Second trimester pregnancy    History of  delivery    Hx of preeclampsia, prior pregnancy, currently pregnant    Pyelectasis of fetus on prenatal ultrasound       Objective     /60 (BP Location: Right arm, Patient Position: Sitting, Cuff Size: Standard)   Pulse 80   Temp 97 5 °F (36 4 °C) (Temporal)   Ht 4' 10" (1 473 m)   Wt 60 8 kg (134 lb)   LMP 2020 (Exact Date)   SpO2 97%   BMI 28 01 kg/m²     Physical Exam  - GEN: Appears well, alert and oriented x 3, pleasant and cooperative, in no acute distress  - HEENT: Anicteric, mucous membranes moist, PERRL and EOMI   - NECK: No lymphadenopathy, JVD or carotid bruits   - HEART: RRR, normal S1 and S2, no murmurs, clicks, gallops or rubs   - LUNGS: Clear to auscultation bilaterally; no wheezes, rales, or rhonchi  - ABDOMEN: Normal bowel sounds, soft, no tenderness, no distention, no organomegaly or masses felt on exam    - EXTREMITIES: Peripheral pulses normal; no clubbing, cyanosis, or edema  - NEURO: No focal findings, CN II-XII are grossly intact  - Musculoskeletal: 5/5 strength, normal ROM, no swollen or erythematous joints  - SKIN:  Peeling skin on the sole of the left foot and between left toys ; Some similar lesions on the right foot but  To less extent than the right foot  No cuts erythema or swelling       Pertinent Laboratory/Diagnostic Studies:  CBC:   Lab Results   Component Value Date/Time    WBC 9 00 03/26/2021 10:17 AM    WBC 17 23 (H) 08/29/2015 04:25 AM    RBC 3 38 (L) 03/26/2021 10:17 AM    RBC 3 10 (L) 08/29/2015 04:25 AM    HGB 10 6 (L) 03/26/2021 10:17 AM    HGB 9 7 (L) 08/29/2015 04:25 AM    HCT 29 9 (L) 03/26/2021 10:17 AM    HCT 27 1 (L) 08/29/2015 04:25 AM    MCV 89 03/26/2021 10:17 AM    MCV 87 08/29/2015 04:25 AM    MCH 31 4 03/26/2021 10:17 AM    MCH 31 3 08/29/2015 04:25 AM    MCHC 35 5 03/26/2021 10:17 AM    MCHC 35 8 08/29/2015 04:25 AM    RDW 12 4 03/26/2021 10:17 AM    RDW 13 1 08/29/2015 04:25 AM    MPV 9 9 03/26/2021 10:17 AM    MPV 9 2 08/29/2015 04:25 AM     03/26/2021 10:17 AM     08/29/2015 04:25 AM    NRBC 0 03/26/2021 10:17 AM    NEUTOPHILPCT 74 03/26/2021 10:17 AM    NEUTOPHILPCT 89 (H) 08/29/2015 04:25 AM    LYMPHOPCT 20 03/26/2021 10:17 AM    LYMPHOPCT 6 (L) 08/29/2015 04:25 AM    MONOPCT 4 03/26/2021 10:17 AM    MONOPCT 5 08/29/2015 04:25 AM    EOSPCT 2 03/26/2021 10:17 AM    BASOPCT 0 03/26/2021 10:17 AM    NEUTROABS 6 55 03/26/2021 10:17 AM    NEUTROABS 15 33 (H) 08/29/2015 04:25 AM    LYMPHSABS 1 81 03/26/2021 10:17 AM    LYMPHSABS 1 03 08/29/2015 04:25 AM    MONOSABS 0 38 03/26/2021 10:17 AM    MONOSABS 0 86 08/29/2015 04:25 AM    EOSABS 0 19 03/26/2021 10:17 AM    EOSABS 0 03 08/29/2015 04:25 AM     Chemistry Profile:   Lab Results   Component Value Date/Time    K 3 6 03/26/2021 10:17 AM     (H) 03/26/2021 10:17 AM    CO2 21 03/26/2021 10:17 AM    BUN 7 03/26/2021 10:17 AM    CREATININE 0 38 (L) 03/26/2021 10:17 AM    GLUC 91 02/28/2019 06:32 AM    GLUF 71 03/26/2021 10:17 AM    CALCIUM 8 8 03/26/2021 10:17 AM    CORRECTEDCA 9 4 03/26/2021 10:17 AM    MG 7 0 (HH) 02/27/2019 09:17 AM    PHOS 5 1 (H) 02/27/2019 09:17 AM    AST 9 03/26/2021 10:17 AM    ALT 12 03/26/2021 10:17 AM    ALKPHOS 63 03/26/2021 10:17 AM    EGFR 140 03/26/2021 10:17 AM         Current Medications     Current Outpatient Medications:     aspirin 81 mg chewable tablet, Chew 2 tablets (162 mg total) daily, Disp: 60 tablet, Rfl: 6    Prenatal Vit-Fe Fumarate-FA (PRENATAL 19 PO), Take by mouth, Disp: , Rfl:     Health Maintenance     Health Maintenance   Topic Date Due    Depression Screening PHQ  Never done    COVID-19 Vaccine (1) Never done    Annual Physical  06/12/2021    Influenza Vaccine (Season Ended) 09/01/2021    BMI: Adult  06/08/2022    Cervical Cancer Screening  06/12/2025    DTaP,Tdap,and Td Vaccines (4 - Td) 11/30/2028    HIV Screening  Completed    Hepatitis C Screening  Completed    Pneumococcal Vaccine: Pediatrics (0 to 5 Years) and At-Risk Patients (6 to 59 Years)  Aged Out    HIB Vaccine  Aged Out    Hepatitis B Vaccine  Aged Out    IPV Vaccine  Aged Out    Hepatitis A Vaccine  Aged Out    Meningococcal ACWY Vaccine  Aged Out    HPV Vaccine  Aged [employer] History   Administered Date(s) Administered    INFLUENZA 11/02/2018    Influenza Quadrivalent Preservative Free 3 years and older IM 10/20/2015    Influenza, injectable, quadrivalent, preservative free 0 5 mL 11/02/2018, 01/10/2020    Tdap 06/08/2015, 08/01/2015, 11/30/2018       Melissa Lemus, DO  Internal Medicine  PGY-1  6/8/2021 5:13 PM

## 2021-06-22 ENCOUNTER — ROUTINE PRENATAL (OUTPATIENT)
Dept: OBGYN CLINIC | Facility: CLINIC | Age: 31
End: 2021-06-22

## 2021-06-22 ENCOUNTER — APPOINTMENT (OUTPATIENT)
Dept: LAB | Facility: HOSPITAL | Age: 31
End: 2021-06-22
Payer: COMMERCIAL

## 2021-06-22 VITALS
BODY MASS INDEX: 28.55 KG/M2 | SYSTOLIC BLOOD PRESSURE: 102 MMHG | DIASTOLIC BLOOD PRESSURE: 64 MMHG | HEART RATE: 80 BPM | HEIGHT: 58 IN | WEIGHT: 136 LBS

## 2021-06-22 DIAGNOSIS — O09.299 HX OF PREECLAMPSIA, PRIOR PREGNANCY, CURRENTLY PREGNANT: Primary | ICD-10-CM

## 2021-06-22 DIAGNOSIS — Z98.891 HISTORY OF CESAREAN DELIVERY: ICD-10-CM

## 2021-06-22 DIAGNOSIS — Z34.92 SECOND TRIMESTER PREGNANCY: ICD-10-CM

## 2021-06-22 DIAGNOSIS — O35.8XX0 PYELECTASIS OF FETUS ON PRENATAL ULTRASOUND: ICD-10-CM

## 2021-06-22 PROBLEM — O23.40 URINARY TRACT INFECTION AFFECTING PREGNANCY: Status: ACTIVE | Noted: 2021-06-22

## 2021-06-22 PROBLEM — Z3A.26 26 WEEKS GESTATION OF PREGNANCY: Status: ACTIVE | Noted: 2021-03-26

## 2021-06-22 LAB
BACTERIA UR QL AUTO: ABNORMAL /HPF
BILIRUB UR QL STRIP: NEGATIVE
CLARITY UR: ABNORMAL
COLOR UR: YELLOW
GLUCOSE UR STRIP-MCNC: NEGATIVE MG/DL
HGB UR QL STRIP.AUTO: NEGATIVE
HYALINE CASTS #/AREA URNS LPF: ABNORMAL /LPF
KETONES UR STRIP-MCNC: NEGATIVE MG/DL
LEUKOCYTE ESTERASE UR QL STRIP: NEGATIVE
NITRITE UR QL STRIP: POSITIVE
NON-SQ EPI CELLS URNS QL MICRO: ABNORMAL /HPF
PH UR STRIP.AUTO: 7.5 [PH]
PROT UR STRIP-MCNC: NEGATIVE MG/DL
RBC #/AREA URNS AUTO: ABNORMAL /HPF
SP GR UR STRIP.AUTO: 1.02 (ref 1–1.03)
UROBILINOGEN UR QL STRIP.AUTO: 0.2 E.U./DL
WBC #/AREA URNS AUTO: ABNORMAL /HPF

## 2021-06-22 PROCEDURE — 99213 OFFICE O/P EST LOW 20 MIN: CPT | Performed by: NURSE PRACTITIONER

## 2021-06-22 PROCEDURE — 87086 URINE CULTURE/COLONY COUNT: CPT | Performed by: STUDENT IN AN ORGANIZED HEALTH CARE EDUCATION/TRAINING PROGRAM

## 2021-06-22 PROCEDURE — 87186 SC STD MICRODIL/AGAR DIL: CPT | Performed by: STUDENT IN AN ORGANIZED HEALTH CARE EDUCATION/TRAINING PROGRAM

## 2021-06-22 PROCEDURE — T1015 CLINIC SERVICE: HCPCS | Performed by: NURSE PRACTITIONER

## 2021-06-22 PROCEDURE — 81001 URINALYSIS AUTO W/SCOPE: CPT | Performed by: STUDENT IN AN ORGANIZED HEALTH CARE EDUCATION/TRAINING PROGRAM

## 2021-06-22 PROCEDURE — 87077 CULTURE AEROBIC IDENTIFY: CPT | Performed by: STUDENT IN AN ORGANIZED HEALTH CARE EDUCATION/TRAINING PROGRAM

## 2021-06-22 NOTE — PATIENT INSTRUCTIONS
El embarazo de la semana 23 a la 26   CUIDADO AMBULATORIO:   Qué cambios están ocurriendo en wilson cuerpo: Ahora usted está cerca o al principio del tercer trimestre  El tercer trimestre comienza a las 24 semanas y concluye al momento del [place]  Conforme wilson bebé crece más, usted podría desarrollar ciertos síntomas  Estos podrían incluir dolor en wilson espalda o en la parte inferior de los costados de wilson abdomen  Es posible que también se le formen estrías en wilson abdomen, senos, muslos o glúteos  Usted también podría presentar estreñimiento  Busque atención médica de inmediato si:  · Usted presenta un micah dolor de claire que no desaparece  · Usted tiene cambios en la visión nuevos o en aumento, cata visión borrosa o con manchas  · Usted tiene inflamación nueva o creciente en wilson ammon o [place]  · Usted tiene manchado o sangrado vaginal     · Usted rompe candy o siente un chorro o gotas de agua tibia que le está bajando por wilson vagina  Comuníquese con wilson médico si:  · Usted tiene calambres, presión o tensión abdominal     · Usted tiene un cambio en la secreción vaginal     · Usted tiene un sangrado leve  · Usted tiene escalofríos o fiebre  · Usted tiene comezón, ardor o dolor vaginal     · Usted tiene ike secreción vaginal amarillenta, verdosa, farzad o de [employer]  · Usted tiene dolor o ardor al Rubbie Sauger, orina menos de lo habitual o tiene [place] rosada o sanguinolenta  · Usted tiene preguntas o inquietudes acerca de wilson condición o cuidado  Cómo cuidarse en esta etapa de wilson embarazo:  · Consuma alimentos saludables y variados  Alimentos saludables incluyen frutas, verduras, panes de kalia integral, alimentos lácteos bajos en grasa, frijoles, dari magras y pescado  [place] líquidos cata se le haya indicado  Pregunte cuánto líquido debe allison cada día y cuáles líquidos son los más adecuados para usted  Limite el consumo de cafeína a menos de [place]   Limite el consumo de pescado a 2 porciones cada semana  Escoja pescado con concentraciones bajas de alejandro cata atún al natural enlatado, camarón, salmón, bacalao o tilapia  No coma pescado con concentraciones altas de alejandro cata pez shad, caballa gigante, pargo rayado y tiburón  · Controle wilson dolor de espalda  No esté de pie por largos periodos de tiempo ni levante objetos pesados  Use ike buena postura mientras esté de pie, se agache o se doble  Use zapatos de tacón bajo con un buen soporte  Descansar puede también ayudarla a aliviar el dolor de espalda  Pregunte a wilson médico acerca de ejercicios que usted pueda hacer para fortalecer los músculos de wilson espalda  · [id number] [place]  Wilson necesidad de ciertas vitaminas y [facility], cata el ácido fólico, aumenta catherine el [facility]  Las vitaminas prenatales proporcionan algunas de las vitaminas y minerales adicionales que usted necesita  Las vitaminas prenatales también podrían ayudar a disminuir el riesgo de ciertos defectos de nacimiento  · Consulte con wilson médico acerca de hacer ejercicio  El ejercicio moderado puede ayudarla a mantenerse en forma  Wilson médico la ayudará a planear un programa de ejercicios que sea seguro para usted catherine wilson [facility]  · No fume  Fumar aumenta el riesgo de aborto espontáneo y otros problemas de marry catherine wilson [facility]  Fumar puede causar que wilson bebé nazca antes de tiempo o que pese menos al nacer  Solicite información a wilson médico si usted necesita ayuda para dejar de fumar  · No consuma alcohol  El alcohol pasa de wilson cuerpo al bebé a través de la placenta  Puede afectar el desarrollo del cerebro de wilson bebé y provocar el síndrome de alcoholismo fetal (SAF)  El SAF es un anusha de condiciones que causan problemas [place], de comportamiento y de crecimiento  · Consulte con wilson médico antes de allison cualquier medicamento   Muchos medicamentos pueden perjudicar a wilson bebé si usted los lionel [address] [facility] está embarazada  No tome ningún medicamento, vitaminas, hierbas o suplementos sin liz consultar con elam Berry Diesel  use drogas ilegales o de la rodriguez (cata marihuana o cocaína) mientras está embarazada  Consejos de seguridad catherine el embarazo:  · Evite jacuzzis y saunas  No use un jacuzzi o un sauna mientras usted está embarazada, especialmente catherine el primer trimestre  Los [facility] y los saunas aumentan la temperatura de elam bebé y el riesgo de defectos de nacimiento  · Evite la toxoplasmosis  [place] es ike infección causada por comer carne cruda o estar cerca del excremento de un bozena infectado  [place] puede causar malformaciones congénitas, aborto espontáneo y Jagdeep Schein  Lávese las [place] después de tocar carne cruda  Asegúrese de que la carne esté hansa cocida antes de comerla  Evite los huevos crudos y la Delayne Pupa  Use guantes o pida que alguien la ayude a limpiar la caja de arena del bozena mientras usted Nancy Anahi  Cambios que están ocurriendo con elam bebé: Para las 26 semanas, elam bebé pesará alrededor de 2 libras  Elam bebé medirá alrededor de 10 pulgadas de ruma desde el porter de la claire hasta la rabadilla (parte inferior del bebé)  Los movimientos de elam bebé son mucho más martha en esta etapa  Los ojos de elam bebé paula están completamente formados y pueden abrirse parcialmente  Elam bebé también duerme y se despierta  Lo que necesita saber acerca del cuidado prenatal: Elam médico le revisará elam presión arterial y [place]  Es posible que también necesite lo siguiente:  · Un examen de orina también podría realizarse para revisarle el azúcar y la proteína  Estas son señales de diabetes gestacional o de infección  La proteína en elam orina también podría ser ike señal de preeclampsia  La preeclampsia es ike condición que puede desarrollarse catherine la semana 21 o después en elam embarazo   Esta provoca presión arterial luis y [employer] con kelvin riñones y [place] órganos  · La altura uterina es ike medición del útero para controlar el desarrollo de wilson bebé  [employer] por lo general es igual al número de [facility] que usted tiene de embarazo  · El ritmo cardíaco de wilson bebé será revisado  © Copyright [address] Information is for End User's use only and may not be sold, redistributed or otherwise used for commercial purposes  All illustrations and images included in CareNotes® are the copyrighted property of A D A M , Inc  or 0 [facility] es sólo para uso en educación  Wilson intención no es darle un consejo médico sobre enfermedades o tratamientos  Colsulte con wilson Ryan Revering farmacéutico antes de seguir cualquier régimen médico para saber si es seguro y efectivo para usted  Movimiento fetal   LO QUE NECESITA SABER:   Los movimientos fetales son las patadas, vueltas e hipo del bebé en el [place]  Es posible que usted empiece a sentir estos movimientos aproximadamente a las [address]  A medida que wilson bebé crezca, los movimientos se sienten más martha y son Kamron Bach frecuentes  Los movimientos del feto comprueban que wilson bebé en el útero está recibiendo el oxígeno y los nutrientes necesarios antes de nacer  La reducción de movimientos fetales podrían señalar un problema de la marry de wilson bebé  INSTRUCCIONES SOBRE EL [facility]ITAL[facility]:   Programe un control con wilson médico u obstetra según lo indicado: Anote kelvin preguntas para que se acuerde de hacerlas catherine kelvin visitas  Movimientos normales del feto: La actividad del paras en el útero puede describirse en 4 estados, del menos activo al [place]  Catherine el estado de sueño pasivo, es probable que wilson renee por nacer permanezca quieto un justo de 2 horas  Catherine el estado de [facility], wilson bebé patalea, da vueltas y se mueve con frecuencia  Catherine el estado vigilia tranquila, wilson renee podría solamente  kelvin ojos   El estado despierto activo incluye patadas martha y vueltas  Lo que afecta el movimiento fetal: Es posible que sienta a wilson bebé moverse más después de que usted haya comido o bebido cafeína  Es probable que usted sienta menos movimientos de wilson bebé en el útero cuando está más [place], cata cuando hace ejercicio  También podría sentir menos movimientos del feto si usted es Leanora Funmilayo  Ciertos medicamentos pueden afectar los movimientos de wilson bebé  Infórmele a wilson médico los [employer] lionel  Monitoreo de los movimientos del bebé en la casa: La mayoría de los movimientos de wilson bebé en el útero se notan cuando usted se acuesta silenciosamente de lado  Es probable que wilson médico le pida que cuente los movimientos del feto catherine 2 horas  Podría pedirle que registre el tiempo que wilson bebé dura para realizar 10 movimientos  Mantenga un registro de los movimientos de wilson bebé  Comuníquese con wilson médico u obstetra si:  · Tarda más de lo usual para sentir 10 movimientos de wilson bebé en el útero  · Usted no siente a wilson bebé moverse en el útero por lo menos 10 veces cada 2 horas  · La piel de kelvin [place], pies y alrededor de kelvin ojos se encuentra más inflamada de lo normal     · Usted tiene dolor de claire catherine por lo menos 24 horas  · Le aparecen puntos rojos pequeñitos en la piel  · Wilson estómago se siente sensible al aplicarle presión  · Usted tiene preguntas o inquietudes acerca de wilson condición o cuidado  Regrese a la hermilo de emergencias si:  · Usted no siente a wilson bebé en el útero moverse por 12 horas  · Usted siente calambres o dolor aris en el abdomen  · Usted tiene sangrado vaginal abundante  · Usted tiene dolor de [place] severo y no puede nichole con claridad  · Usted tiene dificultad para respirar o está vomitando  · Usted sufre ike convulsión  © Copyright [address] [place] [address] Drive Information is for End User's use only and may not be sold, redistributed or otherwise used for commercial purposes   All illustrations and images included in CareNotes® are the copyrighted property of A D A M , Inc  or [address] información es sólo para uso en educación  Wilson intención no es darle un consejo médico sobre enfermedades o tratamientos  Colsulte con wilson Lesleigh Mark farmacéutico antes de seguir cualquier régimen médico para saber si es seguro y efectivo para usted

## 2021-06-22 NOTE — PROGRESS NOTES
Ge Andrea presents today for routine OB visit at 26 wk 0 d  Blood Pressure: 102/64  Wt=61 7 kg (136 lb); Body mass index is 28 42 kg/m² ; TWG=9 526 kg (21 lb)  Fetal Heart Rate: 148; Fundal Height (cm): 26 cm  Abdomen: gravid, soft, non-tender  Denies uterine contractions or persistent cramping  Denies vaginal bleeding or leaking of fluid  Reports fetal movement  Hx of preeclampsia- LDASA 162 mgs daily, denies any headaches, visual changes or epigastric pain  For urine protein creatinine ratio done 03/26/2021 was 0 18  Hx of C/S x2, last 2/21/2019- desires tubal with repeat C/S-does not want any more children-she signed consent at next appointment  Plans on breastfeeding  Last US 5/14/21  Mild Left fetal urinary tract dilation- scheduled for ultrasound 8 wks from last - scheduled for 7/9/21   28 wk labs request given to pt  To also complete urine test-history of UTI in pregnancy  Reviewed common discomforts of pregnancy in second trimester and warning signs  Advised to continue medications and return in 2 weeks  COVID 19 precautions were discussed with patient, reviewed symptoms, masking, hygiene, social distancing, avoid high risk gatherings, patient to call office with questions or concerns  Unsure about  vaccine at this time    Current Outpatient Medications   Medication Instructions    aspirin 162 mg, Oral, Daily    clotrimazole (LOTRIMIN) 1 % cream Topical, 2 times daily    Prenatal Vit-Fe Fumarate-FA (PRENATAL 19 PO) Oral         Pregnancy Problems (from 03/03/21 to present)     No problems associated with this episode

## 2021-06-23 ENCOUNTER — APPOINTMENT (OUTPATIENT)
Dept: LAB | Facility: HOSPITAL | Age: 31
End: 2021-06-23
Payer: COMMERCIAL

## 2021-06-23 DIAGNOSIS — Z34.92 SECOND TRIMESTER PREGNANCY: ICD-10-CM

## 2021-06-23 LAB
BASOPHILS # BLD AUTO: 0.02 THOUSANDS/ΜL (ref 0–0.1)
BASOPHILS NFR BLD AUTO: 0 % (ref 0–1)
EOSINOPHIL # BLD AUTO: 0.2 THOUSAND/ΜL (ref 0–0.61)
EOSINOPHIL NFR BLD AUTO: 2 % (ref 0–6)
ERYTHROCYTE [DISTWIDTH] IN BLOOD BY AUTOMATED COUNT: 12.3 % (ref 11.6–15.1)
GLUCOSE 1H P 50 G GLC PO SERPL-MCNC: 126 MG/DL (ref 40–134)
HCT VFR BLD AUTO: 30.5 % (ref 34.8–46.1)
HGB BLD-MCNC: 10.3 G/DL (ref 11.5–15.4)
IMM GRANULOCYTES # BLD AUTO: 0.11 THOUSAND/UL (ref 0–0.2)
IMM GRANULOCYTES NFR BLD AUTO: 1 % (ref 0–2)
LYMPHOCYTES # BLD AUTO: 1.72 THOUSANDS/ΜL (ref 0.6–4.47)
LYMPHOCYTES NFR BLD AUTO: 18 % (ref 14–44)
MCH RBC QN AUTO: 30.9 PG (ref 26.8–34.3)
MCHC RBC AUTO-ENTMCNC: 33.8 G/DL (ref 31.4–37.4)
MCV RBC AUTO: 92 FL (ref 82–98)
MONOCYTES # BLD AUTO: 0.57 THOUSAND/ΜL (ref 0.17–1.22)
MONOCYTES NFR BLD AUTO: 6 % (ref 4–12)
NEUTROPHILS # BLD AUTO: 7.15 THOUSANDS/ΜL (ref 1.85–7.62)
NEUTS SEG NFR BLD AUTO: 73 % (ref 43–75)
NRBC BLD AUTO-RTO: 0 /100 WBCS
PLATELET # BLD AUTO: 272 THOUSANDS/UL (ref 149–390)
PMV BLD AUTO: 10 FL (ref 8.9–12.7)
RBC # BLD AUTO: 3.33 MILLION/UL (ref 3.81–5.12)
RPR SER QL: NORMAL
WBC # BLD AUTO: 9.77 THOUSAND/UL (ref 4.31–10.16)

## 2021-06-23 PROCEDURE — 36415 COLL VENOUS BLD VENIPUNCTURE: CPT | Performed by: NURSE PRACTITIONER

## 2021-06-23 PROCEDURE — 85025 COMPLETE CBC W/AUTO DIFF WBC: CPT | Performed by: NURSE PRACTITIONER

## 2021-06-23 PROCEDURE — 86592 SYPHILIS TEST NON-TREP QUAL: CPT | Performed by: NURSE PRACTITIONER

## 2021-06-23 PROCEDURE — 82950 GLUCOSE TEST: CPT

## 2021-06-24 LAB — BACTERIA UR CULT: ABNORMAL

## 2021-06-25 ENCOUNTER — TELEPHONE (OUTPATIENT)
Dept: OBGYN CLINIC | Facility: CLINIC | Age: 31
End: 2021-06-25

## 2021-06-25 NOTE — RESULT ENCOUNTER NOTE
Please call the patient and update that she has Positive Urine Culture (E  Coli Bacteria) however given she is pregnant it is advised to reach to her Ob/Gyn Vibha العلي MD ) for further management of her UTI as soon as possible  Please update if patient can NOT reach and schedule follow up with her Ob/Gyn because if so we may consider treating it if needed

## 2021-06-28 ENCOUNTER — ROUTINE PRENATAL (OUTPATIENT)
Dept: OBGYN CLINIC | Facility: CLINIC | Age: 31
End: 2021-06-28

## 2021-06-28 ENCOUNTER — TELEPHONE (OUTPATIENT)
Dept: OBGYN CLINIC | Facility: CLINIC | Age: 31
End: 2021-06-28

## 2021-06-28 VITALS
WEIGHT: 137 LBS | SYSTOLIC BLOOD PRESSURE: 102 MMHG | DIASTOLIC BLOOD PRESSURE: 60 MMHG | HEART RATE: 93 BPM | BODY MASS INDEX: 28.63 KG/M2

## 2021-06-28 DIAGNOSIS — O35.8XX0 PYELECTASIS OF FETUS ON PRENATAL ULTRASOUND: ICD-10-CM

## 2021-06-28 DIAGNOSIS — R39.9 URINARY TRACT INFECTION SYMPTOMS: ICD-10-CM

## 2021-06-28 DIAGNOSIS — O23.40 URINARY TRACT INFECTION AFFECTING PREGNANCY: ICD-10-CM

## 2021-06-28 DIAGNOSIS — Z98.891 HISTORY OF CESAREAN DELIVERY: ICD-10-CM

## 2021-06-28 DIAGNOSIS — Z34.92 SECOND TRIMESTER PREGNANCY: Primary | ICD-10-CM

## 2021-06-28 DIAGNOSIS — O09.299 HX OF PREECLAMPSIA, PRIOR PREGNANCY, CURRENTLY PREGNANT: ICD-10-CM

## 2021-06-28 PROCEDURE — 99213 OFFICE O/P EST LOW 20 MIN: CPT | Performed by: OBSTETRICS & GYNECOLOGY

## 2021-06-28 PROCEDURE — T1015 CLINIC SERVICE: HCPCS | Performed by: OBSTETRICS & GYNECOLOGY

## 2021-06-28 RX ORDER — NITROFURANTOIN 25; 75 MG/1; MG/1
100 CAPSULE ORAL 2 TIMES DAILY
Qty: 10 CAPSULE | Refills: 0 | Status: SHIPPED | OUTPATIENT
Start: 2021-06-28 | End: 2021-07-03

## 2021-06-28 NOTE — ASSESSMENT & PLAN NOTE
No obstetric complaints today  PNC US  grams - 0 lbs 14 oz (21)   TWG: +  (recommended weight gain )  Flu vaccine , TDAP vaccine   Contraception:   Breastfeeding:   Birth plan:  about epidural  Discussed  labor precautions and fetal kick counts    Return to clinic in 4 weeks  COVID Vaccine

## 2021-06-28 NOTE — PROGRESS NOTES
OB/GYN prenatal visit    S: 33 y o  [id number] 26w6d here for UTI management, referred by IM  Urine culture positive for E  Coli with susceptibility to Nitrofurantoin         O:  Vitals:    06/28/21 1410   BP: 102/60   Pulse: 93       Gen: no acute distress, nonlabored breathing  FHT: 150bpm           A/P:    Problem List Items Addressed This Visit        Genitourinary    Urinary tract infection affecting pregnancy          Urinary tract infection symptoms        Relevant Medications    nitrofurantoin (MACROBID) 100 mg capsule    Other Relevant Orders    POCT urine dip              Marnie Skinner MD  6/28/2021  2:23 PM

## 2021-07-09 ENCOUNTER — ROUTINE PRENATAL (OUTPATIENT)
Dept: OBGYN CLINIC | Facility: CLINIC | Age: 31
End: 2021-07-09

## 2021-07-09 ENCOUNTER — ULTRASOUND (OUTPATIENT)
Dept: PERINATAL CARE | Facility: CLINIC | Age: 31
End: 2021-07-09
Payer: COMMERCIAL

## 2021-07-09 VITALS
HEIGHT: 58 IN | WEIGHT: 141.6 LBS | HEART RATE: 87 BPM | DIASTOLIC BLOOD PRESSURE: 54 MMHG | BODY MASS INDEX: 29.72 KG/M2 | SYSTOLIC BLOOD PRESSURE: 108 MMHG

## 2021-07-09 VITALS
HEART RATE: 92 BPM | HEIGHT: 58 IN | BODY MASS INDEX: 29.6 KG/M2 | DIASTOLIC BLOOD PRESSURE: 57 MMHG | SYSTOLIC BLOOD PRESSURE: 110 MMHG | WEIGHT: 141 LBS

## 2021-07-09 DIAGNOSIS — O35.8XX0 PYELECTASIS OF FETUS ON PRENATAL ULTRASOUND: Primary | ICD-10-CM

## 2021-07-09 DIAGNOSIS — Z34.93 PRENATAL CARE IN THIRD TRIMESTER: Primary | ICD-10-CM

## 2021-07-09 DIAGNOSIS — Z98.891 HISTORY OF CESAREAN DELIVERY: ICD-10-CM

## 2021-07-09 DIAGNOSIS — O09.299 HX OF PREECLAMPSIA, PRIOR PREGNANCY, CURRENTLY PREGNANT: ICD-10-CM

## 2021-07-09 DIAGNOSIS — Z3A.28 28 WEEKS GESTATION OF PREGNANCY: ICD-10-CM

## 2021-07-09 PROBLEM — O35.EXX0 PYELECTASIS OF FETUS ON PRENATAL ULTRASOUND: Status: RESOLVED | Noted: 2021-05-14 | Resolved: 2021-07-09

## 2021-07-09 PROCEDURE — T1015 CLINIC SERVICE: HCPCS | Performed by: OBSTETRICS & GYNECOLOGY

## 2021-07-09 PROCEDURE — 3008F BODY MASS INDEX DOCD: CPT | Performed by: OBSTETRICS & GYNECOLOGY

## 2021-07-09 PROCEDURE — 1036F TOBACCO NON-USER: CPT | Performed by: OBSTETRICS & GYNECOLOGY

## 2021-07-09 PROCEDURE — 90471 IMMUNIZATION ADMIN: CPT

## 2021-07-09 PROCEDURE — 76816 OB US FOLLOW-UP PER FETUS: CPT | Performed by: OBSTETRICS & GYNECOLOGY

## 2021-07-09 PROCEDURE — 99213 OFFICE O/P EST LOW 20 MIN: CPT | Performed by: OBSTETRICS & GYNECOLOGY

## 2021-07-09 PROCEDURE — 90715 TDAP VACCINE 7 YRS/> IM: CPT

## 2021-07-09 NOTE — PATIENT INSTRUCTIONS
El embarazo de la semana 23 a la 26   LO QUE NECESITA SABER:   Ahora usted está cerca o al principio del tercer trimestre  El tercer trimestre comienza a las 24 semanas y concluye al momento del [place]  Conforme wilson bebé crece más, usted podría desarrollar ciertos síntomas  Estos podrían incluir dolor en wilson espalda o en la parte inferior de los costados de wilson abdomen  Es posible que también se le formen estrías en wilson abdomen, senos, muslos o glúteos  Usted también podría presentar estreñimiento  INSTRUCCIONES SOBRE EL [facility]ITAL[facility]:   Busque atención médica de inmediato si:  · Usted presenta un micah dolor de claire que no desaparece  · Usted tiene cambios en la visión nuevos o en aumento, cata visión borrosa o con manchas  · Usted tiene inflamación nueva o creciente en wilson ammon o [place]  · Usted tiene manchado o sangrado vaginal     · Usted rompe candy o siente un chorro o gotas de agua tibia que le está bajando por wilson vagina  Comuníquese con wilson médico si:  · Usted tiene calambres, presión o tensión abdominal     · Usted tiene un cambio en la secreción vaginal     · Usted tiene un sangrado leve  · Usted tiene escalofríos o fiebre  · Usted tiene comezón, ardor o dolor vaginal     · Usted tiene ike secreción vaginal amarillenta, verdosa, farzad o de [employer]  · Usted tiene dolor o ardor al Valri Crigler, orina menos de lo habitual o tiene [place] rosada o sanguinolenta  · Usted tiene preguntas o inquietudes acerca de wilson condición o cuidado  Cómo cuidarse en esta etapa de wilson embarazo:  · Consuma alimentos saludables y variados  Alimentos saludables incluyen frutas, verduras, panes de kalia integral, alimentos lácteos bajos en grasa, frijoles, dari magras y pescado  [place] líquidos cata se le haya indicado  Pregunte cuánto líquido debe allison cada día y cuáles líquidos son los más adecuados para usted  Limite el consumo de cafeína a menos de [place] [address]   Limite el consumo de pescado a 2 porciones cada semana  Escoja pescado con concentraciones bajas de alejandro cata atún al natural enlatado, camarón, salmón, bacalao o tilapia  No coma pescado con concentraciones altas de alejandro cata pez shad, caballa gigante, pargo rayado y tiburón  · Controle wilson dolor de espalda  No esté de pie por largos periodos de tiempo ni levante objetos pesados  Use ike buena postura mientras esté de pie, se agache o se doble  Use zapatos de tacón bajo con un buen soporte  Descansar puede también ayudarla a aliviar el dolor de espalda  Pregunte a wilson médico acerca de ejercicios que usted pueda hacer para fortalecer los músculos de wilson espalda  · [id number] [address]  Wilson necesidad de ciertas vitaminas y [facility], cata el ácido fólico, aumenta catherine el [facility]  Las vitaminas prenatales proporcionan algunas de las vitaminas y minerales adicionales que usted necesita  Las vitaminas prenatales también podrían ayudar a disminuir el riesgo de ciertos defectos de nacimiento  · Consulte con wilson médico acerca de hacer ejercicio  El ejercicio moderado puede ayudarla a mantenerse en forma  Wilson médico la ayudará a planear un programa de ejercicios que sea seguro para usted catherine wilson [facility]  · No fume  Fumar aumenta el riesgo de aborto espontáneo y otros problemas de marry catherine wilson [facility]  Fumar puede causar que wilson bebé nazca antes de tiempo o que pese menos al nacer  Solicite información a wilson médico si usted necesita ayuda para dejar de fumar  · No consuma alcohol  El alcohol pasa de wilson cuerpo al bebé a través de la placenta  Puede afectar el desarrollo del cerebro de wilson bebé y provocar el síndrome de alcoholismo fetal (SAF)  El SAF es un anusha de condiciones que causan problemas [place], de comportamiento y de crecimiento  · Consulte con wilson médico antes de allison cualquier medicamento   Muchos medicamentos pueden perjudicar a wilson bebé si usted los lionel [address]  [facility] está embarazada  No tome ningún medicamento, vitaminas, hierbas o suplementos sin liz consultar con elam Felicitas Raul  use drogas ilegales o de la rodriguez (cata marihuana o cocaína) mientras está embarazada  Consejos de seguridad:  · Evite jacuzzis y saunas  No use un jacuzzi o un sauna mientras usted está embarazada, especialmente catherine el primer trimestre  Los [employer] y los saunas aumentan la temperatura de elam bebé y el riesgo de defectos de nacimiento  · Evite la toxoplasmosis  [place] es ike infección causada por comer carne cruda o estar cerca del excremento de un bozena infectado  [place] puede causar malformaciones congénitas, aborto espontáneo y Jagdeep Schein  Lávese las [place] después de tocar carne cruda  Asegúrese de que la carne esté hansa cocida antes de comerla  Evite los huevos crudos y la [place] Grim  Use guantes o pida que alguien la ayude a limpiar la caja de arena del bozena mientras usted Aysha Budd  Cambios que están ocurriendo con elam bebé: Para las 26 semanas, elam bebé pesará alrededor de 2 libras  Elam bebé medirá alrededor de 10 pulgadas de ruma desde el porter de la claire hasta la rabadilla (parte inferior del bebé)  Los movimientos de elam bebé son mucho más martha en esta etapa  Los ojos de elam bebé paula están completamente formados y pueden abrirse parcialmente  Ealm bebé también duerme y se despierta  Lo que necesita saber acerca del cuidado prenatal: Elam médico le revisará elam presión arterial y [place]  Es posible que también necesite lo siguiente:  · Un examen de orina también podría realizarse para revisarle el azúcar y la proteína  Estas son señales de diabetes gestacional o de infección  La proteína en elam orina también podría ser ike señal de preeclampsia  La preeclampsia es ike condición que puede desarrollarse catherine la semana 21 o después en elam embarazo  Esta provoca presión arterial luis y [employer] con kelvin riñones y [place]      · La altura Aydin Hillock es ike medición del útero para controlar el desarrollo de wilson bebé  [employer] por lo general es igual al número de [address] que usted tiene de embarazo  · El ritmo cardíaco de wilson bebé será revisado  © Copyright [address] [place] [address] Drive Information is for End User's use only and may not be sold, redistributed or otherwise used for commercial purposes  All illustrations and images included in CareNotes® are the copyrighted property of A D A M , [employer]  or [address] [place] [address] es sólo para uso en educación  Wilson intención no es darle un consejo médico sobre enfermedades o tratamientos  Colsulte con wilson Derick So farmacéutico antes de seguir cualquier régimen médico para saber si es seguro y efectivo para usted

## 2021-07-09 NOTE — ASSESSMENT & PLAN NOTE
No obstetric complaints today  28 week labs completed  Passed 1h gtt  - 12[address] [facility] - scheduled today for interval growth  Mild left urinary tract dilatation is identified 21  No additional anomalies   were identified  TWG: + 11 8 kg  TDAP vaccine - given today  Contraception: Desires tubal ligation  Patient counseled on other forms of BC including OCPs, LARCs, Depo, partner vasectomy of which she declines  She is aware tubal ligation is irreversible and states she no longer desires further childbearing   MA-31 form signed today  Birth plan: RLTCS with tubal ligation - Delivery consent signed today  Discussed  labor precautions and fetal kick counts    Return to clinic in 2 weeks

## 2021-07-09 NOTE — PROGRESS NOTES
28 week education packet provided to patient on 07/09/21  Included in packet:   Third Trimester paperwork  Delivery consent   Birthing room support person rules and acknowledgment  Birth Plan   Welcome information  Birth certificate worksheet   Consent for Photographers  Perineal/ Vaginal massage   Pediatric practices and locations

## 2021-07-09 NOTE — PROGRESS NOTES
OB/GYN prenatal visit    S: 33 y o  [id number] 28w3d here for PN visit  She has no obstetric complaints, including pelvic pain, contractions, vaginal bleeding, loss of fluid, or decreased fetal movement  Denies headaches, vision changes, RUQ pain, chest pain, SOB, LE pain/tenderness    O:  Vitals:    21 0908   BP: 110/57   Pulse: 92       Gen: no acute distress  Lungs: nonlabored breathing  Abdomen: Gravid, nontender  Fundal height 28 cm  LE: nontender, no edema    FHT: 145 bpm via doppler          A/P:      Problem List Items Addressed This Visit        Other    28 weeks gestation of pregnancy     No obstetric complaints today  28 week labs completed  Passed 1h gtt  - 1220 [facility] - scheduled today for interval growth  Mild left urinary tract dilatation is identified 21  No additional anomalies   were identified  TWG: + 11 8 kg  TDAP vaccine - given today  Contraception: Desires tubal ligation  Patient counseled on other forms of BC including OCPs, LARCs, Depo, partner vasectomy of which she declines  She is aware tubal ligation is irreversible and states she no longer desires further childbearing   MA-31 form signed today  Birth plan: RLTCS with tubal ligation - Delivery consent signed today  Discussed  labor precautions and fetal kick counts    Return to clinic in 2 weeks           Relevant Orders    Tdap Vaccine greater than or equal to 8yo    Tdap Vaccine greater than or equal to 8yo    History of  delivery     Delivery plan RLTCS with tubal ligation  Need to scheduled c/s at next visit         Hx of preeclampsia, prior pregnancy, currently pregnant     BP today 110/57  Reviewed signs/symptoms of PreE           Other Visit Diagnoses     Prenatal care in third trimester    -  Primary        D/w Dr Ema Castleman, MD  2021  9:13 AM

## 2021-07-09 NOTE — PROGRESS NOTES
The patient was seen today for an ultrasound  Please see ultrasound report (located under Ob Procedures) for additional details  Thank you very much for allowing us to participate in the care of this very nice patient  Should you have any questions, please do not hesitate to contact me  Noah Hensley MD [address]  Attending Physician, [place]

## 2021-07-22 PROBLEM — Z34.92 PRENATAL CARE IN SECOND TRIMESTER: Status: RESOLVED | Noted: 2021-06-28 | Resolved: 2021-07-22

## 2021-07-22 PROBLEM — Z34.92 SECOND TRIMESTER PREGNANCY: Status: RESOLVED | Noted: 2021-04-23 | Resolved: 2021-07-22

## 2021-07-22 PROBLEM — Z3A.30 30 WEEKS GESTATION OF PREGNANCY: Status: ACTIVE | Noted: 2021-03-26

## 2021-07-23 ENCOUNTER — ROUTINE PRENATAL (OUTPATIENT)
Dept: OBGYN CLINIC | Facility: CLINIC | Age: 31
End: 2021-07-23

## 2021-07-23 VITALS
HEART RATE: 88 BPM | DIASTOLIC BLOOD PRESSURE: 60 MMHG | SYSTOLIC BLOOD PRESSURE: 91 MMHG | HEIGHT: 58 IN | WEIGHT: 144 LBS | BODY MASS INDEX: 30.23 KG/M2

## 2021-07-23 DIAGNOSIS — O09.299 HX OF PREECLAMPSIA, PRIOR PREGNANCY, CURRENTLY PREGNANT: ICD-10-CM

## 2021-07-23 DIAGNOSIS — Z98.891 HISTORY OF CESAREAN DELIVERY: ICD-10-CM

## 2021-07-23 DIAGNOSIS — Z34.93 PRENATAL CARE IN THIRD TRIMESTER: Primary | ICD-10-CM

## 2021-07-23 DIAGNOSIS — Z3A.30 30 WEEKS GESTATION OF PREGNANCY: ICD-10-CM

## 2021-07-23 LAB
SL AMB  POCT GLUCOSE, UA: NORMAL
SL AMB POCT URINE PROTEIN: NORMAL

## 2021-07-23 PROCEDURE — 81002 URINALYSIS NONAUTO W/O SCOPE: CPT | Performed by: OBSTETRICS & GYNECOLOGY

## 2021-07-23 PROCEDURE — 99213 OFFICE O/P EST LOW 20 MIN: CPT | Performed by: OBSTETRICS & GYNECOLOGY

## 2021-07-23 PROCEDURE — T1015 CLINIC SERVICE: HCPCS | Performed by: OBSTETRICS & GYNECOLOGY

## 2021-07-23 NOTE — PATIENT INSTRUCTIONS
El embarazo de la semana 27 a la 30   CUIDADO AMBULATORIO:   Qué cambios están ocurriendo en wilson cuerpo: Usted podría notar síntomas nuevos cata falta de [place] o inflamación de kelvin tobillos y pies  Es posible que también tenga dificultad para dormir o contracciones  Busque atención médica de inmediato si:  · Usted presenta un micah dolor de claire que no desaparece  · Usted tiene cambios en la visión nuevos o en aumento, cata visión borrosa o con manchas  · Usted tiene inflamación nueva o creciente en wilson ammon o [place]  · Usted tiene manchado o sangrado vaginal     · Usted rompe candy o siente un chorro o gotas de agua tibia que le está bajando por wilson vagina  Comuníquese con wilson médico si:  · Usted tiene más de 5 contracciones en 1 hora  · Usted nota algún cambio en los movimientos de wilson bebé  · Usted tiene calambres, presión o tensión abdominal     · Usted tiene un cambio en la secreción vaginal     · Usted tiene escalofríos o fiebre  · Usted tiene comezón, ardor o dolor vaginal     · Usted tiene ike secreción vaginal amarillenta, verdosa, farzad o de [employer]  · Usted tiene dolor o ardor al [facility], orina menos de lo habitual o tiene [facility] rosada o sanguinolenta  · Usted tiene preguntas o inquietudes acerca de wilson condición o cuidado  Cómo cuidarse en esta etapa de wilson embarazo:  · Consuma alimentos saludables y variados  Alimentos saludables incluyen frutas, verduras, panes de kalia integral, alimentos lácteos bajos en grasa, frijoles, dari magras y pescado  [place] líquidos cata se le haya indicado  Pregunte cuánto líquido debe allison cada día y cuáles líquidos son los más adecuados para usted  Limite el consumo de cafeína a menos de [address]  Limite el consumo de pescado a 2 porciones cada semana  Escoja pescado con concentraciones bajas de alejandro cata atún al natural enlatado, camarón, salmón, bacalao o tilapia   No coma pescado con concentraciones altas de alejandro cata pez shad, caballa gigante, pargo rayado y tiburón  · Controle la acidez comiendo 4 o 5 comidas pequeñas cada día en vez de comidas grandes  Evite los alimentos picantes  · Controle la inflamación al [place] y poner los pies en alto o elevados sobre [place]  · [id number] [place]  Wilson necesidad de ciertas vitaminas y [address] [facility], cata el ácido fólico, aumenta catherine el [place]  Las vitaminas prenatales proporcionan algunas de las vitaminas y minerales adicionales que usted necesita  Las vitaminas prenatales también podrían ayudar a disminuir el riesgo de ciertos defectos de nacimiento  · Consulte con wilson médico acerca de hacer ejercicio  El ejercicio moderado puede ayudarla a mantenerse en forma  Wilson médico la ayudará a planear un programa de ejercicios que sea seguro para usted catherine wilson [place]  · No fume  Fumar aumenta el riesgo de aborto espontáneo y otros problemas de marry catherine wilson [place]  Fumar puede causar que wilson bebé nazca antes de tiempo o que pese menos al nacer  Solicite información a wilson médico si usted necesita ayuda para dejar de fumar  · No consuma alcohol  El alcohol pasa de wilson cuerpo al bebé a través de la placenta  Puede afectar el desarrollo del cerebro de wilson bebé y provocar el síndrome de alcoholismo fetal (SAF)  El SAF es un anusha de condiciones que causan problemas [place], de comportamiento y de crecimiento  · Consulte con wilson médico antes de allison cualquier medicamento  Muchos medicamentos pueden perjudicar a wilson bebé si usted los lionel [address] [place] [address]  No tome ningún medicamento, vitaminas, hierbas o suplementos sin liz consultar con wilson Chalino Pitkin  use drogas ilegales o de la rodriguez (cata marihuana o cocaína) mientras está embarazada  Consejos de seguridad catherine el embarazo:  · Evite jacuzzis y saunas   No use un jacuzzi o un sauna mientras usted está [place], especialmente catherine el primer trimestre  Los [employer] y los saunas aumentan la temperatura de elam bebé y el riesgo de defectos de nacimiento  · Evite la toxoplasmosis  [place] es ike infección causada por comer carne cruda o estar cerca del excremento de un bozena infectado  [place] puede causar malformaciones congénitas, aborto espontáneo y Jagdeep Schein  Lávese las [place] después de tocar carne cruda  Asegúrese de que la carne esté hansa cocida antes de comerla  Evite los huevos crudos y la [place]  Use guantes o pida que alguien la ayude a limpiar la caja de arena del bozena mientras levi Marie  Cambios que están ocurriendo con elam bebé: Para las 30 semanas, elam bebé podría pesar más de 3 libras  Elam bebé podría medir alrededor de 11 pulgadas de ruma desde la punta de la claire hasta la rabadilla (parte inferior del bebé)  Ahora elam bebé puede abrir y cerrar kelvin ojos  Las patadas y movimientos de elam bebé son más martha en lance momento  Lo que necesita saber acerca del cuidado prenatal: Elam médico le revisará elam presión arterial y [place]  Es posible que también necesite lo siguiente:  · Los análisis de yaya podrían realizarse para revisar signos de anemia o el tipo de [place]  · Un examen de orina también podría realizarse para revisarle el azúcar y la proteína  Estas son señales de diabetes gestacional o de infección  La proteína en elam orina también podría ser ike señal de preeclampsia  La preeclampsia es ike condición que puede desarrollarse catherine la semana 21 o después en elam embarazo  Esta provoca presión arterial luis y [employer] con kelvin riñones y [place]  · Ike vacuna contra difteria, tétanos y tos ferina y vacuna contra la gripe podría ser recomendado por elam médico     · La detección de diabetes gestacional se realizará usando ike prueba oral de tolerancia a la glucosa   Ike prueba oral de tolerancia a la glucosa comienza con ike revisión de los niveles de azúcar en la [place] después de que usted no haya comido por 8 horas  Después se le da ike bebida de glucosa  Le revisan el nivel de azúcar en la yaya después de 1 hora, 2 horas y algunas veces 3 horas  Los médicos analizarán si el nivel de azúcar en la yaya aumenta después del primer análisis  · La altura uterina es ike medición del útero para controlar el desarrollo de wilson bebé  [employer] por lo general es igual al número de [address] que usted tiene de embarazo  Wilson médico también podría revisar la posición de wilson bebé  · El ritmo cardíaco de wilson bebé será revisado  © Copyright [employer] 2021 Information is for End User's use only and may not be sold, redistributed or otherwise used for commercial purposes  All illustrations and images included in CareNotes® are the copyrighted property of A D A [employer]  or [address] [place] [address] es sólo para uso en educación  Wilson intención no es darle un consejo médico sobre enfermedades o tratamientos  Colsulte con wilson Ozell Fabry farmacéutico antes de seguir cualquier régimen médico para saber si es seguro y efectivo para usted

## 2021-07-23 NOTE — PROGRESS NOTES
OB/GYN prenatal visit    S: 33 y o  [id number] 30w2d here for PN visit  She has no obstetric complaints, including pelvic pain, contractions, vaginal bleeding, loss of fluid, or decreased fetal movement  O:  Vitals:    21 0756   BP: 91/60   Pulse: 88       Blood Pressure: 91/60  Wt=65 3 kg (144 lb); Body mass index is 30 1 kg/m² ; TWG=12 1 kg (26 lb 9 6 oz)      Gen: no acute distress, nonlabored breathing  Lungs: CTAB  CV: RRR  Abdomen: soft, nontender, gravid  Fundal height: 30 cm    FHT: 144 bpm    A/P:    Problem List Items Addressed This Visit        Other    30 weeks gestation of pregnancy     No obstetric complaints today  PNC US 21 - breech, posterior R lateral placenta, normal fetal growth, EFW 1510g (91%)  Interval growth scan scheduled 21  TDAP vaccine - complete  COVID vaccine - declines  Considering post-pregnancy  Discussed with patient that I highly recommend the COVID vaccine in pregnancy as supported by ACOG and [facility]FM  Discussed that pregnant patients are at increased risk of severe illness and complications from [place] compared to the general population including hospitalizations, ICU care, intubation, ECMO and even death  Although vaccine studies did not include pregnant women, to date there are no suggestions that vaccines should be contraindicated in pregnant women and data from [facility] V-safe registration does not show increase rate of adverse outcomes - including pregnancy loss, GDM, preeclampsia, FGR  Patient stated understanding and all questions were answered to her satisfaction     Breastfeeding: yes  Birth plan: RLTCS with tubal ligation (MA-31 and delivery consent previously signed)  Discussed  labor precautions and fetal kick counts    Return to clinic in 2 weeks           History of  delivery     Delivery plan RLTCS with BTL         Hx of preeclampsia, prior pregnancy, currently pregnant     BP today: 9160   Reviewed signs/symptoms of PreE           Other Visit Diagnoses     Prenatal care in third trimester    -  Primary              Vahid Zuñiga MD  7/23/2021  8:12 AM

## 2021-07-23 NOTE — ASSESSMENT & PLAN NOTE
No obstetric complaints today  [address]  [facility] 21 - breech, posterior R lateral placenta, normal fetal growth, EFW 1510g (91%)  Interval growth scan scheduled 21  TDAP vaccine - complete  COVID vaccine - declines  Considering post-pregnancy  Discussed with patient that I highly recommend the COVID vaccine in pregnancy as supported by ACOG and SMFM  Discussed that pregnant patients are at increased risk of severe illness and complications from [place] compared to the general population including hospitalizations, ICU care, intubation, ECMO and even death  Although vaccine studies did not include pregnant women, to date there are no suggestions that vaccines should be contraindicated in pregnant women and data from [facility] V-safe registration does not show increase rate of adverse outcomes - including pregnancy loss, GDM, preeclampsia, FGR  Patient stated understanding and all questions were answered to her satisfaction     Breastfeeding: yes  Birth plan: RLTCS with tubal ligation (MA-31 and delivery consent previously signed)  Discussed  labor precautions and fetal kick counts    Return to clinic in 2 weeks

## 2021-08-05 PROBLEM — Z3A.32 32 WEEKS GESTATION OF PREGNANCY: Status: ACTIVE | Noted: 2021-03-26

## 2021-08-06 ENCOUNTER — ROUTINE PRENATAL (OUTPATIENT)
Dept: OBGYN CLINIC | Facility: CLINIC | Age: 31
End: 2021-08-06

## 2021-08-06 VITALS
SYSTOLIC BLOOD PRESSURE: 102 MMHG | BODY MASS INDEX: 30.86 KG/M2 | HEIGHT: 58 IN | WEIGHT: 147 LBS | HEART RATE: 87 BPM | DIASTOLIC BLOOD PRESSURE: 68 MMHG

## 2021-08-06 DIAGNOSIS — Z98.891 HISTORY OF CESAREAN DELIVERY: ICD-10-CM

## 2021-08-06 DIAGNOSIS — Z3A.32 32 WEEKS GESTATION OF PREGNANCY: ICD-10-CM

## 2021-08-06 DIAGNOSIS — O09.299 HX OF PREECLAMPSIA, PRIOR PREGNANCY, CURRENTLY PREGNANT: ICD-10-CM

## 2021-08-06 DIAGNOSIS — Z34.93 PRENATAL CARE IN THIRD TRIMESTER: Primary | ICD-10-CM

## 2021-08-06 LAB
SL AMB  POCT GLUCOSE, UA: NORMAL
SL AMB POCT KETONES,UA: NORMAL
SL AMB POCT URINE PROTEIN: NORMAL

## 2021-08-06 PROCEDURE — 99213 OFFICE O/P EST LOW 20 MIN: CPT | Performed by: OBSTETRICS & GYNECOLOGY

## 2021-08-06 PROCEDURE — 3008F BODY MASS INDEX DOCD: CPT | Performed by: OBSTETRICS & GYNECOLOGY

## 2021-08-06 PROCEDURE — 81002 URINALYSIS NONAUTO W/O SCOPE: CPT | Performed by: OBSTETRICS & GYNECOLOGY

## 2021-08-06 NOTE — ASSESSMENT & PLAN NOTE
No obstetric complaints today  [facility] - next scan 21  TWG: + 14 5 kg  TDAP vaccine -complete  COVID vaccine - declines, previously counseled on risks  Breastfeeding: yes  Birth plan: RLTCS with BTL  Discussed  labor precautions and fetal kick counts    Return to clinic in 2 weeks

## 2021-08-06 NOTE — PATIENT INSTRUCTIONS
El embarazo de la semana 31 a la 34   CUIDADO AMBULATORIO:   Qué cambios están ocurriendo en wilson cuerpo: Es posible que usted continúe teniendo síntomas tales cata falta de [place], [place], contracciones o inflamación en kelvin tobillos y pies  Usted podría estar aumentando 1 pavel por semana ahora  Busque atención médica de inmediato si:  · Usted presenta un micah dolor de claire que no desaparece  · Usted tiene cambios en la visión nuevos o en aumento, cata visión borrosa o con manchas  · Usted tiene inflamación nueva o creciente en wilson ammon o [place]  · Usted tiene manchado o sangrado vaginal     · Usted rompe candy o siente un chorro o gotas de agua tibia que le está bajando por wilson vagina  Comuníquese con wilson médico si:  · Usted tiene más de 5 contracciones en 1 hora  · Usted nota algún cambio en los movimientos de wilson bebé  · Usted tiene calambres, presión o tensión abdominal     · Usted tiene un cambio en la secreción vaginal     · Usted tiene escalofríos o fiebre  · Usted tiene comezón, ardor o dolor vaginal     · Usted tiene ike secreción vaginal amarillenta, verdosa, farzad o de [employer]  · Usted tiene dolor o ardor al Jazmin Quitman, orina menos de lo habitual o tiene [place] rosada o sanguinolenta  · Usted tiene preguntas o inquietudes acerca de wilson condición o cuidado  Cómo cuidarse en esta etapa de wilson embarazo:  · Consuma alimentos saludables y variados  Alimentos saludables incluyen frutas, verduras, panes de kalia integral, alimentos lácteos bajos en grasa, frijoles, dari magras y pescado  [place] líquidos cata se le haya indicado  Pregunte cuánto líquido debe allison cada día y cuáles líquidos son los más adecuados para usted  Limite el consumo de cafeína a menos de [place] [address]  Limite el consumo de pescado a 2 porciones cada semana  Escoja pescado con concentraciones bajas de alejandro cata atún al natural enlatado, camarón, salmón, bacalao o tilapia   No coma pescado con concentraciones altas de alejandro cata pez shad, caballa gigante, pargo rayado y tiburón  · Controle la acidez comiendo 4 o 5 comidas pequeñas cada día en vez de comidas grandes  Evite los alimentos picantes  · Controle la inflamación al [facility] [employer] y poner los pies en alto o elevados sobre [place]  · [id number] [place]  Wilson necesidad de ciertas vitaminas y [facility], cata el ácido fólico, aumenta catherine el [facility]  Las vitaminas prenatales proporcionan algunas de las vitaminas y minerales adicionales que usted necesita  Las vitaminas prenatales también podrían ayudar a disminuir el riesgo de ciertos defectos de nacimiento  · Consulte con wilson médico acerca de hacer ejercicio  El ejercicio moderado puede ayudarla a mantenerse en forma  Wilson médico la ayudará a planear un programa de ejercicios que sea seguro para usted catherine wilson [facility]  · No fume  Fumar aumenta el riesgo de aborto espontáneo y otros problemas de marry catherine wilson [facility]  Fumar puede causar que wilson bebé nazca antes de tiempo o que pese menos al nacer  Solicite información a wilson médico si usted necesita ayuda para dejar de fumar  · No consuma alcohol  El alcohol pasa de wilson cuerpo al bebé a través de la placenta  Puede afectar el desarrollo del cerebro de wilson bebé y provocar el síndrome de alcoholismo fetal (SAF)  El SAF es un anusha de condiciones que causan problemas [place], de comportamiento y de crecimiento  · Consulte con wilson médico antes de allison cualquier medicamento  Muchos medicamentos pueden perjudicar a wilson bebé si usted los lionel [address] [place] [address]  No tome ningún medicamento, vitaminas, hierbas o suplementos sin liz consultar con wilson [place]  use drogas ilegales o de la rodriguez (cata marihuana o cocaína) mientras está embarazada  Consejos de seguridad catherine el embarazo:  · Evite jacuzzis y saunas   No use un jacuzzi o un sauna mientras usted está [place], especialmente catherine el primer trimestre  Los [facility] y los saunas aumentan la temperatura de elam bebé y el riesgo de defectos de nacimiento  · Evite la toxoplasmosis  [place] es ike infección causada por comer carne cruda o estar cerca del excremento de un bozena infectado  [place] puede causar malformaciones congénitas, aborto espontáneo y Jagdeep Schein  Lávese las [place]aleigh después de tocar carne cruda  Asegúrese de que la carne esté hansa cocida antes de comerla  Evite los huevos crudos y la Eduardo Blamer  Use guantes o pida que alguien la ayude a limpiar la caja de arena del bozena mientras usted Gaudencio Ridgel  Cambios que están ocurriendo con elam bebé: Para las 34 semanas, elam bebé podría pesar más de 5 [address]  Elam bebé medirá alrededor de 12 ½ pulgadas de ruma desde el porter de la claire hasta la rabadilla (parte inferior del bebé)  Elam bebé está aumentando alrededor de ½ pavel por semana  Ahora elam bebé puede abrir y cerrar kelvin ojos  Las patadas y movimientos de elam bebé son más martha en lance momento  Lo que necesita saber acerca del cuidado prenatal: Elam médico le revisará elam presión arterial y [place]  Es posible que también necesite lo siguiente:  · Un examen de orina también podría realizarse para revisarle el azúcar y la proteína  Estas son señales de diabetes gestacional o de infección  La proteína en elam orina también podría ser ike señal de preeclampsia  La preeclampsia es ike condición que puede desarrollarse catherine la semana 21 o después en elam embarazo  Esta provoca presión arterial luis y [place] [employer] [place] con kelvin riñones y [place]  · La vacuna Tdap podría ser recomendado por elam médico     · La altura uterina es ike medición del útero para controlar el desarrollo de elam bebé  [employer] por lo general es igual al número de [address] que usted tiene de embarazo  Elam médico también podría revisar la posición de elam bebé  · El ritmo cardíaco de elam bebé será revisado      © Copyright [employer] 2021 Information is for [employer] use only and may not be sold, redistributed or otherwise used for commercial purposes  All illustrations and images included in CareNotes® are the copyrighted property of A D A M , Inc  or [address] [place] [address] es sólo para uso en educación  Wilson intención no es darle un consejo médico sobre enfermedades o tratamientos  Colsulte con wilson [place] farmacéutico antes de seguir cualquier régimen médico para saber si es seguro y efectivo para usted

## 2021-08-06 NOTE — PROGRESS NOTES
OB/GYN prenatal visit    S: 33 y o  Addy Schultz here for PN visit  She has no obstetric complaints, including pelvic pain, contractions, vaginal bleeding, loss of fluid, or decreased fetal movement  O:    Blood Pressure: 102/68  Wt=66 7 kg (147 lb); Body mass index is 30 72 kg/m² ; TWG=14 5 kg (32 lb)  Fetal Heart Rate: 140; Fundal Height (cm): 32 cm    Gen: no acute distress  Lungs: nonlabored breathing  Abdomen: soft,nontender gravid  Fundal Height (cm): 32 cm  Fetal Heart Rate: 140    A/P:    Problem List Items Addressed This Visit        Other    32 weeks gestation of pregnancy     No obstetric complaints today  PNC US - next scan 21  TWG: + 14 5 kg  TDAP vaccine -complete  COVID vaccine - declines, previously counseled on risks  Breastfeeding: yes  Birth plan: RLTCS with BTL  Discussed  labor precautions and fetal kick counts    Return to clinic in 2 weeks           History of  delivery     RLTCS with BTL  Patient would like to schedule at next visit            Hx of preeclampsia, prior pregnancy, currently pregnant     BP today: 102/68  Reviewed signs/symptoms of PreE           Other Visit Diagnoses     Prenatal care in third trimester    -  Primary        D/w Dr Tej Mueller MD   OB/GYN PGY-2  2021  8:48 AM

## 2021-08-19 NOTE — PROGRESS NOTES
Please refer to the [facility] ultrasound report in Ob Procedures for additional information regarding today's visit

## 2021-08-20 ENCOUNTER — ROUTINE PRENATAL (OUTPATIENT)
Dept: OBGYN CLINIC | Facility: CLINIC | Age: 31
End: 2021-08-20

## 2021-08-20 ENCOUNTER — TELEPHONE (OUTPATIENT)
Dept: OBGYN CLINIC | Facility: CLINIC | Age: 31
End: 2021-08-20

## 2021-08-20 ENCOUNTER — ULTRASOUND (OUTPATIENT)
Dept: PERINATAL CARE | Facility: CLINIC | Age: 31
End: 2021-08-20
Payer: COMMERCIAL

## 2021-08-20 VITALS
BODY MASS INDEX: 31.53 KG/M2 | WEIGHT: 150.2 LBS | HEIGHT: 58 IN | HEART RATE: 86 BPM | SYSTOLIC BLOOD PRESSURE: 97 MMHG | DIASTOLIC BLOOD PRESSURE: 56 MMHG

## 2021-08-20 VITALS
DIASTOLIC BLOOD PRESSURE: 61 MMHG | BODY MASS INDEX: 31.53 KG/M2 | HEIGHT: 58 IN | SYSTOLIC BLOOD PRESSURE: 96 MMHG | WEIGHT: 150.2 LBS | HEART RATE: 89 BPM

## 2021-08-20 DIAGNOSIS — O09.293 HX OF PREECLAMPSIA, PRIOR PREGNANCY, CURRENTLY PREGNANT, THIRD TRIMESTER: ICD-10-CM

## 2021-08-20 DIAGNOSIS — Z34.93 PRENATAL CARE IN THIRD TRIMESTER: ICD-10-CM

## 2021-08-20 DIAGNOSIS — Z3A.34 34 WEEKS GESTATION OF PREGNANCY: ICD-10-CM

## 2021-08-20 DIAGNOSIS — Z3A.34 34 WEEKS GESTATION OF PREGNANCY: Primary | ICD-10-CM

## 2021-08-20 DIAGNOSIS — O23.40 URINARY TRACT INFECTION AFFECTING PREGNANCY: ICD-10-CM

## 2021-08-20 DIAGNOSIS — Z36.4 ULTRASOUND FOR ANTENATAL SCREENING FOR FETAL GROWTH RESTRICTION: Primary | ICD-10-CM

## 2021-08-20 LAB
BACTERIA UR QL AUTO: ABNORMAL /HPF
BILIRUB UR QL STRIP: NEGATIVE
CLARITY UR: ABNORMAL
COLOR UR: YELLOW
GLUCOSE UR STRIP-MCNC: NEGATIVE MG/DL
HGB UR QL STRIP.AUTO: NEGATIVE
KETONES UR STRIP-MCNC: NEGATIVE MG/DL
LEUKOCYTE ESTERASE UR QL STRIP: ABNORMAL
NITRITE UR QL STRIP: POSITIVE
NON-SQ EPI CELLS URNS QL MICRO: ABNORMAL /HPF
PH UR STRIP.AUTO: 7 [PH]
PROT UR STRIP-MCNC: NEGATIVE MG/DL
RBC #/AREA URNS AUTO: ABNORMAL /HPF
SL AMB  POCT GLUCOSE, UA: NEGATIVE
SL AMB LEUKOCYTE ESTERASE,UA: NEGATIVE
SL AMB POCT BILIRUBIN,UA: ABNORMAL
SL AMB POCT BLOOD,UA: NEGATIVE
SL AMB POCT CLARITY,UA: CLEAR
SL AMB POCT COLOR,UA: YELLOW
SL AMB POCT KETONES,UA: NEGATIVE
SL AMB POCT NITRITE,UA: POSITIVE
SL AMB POCT PH,UA: 6
SL AMB POCT SPECIFIC GRAVITY,UA: 1.01
SL AMB POCT URINE PROTEIN: ABNORMAL
SL AMB POCT UROBILINOGEN: 0.2
SP GR UR STRIP.AUTO: 1.02 (ref 1–1.03)
UROBILINOGEN UR QL STRIP.AUTO: 0.2 E.U./DL
WBC #/AREA URNS AUTO: ABNORMAL /HPF

## 2021-08-20 PROCEDURE — 81002 URINALYSIS NONAUTO W/O SCOPE: CPT | Performed by: NURSE PRACTITIONER

## 2021-08-20 PROCEDURE — 99213 OFFICE O/P EST LOW 20 MIN: CPT | Performed by: OBSTETRICS & GYNECOLOGY

## 2021-08-20 PROCEDURE — 87186 SC STD MICRODIL/AGAR DIL: CPT | Performed by: NURSE PRACTITIONER

## 2021-08-20 PROCEDURE — 76816 OB US FOLLOW-UP PER FETUS: CPT | Performed by: OBSTETRICS & GYNECOLOGY

## 2021-08-20 PROCEDURE — 99213 OFFICE O/P EST LOW 20 MIN: CPT | Performed by: NURSE PRACTITIONER

## 2021-08-20 PROCEDURE — 81001 URINALYSIS AUTO W/SCOPE: CPT | Performed by: NURSE PRACTITIONER

## 2021-08-20 PROCEDURE — 87086 URINE CULTURE/COLONY COUNT: CPT | Performed by: NURSE PRACTITIONER

## 2021-08-20 PROCEDURE — 87077 CULTURE AEROBIC IDENTIFY: CPT | Performed by: NURSE PRACTITIONER

## 2021-08-20 RX ORDER — NITROFURANTOIN 25; 75 MG/1; MG/1
100 CAPSULE ORAL 2 TIMES DAILY
Qty: 10 CAPSULE | Refills: 0 | Status: SHIPPED | OUTPATIENT
Start: 2021-08-20 | End: 2021-08-25

## 2021-08-20 NOTE — LETTER
August 20, 2021     [address] PROVIDER    Patient: Roxanna Mora   YOB: 1990   Date of Visit: 8/20/2021       Dear   Provider: Thank you for referring Roxanna Mora to me for evaluation  Below are my notes for this consultation  If you have questions, please do not hesitate to call me  I look forward to following your patient along with you  Sincerely,        Padmini Beavers MD        CC: No Recipients  Padmini Beavers MD  8/19/2021  4:18 PM  Sign when Signing Visit   Please refer to the [facility] ultrasound report in Ob Procedures for additional information regarding today's visit

## 2021-08-20 NOTE — TELEPHONE ENCOUNTER
Called patient to notify that Concpennya Milling is available for  at her pharmacy  She had no questions at this time

## 2021-08-20 NOTE — PATIENT INSTRUCTIONS
El Tercer Trimestre  (28-42 semanas)   WILSON BEBÉ   ·        wilson bebé chupa wilson dedo ahora! ·        wilson bebé puede oír voces y responder al tacto    habla con Marlinda Hoots!!   ·        el cerebro de wilson bebé crece y se desarrolla más en los últimos 2 meses de embarazo   ·        Claudette Lobe y [place] del bebé son Illona Williams y flexibles para que quepan por el canal del parto   ·        los movimientos del bebé cambian hacia el final del embarazo porque hay menos espacio para patear y estiramientos en tu vientre   ·        los pulmones del bebé no están completamente desarrollados y totalmente preparados para respirar por kelvin propios hasta el último 3-4 semanas antes de wilson fecha de vencimiento     TU CUERPO   ·        wilson vientre está creciendo mucho ahora   ·        será más difícil dormir hansa de noche o ser tan activos cata eres generalmente   ·        puede sudar más de lo habitual   ·        serás más desequilibrado    tenga cuidado de no caer! ·        Usted puede desarrollar hemorroides (qué pueden ser dolorosas y hacen difícil sentarse)   ·        los dos últimos meses del embarazo puede ser muy incómodos con javid de [place], javid de Claudette Lobe y ardor de estómago   ·        Puedes empezar a tener contracciones    mientras son irregulares y Janis de 5 por hora, esto es ike parte normal de wilson cuerpo a punto de tener un bebé   ·        el ruth uterino puede empezar a dilatar y abrir Missael Stalling    para estar listo para el parto   ·        Usted puede encontrarse que necesitan hacer orinar muy a menudo    porque el bebé está presionando mucho la vejiga   ·        Usted puede quedarse corta de respiracion más rápido de lo liu          CUENTAS DEL RETROCESO FETAL    En el tercer trimestre (después de 28 semanas de gestación) deben realizar patada fetal cuentas cada día  Wilson bebé debe moverse al menos 10 veces en 2 horas catherine un tiempo [place], ike vez al día  Elegir el atime del día cuando el bebé es [place]   Trate de hacerlo a la misma hora cada día  Entrar en ike posición cómoda y luego escribir el tiempo que tu bebé se mueve liz  Cuenta cada movimiento hasta que el bebé se mueve 10 veces  Estos movimientos incluyen patadas, puñetazos, codazos, revolotea o rollos  [place] puede tardar entre 5 minutos a 2 horas  Anote el tiempo que sientes movimiento 10 del bebé  Si ha pasado 2 horas y tu bebé no se ha movido al menos 10 veces, usted debe llamar a la oficina de inmediato  Wanda Breana M[address] a [phone]:  Verlean Prom que llamar inmediatamente si tengo incluso ike pequeña cantidad de LIQUIDO de mi vagina, con o sin contracciones  * Tengo que llamar si estoy SANGRADO de mi vagina  * Tengo que llamar si tengo COLICOS que continúa después de beber 2 ó 3 vasos de agua y [place] en mi lado catherine ike hora y que se siente cata que estoy teniendo un período  * Tengo que llamar si siento CONTRACCIONES más de 4 veces en ike hora quando siento que mi daniel se mantiene dura después de que trato de beber 2-3 vasos del agua y [place] en mi lado catherine ike hora  * Tengo que llamar si anila un cambio de mi FLUJO vaginal    * Tengo que llamar si siento la PRESIÓN PÉLVICA que siente que el bebé aprieta en mi vagina y dura más de [place]  * Tengo que llamar si tengo DOLOR BAJO DE LA ESPALDA que es nueva y está cerca de mi cóccix  Puede entrar y salir varias veces catherine ike hora o quedarse allí constantemente  LA PRE-ECLAMPSIA    ¿Qué es? La preeclampsia es ike enfermedad grave que puede ocurrir catherine el embarazo se relaciona con la presión arterial luis  Le puede pasar a cualquier refugio  ¿Por qué Yes importarme? [address] preeclampsia tienen riesgos graves pueden incluir convulsiones, trazo, daños en los [place], nacimiento prematuro de wilson bebé  En los The Located [facility], puede causar la muerte de la madre y wilson bebé  ¿Qué winnie pagar [place]?  Signos y síntomas de la preeclampsia pueden incluir:   * Inflamación severa [place] de la ammon o las   * Dolor de claire todavía presente después de ruben tomado Tylenol   * Rob manchas o cambios en [place]   * Dolor en la parte superior del abdomen o hombro   * [place] náuseas y vómitos (en la segunda mitad del embarazo)   * Aumento de peso repentino   * Dificultad para respirar     ¿Qué winnie hacer? Si usted experimenta alguno de los síntomas de la preeclampsia, comuníquese con wilson proveedor de [facility]  Encontrar la preeclampsia temprana es importante para usted y wilson bebé  Annabelgiovany Moulton al [phone]  LACTANCIA MATERNA     BENEFICIOS PARA LOS BEBÉS   ·       sistemas inmunológicos más martha (menos alergias, eczema, asma y cáncer infantil)   ·       menos diarrea y estreñimiento u otras enfermedades GI   ·       menos resfriados e infecciones del oído   ·       mejor visión y dientes (menos cavidades)   ·       mejora el IQ   ·       menores tasas de diabetes y obesidad en la infancia     BENEFICIOS PARA LAS MADRES   ·        promueve la pérdida de peso más rápida después del parto   ·        digna riesgo para la depresión postparto   ·        digna riesgo para los cánceres [place], útero y ovario   ·        digna riesgo para la osteoporosis en desarrollo con la edad   ·        siempre es más fácil que fórmula - correcto, limpio, y la temperatura adecuada   ·        menos costosa que la fórmula es gratis!!!     CLAVES PARA MORENA LACTANCIA EXITOSA   ·        mantener bebé piel a piel hasta después de la primera alimentación evento   ·        tener a bebé en wilson cuarto con usted catherine wilson estadía en el hospital después del parto   ·        evitar cualquier botella de alimentación (a menos que sea médicamente necesario)   ·        limitar el uso de chupones y pañales   ·        Pida ayuda si usted está teniendo problemas    consultores de lactancia (que se especializan en la lactancia) están disponibles para ayudarle a   · ike dieta saludable para mamá    comiendo ike variedad de alimentos y raciones con moderación     COSAS QUE DEBES SABER SOBRE LA LACTANCIA   ·        mayoría de los medicamentos se considera compatible con la lactancia materna por [address] [place] [address] Road Neelima Conradi consultarlo con wilson médico o en lactancia antes de allison un medicamento nuevo    para estar seguro es seguro   ·        alcohol (cerveza, vino, licor) puede transmitirse de la madre al bebé a través de la Roz    un ocasional, social bebida es considerado aceptable por [address]    más que eso deben evitarse   ·        la lactancia materna es NO es un método para evitar embarazo   ·        nicotina (cigarrillos) puede pasar de la madre al bebé a través de la Roz    sin embargo, para las [employer], es aún más saludable para amamantar que use la fórmula   ·        cafeína debería limitarse a 1-3 tazas por día    incluye café, refrescos, bebidas energéticas           MASAJE EN LA SHASHANK PERINEAL O VAGINAL    Que puedo hacer ahora para reducir las probabilidades de desgarro catherine el parto? Masaje alrededor del orificio de la vagina realizado por usted misma (o por wilson hanna)  El masaje en esta shashank, ya sea antes del parto o catherine la segunda etapa del parto, [place] reducir la probabilidad de desgarro perineal catherine el parto  Asimismo, el uso de compresas tibias en el perineo catherine la etapa expulsiva del parto puede reducir la pravedad del desparro  [place] sucedera catherine la etapa expulsiva del parto  En casa tambien puede reducir las probabilidades de sufrir lesiones durnate el parto de con masajes en la shashank perineal     Erleen Baldev? Todas las mujeres embarazadas a partir de, [place], las 34 semanas de [facility]  Cuando? Usted o wilson hanna deben hacer masajes en la shashank vaginal catherine 5 a 10 minutos de 1 a 4 veces por semana  [place]?   Use [employer] de agua y [facility], felicia u coffey con 1 o 2 dedos (poppy la comodidad)  Inserte los dedos en la vagina entre 3 y 5cm  Aplique presion general hacia abajo y [employer] costados mika 5 a [address] 3 y las 9 horas, de 1 a 4 veces por semana  SENALES MIKA EL EMBARAZO  Llame a nuestra oficina al [phone] para cualquiera de los siguientes:    1  Sangrado vaginal  2  Dolor abdominal denita que no desaparece  3  Fiebre (más de 100 4 y no se darshan con Tylenol)  4  Vómitos persistentes que sandhu más de 24 horas  5  dolor de pecho  6  Dolor o ardor al orinar  7  Dolor de claire severo que no se resuelve con Tylenol  8  Visión borrosa o nichole puntos en wilson visión  9  Hinchazón repentina de wilson ammon o [place]  10  Enrojecimiento, hinchazón o dolor en ike pierna  11  Un aumento de peso repentino en pocos días  12  Contar los movimientos fetales del bebé  (después de 28 semanas o el sexto mes de embarazo)  15  Ike pérdida de líquido acuoso de la vagina: puede ser un chorro, un goteo o ike humedad continua  14  Después de 20 semanas de embarazo, calambres rítmicos (más de 4 por hora) o menstruales cata dolor bajo / [place] MIKA EL EMBARAZO    TDAP  La tos ferina (o tos [place]) puede ser grave para cualquier persona, greg para wilson recién nacido, puede ser mortal  Hasta 20 recién nacidos mueren cada año en los [place] debido a la tos [place]  Aproximadamente la mitad de los bebés menores de 1 año de edad que contraen tos ferina necesitan tratamiento en el hospital  Cuanto más joven es el bebé cuando él o julian son la tos Marv Chroman probable es que él o julian tendrá que ser tratado en un hospital  Cuando usted recibe la vacuna contra la tos ferina (Tdap) mika wilson embarazo, wilson cuerpo creará anticuerpos protectores y algunos de ellos pasan a wilson bebé antes de nacer   Estos anticuerpos pueden ayudar a proteger a wilson bebé contraigan la tos ferina hasta que tienen edad suficiente para recibir Tiffanie Kida

## 2021-08-20 NOTE — PROGRESS NOTES
Elvira Rodriguez presents today for routine OB visit at 34w3d  Blood Pressure: 96/61  Wt=68 1 kg (150 lb 3 2 oz); Body mass index is 31 39 kg/m² ; TWG=16 kg (35 lb 3 2 oz)  Fetal Heart Rate: 138; Fundal Height (cm): 34 cm  Abdomen: gravid, soft, non-tender  She reports no complaints, feeling well  Denies uterine contractions  Denies vaginal bleeding or leaking of fluid  Reports adequate fetal movement of at least 10 movements in 2 hours once daily  Scheduled for ultrasound this afternoon to assess fetal growth  Reviewed premature labor precautions and fetal kick counts  Advised to continue medications and return in 2 weeks  Delivery plan is for repeat c/s with BTL  MA 31 form is completed  Will complete surgical consent forms with Physician at her next scheduled OB visit in 2 weeks  She has a history of multiple E  Coli UTIs in this pregnancy  Urine dip today is again +for nitrites  Urine culture sent  Will begin Macrobid treatment while awaiting repeat culture       Current Outpatient Medications   Medication Instructions    aspirin 162 mg, Oral, Daily    clotrimazole (LOTRIMIN) 1 % cream Topical, 2 times daily    docusate sodium (COLACE) 100 mg, Oral, 2 times daily PRN    ferrous sulfate 324 mg, Oral, Daily before breakfast    Prenatal Vit-Fe Fumarate-FA (PRENATAL 19 PO) Oral         Pregnancy Problems (from 03/03/21 to present)     Problem Noted Resolved    34 weeks gestation of pregnancy 3/26/2021 by Mitch Lilly MD No    Overview Addendum 8/20/2021  8:36 AM by ASHISH Stiles     28 week labs WNL  Delivery Consent signed 7/9/21  S/p TDAP  Declines COVID vaccine  Next ultrasound scheduled 8/20/21  MA 31 is completed  Surgical consent forms at next visit         Previous Version

## 2021-08-20 NOTE — PATIENT INSTRUCTIONS
Conteo de patadas en el embarazo   LO QUE NECESITA SABER:   El conteo de patadas mide cuánto se está moviendo wilson bebé en el útero  Ike patada de wilson bebé podría sentirse cata ike torcedura, ike vuelta, un crujido, un meneo o un golpe  Es común sentir a tu bebé patear a las 32 a 29 semanas de [place]  Es posible que sienta al bebé patear ya a las 20 semanas de [place]  Puede que desee empezar a contar a las 28 semanas  INSTRUCCIONES SOBRE EL [facility]OSPITAL[facility]IA:   Comuníquese inmediatamente con wilson médico si:  · Usted siente un cambio en el número de patadas o movimientos de wilson bebé  · Siente menos de 10 patadas en 2 horas  · Usted tiene preguntas o inquietudes acerca de los movimientos de wilson bebé  Por qué realizar el conteo de patadas: Los movimientos de wilson bebé podrían proporcionar información de la marry de wilson bebé  Es posible que si hay problemas, wilson bebé se mueva menos o nada en lo absoluto  El bebé podría moverse menos si no recibe suficiente oxígeno o alimento de la placenta  No fume mientras está embarazada  Fumar disminuye la cantidad de oxígeno que llega a wilson bebé  Hable con wilson médico si necesita ayuda para dejar de fumar  Los problemas que se encuentran en ike etapa más temprana son más fáciles de tratar  Cuándo realizar el conteo de patadas:  · Cuente las patadas en el mismo horario todos los GRASSE  · Realice el conteo de las patadas cuando wilson bebé esté despierto y [place]  Wilson bebé podría estar más activo en la tarde  Cómo realizar el conteo de patadas: Revise que wilson bebé esté despierto antes de realizar el conteo de patadas  Usted puede despertar a wilson bebé empujando wilson estómago suavemente, caminando o tomando algo frío  Wilson médico podría indicarle diferentes maneras de realizar el conteo  Es posible que le indique que cynthia lo siguiente:  · Use ike gráfica o un reloj para mantener un registro de la hora en que comienza y termina de contar      · Siéntese en ike silla o acuéstese en wilson costado derecho  · Coloque kelvin [place] en la parte más marek de wilson BJURHOLM  · Cuente hasta que llegue a las 10 patadas  Escriba cuánto tiempo le lleva contar las 10 patadas  · Podría allison de 30 minutos a 2 horas para contar 10 patadas  No debería de allison más de 2 horas para contar 10 patadas  Acuda a kelvin consultas de control con wilson médico según le indicaron  Anote kelvin preguntas para que se acuerde de hacerlas catherine kelvin visitas  © Copyright [employer] 2021 Information is for End User's use only and may not be sold, redistributed or otherwise used for commercial purposes  All illustrations and images included in CareNotes® are the copyrighted property of A D A M 3FLOZ  or [address] [place] [address] es sólo para uso en educación  Wilson intención no es darle un consejo médico sobre enfermedades o tratamientos  Colsulte con wilson [place] farmacéutico antes de seguir cualquier régimen médico para saber si es seguro y efectivo para usted

## 2021-08-22 LAB — BACTERIA UR CULT: ABNORMAL

## 2021-09-03 ENCOUNTER — ROUTINE PRENATAL (OUTPATIENT)
Dept: OBGYN CLINIC | Facility: CLINIC | Age: 31
End: 2021-09-03

## 2021-09-03 VITALS
DIASTOLIC BLOOD PRESSURE: 70 MMHG | SYSTOLIC BLOOD PRESSURE: 111 MMHG | BODY MASS INDEX: 31.7 KG/M2 | WEIGHT: 151 LBS | HEIGHT: 58 IN | HEART RATE: 87 BPM

## 2021-09-03 DIAGNOSIS — Z3A.36 36 WEEKS GESTATION OF PREGNANCY: Chronic | ICD-10-CM

## 2021-09-03 DIAGNOSIS — N39.0 URINARY TRACT INFECTION WITHOUT HEMATURIA, SITE UNSPECIFIED: Primary | ICD-10-CM

## 2021-09-03 PROCEDURE — 87150 DNA/RNA AMPLIFIED PROBE: CPT | Performed by: OBSTETRICS & GYNECOLOGY

## 2021-09-03 PROCEDURE — 99213 OFFICE O/P EST LOW 20 MIN: CPT | Performed by: OBSTETRICS & GYNECOLOGY

## 2021-09-03 NOTE — PROGRESS NOTES
OB/GYN prenatal visit    S: 33 y o  [id number] 36w3d here for PN visit  She has general  obstetric discomfort, decline including pelvic pain, contractions, vaginal bleeding, loss of fluid, or decreased fetal movement  O:  Vitals:    21 0823   BP: 111/70   Pulse: 87       Gen: no acute distress, nonlabored breathing     Fetal Heart Rate: 150    A/P:  36 weeks gestation of pregnancy  - Glucola test normal,28 weeks labs normal  - desire for sterilization, MA 31 form signed  -status post Tdap, COVID counseling time  -last ultrasound scanning grossly normal  -surgery will be scheduling at next week  -GBS collected  -blood pressure today         IUP at 36w3d  No obstetric complaints today  TWG: + 16 3kg (recommended weight gain 15-25lbs)  Flu vaccine no, TDAP vaccine yes  Contraception:  Permanent sterilization  Breastfeeding: yes  Birth plan: yes about epidural  Discussed  labor precautions and fetal kick counts    Return to clinic in 1 weeks    COVID vaccine: I encourage the patient to receive COVIC vaccine as supported by ACOG and [facility]  Discussed  That pregnant patients are at increased risk for severe illness and complications from [place] compared to general population including hospitalizations, ICU care, intubation, ECMO and even death  Although vaccine studies did not include the pregnant women, to date there are no suggestions that vaccines should be contraindicated in pregnant women and date from [facility] [place] safe registration does not show increase rate of adverse outcomes; including pregnancy loss, GDM, preeclampsia, FGR  Patient stated understanding and all questions were answered to her satisfaction      COVID 19 precautions were discussed with patient at length, reviewed symptoms, hygiene, social distancing, patient to call office  with questions/concerns    36 weeks: collect GBS/PCN allergy, check fetal position, contraception and birth plan, Victorino Krause MD  [phone]  6:53 AM

## 2021-09-03 NOTE — ASSESSMENT & PLAN NOTE
- Glucola test normal,28 weeks labs normal  - desire for sterilization, MA 31 form signed  -status post Tdap, COVID counseling time  -last ultrasound scanning grossly normal  -surgery will be scheduling at next week  -GBS collected  -blood pressure today

## 2021-09-05 LAB — GP B STREP DNA SPEC QL NAA+PROBE: NEGATIVE

## 2021-09-09 ENCOUNTER — TELEPHONE (OUTPATIENT)
Dept: OBGYN CLINIC | Facility: CLINIC | Age: 31
End: 2021-09-09

## 2021-09-09 NOTE — TELEPHONE ENCOUNTER
----- Message from Anh Morales MD sent at 0/4/8551  9:30 AM EDT -----  Regarding: GBS  Hi, please inform patient GBS status stated as is negative    Thanks, Hometapper  ----- Message -----  From: Lab, Background User  Sent: 9/5/2021   1:37 PM EDT  To: Anh Morales MD

## 2021-09-10 ENCOUNTER — ROUTINE PRENATAL (OUTPATIENT)
Dept: OBGYN CLINIC | Facility: CLINIC | Age: 31
End: 2021-09-10

## 2021-09-10 VITALS
SYSTOLIC BLOOD PRESSURE: 104 MMHG | WEIGHT: 151 LBS | HEIGHT: 58 IN | HEART RATE: 84 BPM | DIASTOLIC BLOOD PRESSURE: 56 MMHG | BODY MASS INDEX: 31.7 KG/M2

## 2021-09-10 DIAGNOSIS — Z3A.37 37 WEEKS GESTATION OF PREGNANCY: Primary | ICD-10-CM

## 2021-09-10 PROCEDURE — 99213 OFFICE O/P EST LOW 20 MIN: CPT | Performed by: OBSTETRICS & GYNECOLOGY

## 2021-09-10 NOTE — PROGRESS NOTES
Assessment/Plan:    37 weeks gestation of pregnancy  GBS negative, other labs including Glucola and 28 weeks lab normal   -Received tdap  COVID vaccine counseling given   Surgery will be scheduled next week   Sterilization: desire tubal ligation, MA 31 form signed  Blood pressure today 104/56     Follow up next week         r  Subjective:      Patient ID: Christina Camarena is a 33 y o  female  37 weeks 3 days  presented for routine prenatal care  Pt denies any complaints today including vaginal bleeding, leakage of fluid, decreased fetal movements, contractions  No other complaints       The following portions of the patient's history were reviewed and updated as appropriate:   She  has a past medical history of Anemia, History of cold sores, Hypertension, Urinary tract infection, and Varicella  She   Patient Active Problem List    Diagnosis Date Noted    Urinary tract infection affecting pregnancy 2021    History of  delivery 2021    Hx of preeclampsia, prior pregnancy, currently pregnant 2021    37 weeks gestation of pregnancy 2021    History of cold sores     Onychomycosis 01/10/2020     She  has a past surgical history that includes  section (2015) and pr  delivery only (N/A, 2019)  Her family history includes Arthritis in her mother; Heart disease in her father; Hyperlipidemia in her mother; Hypertension in her father; No Known Problems in her brother, brother, brother, daughter, maternal grandfather, maternal grandmother, paternal grandfather, paternal grandmother, sister, sister, sister, and son; Osteoporosis in her mother  She  reports that she has never smoked  She has never used smokeless tobacco  She reports previous alcohol use  She reports that she does not use drugs    Current Outpatient Medications   Medication Sig Dispense Refill    aspirin 81 mg chewable tablet Chew 2 tablets (162 mg total) daily 60 tablet 6    ferrous sulfate 324 (65 Fe) mg Take 1 tablet (324 mg total) by mouth daily before breakfast 90 tablet 0    Prenatal Vit-Fe Fumarate-FA (PRENATAL 19 PO) Take by mouth      clotrimazole (LOTRIMIN) 1 % cream Apply topically 2 (two) times a day (Patient not taking: Reported on 8/20/2021) 30 g 1    docusate sodium (COLACE) 100 mg capsule Take 1 capsule (100 mg total) by mouth 2 (two) times a day as needed for constipation (Patient not taking: Reported on 7/9/2021) 60 capsule 1     No current facility-administered medications for this visit  Current Outpatient Medications on File Prior to Visit   Medication Sig    aspirin 81 mg chewable tablet Chew 2 tablets (162 mg total) daily    ferrous sulfate 324 (65 Fe) mg Take 1 tablet (324 mg total) by mouth daily before breakfast    Prenatal Vit-Fe Fumarate-FA (PRENATAL 19 PO) Take by mouth    clotrimazole (LOTRIMIN) 1 % cream Apply topically 2 (two) times a day (Patient not taking: Reported on 8/20/2021)    docusate sodium (COLACE) 100 mg capsule Take 1 capsule (100 mg total) by mouth 2 (two) times a day as needed for constipation (Patient not taking: Reported on 7/9/2021)     No current facility-administered medications on file prior to visit  She has No Known Allergies       Review of Systems   Constitutional: Negative for chills and fever  HENT: Negative for ear pain and sore throat  Eyes: Negative for visual disturbance  Respiratory: Negative for cough and shortness of breath  Cardiovascular: Negative for chest pain and palpitations  Gastrointestinal: Negative for abdominal pain and vomiting  Genitourinary: Negative for dysuria, hematuria, vaginal bleeding, vaginal discharge and vaginal pain  Musculoskeletal: Negative for arthralgias and back pain  Skin: Negative for color change and rash  Neurological: Negative for headaches  All other systems reviewed and are negative          Objective:      /56   Pulse 84   Ht 4' 10" (1 473 m) Wt 68 5 kg (151 lb)   LMP 12/22/2020 (Exact Date)   BMI 31 56 kg/m²          Physical Exam  Vitals reviewed  Constitutional:       Appearance: Normal appearance  HENT:      Head: Normocephalic and atraumatic  Mouth/Throat:      Mouth: Mucous membranes are moist    Eyes:      Conjunctiva/sclera: Conjunctivae normal    Cardiovascular:      Heart sounds: Normal heart sounds  Pulmonary:      Effort: Pulmonary effort is normal    Abdominal:      Comments: Gravid uterus    Musculoskeletal:      Cervical back: Normal range of motion  Right lower leg: No edema  Left lower leg: No edema  Skin:     General: Skin is warm and dry  Neurological:      Mental Status: She is alert        Gait: Gait normal    Psychiatric:         Mood and Affect: Mood normal          Behavior: Behavior normal

## 2021-09-10 NOTE — ASSESSMENT & PLAN NOTE
GBS negative, other labs including Glucola and 28 weeks lab normal   -Received tdap  COVID vaccine counseling given   Surgery will be scheduled next week   Sterilization: desire tubal ligation, MA 31 form signed  Blood pressure today 104/56     Follow up next week

## 2021-09-16 PROBLEM — Z3A.38 38 WEEKS GESTATION OF PREGNANCY: Chronic | Status: ACTIVE | Noted: 2021-03-26

## 2021-09-16 NOTE — PROGRESS NOTES
OB/GYN prenatal visit    S: 33 y o  [id number] 38w2d here for PN visit  She has no obstetric complaints, including pelvic pain, contractions, vaginal bleeding, loss of fluid, or decreased fetal movement       O:  Vitals:    09/17/21 0754   BP: 104/60   Pulse: 79       Gen: no acute distress, nonlabored breathing     Fetal Heart Rate: 139    A/P:  38 weeks gestation of pregnancy  - GBS negative status, previous labs normal  - she received Tdap, COVID vaccine first dose received last week  - surgery will be scheduled today  - contraception she desires permanent sterilization MA 31 sign previously      Hx of preeclampsia, prior pregnancy, currently pregnant  - history of preeclampsia    Surgery Scheduled on 9/23/2021    IUP at 38w2d  No obstetric complaints today  TWG: + 16 8 (recommended weight gain 11-20lbs)  Flu vaccine no, TDAP vaccine yes  Contraception: sterilization, MA 31 signed  Breastfeeding: yes  Birth plan: yes about epidural  Discussed labor precautions and fetal kick counts    Return to clinic in 2  Weeks postpartum    COVID 19 precautions were discussed with patient at length, reviewed symptoms, hygiene, social distancing, patient to call office 24/7 with questions/concerns    36 weeks: collect GBS/PCN allergy, check fetal position, contraception and birth plan, Delmi Reddy MD  [id number]/67/8388  6:66 AM

## 2021-09-16 NOTE — ASSESSMENT & PLAN NOTE
- GBS negative status, previous labs normal  - she received Tdap, COVID vaccine first dose received last week  - surgery will be scheduled today  - contraception she desires permanent sterilization MA 31 sign previously

## 2021-09-17 ENCOUNTER — ROUTINE PRENATAL (OUTPATIENT)
Dept: OBGYN CLINIC | Facility: CLINIC | Age: 31
End: 2021-09-17

## 2021-09-17 VITALS
BODY MASS INDEX: 31.91 KG/M2 | HEIGHT: 58 IN | HEART RATE: 79 BPM | DIASTOLIC BLOOD PRESSURE: 60 MMHG | WEIGHT: 152 LBS | SYSTOLIC BLOOD PRESSURE: 104 MMHG

## 2021-09-17 DIAGNOSIS — Z3A.38 38 WEEKS GESTATION OF PREGNANCY: Primary | Chronic | ICD-10-CM

## 2021-09-17 PROCEDURE — 99213 OFFICE O/P EST LOW 20 MIN: CPT | Performed by: OBSTETRICS & GYNECOLOGY

## 2021-09-21 ENCOUNTER — ANESTHESIA EVENT (INPATIENT)
Dept: LABOR AND DELIVERY | Facility: HOSPITAL | Age: 31
DRG: 539 | End: 2021-09-21
Payer: COMMERCIAL

## 2021-09-21 NOTE — H&P
H&P Exam - Obstetrics   Tereso Rodriguez 33 y o  female MRN: [id number]  Unit/Bed#:  Encounter: [id number]    Assessment/Plan     Assessment:  68-year-old P2 at 39 weeks and 2 days here for a repeat low-transverse  delivery and permanent sterilization  Plan:  Repeat low-transverse  delivery and permanent sterilization  History of Present Illness   Chief Complaint:  with tubal sterilization    HPI:  Tereso Rodriguez is a 33 y o   female with an RUTH of 2021, by Last Menstrual Period at 39w0d weeks gestation who is being admitted for  with tubal sterilization  Her current obstetrical history is significant for prior   Contractions: None  Leakage of fluid: None  Bleeding: None  Fetal movement: present  Pregnancy complications: none  Review of Systems   Constitutional: Negative for fever  Respiratory: Negative for shortness of breath  Cardiovascular: Negative for chest pain  Genitourinary: Negative for vaginal bleeding         Historical Information   OB History    Para Term  AB Living   3 2 2     2   SAB TAB Ectopic Multiple Live Births         0 2      # Outcome Date GA Lbr Escobar/2nd Weight Sex Delivery Anes PTL Lv   3 Current            2 Term 19 39w0d  3856 g (8 lb 8 oz) F CS-LTranv Spinal N MARY   1 Term 08/28/15 40w0d  4054 g (8 lb 15 oz) M CS-LTranv  N MARY      Birth Comments: NRFHT      Obstetric Comments   Menstrual cycle: 15     Baby complications/comments: None  Past Medical History:   Diagnosis Date    Anemia     History of cold sores     last assessed: 10/12/2016     Hypertension     History of pre-eclampsia     Urinary tract infection     Hx of UTI during last pregnancy    Varicella     Positive Hx     Past Surgical History:   Procedure Laterality Date     SECTION  2015    IN  DELIVERY ONLY N/A 2019    Procedure:  SECTION () REPEAT;  Surgeon: Matilda March MD;  Location: [facility];  Service: Obstetrics     Social History   Social History     Substance and Sexual Activity   Alcohol Use Not Currently    Alcohol/week: 0 0 standard drinks     Social History     Substance and Sexual Activity   Drug Use No     Social History     Tobacco Use   Smoking Status Never Smoker   Smokeless Tobacco Never Used     E-Cigarette/Vaping    E-Cigarette Use Never User      E-Cigarette/Vaping Substances    Nicotine No     THC No     CBD No     Flavoring No     Unknown No      Family History: non-contributory    Meds/Allergies   all medications and allergies reviewed  No Known Allergies    Objective   Vitals: Last menstrual period 2020, currently breastfeeding  There is no height or weight on file to calculate BMI  Invasive Devices     None                 Physical Exam  Constitutional:       Appearance: Normal appearance  Cardiovascular:      Rate and Rhythm: Normal rate and regular rhythm  Pulmonary:      Effort: Pulmonary effort is normal       Breath sounds: Normal breath sounds  Abdominal:      Palpations: Abdomen is soft  Skin:     General: Skin is warm and dry  Neurological:      General: No focal deficit present  Mental Status: She is alert and oriented to person, place, and time  Psychiatric:         Mood and Affect: Mood normal          Prenatal Labs: I have personally reviewed pertinent reports  Imaging, EKG, Pathology, and Other Studies: I have personally reviewed pertinent reports  The patient was counseled regarding indications, risks, benefits and alternatives to repeat  with permanent sterilization  We discussed risks including but not limited to bleeding and potential need for blood transfusion, infection, pain, injury to surrounding organs such as bladder, intestines, ureters and neurovascular structures    Patient understands potential risks associated stress of surgery, not limited to, injury to internal organs, infections, acute cardiac events, venous thrombosis embolism, etc   All questions answered to patient satisfaction  Patient agrees and wants to proceed with repeat  and permanent sterilization  Surgical consent reviewed and signed  MA-31 reviewed and signed

## 2021-09-23 ENCOUNTER — ANESTHESIA (INPATIENT)
Dept: LABOR AND DELIVERY | Facility: HOSPITAL | Age: 31
DRG: 539 | End: 2021-09-23
Payer: COMMERCIAL

## 2021-09-23 ENCOUNTER — HOSPITAL ENCOUNTER (INPATIENT)
Facility: HOSPITAL | Age: 31
LOS: 3 days | Discharge: HOME | DRG: 539 | End: 2021-09-26
Attending: OBSTETRICS & GYNECOLOGY | Admitting: OBSTETRICS & GYNECOLOGY
Payer: COMMERCIAL

## 2021-09-23 DIAGNOSIS — O34.219 PREVIOUS CESAREAN SECTION COMPLICATING PREGNANCY, WITH DELIVERY: ICD-10-CM

## 2021-09-23 DIAGNOSIS — Z98.891 S/P REPEAT LOW TRANSVERSE C-SECTION: ICD-10-CM

## 2021-09-23 DIAGNOSIS — Z98.891 HISTORY OF CESAREAN DELIVERY: Primary | ICD-10-CM

## 2021-09-23 PROBLEM — Z90.79 STATUS POST BILATERAL SALPINGECTOMY: Status: ACTIVE | Noted: 2021-09-23

## 2021-09-23 LAB
ABO GROUP BLD: NORMAL
ALBUMIN SERPL BCP-MCNC: 2.7 G/DL (ref 3.5–5)
ALP SERPL-CCNC: 212 U/L (ref 46–116)
ALT SERPL W P-5'-P-CCNC: 25 U/L (ref 12–78)
ANION GAP SERPL CALCULATED.3IONS-SCNC: 13 MMOL/L (ref 4–13)
AST SERPL W P-5'-P-CCNC: 37 U/L (ref 5–45)
BASE EXCESS BLDCOA CALC-SCNC: -1.9 MMOL/L (ref 3–11)
BASE EXCESS BLDCOV CALC-SCNC: -4.3 MMOL/L (ref 1–9)
BASOPHILS # BLD AUTO: 0.02 THOUSANDS/ΜL (ref 0–0.1)
BASOPHILS # BLD AUTO: 0.03 THOUSANDS/ΜL (ref 0–0.1)
BASOPHILS NFR BLD AUTO: 0 % (ref 0–1)
BASOPHILS NFR BLD AUTO: 0 % (ref 0–1)
BILIRUB SERPL-MCNC: 0.33 MG/DL (ref 0.2–1)
BLD GP AB SCN SERPL QL: NEGATIVE
BUN SERPL-MCNC: 11 MG/DL (ref 5–25)
CALCIUM ALBUM COR SERPL-MCNC: 9.5 MG/DL (ref 8.3–10.1)
CALCIUM SERPL-MCNC: 8.5 MG/DL (ref 8.3–10.1)
CHLORIDE SERPL-SCNC: 104 MMOL/L (ref 100–108)
CO2 SERPL-SCNC: 19 MMOL/L (ref 21–32)
CREAT SERPL-MCNC: 0.51 MG/DL (ref 0.6–1.3)
CREAT UR-MCNC: 104 MG/DL
EOSINOPHIL # BLD AUTO: 0.02 THOUSAND/ΜL (ref 0–0.61)
EOSINOPHIL # BLD AUTO: 0.18 THOUSAND/ΜL (ref 0–0.61)
EOSINOPHIL NFR BLD AUTO: 0 % (ref 0–6)
EOSINOPHIL NFR BLD AUTO: 2 % (ref 0–6)
ERYTHROCYTE [DISTWIDTH] IN BLOOD BY AUTOMATED COUNT: 13.7 % (ref 11.6–15.1)
ERYTHROCYTE [DISTWIDTH] IN BLOOD BY AUTOMATED COUNT: 14.3 % (ref 11.6–15.1)
GFR SERPL CREATININE-BSD FRML MDRD: 127 ML/MIN/1.73SQ M
GLUCOSE SERPL-MCNC: 80 MG/DL (ref 65–140)
HCO3 BLDCOA-SCNC: 25.7 MMOL/L (ref 17.3–27.3)
HCO3 BLDCOV-SCNC: 20.8 MMOL/L (ref 12.2–28.6)
HCT VFR BLD AUTO: 26.3 % (ref 34.8–46.1)
HCT VFR BLD AUTO: 29.3 % (ref 34.8–46.1)
HGB BLD-MCNC: 8.8 G/DL (ref 11.5–15.4)
HGB BLD-MCNC: 9.5 G/DL (ref 11.5–15.4)
HOLD SPECIMEN: NORMAL
IMM GRANULOCYTES # BLD AUTO: 0.11 THOUSAND/UL (ref 0–0.2)
IMM GRANULOCYTES # BLD AUTO: 0.12 THOUSAND/UL (ref 0–0.2)
IMM GRANULOCYTES NFR BLD AUTO: 1 % (ref 0–2)
IMM GRANULOCYTES NFR BLD AUTO: 1 % (ref 0–2)
LYMPHOCYTES # BLD AUTO: 1.7 THOUSANDS/ΜL (ref 0.6–4.47)
LYMPHOCYTES # BLD AUTO: 2.31 THOUSANDS/ΜL (ref 0.6–4.47)
LYMPHOCYTES NFR BLD AUTO: 10 % (ref 14–44)
LYMPHOCYTES NFR BLD AUTO: 25 % (ref 14–44)
MCH RBC QN AUTO: 27 PG (ref 26.8–34.3)
MCH RBC QN AUTO: 27.1 PG (ref 26.8–34.3)
MCHC RBC AUTO-ENTMCNC: 32.4 G/DL (ref 31.4–37.4)
MCHC RBC AUTO-ENTMCNC: 33.5 G/DL (ref 31.4–37.4)
MCV RBC AUTO: 81 FL (ref 82–98)
MCV RBC AUTO: 83 FL (ref 82–98)
MONOCYTES # BLD AUTO: 0.52 THOUSAND/ΜL (ref 0.17–1.22)
MONOCYTES # BLD AUTO: 0.69 THOUSAND/ΜL (ref 0.17–1.22)
MONOCYTES NFR BLD AUTO: 4 % (ref 4–12)
MONOCYTES NFR BLD AUTO: 6 % (ref 4–12)
NEUTROPHILS # BLD AUTO: 14.28 THOUSANDS/ΜL (ref 1.85–7.62)
NEUTROPHILS # BLD AUTO: 6.07 THOUSANDS/ΜL (ref 1.85–7.62)
NEUTS SEG NFR BLD AUTO: 66 % (ref 43–75)
NEUTS SEG NFR BLD AUTO: 85 % (ref 43–75)
NRBC BLD AUTO-RTO: 0 /100 WBCS
NRBC BLD AUTO-RTO: 0 /100 WBCS
O2 CT VFR BLDCOA CALC: 4.1 ML/DL
OXYHGB MFR BLDCOA: 17.8 %
OXYHGB MFR BLDCOV: 63 %
PCO2 BLDCOA: 54.5 MM[HG] (ref 30–60)
PCO2 BLDCOV: 38.7 MM HG (ref 27–43)
PH BLDCOA: 7.29 [PH] (ref 7.23–7.43)
PH BLDCOV: 7.35 [PH] (ref 7.19–7.49)
PLATELET # BLD AUTO: 300 THOUSANDS/UL (ref 149–390)
PLATELET # BLD AUTO: 317 THOUSANDS/UL (ref 149–390)
PMV BLD AUTO: 10 FL (ref 8.9–12.7)
PMV BLD AUTO: 9.9 FL (ref 8.9–12.7)
PO2 BLDCOA: 13.1 MM HG (ref 5–25)
PO2 BLDCOV: 28.4 MM HG (ref 15–45)
POTASSIUM SERPL-SCNC: 3.9 MMOL/L (ref 3.5–5.3)
PROT SERPL-MCNC: 6.9 G/DL (ref 6.4–8.2)
PROT UR-MCNC: 44 MG/DL
PROT/CREAT UR: 0.42 MG/G{CREAT} (ref 0–0.1)
RBC # BLD AUTO: 3.25 MILLION/UL (ref 3.81–5.12)
RBC # BLD AUTO: 3.52 MILLION/UL (ref 3.81–5.12)
RH BLD: POSITIVE
RPR SER QL: NORMAL
SAO2 % BLDCOV: 14.7 ML/DL
SODIUM SERPL-SCNC: 136 MMOL/L (ref 136–145)
SPECIMEN EXPIRATION DATE: NORMAL
WBC # BLD AUTO: 16.84 THOUSAND/UL (ref 4.31–10.16)
WBC # BLD AUTO: 9.21 THOUSAND/UL (ref 4.31–10.16)

## 2021-09-23 PROCEDURE — 82570 ASSAY OF URINE CREATININE: CPT | Performed by: OBSTETRICS & GYNECOLOGY

## 2021-09-23 PROCEDURE — 85025 COMPLETE CBC W/AUTO DIFF WBC: CPT | Performed by: OBSTETRICS & GYNECOLOGY

## 2021-09-23 PROCEDURE — 80053 COMPREHEN METABOLIC PANEL: CPT | Performed by: OBSTETRICS & GYNECOLOGY

## 2021-09-23 PROCEDURE — 86592 SYPHILIS TEST NON-TREP QUAL: CPT | Performed by: OBSTETRICS & GYNECOLOGY

## 2021-09-23 PROCEDURE — 86920 COMPATIBILITY TEST SPIN: CPT

## 2021-09-23 PROCEDURE — 86901 BLOOD TYPING SEROLOGIC RH(D): CPT | Performed by: OBSTETRICS & GYNECOLOGY

## 2021-09-23 PROCEDURE — 86850 RBC ANTIBODY SCREEN: CPT | Performed by: OBSTETRICS & GYNECOLOGY

## 2021-09-23 PROCEDURE — 86900 BLOOD TYPING SEROLOGIC ABO: CPT | Performed by: OBSTETRICS & GYNECOLOGY

## 2021-09-23 PROCEDURE — 88302 TISSUE EXAM BY PATHOLOGIST: CPT | Performed by: PATHOLOGY

## 2021-09-23 PROCEDURE — 84156 ASSAY OF PROTEIN URINE: CPT | Performed by: OBSTETRICS & GYNECOLOGY

## 2021-09-23 PROCEDURE — 58611 LIGATE OVIDUCT(S) ADD-ON: CPT | Performed by: OBSTETRICS & GYNECOLOGY

## 2021-09-23 PROCEDURE — 0UB70ZZ EXCISION OF BILATERAL FALLOPIAN TUBES, OPEN APPROACH: ICD-10-PCS | Performed by: OBSTETRICS & GYNECOLOGY

## 2021-09-23 PROCEDURE — 82805 BLOOD GASES W/O2 SATURATION: CPT | Performed by: OBSTETRICS & GYNECOLOGY

## 2021-09-23 PROCEDURE — NC001 PR NO CHARGE: Performed by: OBSTETRICS & GYNECOLOGY

## 2021-09-23 PROCEDURE — 59514 CESAREAN DELIVERY ONLY: CPT | Performed by: OBSTETRICS & GYNECOLOGY

## 2021-09-23 RX ORDER — ACETAMINOPHEN 325 MG/1
650 TABLET ORAL EVERY 6 HOURS
Status: DISPENSED | OUTPATIENT
Start: 2021-09-23 | End: 2021-09-25

## 2021-09-23 RX ORDER — NALOXONE HYDROCHLORIDE 0.4 MG/ML
0.1 INJECTION, SOLUTION INTRAMUSCULAR; INTRAVENOUS; SUBCUTANEOUS
Status: ACTIVE | OUTPATIENT
Start: 2021-09-23 | End: 2021-09-24

## 2021-09-23 RX ORDER — FENTANYL CITRATE 50 UG/ML
INJECTION, SOLUTION INTRAMUSCULAR; INTRAVENOUS AS NEEDED
Status: DISCONTINUED | OUTPATIENT
Start: 2021-09-23 | End: 2021-09-23

## 2021-09-23 RX ORDER — FENTANYL CITRATE/PF 50 MCG/ML
25 SYRINGE (ML) INJECTION
Status: DISCONTINUED | OUTPATIENT
Start: 2021-09-23 | End: 2021-09-26 | Stop reason: HOSPADM

## 2021-09-23 RX ORDER — HYDROMORPHONE HCL/PF 1 MG/ML
0.5 SYRINGE (ML) INJECTION EVERY 2 HOUR PRN
Status: DISCONTINUED | OUTPATIENT
Start: 2021-09-23 | End: 2021-09-23

## 2021-09-23 RX ORDER — SIMETHICONE 80 MG
80 TABLET,CHEWABLE ORAL 4 TIMES DAILY PRN
Status: DISCONTINUED | OUTPATIENT
Start: 2021-09-23 | End: 2021-09-26 | Stop reason: HOSPADM

## 2021-09-23 RX ORDER — BUPIVACAINE HYDROCHLORIDE 7.5 MG/ML
INJECTION, SOLUTION INTRASPINAL AS NEEDED
Status: DISCONTINUED | OUTPATIENT
Start: 2021-09-23 | End: 2021-09-23

## 2021-09-23 RX ORDER — HYDROMORPHONE HCL/PF 1 MG/ML
0.5 SYRINGE (ML) INJECTION
Status: DISCONTINUED | OUTPATIENT
Start: 2021-09-23 | End: 2021-09-26 | Stop reason: HOSPADM

## 2021-09-23 RX ORDER — SODIUM CHLORIDE, SODIUM LACTATE, POTASSIUM CHLORIDE, CALCIUM CHLORIDE 600; 310; 30; 20 MG/100ML; MG/100ML; MG/100ML; MG/100ML
INJECTION, SOLUTION INTRAVENOUS CONTINUOUS PRN
Status: DISCONTINUED | OUTPATIENT
Start: 2021-09-23 | End: 2021-09-23

## 2021-09-23 RX ORDER — DOCUSATE SODIUM 100 MG/1
100 CAPSULE, LIQUID FILLED ORAL 2 TIMES DAILY
Status: DISCONTINUED | OUTPATIENT
Start: 2021-09-23 | End: 2021-09-26 | Stop reason: HOSPADM

## 2021-09-23 RX ORDER — SODIUM CHLORIDE, SODIUM LACTATE, POTASSIUM CHLORIDE, CALCIUM CHLORIDE 600; 310; 30; 20 MG/100ML; MG/100ML; MG/100ML; MG/100ML
125 INJECTION, SOLUTION INTRAVENOUS CONTINUOUS
Status: DISCONTINUED | OUTPATIENT
Start: 2021-09-23 | End: 2021-09-26 | Stop reason: HOSPADM

## 2021-09-23 RX ORDER — ONDANSETRON 2 MG/ML
4 INJECTION INTRAMUSCULAR; INTRAVENOUS EVERY 4 HOURS PRN
Status: ACTIVE | OUTPATIENT
Start: 2021-09-23 | End: 2021-09-24

## 2021-09-23 RX ORDER — DEXAMETHASONE SODIUM PHOSPHATE 4 MG/ML
8 INJECTION, SOLUTION INTRA-ARTICULAR; INTRALESIONAL; INTRAMUSCULAR; INTRAVENOUS; SOFT TISSUE ONCE AS NEEDED
Status: ACTIVE | OUTPATIENT
Start: 2021-09-23 | End: 2021-09-24

## 2021-09-23 RX ORDER — ACETAMINOPHEN 325 MG/1
650 TABLET ORAL EVERY 6 HOURS PRN
Status: DISCONTINUED | OUTPATIENT
Start: 2021-09-24 | End: 2021-09-25

## 2021-09-23 RX ORDER — CALCIUM CARBONATE 200(500)MG
1000 TABLET,CHEWABLE ORAL DAILY PRN
Status: DISCONTINUED | OUTPATIENT
Start: 2021-09-23 | End: 2021-09-26 | Stop reason: HOSPADM

## 2021-09-23 RX ORDER — CEFAZOLIN SODIUM 2 G/50ML
2000 SOLUTION INTRAVENOUS ONCE
Status: DISCONTINUED | OUTPATIENT
Start: 2021-09-23 | End: 2021-09-23

## 2021-09-23 RX ORDER — MORPHINE SULFATE 0.5 MG/ML
INJECTION, SOLUTION EPIDURAL; INTRATHECAL; INTRAVENOUS AS NEEDED
Status: DISCONTINUED | OUTPATIENT
Start: 2021-09-23 | End: 2021-09-23

## 2021-09-23 RX ORDER — METOCLOPRAMIDE HYDROCHLORIDE 5 MG/ML
5 INJECTION INTRAMUSCULAR; INTRAVENOUS EVERY 6 HOURS PRN
Status: ACTIVE | OUTPATIENT
Start: 2021-09-23 | End: 2021-09-24

## 2021-09-23 RX ORDER — ONDANSETRON 2 MG/ML
INJECTION INTRAMUSCULAR; INTRAVENOUS AS NEEDED
Status: DISCONTINUED | OUTPATIENT
Start: 2021-09-23 | End: 2021-09-23

## 2021-09-23 RX ORDER — OXYTOCIN/RINGER'S LACTATE 30/500 ML
62.5 PLASTIC BAG, INJECTION (ML) INTRAVENOUS CONTINUOUS
Status: ACTIVE | OUTPATIENT
Start: 2021-09-23 | End: 2021-09-23

## 2021-09-23 RX ORDER — ACETAMINOPHEN 325 MG/1
650 TABLET ORAL EVERY 6 HOURS PRN
Status: DISPENSED | OUTPATIENT
Start: 2021-09-23 | End: 2021-09-24

## 2021-09-23 RX ORDER — IBUPROFEN 600 MG/1
600 TABLET ORAL EVERY 6 HOURS PRN
Status: DISCONTINUED | OUTPATIENT
Start: 2021-09-23 | End: 2021-09-25

## 2021-09-23 RX ORDER — CEFAZOLIN SODIUM 2 G/50ML
SOLUTION INTRAVENOUS AS NEEDED
Status: DISCONTINUED | OUTPATIENT
Start: 2021-09-23 | End: 2021-09-23

## 2021-09-23 RX ORDER — DIAPER,BRIEF,INFANT-TODD,DISP
1 EACH MISCELLANEOUS DAILY PRN
Status: DISCONTINUED | OUTPATIENT
Start: 2021-09-23 | End: 2021-09-26 | Stop reason: HOSPADM

## 2021-09-23 RX ORDER — SODIUM CHLORIDE, SODIUM LACTATE, POTASSIUM CHLORIDE, CALCIUM CHLORIDE 600; 310; 30; 20 MG/100ML; MG/100ML; MG/100ML; MG/100ML
125 INJECTION, SOLUTION INTRAVENOUS CONTINUOUS
Status: DISCONTINUED | OUTPATIENT
Start: 2021-09-23 | End: 2021-09-23

## 2021-09-23 RX ORDER — DIPHENHYDRAMINE HYDROCHLORIDE 50 MG/ML
10 INJECTION INTRAMUSCULAR; INTRAVENOUS EVERY 6 HOURS PRN
Status: DISCONTINUED | OUTPATIENT
Start: 2021-09-23 | End: 2021-09-23

## 2021-09-23 RX ORDER — SENNOSIDES 8.6 MG
1 TABLET ORAL
Status: DISCONTINUED | OUTPATIENT
Start: 2021-09-23 | End: 2021-09-26 | Stop reason: HOSPADM

## 2021-09-23 RX ORDER — ONDANSETRON 2 MG/ML
4 INJECTION INTRAMUSCULAR; INTRAVENOUS EVERY 4 HOURS PRN
Status: DISCONTINUED | OUTPATIENT
Start: 2021-09-23 | End: 2021-09-23

## 2021-09-23 RX ORDER — NALOXONE HYDROCHLORIDE 0.4 MG/ML
0.1 INJECTION, SOLUTION INTRAMUSCULAR; INTRAVENOUS; SUBCUTANEOUS
Status: DISCONTINUED | OUTPATIENT
Start: 2021-09-23 | End: 2021-09-23

## 2021-09-23 RX ORDER — OXYTOCIN/RINGER'S LACTATE 30/500 ML
PLASTIC BAG, INJECTION (ML) INTRAVENOUS AS NEEDED
Status: DISCONTINUED | OUTPATIENT
Start: 2021-09-23 | End: 2021-09-23

## 2021-09-23 RX ORDER — TRISODIUM CITRATE DIHYDRATE AND CITRIC ACID MONOHYDRATE 500; 334 MG/5ML; MG/5ML
30 SOLUTION ORAL ONCE
Status: COMPLETED | OUTPATIENT
Start: 2021-09-23 | End: 2021-09-23

## 2021-09-23 RX ORDER — ONDANSETRON 2 MG/ML
4 INJECTION INTRAMUSCULAR; INTRAVENOUS ONCE AS NEEDED
Status: DISCONTINUED | OUTPATIENT
Start: 2021-09-23 | End: 2021-09-26 | Stop reason: HOSPADM

## 2021-09-23 RX ORDER — ACETAMINOPHEN 325 MG/1
650 TABLET ORAL EVERY 6 HOURS PRN
Status: DISCONTINUED | OUTPATIENT
Start: 2021-09-23 | End: 2021-09-23

## 2021-09-23 RX ORDER — DIPHENHYDRAMINE HCL 25 MG
25 TABLET ORAL EVERY 6 HOURS PRN
Status: DISCONTINUED | OUTPATIENT
Start: 2021-09-24 | End: 2021-09-26 | Stop reason: HOSPADM

## 2021-09-23 RX ORDER — DEXAMETHASONE SODIUM PHOSPHATE 4 MG/ML
INJECTION, SOLUTION INTRA-ARTICULAR; INTRALESIONAL; INTRAMUSCULAR; INTRAVENOUS; SOFT TISSUE AS NEEDED
Status: DISCONTINUED | OUTPATIENT
Start: 2021-09-23 | End: 2021-09-23

## 2021-09-23 RX ORDER — DIPHENHYDRAMINE HYDROCHLORIDE 50 MG/ML
25 INJECTION INTRAMUSCULAR; INTRAVENOUS EVERY 6 HOURS PRN
Status: DISPENSED | OUTPATIENT
Start: 2021-09-23 | End: 2021-09-24

## 2021-09-23 RX ADMIN — DOCUSATE SODIUM 100 MG: 100 CAPSULE, LIQUID FILLED ORAL at 20:05

## 2021-09-23 RX ADMIN — SODIUM CITRATE AND CITRIC ACID MONOHYDRATE 30 ML: 500; 334 SOLUTION ORAL at 09:37

## 2021-09-23 RX ADMIN — ACETAMINOPHEN 650 MG: 325 TABLET, FILM COATED ORAL at 20:05

## 2021-09-23 RX ADMIN — DIPHENHYDRAMINE HYDROCHLORIDE 25 MG: 50 INJECTION, SOLUTION INTRAMUSCULAR; INTRAVENOUS at 13:26

## 2021-09-23 RX ADMIN — SODIUM CHLORIDE, SODIUM LACTATE, POTASSIUM CHLORIDE, AND CALCIUM CHLORIDE: .6; .31; .03; .02 INJECTION, SOLUTION INTRAVENOUS at 11:10

## 2021-09-23 RX ADMIN — SODIUM CHLORIDE, SODIUM LACTATE, POTASSIUM CHLORIDE, AND CALCIUM CHLORIDE 125 ML/HR: .6; .31; .03; .02 INJECTION, SOLUTION INTRAVENOUS at 09:55

## 2021-09-23 RX ADMIN — SODIUM CHLORIDE, SODIUM LACTATE, POTASSIUM CHLORIDE, AND CALCIUM CHLORIDE: .6; .31; .03; .02 INJECTION, SOLUTION INTRAVENOUS at 10:04

## 2021-09-23 RX ADMIN — FAMOTIDINE 20 MG: 10 INJECTION, SOLUTION INTRAVENOUS at 09:37

## 2021-09-23 RX ADMIN — Medication 1 TABLET: at 20:05

## 2021-09-23 RX ADMIN — FENTANYL CITRATE 25 MCG: 50 INJECTION INTRAMUSCULAR; INTRAVENOUS at 13:22

## 2021-09-23 RX ADMIN — SODIUM CHLORIDE, SODIUM LACTATE, POTASSIUM CHLORIDE, AND CALCIUM CHLORIDE 1000 ML: .6; .31; .03; .02 INJECTION, SOLUTION INTRAVENOUS at 08:54

## 2021-09-23 RX ADMIN — PHENYLEPHRINE HYDROCHLORIDE 50 MCG/MIN: 10 INJECTION INTRAVENOUS at 10:22

## 2021-09-23 RX ADMIN — CEFAZOLIN SODIUM 2000 MG: 2 SOLUTION INTRAVENOUS at 10:23

## 2021-09-23 RX ADMIN — SODIUM CHLORIDE, SODIUM LACTATE, POTASSIUM CHLORIDE, AND CALCIUM CHLORIDE 125 ML/HR: .6; .31; .03; .02 INJECTION, SOLUTION INTRAVENOUS at 12:32

## 2021-09-23 RX ADMIN — Medication 62.5 MILLI-UNITS/MIN: at 12:32

## 2021-09-23 RX ADMIN — DEXAMETHASONE SODIUM PHOSPHATE 4 MG: 4 INJECTION INTRA-ARTICULAR; INTRALESIONAL; INTRAMUSCULAR; INTRAVENOUS; SOFT TISSUE at 10:26

## 2021-09-23 RX ADMIN — Medication 999 MILLI-UNITS/MIN: at 10:44

## 2021-09-23 RX ADMIN — BUPIVACAINE HYDROCHLORIDE IN DEXTROSE 1.4 ML: 7.5 INJECTION, SOLUTION SUBARACHNOID at 10:21

## 2021-09-23 RX ADMIN — FENTANYL CITRATE 15 MCG: 50 INJECTION, SOLUTION INTRAMUSCULAR; INTRAVENOUS at 10:21

## 2021-09-23 RX ADMIN — IBUPROFEN 600 MG: 600 TABLET ORAL at 23:04

## 2021-09-23 RX ADMIN — ONDANSETRON 4 MG: 2 INJECTION INTRAMUSCULAR; INTRAVENOUS at 10:26

## 2021-09-23 RX ADMIN — MORPHINE SULFATE 0.15 MG: 0.5 INJECTION, SOLUTION EPIDURAL; INTRATHECAL; INTRAVENOUS at 10:21

## 2021-09-23 RX ADMIN — FENTANYL CITRATE 25 MCG: 50 INJECTION INTRAMUSCULAR; INTRAVENOUS at 13:08

## 2021-09-23 NOTE — ANESTHESIA POSTPROCEDURE EVALUATION
Post-Op Assessment Note    CV Status:  Stable  Pain Score: 0    Pain management: adequate     Mental Status:  Alert and awake   Hydration Status:  Euvolemic   PONV Controlled:  Controlled   Airway Patency:  Patent      Post Op Vitals Reviewed: Yes      Comments: catheter left in situ due to some oozing and anesthesia will reassess need to pull catheter in 2 hours        No complications documented      /65 (09/23/21 1148)    Temp      Pulse 68 (09/23/21 1148)   Resp  15   SpO2   98

## 2021-09-23 NOTE — PLAN OF CARE
Problem: POSTPARTUM  Goal: Experiences normal postpartum course  Description: INTERVENTIONS:  - Monitor maternal vital signs  - Assess uterine involution and lochia  Outcome: Progressing  Goal: Appropriate maternal -  bonding  Description: INTERVENTIONS:  - Identify family support  - Assess for appropriate maternal/infant bonding   -Encourage maternal/infant bonding opportunities  - Referral to  or  as needed  Outcome: Progressing  Goal: Establishment of infant feeding pattern  Description: INTERVENTIONS:  - Assess breast/bottle feeding  - Refer to lactation as needed  Outcome: Progressing  Goal: Incision(s), wounds(s) or drain site(s) healing without S/S of infection  Description: INTERVENTIONS  - Assess and document dressing, incision, wound bed, drain sites and surrounding tissue  - Provide patient and family education  Outcome: Progressing     Problem: ALTERATION IN THE BREAST  Goal: Optimize infant feeding at the breast  Description: INTERVENTIONS:  - Latch, breast and nipple assessment  - Assess prior breast feeding history  - Hand expression of breast milk  - For cracked, bleeding and or sore nipples reassess latch, treat damaged nipple  -Educate mother on feeding cues  -Positioning/latch techniques  Outcome: Progressing     Problem: INADEQUATE LATCH, SUCK OR SWALLOW  Goal: Demonstrate ability to latch and sustain latch, audible swallowing and satiety  Description: INTERVENTIONS:  - Assess oral anatomy, notify Physician/AP for abnormal findings  - Establish milk expression  - Maximize feeding opportunity (skin to skin, behavioral state)  - Position/latch techniques  - Discourage use of pacifier-artificial nipple  - Mechanical pumping  - Nipple Shield  - Supplemental formula feeding (Physician/AP order)  - Alternative feeding method  Outcome: Progressing     Problem: BIRTH - VAGINAL/ SECTION  Goal: Fetal and maternal status remain reassuring during the birth process  Description: INTERVENTIONS:  - Monitor vital signs  - Monitor fetal heart rate  - Monitor uterine activity  - DVT prophylaxis  - Antibiotic prophylaxis  Outcome: Completed  Goal: Emotionally satisfying birthing experience for mother/fetus  Description: Interventions:  - Assess, plan, implement and evaluate the nursing care given to the patient in labor  - Advocate the philosophy that each childbirth experience is a unique experience and support the family's chosen level of involvement and control during the labor process   - Actively participate in both the patient's and family's teaching of the birth process  - Consider cultural, Buddhism and age-specific factors and plan care for the patient in labor  Outcome: Completed

## 2021-09-23 NOTE — ANESTHESIA PROCEDURE NOTES
Epidural Block    Patient location during procedure: OB  Reason for block: at surgeon's request, post-op pain management and primary anesthetic  Staffing  Performed: Anesthesiologist and CRNA   Anesthesiologist: Namrata Wellington MD  Resident/CRNA: Rosalina Hinson CRNA  Preanesthetic Checklist  Completed: patient identified, IV checked, site marked, risks and benefits discussed, surgical consent, monitors and equipment checked, pre-op evaluation and timeout performed  Epidural  Patient position: sitting  Prep: ChloraPrep  Patient monitoring: heart rate and frequent blood pressure checks  Approach: midline  Injection technique: CATHLEEN saline  Needle  Needle type: Tuohy   Needle gauge: 17 G  Catheter type: side hole  Catheter size: 19 G  Catheter at skin depth: 10 cm  Catheter securement method: clear occlusive dressing  Test dose: negative  Assessment  Sensory level: T10  Number of attempts: 1negative aspiration for CSF, negative aspiration for heme and no paresthesia on injection  patient tolerated the procedure well with no immediate complications  Additional Notes  Uncomplicated combined spinal-epidural  Positive CSF with IT dose  Epidural was secured with clear dressing, liquid adhesive, and surgical tape

## 2021-09-23 NOTE — ANESTHESIA PREPROCEDURE EVALUATION
Procedure:   SECTION () REPEAT (N/A Uterus)  LIGATION/COAGULATION TUBAL (Bilateral Abdomen)    33year old  who presents for repeat C/S    PMHx of anemia (Hb 9 5)  Plts 300        Relevant Problems   GYN   (+) 38 weeks gestation of pregnancy      NEURO/PSYCH   (+) History of cold sores   (+) Hx of preeclampsia, prior pregnancy, currently pregnant        Physical Exam    Airway    Mallampati score: II  TM Distance: >3 FB  Neck ROM: full     Dental   No notable dental hx     Cardiovascular  Rhythm: regular, Rate: normal,     Pulmonary  Breath sounds clear to auscultation,     Other Findings  Intercisor Distance > 3cm          Anesthesia Plan  ASA Score- 2     Anesthesia Type- spinal and epidural with ASA Monitors  Additional Monitors:   Airway Plan:     Comment: Discussed benefits/risks of neuraxial anesthesia including possibility of discomfort at site of injection, post-dural puncture headache, block failure, and more rare complications such as bleeding, infection, and permanent nerve damage  If block fails or there is difficulty placing neuraxial block, general anesthesia was discussed as back-up plan  Patient understands and wishes to proceed  All questions answered  Will proceed with CSE given 3rd C/S and plan for tubal ligation for sterility + duramorph for post-operative analgesia          Plan Factors-Exercise tolerance (METS): >4 METS  Chart reviewed  EKG reviewed  Existing labs reviewed  Induction-     Postoperative Plan- Plan for postoperative opioid use  Informed Consent- Anesthetic plan and risks discussed with patient  I personally reviewed this patient with the CRNA  Discussed and agreed on the Anesthesia Plan with the CRNA  Rosella Goldmann

## 2021-09-23 NOTE — PLAN OF CARE
Problem: PAIN - ADULT  Goal: Verbalizes/displays adequate comfort level or baseline comfort level  Description: Interventions:  - Encourage patient to monitor pain and request assistance  - Assess pain using appropriate pain scale  - Administer analgesics based on type and severity of pain and evaluate response  - Implement non-pharmacological measures as appropriate and evaluate response  - Consider cultural and social influences on pain and pain management  - Notify physician/advanced practitioner if interventions unsuccessful or patient reports new pain  Outcome: Progressing     Problem: INFECTION - ADULT  Goal: Absence or prevention of progression during hospitalization  Description: INTERVENTIONS:  - Assess and monitor for signs and symptoms of infection  - Monitor lab/diagnostic results  - Monitor all insertion sites, i e  indwelling lines, tubes, and drains  - Shaw appropriate cooling/warming therapies per order  - Administer medications as ordered  - Instruct and encourage patient and family to use good hand hygiene technique  - Identify and instruct in appropriate isolation precautions for identified infection/condition  Outcome: Progressing  Goal: Absence of fever/infection during neutropenic period  Description: INTERVENTIONS:  - Monitor WBC    Outcome: Progressing     Problem: SAFETY ADULT  Goal: Patient will remain free of falls  Description: INTERVENTIONS:  - Educate patient/family on patient safety including physical limitations  - Instruct patient to call for assistance with activity   - Keep Call bell within reach  - Keep bed low and locked with side rails adjusted as appropriate  - Keep care items and personal belongings within reach  - Initiate and maintain comfort rounds    Outcome: Progressing  Goal: Maintain or return to baseline ADL function  Description: INTERVENTIONS:  -  Assess patient's ability to carry out ADLs; assess patient's baseline for ADL function and identify physical deficits which impact ability to perform ADLs (bathing, care of mouth/teeth, toileting, grooming, dressing, etc )  - Assess/evaluate cause of self-care deficits   - Encourage maximum independence but intervene and supervise when necessary  - Involve family in performance of ADLs  - Assess for home care needs following discharge   - Provide patient education as appropriate  Outcome: Progressing  Goal: Maintains/Returns to pre admission functional level  Description: INTERVENTIONS:  - Perform BMAT or MOVE assessment daily    - Set and communicate daily mobility goal to care team and patient/family/caregiver  - Out of bed for toileting  - Record patient progress and toleration of activity level   Outcome: Progressing     Problem: Knowledge Deficit  Goal: Patient/family/caregiver demonstrates understanding of disease process, treatment plan, medications, and discharge instructions  Description: Complete learning assessment and assess knowledge base    Interventions:  - Provide teaching at level of understanding  - Provide teaching via preferred learning methods  Outcome: Progressing     Problem: DISCHARGE PLANNING  Goal: Discharge to home or other facility with appropriate resources  Description: INTERVENTIONS:  - Identify barriers to discharge w/patient and caregiver  - Arrange for needed discharge resources and transportation as appropriate  - Identify discharge learning needs (meds, wound care, etc )  - Arrange for interpretive services to assist at discharge as needed  - Refer to Case Management Department for coordinating discharge planning if the patient needs post-hospital services based on physician/advanced practitioner order or complex needs related to functional status, cognitive ability, or social support system  Outcome: Progressing     Problem: BIRTH - VAGINAL/ SECTION  Goal: Fetal and maternal status remain reassuring during the birth process  Description: INTERVENTIONS:  - Monitor vital signs  - Monitor fetal heart rate  - Monitor uterine activity  - DVT prophylaxis  - Antibiotic prophylaxis  Outcome: Progressing  Goal: Emotionally satisfying birthing experience for mother/fetus  Description: Interventions:  - Assess, plan, implement and evaluate the nursing care given to the patient in labor  - Advocate the philosophy that each childbirth experience is a unique experience and support the family's chosen level of involvement and control during the labor process   - Actively participate in both the patient's and family's teaching of the birth process  - Consider cultural, [employer] and age-specific factors and plan care for the patient in labor  Outcome: Progressing

## 2021-09-23 NOTE — INTERIM OP NOTE
SECTION () REPEAT, LIGATION/COAGULATION TUBAL  Postoperative Note  PATIENT NAME: Jeanne Johnson  : 1990  MRN: [id number]  [facility] L[facility] OR ROOM 02    Surgery Date: 2021    Preop Diagnosis:  Previous  section complicating pregnancy, with delivery [O34 219]    Post-Op Diagnosis Codes:     * Previous  section complicating pregnancy, with delivery [O34 219]    Procedure(s) (LRB):   SECTION () REPEAT (N/A)  LIGATION/COAGULATION TUBAL (Bilateral)    Surgeon(s) and Role:     * Tha Ellis MD - Primary     * Cristino Guevara MD - Assisting    Specimens:  ID Type Source Tests Collected by Time Destination   1 : PRN Tissue Fallopian Tube, Right TISSUE EXAM Tha Ellis MD 2021 1102    2 : PRN Tissue Fallopian Tube, Left TISSUE EXAM Tha Ellis MD 2021 1108    A :  Cord Blood Cord BLOOD GAS, VENOUS, CORD, BLOOD GAS, ARTERIAL, CORD Tha Ellis MD 2021 1042    B :  Tissue (Placenta on Hold) OB Only Placenta PLACENTA IN STORAGE Tha Ellis MD 2021 1043        Estimated Blood Loss:   800 ml    Anesthesia Type:   CSE    Findings:   Viable Girl  with spontaneous cry, good tone, and good color  Normal uterus, tubes and ovaries      Complications:   None    SIGNATURE: Tha Ellis MD   DATE: 2021   TIME: 11:31 AM

## 2021-09-23 NOTE — OP NOTE
OPERATIVE REPORT  PATIENT NAME: Amrik Rodrigez    :  1990  MRN: [id number]  Pt Location: [facility] OR ROOM 02    SURGERY DATE: 2021    Indications:   History of  section x2  Desire for permanent sterilization    Pre-operative Diagnosis:   39w2d pregnancy  GBS negative  History of  section x2  History of preeclampsia  Anemia  Desire for permanent sterilization    Post-operative Diagnosis:   RLTCS, bilateral salpingectomy    Attending: Fallon Monsivais MD  Resident: Marcelina Martinez MD    Maternal Findings:  Normal uterus  Normal tubes and ovaries bilaterally  Fascia adherent to rectus muscle and peritoneum  No difficulty noted from skin to delivery     Findings:  Viable female weighing 8lbs 13oz; Apgar scores of 8 at one minute and 9 at five minutes  Clear amniotic fluid  Normal placenta with 3-vessel cord    Arterial and Venous Gases:  Umbilical Cord Venous Blood Gas:  Results from last 7 days   Lab Units 21  1042   PH COV  7 349   PCO2 COV mm HG 38 7   HCO3 COV mmol/L 20 8   BASE EXC COV mmol/L -4 3*   O2 CT CD VB mL/dL 14 7   O2 HGB, VENOUS CORD % 33 5     Umbilical Cord Arterial Blood Gas:  Results from last 7 days   Lab Units 21  1042   PH COA  7 291   PCO2 COA  54 5   PO2 COA mm HG 13 1   HCO3 COA mmol/L 25 7   BASE EXC COA mmol/L -1 9*   O2 CONTENT CORD ART ml/dl 4 1   O2 HGB, ARTERIAL CORD % 17 8       Specimens: Arterial and venous cord gases, cord blood, segment of umbilical cord, placenta to storage,  Bilateral fallopian tubes to pathology    Quantitative Blood Loss: 512 mL    Fluids: 1 1 L LR    Drains: Ryan catheter           Complications:  None; patient tolerated the procedure well  Disposition: PACU            Condition: stable    Procedure Details   The patient was seen prior to the procedure  Risks, benefits, possible complications, alternate treatment options, and expected outcomes were discussed with the patient    The patient agreed with the proposed plan and gave informed consent for a repeat low transverse  section and bilateral salpingectomy  The patient was taken to the [facility] Operating Room at 1011 where she received a combined spinal-epidural  For infection prophylaxis, she received 2g ancef IV preoperatively  Fetal heart tones in the OR were assessed and noted to be within normal limits and a Ryan catheter and SCDs were placed  The abdomen was prepped with Chloraprep, the vagina was prepped with Betadine, and following appropriate drying time, the patient was draped in the usual sterile manner  A Time Out was held and the above information confirmed  The patient was identified as Fer Angulo and the procedure verified as a  Delivery for history of prior  section x2  A Pfannenstiel incision was made and carried down through the underlying subcutaneous tissue to the fascia using a scalpel  The rectus fascia was then nicked in the midline and dissected laterally using Martinez scissors  The superior edge of the  fascial incision was grasped with Kocher clamps bilaterally, tented upward and the underlying rectus muscles were dissected off sharply with Martinez scissors  The rectus muscles were  and the peritoneum was identified, entered, and extended longitudinally with blunt dissection  The [place] was inserted  A low transverse uterine incision was made with the scalpel and extended cephalocaudad with blunt dissection  The amnion was entered bluntly  The fetal head was palpated, elevated, and delivered through the uterine incision followed by the body without difficulty  Time of birth was noted at 46  There was noted to be spontaneous cry and good tone  There was no apparent injury to the   The umbilical cord was doubly clamped and cut after 30 seconds to allow for delayed cord clamping  The infant was handed off to the  providers    Arterial and venous cord gases, cord blood, and a segment of umbilical cord were obtained for evaluation  The placenta delivered spontaneously at [phone] with uterine fundal massage and appeared normal  The uterus was exteriorized and cleaned out with a moist lap sponge  The uterine incision was closed with a running locked suture of 0 Monocryl  A second layer of the same suture was used to imbricate the first   Hemostasis was noted to be excellent  Normal saline solution was used to irrigate the posterior culdesac  The uterus was returned to the abdomen  The paracolic gutters were inspected and cleared of all clots and debris with irrigation  Attention was then turned to perform a bilateral salpingectomy  A time-out was performed and the above information re-confirmed  The right fallopian tube was isolated and followed all the way to the fimbriated ends  This was tented up with a Stacy clamp around the proximal portion of the tubal ampulla revealing the vascular supply of the mesosalpinx  Two windows were created in an avascular area of the mesosalpix using Bovie cautery  The pedicles were suture ligated with a single strand of 2-0 Plain suture  This technique was repeated x3, working proximally along the fallopian tube, which was then excised 2 cm from the cornua using the Bovie  The left fallopian tube was then excised in similar fashion  Both tubes sent to the lab for pathology  Good hemostasis was confirmed following salpingectomy  The uterine incision was re-inspected and noted to be hemostatic  The Bartolome retractor was removed  Oozing was noted at the belly of the rectus muscle secondary to separation of adhesions on entry  Surgicel powder was applied and hemostasis was excellent  The fascia was closed with a running suture of 0 Vicryl  Subcutaneous adipose tissue was closed with a running suture of 2-0 Plain gut  The skin was closed with a subcuticular running suture of 4-0 Monocryl  Exofin was applied        The patient appeared to tolerate the procedure very well  Lap sponge, needle, and instrument counts were correct x2  The patient's fundus was palpated and the uterus was expressed  She was then cleaned and transferred to her postpartum recovery room in stable condition and her infant went to the  nursery  Attending Attestation: Dr Tha Ellis MD was present for the entire procedure        SIGNATURE: Cristino Guevara MD  DATE: 2021  TIME: 12:06 PM

## 2021-09-23 NOTE — ANESTHESIA POSTPROCEDURE EVALUATION
Post-Op Assessment Note    CV Status:  Stable    Pain management: adequate     Mental Status:  Alert and awake   Hydration Status:  Euvolemic   PONV Controlled:  Controlled   Airway Patency:  Patent      Post Op Vitals Reviewed: Yes      Staff: Anesthesiologist         No complications documented  /60 (09/23/21 1333)    Temp      Pulse 78 (09/23/21 1333)   Resp 16 (09/23/21 1333)    SpO2 99 % (09/23/21 1333)      Firm fundus confirmed by OB exam despite some bleeding intraoperatively  Epidural to be removed

## 2021-09-23 NOTE — DISCHARGE SUMMARY
CS Discharge Summary - Divina Orellana 33 y o  female MRN: [id number]    Unit/Bed#: -01 Encounter: [id number]    Admission Date: 2021     Discharge Date: 21    Admitting Diagnosis:   1) 39w2d pregnancy  2) GBS negative  3) History of  section x2  4) History of preeclampsia  5) Anemia  6) Desire for permanent sterilization    Discharge Diagnosis:   Same, delivered    Procedures:   repeat  section, low transverse incision and bilateral salpingectomy    Admitting Attending: Dr Keily Geller  Delivery Attending: Dr Keily Geller  Discharge Attending: Dr Lanie Gutiérrez service: none  Consult attending: none    Hospital Course: Divina Orellana is a 33 y o  [id number] who was admitted at 39w3d for scheduled repeat  section with bilateral salpingectomy  She then underwent an uncomplicated  section delivery with bilateral salpingectomy and delivered a viable female  at 46  APGARS were 8, 9 at 1 and 5 minutes, respectively   weighed 8lb 13oz  Placenta was delivered at 1043   was then transferred to  nursery  Patient tolerated the procedure well and was transferred to recovery in stable condition  The patient's post partum course was unremarkable    Preoperative hemaglobin was 9 5, postoperative was 8 8  She received IV venofer prior to discharge for anemia  Her postoperative pain was well controlled with oral analgesics  On day of discharge, she was ambulating and able to reasonably perform all ADLs  She was voiding and had appropriate bowel function  Pain was well controlled  She was discharged home on post-operative day #3 without complications  Patient was instructed to follow up with her OB as an outpatient and was given appropriate warnings to call provider if she develops signs of infection or uncontrolled pain  She is breast feeding   Mom's blood type is O positive  Rhogam was not given      Complications:   None    Condition at discharge: stable     Provisions for Follow-Up Care:  See after visit summary for information related to follow-up care and any pertinent home health orders  Disposition:   Home    Planned Readmission:   No    Discharge Medications:   Prenatal vitamin daily for 6 months or the duration of nursing whichever is longer  Motrin 600 mg orally every 6 hours as needed for pain  Tylenol (over the counter) per bottle directions as needed for pain  Hydrocortisone cream 1% (over the counter) applied 1-2x daily to hemorrhoids as needed  Witch hazel pads for hemorrhoidal discomfort as needed      Discharge instructions :   -Do not place anything (no partner, tampons or douche) in your vagina for 6 weeks  -You may walk for exercise for the first 6 weeks then gradually return to your usual activities    -Please do not drive for 1 week if you have no stitches and for 2 weeks if you have stitches or underwent a  delivery     -You may take baths or shower per your preference    -Please look at your bust (breasts) in the mirror daily and call provider for redness or tenderness or increased warmth  - If you have had a  please look at your incision daily as well and call provider for increasing redness or steady drainage from the incision    -Please call your provider if temperature > 100 4*F or 38* C, worsening pain or a foul discharge

## 2021-09-24 LAB
DME PARACHUTE DELIVERY DATE ACTUAL: NORMAL
DME PARACHUTE DELIVERY DATE REQUESTED: NORMAL
DME PARACHUTE ITEM DESCRIPTION: NORMAL
DME PARACHUTE ORDER STATUS: NORMAL
DME PARACHUTE SUPPLIER NAME: NORMAL
DME PARACHUTE SUPPLIER PHONE: NORMAL
ERYTHROCYTE [DISTWIDTH] IN BLOOD BY AUTOMATED COUNT: 13.8 % (ref 11.6–15.1)
HCT VFR BLD AUTO: 26 % (ref 34.8–46.1)
HGB BLD-MCNC: 8.6 G/DL (ref 11.5–15.4)
MCH RBC QN AUTO: 26.8 PG (ref 26.8–34.3)
MCHC RBC AUTO-ENTMCNC: 33.1 G/DL (ref 31.4–37.4)
MCV RBC AUTO: 81 FL (ref 82–98)
PLATELET # BLD AUTO: 309 THOUSANDS/UL (ref 149–390)
PMV BLD AUTO: 10.1 FL (ref 8.9–12.7)
RBC # BLD AUTO: 3.21 MILLION/UL (ref 3.81–5.12)
WBC # BLD AUTO: 13.49 THOUSAND/UL (ref 4.31–10.16)

## 2021-09-24 PROCEDURE — 99024 POSTOP FOLLOW-UP VISIT: CPT | Performed by: OBSTETRICS & GYNECOLOGY

## 2021-09-24 PROCEDURE — 85027 COMPLETE CBC AUTOMATED: CPT | Performed by: OBSTETRICS & GYNECOLOGY

## 2021-09-24 RX ORDER — OXYCODONE HYDROCHLORIDE 10 MG/1
10 TABLET ORAL EVERY 4 HOURS PRN
Status: DISCONTINUED | OUTPATIENT
Start: 2021-09-24 | End: 2021-09-26 | Stop reason: HOSPADM

## 2021-09-24 RX ORDER — OXYCODONE HYDROCHLORIDE 5 MG/1
5 TABLET ORAL EVERY 4 HOURS PRN
Status: DISCONTINUED | OUTPATIENT
Start: 2021-09-24 | End: 2021-09-26 | Stop reason: HOSPADM

## 2021-09-24 RX ADMIN — IBUPROFEN 600 MG: 600 TABLET ORAL at 18:51

## 2021-09-24 RX ADMIN — IBUPROFEN 600 MG: 600 TABLET ORAL at 11:38

## 2021-09-24 RX ADMIN — ACETAMINOPHEN 650 MG: 325 TABLET, FILM COATED ORAL at 09:09

## 2021-09-24 RX ADMIN — DOCUSATE SODIUM 100 MG: 100 CAPSULE, LIQUID FILLED ORAL at 09:10

## 2021-09-24 RX ADMIN — HYDROMORPHONE HYDROCHLORIDE 0.5 MG: 1 INJECTION, SOLUTION INTRAMUSCULAR; INTRAVENOUS; SUBCUTANEOUS at 11:56

## 2021-09-24 RX ADMIN — DOCUSATE SODIUM 100 MG: 100 CAPSULE, LIQUID FILLED ORAL at 17:07

## 2021-09-24 RX ADMIN — HYDROMORPHONE HYDROCHLORIDE 0.5 MG: 1 INJECTION, SOLUTION INTRAMUSCULAR; INTRAVENOUS; SUBCUTANEOUS at 09:15

## 2021-09-24 RX ADMIN — DIPHENHYDRAMINE HYDROCHLORIDE 25 MG: 50 INJECTION, SOLUTION INTRAMUSCULAR; INTRAVENOUS at 02:52

## 2021-09-24 RX ADMIN — SIMETHICONE 80 MG: 80 TABLET, CHEWABLE ORAL at 09:26

## 2021-09-24 RX ADMIN — ACETAMINOPHEN 650 MG: 325 TABLET, FILM COATED ORAL at 02:41

## 2021-09-24 RX ADMIN — HYDROMORPHONE HYDROCHLORIDE 0.5 MG: 1 INJECTION, SOLUTION INTRAMUSCULAR; INTRAVENOUS; SUBCUTANEOUS at 01:04

## 2021-09-24 RX ADMIN — OXYCODONE HYDROCHLORIDE 10 MG: 10 TABLET ORAL at 22:55

## 2021-09-24 RX ADMIN — Medication 1 TABLET: at 09:10

## 2021-09-24 RX ADMIN — ACETAMINOPHEN 650 MG: 325 TABLET, FILM COATED ORAL at 17:07

## 2021-09-24 RX ADMIN — SENNOSIDES 8.6 MG: 8.6 TABLET, FILM COATED ORAL at 20:32

## 2021-09-24 RX ADMIN — ACETAMINOPHEN 650 MG: 325 TABLET, FILM COATED ORAL at 22:55

## 2021-09-24 RX ADMIN — OXYCODONE HYDROCHLORIDE 5 MG: 5 TABLET ORAL at 20:22

## 2021-09-24 NOTE — PLAN OF CARE
Problem: PAIN - ADULT  Goal: Verbalizes/displays adequate comfort level or baseline comfort level  Description: Interventions:  - Encourage patient to monitor pain and request assistance  - Assess pain using appropriate pain scale  - Administer analgesics based on type and severity of pain and evaluate response  - Implement non-pharmacological measures as appropriate and evaluate response  - Consider cultural and social influences on pain and pain management  - Notify physician/advanced practitioner if interventions unsuccessful or patient reports new pain  Outcome: Progressing     Problem: INFECTION - ADULT  Goal: Absence or prevention of progression during hospitalization  Description: INTERVENTIONS:  - Assess and monitor for signs and symptoms of infection  - Monitor lab/diagnostic results  - Monitor all insertion sites, i e  indwelling lines, tubes, and drains  - [place] appropriate cooling/warming therapies per order  - Administer medications as ordered  - Instruct and encourage patient and family to use good hand hygiene technique  - Identify and instruct in appropriate isolation precautions for identified infection/condition  Outcome: Progressing  Goal: Absence of fever/infection during neutropenic period  Description: INTERVENTIONS:  - Monitor WBC    Outcome: Progressing     Problem: SAFETY ADULT  Goal: Patient will remain free of falls  Description: INTERVENTIONS:  - Educate patient/family on patient safety including physical limitations  - Instruct patient to call for assistance with activity   - Keep Call bell within reach  - Keep bed low and locked with side rails adjusted as appropriate  - Keep care items and personal belongings within reach  - Initiate and maintain comfort rounds    Outcome: Progressing  Goal: Maintain or return to baseline ADL function  Description: INTERVENTIONS:  -  Assess patient's ability to carry out ADLs; assess patient's baseline for ADL function and identify physical deficits which impact ability to perform ADLs (bathing, care of mouth/teeth, toileting, grooming, dressing, etc )  - Assess/evaluate cause of self-care deficits   - Encourage maximum independence but intervene and supervise when necessary  - Involve family in performance of ADLs  - Assess for home care needs following discharge   - Provide patient education as appropriate  Outcome: Progressing  Goal: Maintains/Returns to pre admission functional level  Description: INTERVENTIONS:  - Perform BMAT or MOVE assessment daily    - Set and communicate daily mobility goal to care team and patient/family/caregiver  - Out of bed for toileting  - Record patient progress and toleration of activity level   Outcome: Progressing     Problem: Knowledge Deficit  Goal: Patient/family/caregiver demonstrates understanding of disease process, treatment plan, medications, and discharge instructions  Description: Complete learning assessment and assess knowledge base    Interventions:  - Provide teaching at level of understanding  - Provide teaching via preferred learning methods  Outcome: Progressing     Problem: DISCHARGE PLANNING  Goal: Discharge to home or other facility with appropriate resources  Description: INTERVENTIONS:  - Identify barriers to discharge w/patient and caregiver  - Arrange for needed discharge resources and transportation as appropriate  - Identify discharge learning needs (meds, wound care, etc )  - Arrange for interpretive services to assist at discharge as needed  - Refer to Case Management Department for coordinating discharge planning if the patient needs post-hospital services based on physician/advanced practitioner order or complex needs related to functional status, cognitive ability, or social support system  Outcome: Progressing     Problem: POSTPARTUM  Goal: Experiences normal postpartum course  Description: INTERVENTIONS:  - Monitor maternal vital signs  - Assess uterine involution and lochia  Outcome: Progressing  Goal: Appropriate maternal -  bonding  Description: INTERVENTIONS:  - Identify family support  - Assess for appropriate maternal/infant bonding   -Encourage maternal/infant bonding opportunities  - Referral to  or  as needed  Outcome: Progressing  Goal: Establishment of infant feeding pattern  Description: INTERVENTIONS:  - Assess breast/bottle feeding  - Refer to lactation as needed  Outcome: Progressing  Goal: Incision(s), wounds(s) or drain site(s) healing without S/S of infection  Description: INTERVENTIONS  - Assess and document dressing, incision, wound bed, drain sites and surrounding tissue  - Provide patient and family education  - Perform skin care/dressing changes   Outcome: Progressing     Problem: ALTERATION IN THE BREAST  Goal: Optimize infant feeding at the breast  Description: INTERVENTIONS:  - Latch, breast and nipple assessment  - Assess prior breast feeding history  - Hand expression of breast milk  - For cracked, bleeding and or sore nipples reassess latch, treat damaged nipple  -Educate mother on feeding cues  -Positioning/latch techniques  Outcome: Progressing     Problem: INADEQUATE LATCH, SUCK OR SWALLOW  Goal: Demonstrate ability to latch and sustain latch, audible swallowing and satiety  Description: INTERVENTIONS:  - Assess oral anatomy, notify Physician/AP for abnormal findings  - Establish milk expression  - Maximize feeding opportunity (skin to skin, behavioral state)  - Position/latch techniques  - Discourage use of pacifier-artificial nipple  - Mechanical pumping  - Nipple Shield  - Supplemental formula feeding (Physician/AP order)  - Alternative feeding method  Outcome: Progressing

## 2021-09-24 NOTE — PROGRESS NOTES
Progress Note - OB/GYN   Leeann Flores 33 y o  female MRN: [id number]  Unit/Bed#:  307-01 Encounter: [id number]    Assessment:  Post partum Day #1 s/p RLTCS+BS, stable, baby in room    Plan:  1) Hemodynamics   Hgb: 9 5--> 8 8   No current symptoms  2) Bladder function   Ryan removed, UOP: 2 8 cc/kg/hr   Due for voiding trial  3) Hx of pp Pre-E   CBC, CMP wnl  P/C 0 42   BP's: /53-81  4) Continue routine post partum care   Encourage ambulation   Encourage breastfeeding   DVT ppx: SCD's   Anticipate discharge POD2/3     Subjective/Objective   Chief Complaint:     Post delivery  Patient is doing well  Lochia WNL  Pain well controlled  Subjective:     Pain: yes, cramping, improved with meds  Tolerating PO: yes  Voiding: yes  Flatus: yes  BM: no  Ambulating: yes  Chest pain: no  Shortness of breath: no  Leg pain: no  Lochia: minimal    Objective:     Vitals: /64 (BP Location: Right arm)   Pulse 76   Temp 98 °F (36 7 °C) (Oral)   Resp 18   Ht 4' 10" (1 473 m)   Wt 64 4 kg (142 lb) Comment: 142 per pt  LMP 12/22/2020 (Exact Date)   SpO2 98%   Breastfeeding Yes   BMI 29 68 kg/m²       Intake/Output Summary (Last 24 hours) at 9/24/2021 0719  Last data filed at 9/24/2021 0500  Gross per 24 hour   Intake 5570 ml   Output 7412 ml   Net -1842 ml       Lab Results   Component Value Date    WBC 13 49 (H) 09/24/2021    HGB 8 6 (L) 09/24/2021    HCT 26 0 (L) 09/24/2021    MCV 81 (L) 09/24/2021     09/24/2021       Physical Exam:     Gen: AAOx3, NAD  CV: RRR  Lungs: CTA b/l  Abd: Soft, non-tender, non-distended, no rebound or guarding  Uterine fundus firm and non-tender, 1 cm below the umbilicus     Ext: Non tender    Francis Roland MD  9/24/2021  7:19 AM

## 2021-09-24 NOTE — LACTATION NOTE
This note was copied from a baby's chart  CONSULT - LACTATION  Baby Girl Jj Correa) Mila Griffith 1 days female MRN: [id number]    [facility] NURSERY Room / Bed: (N)/(N) Encounter: [id number]    Maternal Information     MOTHER:  Wolfgang Whitehead  Maternal Age: 33 y o    OB History: # 1 - Date: 08/28/15, Sex: Male, Weight: 4054 g (8 lb 15 oz), GA: 40w0d, Delivery: , Low Transverse, Apgar1: None, Apgar5: None, Living: Living, Birth Comments: NRF    # 2 - Date: 19, Sex: Female, Weight: 3856 g (8 lb 8 oz), GA: 39w0d, Delivery: , Low Transverse, Apgar1: 9, Apgar5: 9, Living: Living, Birth Comments: None    # 3 - Date: 21, Sex: Female, Weight: 3997 g (8 lb 13 oz), GA: 39w2d, Delivery: , Low Transverse, Apgar1: 8, Apgar5: 9, Living: Living, Birth Comments: None   Previouse breast reduction surgery? No    Lactation history:   Has patient previously breast fed: Yes   How long had patient previously breast fed: her 8 yo and 2 1/3 yo for 6 months     Previous breast feeding complications: Breast/nipple pain, Other (Comment) (Mastitis )     Past Surgical History:   Procedure Laterality Date     SECTION  2015    MS  DELIVERY ONLY N/A 2019    Procedure:  SECTION () REPEAT;  Surgeon: Kristen Avalos MD;  Location: [facility] LD;  Service: Obstetrics    MS  DELIVERY ONLY N/A 2021    Procedure:  SECTION () REPEAT;  Surgeon: Gilles Welsh MD;  Location: [facility] LD;  Service: Obstetrics    MS Rebeca Saber TUBE W/ Bilateral 2021    Procedure: LIGATION/COAGULATION TUBAL;  Surgeon: Gilles Welsh MD;  Location: [facility] LD;  Service: Obstetrics        Birth information:  YOB: 2023   Time of birth: 10:42 AM   Sex: female   Delivery type: , Low Transverse   Birth Weight: 3997 g (8 lb 13 oz)   Percent of Weight Change: -2%     Gestational Age: 44w2d [unfilled]    Assessment     Breast and nipple assessment: normal assessment     Assessment: normal assessment    Feeding assessment: not assessed at this time, Cheyenne Luciano states breastfeeding is painful  LATCH:  Latch: Audible Swallowing:    Type of Nipple:    Comfort (Breast/Nipple):    Hold (Positioning):    LATCH Score:         Feeding recommendations:  Place baby to breast every 2-3 hours  Met with Cheyenne Wolf and provided her with the Ready,Set,Baby Booklet  Discussed Skin to Skin contact an benefits to mom and baby  Talked about feeding on cue and what that means for recognizing infant's hunger  Avoidance of pacifiers for the first month discussed  Positioning and latch reviewed as well as showing images of other feeding positions  Discussed the properties of a good latch in any position  Reviewed hand/manual expression  Cheyenne Wolf states that baby has latched but that it is painful  Instructed her to call at next feeding for Breastfeeding assessment  She states that she struggled with her other 2 children and had frequent infections ( mastitis )           Kinsey Ferreira RN 2021 8:30 AM

## 2021-09-24 NOTE — CASE MANAGEMENT
Case Management Progress Note    Patient name Roxanna Solid  Location /-48 MRN [id number]  : 1990 Date 2021       LOS (days): 1  Geometric Mean LOS (GMLOS) (days):   Days to GMLOS:        OBJECTIVE:  Bundle(if applicable):    Current admission status: Inpatient  Preferred Pharmacy:   [address], [place] [address] [facility] [address]  Phone: [phone] Fax: [facility] [address] [facility] [address] [facility] [address]  Phone: [phone] Fax: [phone]    Primary Care Provider: Nataliia Price MD    Primary Insurance: [address] [place] [address]  Secondary Insurance:     PROGRESS NOTE:    Breast pump consult: [place] order entered for Spectra S2 pump for room delivery via [employer]; no additional needs noted

## 2021-09-24 NOTE — PLAN OF CARE
Problem: PAIN - ADULT  Goal: Verbalizes/displays adequate comfort level or baseline comfort level  Description: Interventions:  - Encourage patient to monitor pain and request assistance  - Assess pain using appropriate pain scale  - Administer analgesics based on type and severity of pain and evaluate response  - Implement non-pharmacological measures as appropriate and evaluate response  - Consider cultural and social influences on pain and pain management  - Notify physician/advanced practitioner if interventions unsuccessful or patient reports new pain  Outcome: Progressing     Problem: INFECTION - ADULT  Goal: Absence or prevention of progression during hospitalization  Description: INTERVENTIONS:  - Assess and monitor for signs and symptoms of infection  - Monitor lab/diagnostic results  - Monitor all insertion sites, i e  indwelling lines, tubes, and drains  - [place] appropriate cooling/warming therapies per order  - Administer medications as ordered  - Instruct and encourage patient and family to use good hand hygiene technique  - Identify and instruct in appropriate isolation precautions for identified infection/condition  Outcome: Progressing  Goal: Absence of fever/infection during neutropenic period  Description: INTERVENTIONS:  - Monitor WBC    Outcome: Progressing     Problem: SAFETY ADULT  Goal: Patient will remain free of falls  Description: INTERVENTIONS:  - Educate patient/family on patient safety including physical limitations  - Instruct patient to call for assistance with activity   - Keep Call bell within reach  - Keep bed low and locked with side rails adjusted as appropriate  - Keep care items and personal belongings within reach  - Initiate and maintain comfort rounds    Outcome: Progressing  Goal: Maintain or return to baseline ADL function  Description: INTERVENTIONS:  -  Assess patient's ability to carry out ADLs; assess patient's baseline for ADL function and identify physical deficits which impact ability to perform ADLs (bathing, care of mouth/teeth, toileting, grooming, dressing, etc )  - Assess/evaluate cause of self-care deficits   - Encourage maximum independence but intervene and supervise when necessary  - Involve family in performance of ADLs  - Assess for home care needs following discharge   - Provide patient education as appropriate  Outcome: Progressing  Goal: Maintains/Returns to pre admission functional level  Description: INTERVENTIONS:  - Perform BMAT or MOVE assessment daily    - Set and communicate daily mobility goal to care team and patient/family/caregiver  - Out of bed for toileting  - Record patient progress and toleration of activity level   Outcome: Progressing     Problem: Knowledge Deficit  Goal: Patient/family/caregiver demonstrates understanding of disease process, treatment plan, medications, and discharge instructions  Description: Complete learning assessment and assess knowledge base    Interventions:  - Provide teaching at level of understanding  - Provide teaching via preferred learning methods  Outcome: Progressing     Problem: DISCHARGE PLANNING  Goal: Discharge to home or other facility with appropriate resources  Description: INTERVENTIONS:  - Identify barriers to discharge w/patient and caregiver  - Arrange for needed discharge resources and transportation as appropriate  - Identify discharge learning needs (meds, wound care, etc )  - Arrange for interpretive services to assist at discharge as needed  - Refer to Case Management Department for coordinating discharge planning if the patient needs post-hospital services based on physician/advanced practitioner order or complex needs related to functional status, cognitive ability, or social support system  Outcome: Progressing     Problem: POSTPARTUM  Goal: Experiences normal postpartum course  Description: INTERVENTIONS:  - Monitor maternal vital signs  - Assess uterine involution and lochia  Outcome: Progressing  Goal: Appropriate maternal -  bonding  Description: INTERVENTIONS:  - Identify family support  - Assess for appropriate maternal/infant bonding   -Encourage maternal/infant bonding opportunities  - Referral to  or  as needed  Outcome: Progressing  Goal: Establishment of infant feeding pattern  Description: INTERVENTIONS:  - Assess breast/bottle feeding  - Refer to lactation as needed  Outcome: Progressing  Goal: Incision(s), wounds(s) or drain site(s) healing without S/S of infection  Description: INTERVENTIONS  - Assess and document dressing, incision, wound bed, drain sites and surrounding tissue  - Provide patient and family education  - Perform skin care/dressing changes every day  Outcome: Progressing     Problem: ALTERATION IN THE BREAST  Goal: Optimize infant feeding at the breast  Description: INTERVENTIONS:  - Latch, breast and nipple assessment  - Assess prior breast feeding history  - Hand expression of breast milk  - For cracked, bleeding and or sore nipples reassess latch, treat damaged nipple  -Educate mother on feeding cues  -Positioning/latch techniques  Outcome: Progressing     Problem: INADEQUATE LATCH, SUCK OR SWALLOW  Goal: Demonstrate ability to latch and sustain latch, audible swallowing and satiety  Description: INTERVENTIONS:  - Assess oral anatomy, notify Physician/AP for abnormal findings  - Establish milk expression  - Maximize feeding opportunity (skin to skin, behavioral state)  - Position/latch techniques  - Discourage use of pacifier-artificial nipple  - Mechanical pumping  - Nipple Shield  - Supplemental formula feeding (Physician/AP order)  - Alternative feeding method  Outcome: Progressing

## 2021-09-25 PROCEDURE — 99024 POSTOP FOLLOW-UP VISIT: CPT | Performed by: STUDENT IN AN ORGANIZED HEALTH CARE EDUCATION/TRAINING PROGRAM

## 2021-09-25 RX ORDER — IBUPROFEN 600 MG/1
600 TABLET ORAL EVERY 6 HOURS SCHEDULED
Status: DISCONTINUED | OUTPATIENT
Start: 2021-09-25 | End: 2021-09-26 | Stop reason: HOSPADM

## 2021-09-25 RX ORDER — ACETAMINOPHEN 325 MG/1
650 TABLET ORAL EVERY 6 HOURS SCHEDULED
Status: DISCONTINUED | OUTPATIENT
Start: 2021-09-25 | End: 2021-09-26 | Stop reason: HOSPADM

## 2021-09-25 RX ORDER — POLYETHYLENE GLYCOL 3350 17 G/17G
17 POWDER, FOR SOLUTION ORAL DAILY PRN
Status: DISCONTINUED | OUTPATIENT
Start: 2021-09-25 | End: 2021-09-26 | Stop reason: HOSPADM

## 2021-09-25 RX ADMIN — ACETAMINOPHEN 650 MG: 325 TABLET, FILM COATED ORAL at 23:50

## 2021-09-25 RX ADMIN — OXYCODONE HYDROCHLORIDE 10 MG: 10 TABLET ORAL at 06:27

## 2021-09-25 RX ADMIN — OXYCODONE HYDROCHLORIDE 5 MG: 5 TABLET ORAL at 16:05

## 2021-09-25 RX ADMIN — DOCUSATE SODIUM 100 MG: 100 CAPSULE, LIQUID FILLED ORAL at 08:11

## 2021-09-25 RX ADMIN — DOCUSATE SODIUM 100 MG: 100 CAPSULE, LIQUID FILLED ORAL at 18:38

## 2021-09-25 RX ADMIN — OXYCODONE HYDROCHLORIDE 10 MG: 10 TABLET ORAL at 11:57

## 2021-09-25 RX ADMIN — IBUPROFEN 600 MG: 600 TABLET ORAL at 18:37

## 2021-09-25 RX ADMIN — SIMETHICONE 80 MG: 80 TABLET, CHEWABLE ORAL at 08:11

## 2021-09-25 RX ADMIN — IBUPROFEN 600 MG: 600 TABLET ORAL at 23:50

## 2021-09-25 RX ADMIN — ACETAMINOPHEN 650 MG: 325 TABLET, FILM COATED ORAL at 05:03

## 2021-09-25 RX ADMIN — Medication 1 TABLET: at 08:11

## 2021-09-25 RX ADMIN — IBUPROFEN 600 MG: 600 TABLET ORAL at 08:11

## 2021-09-25 RX ADMIN — SIMETHICONE 80 MG: 80 TABLET, CHEWABLE ORAL at 13:14

## 2021-09-25 RX ADMIN — OXYCODONE HYDROCHLORIDE 5 MG: 5 TABLET ORAL at 03:18

## 2021-09-25 RX ADMIN — IRON SUCROSE 200 MG: 20 INJECTION, SOLUTION INTRAVENOUS at 19:57

## 2021-09-25 RX ADMIN — SIMETHICONE 80 MG: 80 TABLET, CHEWABLE ORAL at 06:32

## 2021-09-25 RX ADMIN — SIMETHICONE 80 MG: 80 TABLET, CHEWABLE ORAL at 18:37

## 2021-09-25 RX ADMIN — ACETAMINOPHEN 650 MG: 325 TABLET, FILM COATED ORAL at 18:38

## 2021-09-25 NOTE — PLAN OF CARE
Problem: PAIN - ADULT  Goal: Verbalizes/displays adequate comfort level or baseline comfort level  Description: Interventions:  - Encourage patient to monitor pain and request assistance  - Assess pain using appropriate pain scale  - Administer analgesics based on type and severity of pain and evaluate response  - Implement non-pharmacological measures as appropriate and evaluate response  - Consider cultural and social influences on pain and pain management  - Notify physician/advanced practitioner if interventions unsuccessful or patient reports new pain  Outcome: Progressing     Problem: INFECTION - ADULT  Goal: Absence or prevention of progression during hospitalization  Description: INTERVENTIONS:  - Assess and monitor for signs and symptoms of infection  - Monitor lab/diagnostic results  - Monitor all insertion sites, i e  indwelling lines, tubes, and drains  - [place] appropriate cooling/warming therapies per order  - Administer medications as ordered  - Instruct and encourage patient and family to use good hand hygiene technique  - Identify and instruct in appropriate isolation precautions for identified infection/condition  Outcome: Progressing  Goal: Absence of fever/infection during neutropenic period  Description: INTERVENTIONS:  - Monitor WBC    Outcome: Progressing     Problem: SAFETY ADULT  Goal: Patient will remain free of falls  Description: INTERVENTIONS:  - Educate patient/family on patient safety including physical limitations  - Instruct patient to call for assistance with activity   - Keep Call bell within reach  - Keep bed low and locked with side rails adjusted as appropriate  - Keep care items and personal belongings within reach  - Initiate and maintain comfort rounds    Outcome: Progressing  Goal: Maintain or return to baseline ADL function  Description: INTERVENTIONS:  -  Assess patient's ability to carry out ADLs; assess patient's baseline for ADL function and identify physical deficits which impact ability to perform ADLs (bathing, care of mouth/teeth, toileting, grooming, dressing, etc )  - Assess/evaluate cause of self-care deficits   - Encourage maximum independence but intervene and supervise when necessary  - Involve family in performance of ADLs  - Assess for home care needs following discharge   - Provide patient education as appropriate  Outcome: Progressing  Goal: Maintains/Returns to pre admission functional level  Description: INTERVENTIONS:  - Perform BMAT or MOVE assessment daily    - Set and communicate daily mobility goal to care team and patient/family/caregiver  - Out of bed for toileting  - Record patient progress and toleration of activity level   Outcome: Progressing     Problem: Knowledge Deficit  Goal: Patient/family/caregiver demonstrates understanding of disease process, treatment plan, medications, and discharge instructions  Description: Complete learning assessment and assess knowledge base    Interventions:  - Provide teaching at level of understanding  - Provide teaching via preferred learning methods  Outcome: Progressing     Problem: DISCHARGE PLANNING  Goal: Discharge to home or other facility with appropriate resources  Description: INTERVENTIONS:  - Identify barriers to discharge w/patient and caregiver  - Arrange for needed discharge resources and transportation as appropriate  - Identify discharge learning needs (meds, wound care, etc )  - Arrange for interpretive services to assist at discharge as needed  - Refer to Case Management Department for coordinating discharge planning if the patient needs post-hospital services based on physician/advanced practitioner order or complex needs related to functional status, cognitive ability, or social support system  Outcome: Progressing     Problem: POSTPARTUM  Goal: Experiences normal postpartum course  Description: INTERVENTIONS:  - Monitor maternal vital signs  - Assess uterine involution and lochia  Outcome: Progressing  Goal: Appropriate maternal -  bonding  Description: INTERVENTIONS:  - Identify family support  - Assess for appropriate maternal/infant bonding   -Encourage maternal/infant bonding opportunities  - Referral to  or  as needed  Outcome: Progressing  Goal: Establishment of infant feeding pattern  Description: INTERVENTIONS:  - Assess breast/bottle feeding  - Refer to lactation as needed  Outcome: Progressing  Goal: Incision(s), wounds(s) or drain site(s) healing without S/S of infection  Description: INTERVENTIONS  - Assess and document dressing, incision, wound bed, drain sites and surrounding tissue  - Provide patient and family education  - Perform skin care/dressing changes   Outcome: Progressing     Problem: ALTERATION IN THE BREAST  Goal: Optimize infant feeding at the breast  Description: INTERVENTIONS:  - Latch, breast and nipple assessment  - Assess prior breast feeding history  - Hand expression of breast milk  - For cracked, bleeding and or sore nipples reassess latch, treat damaged nipple  -Educate mother on feeding cues  -Positioning/latch techniques  Outcome: Progressing     Problem: INADEQUATE LATCH, SUCK OR SWALLOW  Goal: Demonstrate ability to latch and sustain latch, audible swallowing and satiety  Description: INTERVENTIONS:  - Assess oral anatomy, notify Physician/AP for abnormal findings  - Establish milk expression  - Maximize feeding opportunity (skin to skin, behavioral state)  - Position/latch techniques  - Discourage use of pacifier-artificial nipple  - Mechanical pumping  - Nipple Shield  - Supplemental formula feeding (Physician/AP order)  - Alternative feeding method  Outcome: Progressing

## 2021-09-25 NOTE — PROGRESS NOTES
Progress Note - OB/GYN   Lurdes Childes 33 y o  female MRN: [id number]  Unit/Bed#:  307-01 Encounter: [id number]    Assessment:  Post partum Day #2 s/p RLTCS + BS, stable, baby in room    Plan:  1) Postoperative state   , Hgb 9 5 --> 8 8   Venofer 200mg IV today   Gas pain - simethicone prn; bowel regimen - colace/senna   Spontaneously voiding   DVT ppx: SCDs, BMI 29  2) Contraception   S/p bilateral salpingectomy  3) Hx of postpartum preeclampsia   CBC/CMP WNL, P/Cr 0 42   One elevated /81, otherwise 100s-130s/50s-60s   Patient does not meet criteria for PreE in this pregnancy; asymptomatic  4) Continue routine post partum care   Encourage ambulation   Encourage breastfeeding   Anticipate discharge POD#3     Subjective/Objective   Chief Complaint:     Post delivery  Patient is doing well  Lochia WNL  Patient currently reports gas pain, has passed minimal flatus  Subjective:     Pain: yes, cramping, improved with meds  Tolerating PO: yes  Voiding: yes  Flatus: minimal  BM: no  Ambulating: yes  Chest pain: no  Shortness of breath: no  Leg pain: no  Lochia: minimal    Objective:     Vitals: /57 (BP Location: Right arm)   Pulse 77   Temp 98 2 °F (36 8 °C) (Oral)   Resp 18   Ht 4' 10" (1 473 m)   Wt 64 4 kg (142 lb) Comment: 142 per pt  LMP 12/22/2020 (Exact Date)   SpO2 98%   Breastfeeding Yes   BMI 29 68 kg/m²     I/O       09/23 0701 - 09/24 0700 09/24 0701 - 09/25 0700 09/25 0701 - 09/26 0700    P  O  920      I V  (mL/kg) 3600 (55 9)      IV Piggyback 1050      Total Intake(mL/kg) 5570 (86 5)      Urine (mL/kg/hr) 6800 1600 (1)     Emesis/NG output 100      Blood 512      Total Output 7412 1600     Net -1842 -1600                  Lab Results   Component Value Date    WBC 13 49 (H) 09/24/2021    HGB 8 6 (L) 09/24/2021    HCT 26 0 (L) 09/24/2021    MCV 81 (L) 09/24/2021     09/24/2021       Physical Exam:     Gen: AAOx3, NAD  CV: RRR  Lungs: CTA b/l  Abd: Soft, non-tender, mild distension, tympanic, no rebound or guarding  Uterine fundus firm and non-tender, 2 cm below the umbilicus   Incision c/d/I  Ext: Non tender    Sharifa Lucia MD  9/25/2021  8:40 AM

## 2021-09-26 VITALS
TEMPERATURE: 98.1 F | RESPIRATION RATE: 18 BRPM | WEIGHT: 142 LBS | HEART RATE: 79 BPM | OXYGEN SATURATION: 98 % | HEIGHT: 58 IN | DIASTOLIC BLOOD PRESSURE: 56 MMHG | BODY MASS INDEX: 29.81 KG/M2 | SYSTOLIC BLOOD PRESSURE: 117 MMHG

## 2021-09-26 PROCEDURE — 99024 POSTOP FOLLOW-UP VISIT: CPT | Performed by: OBSTETRICS & GYNECOLOGY

## 2021-09-26 RX ORDER — OXYCODONE HYDROCHLORIDE 5 MG/1
5 TABLET ORAL EVERY 6 HOURS PRN
Qty: 10 TABLET | Refills: 0 | Status: SHIPPED | OUTPATIENT
Start: 2021-09-26 | End: 2021-09-26

## 2021-09-26 RX ORDER — OXYCODONE HYDROCHLORIDE 5 MG/1
5 TABLET ORAL EVERY 6 HOURS PRN
Qty: 10 TABLET | Refills: 0 | Status: SHIPPED | OUTPATIENT
Start: 2021-09-26 | End: 2022-01-05

## 2021-09-26 RX ORDER — ACETAMINOPHEN 325 MG/1
650 TABLET ORAL EVERY 6 HOURS SCHEDULED
Start: 2021-09-26 | End: 2022-01-05

## 2021-09-26 RX ORDER — SENNOSIDES 8.6 MG
8.6 TABLET ORAL
Start: 2021-09-26 | End: 2022-01-05

## 2021-09-26 RX ORDER — POLYETHYLENE GLYCOL 3350 17 G/17G
17 POWDER, FOR SOLUTION ORAL DAILY PRN
Start: 2021-09-26 | End: 2022-01-05

## 2021-09-26 RX ORDER — IBUPROFEN 600 MG/1
600 TABLET ORAL EVERY 6 HOURS SCHEDULED
Qty: 60 TABLET | Refills: 0 | Status: SHIPPED | OUTPATIENT
Start: 2021-09-26 | End: 2022-01-05

## 2021-09-26 RX ADMIN — OXYCODONE HYDROCHLORIDE 10 MG: 10 TABLET ORAL at 13:44

## 2021-09-26 RX ADMIN — IBUPROFEN 600 MG: 600 TABLET ORAL at 12:17

## 2021-09-26 RX ADMIN — ACETAMINOPHEN 650 MG: 325 TABLET, FILM COATED ORAL at 12:17

## 2021-09-26 RX ADMIN — IBUPROFEN 600 MG: 600 TABLET ORAL at 06:21

## 2021-09-26 RX ADMIN — ACETAMINOPHEN 650 MG: 325 TABLET, FILM COATED ORAL at 06:21

## 2021-09-26 RX ADMIN — DOCUSATE SODIUM 100 MG: 100 CAPSULE, LIQUID FILLED ORAL at 12:17

## 2021-09-26 NOTE — PLAN OF CARE
Problem: PAIN - ADULT  Goal: Verbalizes/displays adequate comfort level or baseline comfort level  Description: Interventions:  - Encourage patient to monitor pain and request assistance  - Assess pain using appropriate pain scale  - Administer analgesics based on type and severity of pain and evaluate response  - Implement non-pharmacological measures as appropriate and evaluate response  - Consider cultural and social influences on pain and pain management  - Notify physician/advanced practitioner if interventions unsuccessful or patient reports new pain  Outcome: Progressing     Problem: INFECTION - ADULT  Goal: Absence or prevention of progression during hospitalization  Description: INTERVENTIONS:  - Assess and monitor for signs and symptoms of infection  - Monitor lab/diagnostic results  - Monitor all insertion sites, i e  indwelling lines, tubes, and drains  - [place] appropriate cooling/warming therapies per order  - Administer medications as ordered  - Instruct and encourage patient and family to use good hand hygiene technique  - Identify and instruct in appropriate isolation precautions for identified infection/condition  Outcome: Progressing  Goal: Absence of fever/infection during neutropenic period  Description: INTERVENTIONS:  - Monitor WBC    Outcome: Progressing     Problem: SAFETY ADULT  Goal: Patient will remain free of falls  Description: INTERVENTIONS:  - Educate patient/family on patient safety including physical limitations  - Instruct patient to call for assistance with activity   - Keep Call bell within reach  - Keep bed low and locked with side rails adjusted as appropriate  - Keep care items and personal belongings within reach  - Initiate and maintain comfort rounds    Outcome: Progressing  Goal: Maintain or return to baseline ADL function  Description: INTERVENTIONS:  -  Assess patient's ability to carry out ADLs; assess patient's baseline for ADL function and identify physical deficits which impact ability to perform ADLs (bathing, care of mouth/teeth, toileting, grooming, dressing, etc )  - Assess/evaluate cause of self-care deficits   - Encourage maximum independence but intervene and supervise when necessary  - Involve family in performance of ADLs  - Assess for home care needs following discharge   - Provide patient education as appropriate  Outcome: Progressing  Goal: Maintains/Returns to pre admission functional level  Description: INTERVENTIONS:  - Perform BMAT or MOVE assessment daily    - Set and communicate daily mobility goal to care team and patient/family/caregiver  - Out of bed for toileting  - Record patient progress and toleration of activity level   Outcome: Progressing     Problem: Knowledge Deficit  Goal: Patient/family/caregiver demonstrates understanding of disease process, treatment plan, medications, and discharge instructions  Description: Complete learning assessment and assess knowledge base    Interventions:  - Provide teaching at level of understanding  - Provide teaching via preferred learning methods  Outcome: Progressing     Problem: DISCHARGE PLANNING  Goal: Discharge to home or other facility with appropriate resources  Description: INTERVENTIONS:  - Identify barriers to discharge w/patient and caregiver  - Arrange for needed discharge resources and transportation as appropriate  - Identify discharge learning needs (meds, wound care, etc )  - Arrange for interpretive services to assist at discharge as needed  - Refer to Case Management Department for coordinating discharge planning if the patient needs post-hospital services based on physician/advanced practitioner order or complex needs related to functional status, cognitive ability, or social support system  Outcome: Progressing     Problem: POSTPARTUM  Goal: Experiences normal postpartum course  Description: INTERVENTIONS:  - Monitor maternal vital signs  - Assess uterine involution and lochia  Outcome: Progressing  Goal: Appropriate maternal -  bonding  Description: INTERVENTIONS:  - Identify family support  - Assess for appropriate maternal/infant bonding   -Encourage maternal/infant bonding opportunities  - Referral to  or  as needed  Outcome: Progressing  Goal: Establishment of infant feeding pattern  Description: INTERVENTIONS:  - Assess breast/bottle feeding  - Refer to lactation as needed  Outcome: Progressing  Goal: Incision(s), wounds(s) or drain site(s) healing without S/S of infection  Description: INTERVENTIONS  - Assess and document dressing, incision, wound bed, drain sites and surrounding tissue  - Provide patient and family education  - Perform skin care/dressing changes ever  Outcome: Progressing     Problem: ALTERATION IN THE BREAST  Goal: Optimize infant feeding at the breast  Description: INTERVENTIONS:  - Latch, breast and nipple assessment  - Assess prior breast feeding history  - Hand expression of breast milk  - For cracked, bleeding and or sore nipples reassess latch, treat damaged nipple  -Educate mother on feeding cues  -Positioning/latch techniques  Outcome: Progressing     Problem: INADEQUATE LATCH, SUCK OR SWALLOW  Goal: Demonstrate ability to latch and sustain latch, audible swallowing and satiety  Description: INTERVENTIONS:  - Assess oral anatomy, notify Physician/AP for abnormal findings  - Establish milk expression  - Maximize feeding opportunity (skin to skin, behavioral state)  - Position/latch techniques  - Discourage use of pacifier-artificial nipple  - Mechanical pumping  - Nipple Shield  - Supplemental formula feeding (Physician/AP order)  - Alternative feeding method  Outcome: Progressing

## 2021-09-26 NOTE — LACTATION NOTE
This note was copied from a baby's chart  Met with Ming Pérez to go over the discharge breastfeeding booklet including the feeding log  Emphasized 8 or more (12) feedings in a 24 hour period and what to expect for the number of diapers per day of life  Discussed s/s engorgement, blocked milk ducts, and mastitis  Discussed how to remedy at home and when to contact physician  Discussed risks for early supplementation: over feeding, longer digestion times, engorgement for mom, lower milk supply for mom, and nipple confusion  Mom has been primarily bottle/formula feeding in the hospital  She states she will start pumping when she gets home  Encouraged her to place baby at the breast and pump every feeding  Supplement as needed until her milk comes in  Reviewed breastfeeding and your lifestyle, storage and preparation of breast milk, how to keep you breast pump clean, the employed breastfeeding mother and paced bottle feeding handouts  Booklet included Breastfeeding Resources for after discharge including access to the number for the [address]  All written information was provided in [place]

## 2021-09-26 NOTE — PROGRESS NOTES
Progress Note - OB/GYN   Kiran Jolly 33 y o  female MRN: [id number]  Unit/Bed#:  307-01 Encounter: [id number]    Assessment:  Post partum Day #3 s/p RLTCS + BS, stable, baby in room    Plan:  1) Postoperative state   , Hgb 9 5 --> 8 8, s/p venofer   Spontaneously voiding   DVT ppx: SCDs, BMI 29  2) Contraception   S/p bilateral salpingectomy  3) Hx of postpartum preeclampsia   CBC/CMP WNL, P/Cr 0 42   Bps 106-121/55-61   Patient does not meet criteria for PreE in this pregnancy; asymptomatic  4) Continue routine post partum care   Encourage ambulation   Encourage breastfeeding   Anticipate discharge POD#3     Subjective/Objective   Chief Complaint:     Post delivery  Lochia WNL  Patient still having burning and incisional discomfort  Pain improved since scheduling tylenol/motrin  Subjective:     Pain: yes, cramping, improved with meds  Tolerating PO: yes  Voiding: yes  Flatus: yes  BM: no  Ambulating: yes  Chest pain: no  Shortness of breath: no  Leg pain: no  Lochia: minimal    Objective:     Vitals: /57 (BP Location: Right arm)   Pulse 89   Temp 99 °F (37 2 °C) (Oral)   Resp 18   Ht 4' 10" (1 473 m)   Wt 64 4 kg (142 lb) Comment: 142 per pt  LMP 12/22/2020 (Exact Date)   SpO2 98%   Breastfeeding Yes   BMI 29 68 kg/m²     I/O       09/24 0701 - 09/25 0700 09/25 0701 - 09/26 0700 09/26 0701 - 09/27 0700    P  O        I V  (mL/kg)       IV Piggyback       Total Intake(mL/kg)       Urine (mL/kg/hr) 1600 (1)      Emesis/NG output       Blood       Total Output 1600      Net -1600                   Lab Results   Component Value Date    WBC 13 49 (H) 09/24/2021    HGB 8 6 (L) 09/24/2021    HCT 26 0 (L) 09/24/2021    MCV 81 (L) 09/24/2021     09/24/2021       Physical Exam:     Gen: AAOx3, NAD  CV: RRR  Lungs: CTA b/l  Abd: Soft, non-tender, mild distension, tympanic, no rebound or guarding  Uterine fundus firm and non-tender, 2 cm below the umbilicus   Incision c/d/I  Ext: Non yaneli Bray MD  9/26/2021  4:28 AM

## 2021-09-27 LAB
ABO GROUP BLD BPU: NORMAL
ABO GROUP BLD BPU: NORMAL
BPU ID: NORMAL
BPU ID: NORMAL
CROSSMATCH: NORMAL
CROSSMATCH: NORMAL
UNIT DISPENSE STATUS: NORMAL
UNIT DISPENSE STATUS: NORMAL
UNIT PRODUCT CODE: NORMAL
UNIT PRODUCT CODE: NORMAL
UNIT PRODUCT VOLUME: 350 ML
UNIT PRODUCT VOLUME: 350 ML
UNIT RH: NORMAL
UNIT RH: NORMAL

## 2021-09-27 NOTE — UTILIZATION REVIEW
Inpatient Admission Authorization Request   Notification of Maternity/Delivery &  Birth Information for Admission   SERVICING FACILITY:   [address]  Tax ID: [id number]  NPI: [id number]  Place of Service: Inpatient [address]  Place of Service Code: 24     ATTENDING PROVIDER:  Attending Name and NPI#: Kolton Tillman Md [[id number]]  Address: [address]  Phone: [phone]     UTILIZATION REVIEW CONTACT:  Miranda Mercado, Utilization   Network Utilization Review Department  Phone: [phone]  Fax [phone]  Email: [email or web address]     PHYSICIAN ADVISORY SERVICES:  FOR [id number] REVIEW - MEDICAL NECESSITY DENIAL  Phone: [phone]  Fax: [phone]  Email: [email or web address]     TYPE OF REQUEST:  Inpatient Status     ADMISSION INFORMATION:  ADMISSION DATE/TIME: 21  8:06 AM  PATIENT DIAGNOSIS CODE/DESCRIPTION:  Previous  section complicating pregnancy, with delivery [O34 219]  Encounter for  delivery without indication [O82] The primary encounter diagnosis was History of  delivery  Diagnoses of S/P repeat low transverse  and Previous  section complicating pregnancy, with delivery were also pertinent to this visit  1  History of  delivery    2  S/P repeat low transverse     3   Previous  section complicating pregnancy, with delivery      DISCHARGE DATE/TIME: 2021  3:30 PM  DISCHARGE DISPOSITION (IF DISCHARGED): Home/Self Care     MOTHER AND  INFORMATION:  Mother: Kristi Bravo 1988   Delivering clinician: [facility]   OB History        3    Para   3    Term   3            AB        Living   3       SAB        TAB        Ectopic        Multiple   0    Live Births   3           Obstetric Comments   Menstrual cycle: 15 [place] Name & MRN:   Information for the patient's :  Reji Burns [[id number]]      Delivery Information:  Sex: female  Delivered 2021 10:42 AM by , Low Transverse; Gestational Age: 44w2d    [place] Measurements:  Weight: 8 lb 13 oz (3997 g); Height: 20"    APGAR 1 minute 5 minutes 10 minutes   Totals: 8 9       Birth Information: 33 y o  female MRN: [id number] Unit/Bed#: -01 Estimated Date of Delivery: 21  Birthweight: No birth weight on file  Gestational Age: <None> Delivery Type: , Low Transverse          APGARS  One minute Five minutes Ten minutes   Totals:               IMPORTANT INFORMATION:  Please contact the [facility] directly with any questions or concerns regarding this request  Department voicemails are confidential     Send requests for admission clinical reviews, concurrent reviews, approvals, and administrative denials due to lack of clinical to fax [phone]

## 2021-09-29 ENCOUNTER — OFFICE VISIT (OUTPATIENT)
Dept: INTERNAL MEDICINE CLINIC | Facility: CLINIC | Age: 31
End: 2021-09-29

## 2021-09-29 VITALS
TEMPERATURE: 98.2 F | DIASTOLIC BLOOD PRESSURE: 78 MMHG | OXYGEN SATURATION: 98 % | SYSTOLIC BLOOD PRESSURE: 135 MMHG | WEIGHT: 140.2 LBS | HEART RATE: 82 BPM | BODY MASS INDEX: 29.3 KG/M2

## 2021-09-29 DIAGNOSIS — T14.8XXA HEMATOMA: Primary | ICD-10-CM

## 2021-09-29 LAB — PLACENTA IN STORAGE: NORMAL

## 2021-09-29 PROCEDURE — 99213 OFFICE O/P EST LOW 20 MIN: CPT | Performed by: INTERNAL MEDICINE

## 2021-09-29 NOTE — PROGRESS NOTES
[facility]  INTERNAL MEDICINE ACUTE VISIT     PATIENT INFORMATION     Titi Colón   33 y o  female   MRN: [id number]    ASSESSMENT/PLAN     Diagnoses and all orders for this visit:    Hematoma  Keep affected extremity elevated as able  Tylenol and ice for pain  Discussed will resorb with time and resolve  Nothing to be concerned about  Call for appointment if fails to resolve in 2 weeks or symptoms worsen  CHIEF COMPLAINT     Chief Complaint   Patient presents with    Mass     lump on right arm and bruised     HISTORY OF PRESENT ILLNESS       Ms Francisco Javier Dash  Is a 27-year-old female presenting to clinic for same-day appointment due to concern for bruising and lump on right forearm previous IV site  Note she was recently hospitalized and gave birth to healthy baby girl on 09/23  Since discharge from hospital she has noted a lump on R forearm with pain on palpation as well as pain with activity  REVIEW OF SYSTEMS     Review of Systems   Constitutional: Negative for chills and fever  Respiratory: Negative for cough and shortness of breath  Cardiovascular: Negative for chest pain and leg swelling  Gastrointestinal: Positive for abdominal pain  Musculoskeletal:        Lump on R forearm   Skin: Positive for color change  Negative for rash  Neurological: Negative for dizziness and headaches  Psychiatric/Behavioral: Negative for confusion  The patient is not nervous/anxious  OBJECTIVE     Vitals:    09/29/21 1554   BP: 135/78   BP Location: Left arm   Patient Position: Sitting   Cuff Size: Standard   Pulse: 82   Temp: 98 2 °F (36 8 °C)   TempSrc: Temporal   SpO2: 98%   Weight: 63 6 kg (140 lb 3 2 oz)     Physical Exam  Vitals reviewed  Constitutional:       General: She is not in acute distress  Appearance: Normal appearance  She is not ill-appearing, toxic-appearing or diaphoretic  Cardiovascular:      Rate and Rhythm: Normal rate and regular rhythm        Heart sounds: Normal heart sounds  Pulmonary:      Effort: Pulmonary effort is normal  No respiratory distress  Breath sounds: Normal breath sounds  Musculoskeletal:      Comments: Lump on R forearm with bruising   Skin:     Findings: Bruising present  Neurological:      General: No focal deficit present  Mental Status: She is alert and oriented to person, place, and time  Psychiatric:         Mood and Affect: Mood normal          Behavior: Behavior normal          Thought Content:  Thought content normal        CURRENT MEDICATIONS     Current Outpatient Medications:     acetaminophen (TYLENOL) 325 mg tablet, Take 2 tablets (650 mg total) by mouth every 6 (six) hours, Disp: , Rfl:     clotrimazole (LOTRIMIN) 1 % cream, Apply topically 2 (two) times a day, Disp: 30 g, Rfl: 1    ferrous sulfate 324 (65 Fe) mg, Take 1 tablet (324 mg total) by mouth daily before breakfast, Disp: 90 tablet, Rfl: 0    oxyCODONE (ROXICODONE) 5 immediate release tablet, Take 1 tablet (5 mg total) by mouth every 6 (six) hours as needed for severe pain for up to 10 dosesMax Daily Amount: 20 mg, Disp: 10 tablet, Rfl: 0    Prenatal Vit-Fe Fumarate-FA (PRENATAL 19 PO), Take by mouth, Disp: , Rfl:     docusate sodium (COLACE) 100 mg capsule, Take 1 capsule (100 mg total) by mouth 2 (two) times a day as needed for constipation (Patient not taking: Reported on 7/9/2021), Disp: 60 capsule, Rfl: 1    ibuprofen (MOTRIN) 600 mg tablet, Take 1 tablet (600 mg total) by mouth every 6 (six) hours (Patient not taking: Reported on 9/29/2021), Disp: 60 tablet, Rfl: 0    polyethylene glycol (MIRALAX) 17 g packet, Take 17 g by mouth daily as needed (constipation) (Patient not taking: Reported on 9/29/2021), Disp: , Rfl:     senna (SENOKOT) 8 6 mg, Take 1 tablet (8 6 mg total) by mouth daily at bedtime as needed for constipation (Patient not taking: Reported on 9/29/2021), Disp: , Rfl:     [address]     Past Medical History:   Diagnosis Date    Anemia     History of cold sores     last assessed: 10/12/2016     Hypertension     History of pre-eclampsia     Urinary tract infection     Hx of UTI during last pregnancy    Varicella     Positive Hx     Past Surgical History:   Procedure Laterality Date     SECTION  2015    MD  DELIVERY ONLY N/A 2019    Procedure:  SECTION () REPEAT;  Surgeon: Joaquina Cardona MD;  Location: [facility] ;  Service: Obstetrics    MD  DELIVERY ONLY N/A 2021    Procedure:  SECTION () REPEAT;  Surgeon: Monica Reyez MD;  Location: [facility] LD;  Service: Obstetrics    MD LIGATION,FALLOPIAN TUBE W/ Bilateral 2021    Procedure: LIGATION/COAGULATION TUBAL;  Surgeon: Monica Reyez MD;  Location: [facility] ;  Service: Obstetrics     SOCIAL & FAMILY HISTORY     Social History     Socioeconomic History    Marital status: Single     Spouse name: Not on file    Number of children: 2    Years of education: 15    Highest education level: Some college, no degree   Occupational History    Not on file   Tobacco Use    Smoking status: Never Smoker    Smokeless tobacco: Never Used   Vaping Use    Vaping Use: Never used   Substance and Sexual Activity    Alcohol use: Not Currently     Alcohol/week: 0 0 standard drinks    Drug use: No    Sexual activity: Yes     Partners: Male     Birth control/protection: None   Other Topics Concern    Not on file   Social History Narrative    Always uses seatbelts    Caffeine use     Exercises rarely    Lives with family    No secondhand smoke exposure     Social Determinants of Health     Financial Resource Strain: Low Risk     Difficulty of Paying Living Expenses: Not hard at all   Food Insecurity: No Food Insecurity    Worried About Running Out of Food in the Last Year: Never true    Eduarda of Food in the Last Year: Never true   Transportation Needs: No Transportation Needs    Lack of Transportation (Medical): No    Lack of Transportation (Non-Medical): No   Physical Activity: Inactive    Days of Exercise per Week: 0 days    Minutes of Exercise per Session: 0 min   Stress: No Stress Concern Present    Feeling of Stress : Only a little   Social Connections: Moderately Isolated    Frequency of Communication with Friends and Family: More than three times a week    Frequency of Social Gatherings with Friends and Family: Once a week    Attends Congregation Services: Never    Active Member of Clubs or Organizations: No    Attends Club or Organization Meetings: Never    Marital Status: Living with partner   Intimate Partner Violence: Not At Risk    Fear of Current or Ex-Partner: No    Emotionally Abused: No    Physically Abused: No    Sexually Abused: No     Social History     Substance and Sexual Activity   Alcohol Use Not Currently    Alcohol/week: 0 0 standard drinks       Social History     Substance and Sexual Activity   Drug Use No     Social History     Tobacco Use   Smoking Status Never Smoker   Smokeless Tobacco Never Used     Family History   Problem Relation Age of Onset    Arthritis Mother     Hyperlipidemia Mother     Osteoporosis Mother     Hypertension Father     Heart disease Father     No Known Problems Sister     No Known Problems Brother     No Known Problems Son     No Known Problems Maternal Grandmother     No Known Problems Maternal Grandfather     No Known Problems Paternal Grandmother     No Known Problems Paternal Grandfather     No Known Problems Sister     No Known Problems Sister     No Known Problems Brother     No Known Problems Brother     No Known Problems Daughter      ==  Blane Dillard,   Internal Medicine Resident, PGY-2  [facility] [address]   [place] [address] [address] , [address], [address] [place] [address]  Office: [phone]  Fax: [phone]

## 2021-10-08 ENCOUNTER — TELEPHONE (OUTPATIENT)
Dept: OBGYN CLINIC | Facility: CLINIC | Age: 31
End: 2021-10-08

## 2021-10-15 ENCOUNTER — POSTPARTUM VISIT (OUTPATIENT)
Dept: OBGYN CLINIC | Facility: CLINIC | Age: 31
End: 2021-10-15

## 2021-10-15 VITALS
HEIGHT: 58 IN | SYSTOLIC BLOOD PRESSURE: 115 MMHG | DIASTOLIC BLOOD PRESSURE: 62 MMHG | WEIGHT: 133 LBS | HEART RATE: 92 BPM | BODY MASS INDEX: 27.92 KG/M2

## 2021-10-15 PROCEDURE — 99213 OFFICE O/P EST LOW 20 MIN: CPT | Performed by: NURSE PRACTITIONER

## 2021-10-15 RX ORDER — MULTIVITAMIN
1 CAPSULE ORAL DAILY
COMMUNITY
End: 2022-01-05 | Stop reason: SDUPTHER

## 2022-01-04 PROBLEM — D50.0 BLOOD LOSS ANEMIA: Status: ACTIVE | Noted: 2022-01-04

## 2022-01-05 ENCOUNTER — OFFICE VISIT (OUTPATIENT)
Dept: INTERNAL MEDICINE CLINIC | Facility: CLINIC | Age: 32
End: 2022-01-05

## 2022-01-05 VITALS
SYSTOLIC BLOOD PRESSURE: 101 MMHG | TEMPERATURE: 97.2 F | WEIGHT: 134 LBS | BODY MASS INDEX: 28.13 KG/M2 | DIASTOLIC BLOOD PRESSURE: 64 MMHG | HEART RATE: 68 BPM | HEIGHT: 58 IN

## 2022-01-05 DIAGNOSIS — D50.0 BLOOD LOSS ANEMIA: ICD-10-CM

## 2022-01-05 DIAGNOSIS — Z23 NEED FOR INFLUENZA VACCINATION: Primary | ICD-10-CM

## 2022-01-05 DIAGNOSIS — B35.1 ONYCHOMYCOSIS: ICD-10-CM

## 2022-01-05 PROCEDURE — 99212 OFFICE O/P EST SF 10 MIN: CPT | Performed by: INTERNAL MEDICINE

## 2022-01-05 PROCEDURE — 90686 IIV4 VACC NO PRSV 0.5 ML IM: CPT | Performed by: INTERNAL MEDICINE

## 2022-01-05 PROCEDURE — 90471 IMMUNIZATION ADMIN: CPT | Performed by: INTERNAL MEDICINE

## 2022-01-05 RX ORDER — MULTIVITAMIN
1 CAPSULE ORAL DAILY
Qty: 90 CAPSULE
Start: 2022-01-05 | End: 2022-04-05

## 2022-01-05 RX ORDER — TERBINAFINE HYDROCHLORIDE 250 MG/1
250 TABLET ORAL DAILY
Qty: 90 TABLET | Refills: 0 | Status: SHIPPED | OUTPATIENT
Start: 2022-01-05 | End: 2022-04-05

## 2022-01-05 NOTE — PROGRESS NOTES
ASSESSMENT/PLAN:    Case and plan discussed with Dr Kike Newman     Diagnoses and all orders for this visit:    Need for influenza vaccination  -     influenza vaccine, quadrivalent, 0 5 mL, preservative-free, for adult and pediatric patients 6 mos+ (AFLURIA, FLUARIX, FLULAVAL, FLUZONE)    Blood loss anemia  S/p partum on 9/2021  -     CBC and Platelet; Future  -     Multiple Vitamin (multivitamin) capsule; Take 1 capsule by mouth daily    Onychomycosis  Will start 12 week course of Lamisil  can consider referral to Podiatry in the future if persistent  -     terbinafine (LamISIL) 250 mg tablet; Take 1 tablet (250 mg total) by mouth daily        Immunization History   Administered Date(s) Administered    INFLUENZA 11/02/2018    Influenza Quadrivalent Preservative Free 3 years and older IM 10/20/2015    Influenza, injectable, quadrivalent, preservative free 0 5 mL 11/02/2018, 01/10/2020, 01/05/2022    Tdap 06/08/2015, 08/01/2015, 11/30/2018, 07/09/2021         HISTORY OF PRESENT ILLNESS:    Presents for checkup  Presents with c/o persistent onychomycosis left foot more than right  She tells me that for some time she was taking or medication but had to stop when she got pregnant  In states she received a cream that according to chart review appears to be clotrimazole cream   Oral medication appears to be terbinafine PO  She has been using the cream but still feels it has not resolved and her nail is deformed  She not currently breast feeding  Review of Systems   Constitutional: Negative for activity change, chills, diaphoresis, fatigue and fever  HENT: Negative  Negative for rhinorrhea, sinus pain, sneezing and sore throat  Eyes: Negative  Negative for visual disturbance  Respiratory: Negative for cough, choking, chest tightness, shortness of breath and wheezing  Cardiovascular: Negative for chest pain, palpitations and leg swelling     Gastrointestinal: Negative for abdominal distention, abdominal pain, constipation, diarrhea, nausea and vomiting  Endocrine: Negative  Genitourinary: Negative  Negative for difficulty urinating, dysuria and urgency  Musculoskeletal: Negative  Skin: Negative  Negative for rash and wound  Neurological: Negative for dizziness, tremors, weakness, light-headedness, numbness and headaches  Psychiatric/Behavioral: Negative for confusion and sleep disturbance  OBJECTIVE:  Vitals:    01/05/22 1108   BP: 101/64   BP Location: Left arm   Patient Position: Sitting   Cuff Size: Adult   Pulse: 68   Temp: (!) 97 2 °F (36 2 °C)   TempSrc: Temporal   Weight: 60 8 kg (134 lb)   Height: 4' 10" (1 473 m)       Physical Exam:    GENERAL: NAD, Normal appearance  Non diaphoretic, non-toxic, not ill-appearing, well-developed, well-nourished  NEUROLOGIC:  Alert/oriented x3  No motor or sensory deficits  HEENT:  NC/AT, PERRL, EOMI, MMM, no scleral icterus  CARDIAC:  RRR, +S1/S2, no S3/S4 heard, no m/g/r  PULMONARY:  non-labored breathing, CTA B/L, no wheezing/rales/rhonci appreciated at time of encounter  ABDOMEN:  Soft, NT/ND, +BS, no rebound/guarding/rigidity  Extremities:  2+ Pulses in DP/PT  No edema, cyanosis, or clubbing  Deformity of the left great toenail    SKIN:  No rashes or erythema        Current Outpatient Medications:     acetaminophen (TYLENOL) 325 mg tablet, Take 2 tablets (650 mg total) by mouth every 6 (six) hours, Disp: , Rfl:     clotrimazole (LOTRIMIN) 1 % cream, Apply topically 2 (two) times a day (Patient not taking: Reported on 1/5/2022 ), Disp: 30 g, Rfl: 1    docusate sodium (COLACE) 100 mg capsule, Take 1 capsule (100 mg total) by mouth 2 (two) times a day as needed for constipation (Patient not taking: Reported on 7/9/2021), Disp: 60 capsule, Rfl: 1    ferrous sulfate 324 (65 Fe) mg, Take 1 tablet (324 mg total) by mouth daily before breakfast (Patient not taking: Reported on 1/5/2022 ), Disp: 90 tablet, Rfl: 0    ibuprofen (MOTRIN) 600 mg tablet, Take 1 tablet (600 mg total) by mouth every 6 (six) hours (Patient not taking: Reported on 2021), Disp: 60 tablet, Rfl: 0    Multiple Vitamin (multivitamin) capsule, Take 1 capsule by mouth daily (Patient not taking: Reported on 2022 ), Disp: , Rfl:     oxyCODONE (ROXICODONE) 5 immediate release tablet, Take 1 tablet (5 mg total) by mouth every 6 (six) hours as needed for severe pain for up to 10 dosesMax Daily Amount: 20 mg (Patient not taking: Reported on 10/15/2021), Disp: 10 tablet, Rfl: 0    polyethylene glycol (MIRALAX) 17 g packet, Take 17 g by mouth daily as needed (constipation) (Patient not taking: Reported on 2021), Disp: , Rfl:     Prenatal Vit-Fe Fumarate-FA (PRENATAL 19 PO), Take by mouth (Patient not taking: Reported on 10/15/2021), Disp: , Rfl:     senna (SENOKOT) 8 6 mg, Take 1 tablet (8 6 mg total) by mouth daily at bedtime as needed for constipation (Patient not taking: Reported on 2021), Disp: , Rfl:     Past Medical History:   Diagnosis Date    Anemia     History of cold sores     last assessed: 10/12/2016     Hypertension     History of pre-eclampsia     Urinary tract infection     Hx of UTI during last pregnancy    Varicella     Positive Hx     Past Surgical History:   Procedure Laterality Date     SECTION  2015    MT  DELIVERY ONLY N/A 2019    Procedure:  SECTION () REPEAT;  Surgeon: Nate Aburto MD;  Location: [facility] LD;  Service: Obstetrics    MT  DELIVERY ONLY N/A 2021    Procedure:  SECTION () REPEAT;  Surgeon: Magda Denton MD;  Location: [facility] LD;  Service: Obstetrics    MT Lonita Bio TUBE W/ Bilateral 2021    Procedure: LIGATION/COAGULATION TUBAL;  Surgeon: Magda Denton MD;  Location: [facility] LD;  Service: Obstetrics     Family History   Problem Relation Age of Onset    Arthritis Mother     Hyperlipidemia Mother    Lurdes Jarrett Osteoporosis Mother    Lurdes Jarrett Hypertension Father     Heart disease Father     No Known Problems Sister     No Known Problems Brother     No Known Problems Son     No Known Problems Maternal Grandmother     No Known Problems Maternal Grandfather     No Known Problems Paternal Grandmother     No Known Problems Paternal Grandfather     No Known Problems Sister     No Known Problems Sister     No Known Problems Brother     No Known Problems Brother     No Known Problems Daughter      Social History     Socioeconomic History    Marital status: Single     Spouse name: Not on file    Number of children: 2    Years of education: 15    Highest education level: Some college, no degree   Occupational History    Not on file   Tobacco Use    Smoking status: Never Smoker    Smokeless tobacco: Never Used   Vaping Use    Vaping Use: Never used   Substance and Sexual Activity    Alcohol use: Not Currently     Alcohol/week: 0 0 standard drinks    Drug use: No    Sexual activity: Yes     Partners: Male     Birth control/protection: None   Other Topics Concern    Not on file   Social History Narrative    Always uses seatbelts    Caffeine use     Exercises rarely    Lives with family    No secondhand smoke exposure     Social Determinants of Health     Financial Resource Strain: Low Risk     Difficulty of Paying Living Expenses: Not hard at all   Food Insecurity: No Food Insecurity    Worried About Running Out of Food in the Last Year: Never true    Eduarda of Food in the Last Year: Never true   Transportation Needs: No Transportation Needs    Lack of Transportation (Medical): No    Lack of Transportation (Non-Medical): No   Physical Activity: Inactive    Days of Exercise per Week: 0 days    Minutes of Exercise per Session: 0 min   Stress: No Stress Concern Present    Feeling of Stress : Only a little   Social Connections:  Moderately Isolated    Frequency of Communication with Friends and Family: More than three times a week    Frequency of Social Gatherings with Friends and Family: Once a week    Attends Advent Services: Never    Active Member of Clubs or Organizations: No    Attends Club or Organization Meetings: Never    Marital Status: Living with partner   Intimate Partner Violence: Not At Risk    Fear of Current or Ex-Partner: No    Emotionally Abused: No    Physically Abused: No    Sexually Abused: No   Housing Stability: Low Risk     Unable to Pay for Housing in the Last Year: No    Number of [place] in the Last Year: 1    Unstable Housing in the Last Year: No     Social History     Tobacco Use   Smoking Status Never Smoker   Smokeless Tobacco Never Used     Social History     Substance and Sexual Activity   Alcohol Use Not Currently    Alcohol/week: 0 0 standard drinks     Social History     Substance and Sexual Activity   Drug Use No         Vicente Gonzáles MD  Internal Medicine Residency, PGY-3  [address] [place] [address]

## 2022-01-26 NOTE — PROGRESS NOTES
Assessment:  25 y o  [id number] who is POD#11 from repeat  section for prior  section  She is doing well post-op  Plan:  Patient is doing well  Bps are wnl today  Advised patient to continue monitor Bps and Preeclampsia precaution was provided  Incision is c/d/i  Patient continues to refuse birth control and is aware she can get pregnant again  Patient also has recurrent UTI and is finishing up her course of macrobid  Referral for urology was given for patient to follow up  Will have patient RTO in 2 weeks for postpartum visit      __________________________________________________________________    Subjective   Lethea Booker is a 25 y o  [id number] who presents POD#11 from repeat  section for prior  section  A few days after being discharge, patient had shortness of breath which she thought was secondary to breastfeeding  Patient was noted to have severe ranged blood pressure and was sent to Emergency Room  She was diagnosed with postpartum preeclampsia and was started on magnesium  She has no history of hypertension disease previously in her pregnancy  Today her blood pressure is 123/74 and she denies any headache, visual change, epigastric pain, chest pain, and shortness of breath  Patient reports doing well  She notes her pain is well-controlled  She is using percocet/motrin to control her pain  Her incision is healing well; she denies redness or drainage  Her bowel function is normal and she is having regular BM's  She has  returned to most of her normal activities  She has not yet returned to sexual activity  Lochia is minimal  She reports that she is doing well emotionally and denies depressive sx  She is breastfeeding and reports it is going well       The following portions of the patient's history were reviewed and updated as appropriate: allergies, current medications, past family history, past medical history, past social history, past surgical history and problem What Type Of Note Output Would You Prefer (Optional)?: Standard Output How Severe Is Your Acne?: mild list     Review of Systems  Pertinent items are noted in HPI  Objective  /74   Pulse 88   Ht 4' 10" (1 473 m)   Wt 56 7 kg (125 lb)   LMP 05/24/2018 (Approximate)   BMI 26 13 kg/m²      Physical Exam:  Physical Exam   Constitutional: She is oriented to person, place, and time  She appears well-developed and well-nourished  Cardiovascular: Normal rate and regular rhythm  Pulmonary/Chest: Effort normal and breath sounds normal    Abdominal: Bowel sounds are normal  There is no tenderness  There is no rebound and no guarding  Incision is c/d/i   Musculoskeletal: Normal range of motion  She exhibits no tenderness  Neurological: She is alert and oriented to person, place, and time  Psychiatric: She has a normal mood and affect  Nursing note and vitals reviewed  Is This A New Presentation, Or A Follow-Up?: Acne

## 2022-02-18 ENCOUNTER — HOSPITAL ENCOUNTER (EMERGENCY)
Facility: HOSPITAL | Age: 32
Discharge: HOME | End: 2022-02-18
Attending: EMERGENCY MEDICINE | Admitting: EMERGENCY MEDICINE
Payer: COMMERCIAL

## 2022-02-18 VITALS
OXYGEN SATURATION: 98 % | TEMPERATURE: 97.8 F | HEART RATE: 78 BPM | RESPIRATION RATE: 18 BRPM | DIASTOLIC BLOOD PRESSURE: 82 MMHG | SYSTOLIC BLOOD PRESSURE: 140 MMHG

## 2022-02-18 DIAGNOSIS — Z00.8 ENCOUNTER FOR PSYCHOLOGICAL EVALUATION: Primary | ICD-10-CM

## 2022-02-18 PROCEDURE — 99282 EMERGENCY DEPT VISIT SF MDM: CPT | Performed by: EMERGENCY MEDICINE

## 2022-02-18 PROCEDURE — 99283 EMERGENCY DEPT VISIT LOW MDM: CPT

## 2022-02-18 NOTE — ED ATTENDING ATTESTATION
2/18/2022  ISonja DO, saw and evaluated the patient  I have discussed the patient with the resident/non-physician practitioner and agree with the resident's/non-physician practitioner's findings, Plan of Care, and MDM as documented in the resident's/non-physician practitioner's note, except where noted  All available labs and Radiology studies were reviewed  I was present for key portions of any procedure(s) performed by the resident/non-physician practitioner and I was immediately available to provide assistance  At this point I agree with the current assessment done in the Emergency Department  I have conducted an independent evaluation of this patient a history and physical is as follows:    26-year-old female presents for psychiatric evaluation  Patient denies all complaints  No chest pain, shortness of breath, abdominal pain, fevers, URI symptoms  Patient also denies psychiatric complaints including no SI and no HI  Patient denies history of psychiatric problems, denies history of hospitalizations, not taking medication on a regular basis  Patient states she was brought here because of lies  On exam-no acute distress, heart regular, no respiratory distress, appears nontoxic, answers questions appropriately  Plan-Dr Haskel Severin spoke with family and crisis also our patient  Crisis reports that [place] was notified and would not take 302 because there are no grounds    After examining patient we are in agreement, there is no grounds for 302    ED Course         Critical Care Time  Procedures

## 2022-02-21 NOTE — ED PROVIDER NOTES
History  Chief Complaint   Patient presents with    Psychiatric Evaluation     HPI  26-year-old female brought in by police for alleged threats against her child and herself  As per patient's daughter's father he states that he received text messages from the patient stating vaguely that this will be her daughter's last day  Other texts show that patient wrote that she does not care for her own life and she does not care if she goes to intermediate  These messages are very vague and do not explicitly state any threatening acts toward herself or others  Patient currently denies any SI or HI audiovisual hallucinations  She denies any chest pain, fevers, chills, nausea, vomiting, shortness of breath, lateralizing weakness  Patient is currently in a custody [place]dwell with her daughter's father over the child  Prior to Admission Medications   Prescriptions Last Dose Informant Patient Reported? Taking?    Multiple Vitamin (multivitamin) capsule   No No   Sig: Take 1 capsule by mouth daily   terbinafine (LamISIL) 250 mg tablet   No No   Sig: Take 1 tablet (250 mg total) by mouth daily      Facility-Administered Medications: None       Past Medical History:   Diagnosis Date    Anemia     History of cold sores     last assessed: 10/12/2016     Hypertension     History of pre-eclampsia     Urinary tract infection     Hx of UTI during last pregnancy    Varicella     Positive Hx       Past Surgical History:   Procedure Laterality Date     SECTION  2015    OK  DELIVERY ONLY N/A 2019    Procedure:  SECTION () REPEAT;  Surgeon: Sayra Carpio MD;  [facility]: [facility] ;  Service: Obstetrics    OK  DELIVERY ONLY N/A 2021    Procedure:  SECTION () REPEAT;  Surgeon: Rosanne Lopez MD;  Location: [facility] ;  Service: Obstetrics    OK Lipscomb Bohr TUBE W/ Bilateral 2021    Procedure: LIGATION/COAGULATION TUBAL;  Surgeon: Rosanne Lopez MD;  Location: [facility] ;  Service: Obstetrics       Family History   Problem Relation Age of Onset    Arthritis Mother     Hyperlipidemia Mother     Osteoporosis Mother     Hypertension Father     Heart disease Father     No Known Problems Sister     No Known Problems Brother     No Known Problems Son     No Known Problems Maternal Grandmother     No Known Problems Maternal Grandfather     No Known Problems Paternal Grandmother     No Known Problems Paternal Grandfather     No Known Problems Sister     No Known Problems Sister     No Known Problems Brother     No Known Problems Brother     No Known Problems Daughter      I have reviewed and agree with the history as documented  E-Cigarette/Vaping    E-Cigarette Use Never User      E-Cigarette/Vaping Substances    Nicotine No     THC No     CBD No     Flavoring No     Other No     Unknown No      Social History     Tobacco Use    Smoking status: Never Smoker    Smokeless tobacco: Never Used   Vaping Use    Vaping Use: Never used   Substance Use Topics    Alcohol use: Not Currently     Alcohol/week: 0 0 standard drinks    Drug use: No        Review of Systems   Constitutional: Negative for chills and fever  HENT: Negative for congestion, ear pain, rhinorrhea and sore throat  Eyes: Negative for pain  Respiratory: Negative for apnea, cough, choking, chest tightness, shortness of breath, wheezing and stridor  Cardiovascular: Negative for chest pain, palpitations and leg swelling  Gastrointestinal: Negative for abdominal pain, constipation, diarrhea, nausea and vomiting  Genitourinary: Negative for hematuria  Musculoskeletal: Negative for arthralgias and back pain  Skin: Negative for rash and wound  Neurological: Negative for dizziness  Psychiatric/Behavioral: Negative for agitation and hallucinations  All other systems reviewed and are negative        Physical Exam  ED Triage Vitals [02/18/22 1043]   Temperature Pulse Respirations Blood Pressure SpO2   97 8 °F (36 6 °C) 78 18 140/82 98 %      Temp Source Heart Rate Source Patient Position - Orthostatic VS BP Location FiO2 (%)   Oral Monitor -- -- --      Pain Score       --             Orthostatic Vital Signs  Vitals:    02/18/22 1043   BP: 140/82   Pulse: 78       Physical Exam  Vitals reviewed  Constitutional:       General: She is not in acute distress  Appearance: She is well-developed  HENT:      Head: Normocephalic and atraumatic  Right Ear: External ear normal       Left Ear: External ear normal       Nose: Nose normal  No congestion or rhinorrhea  Mouth/Throat:      Mouth: Mucous membranes are moist       Pharynx: No oropharyngeal exudate or posterior oropharyngeal erythema  Eyes:      General:         Right eye: No discharge  Left eye: No discharge  Extraocular Movements: Extraocular movements intact  Conjunctiva/sclera: Conjunctivae normal       Pupils: Pupils are equal, round, and reactive to light  Cardiovascular:      Rate and Rhythm: Normal rate and regular rhythm  Pulses: Normal pulses  Heart sounds: Normal heart sounds  Pulmonary:      Effort: Pulmonary effort is normal  No respiratory distress  Breath sounds: Normal breath sounds  No wheezing or rales  Abdominal:      Palpations: Abdomen is soft  Tenderness: There is no abdominal tenderness  Musculoskeletal:         General: No tenderness  Normal range of motion  Cervical back: Normal range of motion and neck supple  No rigidity  Skin:     General: Skin is warm  Findings: No erythema or rash  Neurological:      General: No focal deficit present  Mental Status: She is alert and oriented to person, place, and time  Cranial Nerves: No cranial nerve deficit  Sensory: No sensory deficit  Motor: No weakness        Coordination: Coordination normal    Psychiatric:         Mood and Affect: Mood normal          ED Medications  Medications - No data to display    Diagnostic Studies  Results Reviewed     None                 No orders to display         Procedures  Procedures      ED Course                                       MDM  78-year-old female no significant past medical history brought in for psychiatric evaluation who does not admit to any SI or HI audiovisual hallucinations and denies any drug use and has no complaints  [place] and please do not have any grounds to 302 this patient  There is no evidence here in the emergency department that this patient needs inpatient psychiatric care  Patient is discharged  Disposition  Final diagnoses:   Encounter for psychological evaluation     Time reflects when diagnosis was documented in both MDM as applicable and the Disposition within this note     Time User Action Codes Description Comment    2/18/2022 12:12 PM Reinaldo Ludwig Add [Z00 8] Encounter for psychological evaluation       ED Disposition     ED Disposition Condition Date/Time Comment    Discharge Stable Fri Feb 18, 2022 12:12 PM Vivek Stone discharge to home/self care              Follow-up Information     Follow up With Specialties Details Why Contact Info Additional [address] Emergency Department Emergency Medicine Go to  If symptoms worsen or if you have additional concerns [address]  [address] [facility] [address] [facility] Emergency Department, [address], [address]  Internal Medicine Schedule an appointment as soon as possible for a visit  As needed [id number] [facility] [address]  [facility] [address] [facility] [address], [place], [place], [address] [phone]          Discharge Medication List as of 2/18/2022 12:26 PM      CONTINUE these medications which have NOT CHANGED Details   Multiple Vitamin (multivitamin) capsule Take 1 capsule by mouth daily, Starting Wed 1/5/2022, Until Tue 4/5/2022, No Print      terbinafine (LamISIL) 250 mg tablet Take 1 tablet (250 mg total) by mouth daily, Starting Wed 1/5/2022, Until Tue 4/5/2022, Normal           No discharge procedures on file  PDMP Review       Value Time User    PDMP Reviewed  Yes 9/26/2021  1:27 PM Ton Hubbard MD           ED Provider  Attending physically available and evaluated Erica Celeste I managed the patient along with the ED Attending      Electronically Signed by         Tate Jones DO  02/20/22 2022

## 2022-02-22 ENCOUNTER — HOSPITAL ENCOUNTER (EMERGENCY)
Facility: HOSPITAL | Age: 32
Discharge: HOME | End: 2022-02-22
Attending: EMERGENCY MEDICINE | Admitting: EMERGENCY MEDICINE
Payer: COMMERCIAL

## 2022-02-22 VITALS
HEART RATE: 93 BPM | TEMPERATURE: 98.3 F | OXYGEN SATURATION: 95 % | SYSTOLIC BLOOD PRESSURE: 148 MMHG | RESPIRATION RATE: 18 BRPM | DIASTOLIC BLOOD PRESSURE: 89 MMHG

## 2022-02-22 DIAGNOSIS — J02.9 PHARYNGITIS: Primary | ICD-10-CM

## 2022-02-22 DIAGNOSIS — B34.9 VIRAL SYNDROME: ICD-10-CM

## 2022-02-22 LAB
FLUAV RNA RESP QL NAA+PROBE: NEGATIVE
FLUBV RNA RESP QL NAA+PROBE: NEGATIVE
SARS-COV-2 RNA RESP QL NAA+PROBE: POSITIVE

## 2022-02-22 PROCEDURE — 99283 EMERGENCY DEPT VISIT LOW MDM: CPT

## 2022-02-22 PROCEDURE — 87636 SARSCOV2 & INF A&B AMP PRB: CPT | Performed by: EMERGENCY MEDICINE

## 2022-02-22 PROCEDURE — 99284 EMERGENCY DEPT VISIT MOD MDM: CPT | Performed by: EMERGENCY MEDICINE

## 2022-02-22 RX ORDER — LIDOCAINE HYDROCHLORIDE 20 MG/ML
15 SOLUTION OROPHARYNGEAL ONCE
Status: COMPLETED | OUTPATIENT
Start: 2022-02-22 | End: 2022-02-22

## 2022-02-22 RX ADMIN — Medication 10 MG: at 05:38

## 2022-02-22 RX ADMIN — LIDOCAINE HYDROCHLORIDE 15 ML: 20 SOLUTION ORAL; TOPICAL at 05:38

## 2022-02-22 NOTE — ED PROVIDER NOTES
History  Chief Complaint   Patient presents with    Sore Throat     pt states she has had a sore throat since friday; it is painful to swallow and she is unabe to sleep because of it      34 y/o female presents to the ED for evaluation of sore throat that started 4 days ago  She states that her symptoms have gradually worsened since onset and tonight she has had difficulty sleeping due to her sore throat  She has had contact with people with similar symptoms  She is not vaccinated against COVID-19  No fever, chills, cough, SOB, chest pain, abdominal pain, N/V/D, urinary symptoms, or headaches  Prior to Admission Medications   Prescriptions Last Dose Informant Patient Reported? Taking?    Multiple Vitamin (multivitamin) capsule   No No   Sig: Take 1 capsule by mouth daily   terbinafine (LamISIL) 250 mg tablet   No No   Sig: Take 1 tablet (250 mg total) by mouth daily      Facility-Administered Medications: None       Past Medical History:   Diagnosis Date    Anemia     History of cold sores     last assessed: 10/12/2016     Hypertension     History of pre-eclampsia     Urinary tract infection     Hx of UTI during last pregnancy    Varicella     Positive Hx       Past Surgical History:   Procedure Laterality Date     SECTION  2015    CT  DELIVERY ONLY N/A 2019    Procedure:  SECTION () REPEAT;  Surgeon: Anitha Clifford MD;  [facility];  Service: Obstetrics    CT  DELIVERY ONLY N/A 2021    Procedure:  SECTION () REPEAT;  Surgeon: Ruddy Morocho MD;  [facility];  Service: Obstetrics    CT Davy Grumbles TUBE W/ Bilateral 2021    Procedure: LIGATION/COAGULATION TUBAL;  Surgeon: Ruddy Morocho MD;  [facility];  Service: Obstetrics       Family History   Problem Relation Age of Onset    Arthritis Mother     Hyperlipidemia Mother     Osteoporosis Mother     Hypertension Father    Betsy Mooney Heart disease Father     No Known Problems Sister     No Known Problems Brother     No Known Problems Son     No Known Problems Maternal Grandmother     No Known Problems Maternal Grandfather     No Known Problems Paternal Grandmother     No Known Problems Paternal Grandfather     No Known Problems Sister     No Known Problems Sister     No Known Problems Brother     No Known Problems Brother     No Known Problems Daughter      I have reviewed and agree with the history as documented  E-Cigarette/Vaping    E-Cigarette Use Never User      E-Cigarette/Vaping Substances    Nicotine No     THC No     CBD No     Flavoring No     Other No     Unknown No      Social History     Tobacco Use    Smoking status: Never Smoker    Smokeless tobacco: Never Used   Vaping Use    Vaping Use: Never used   Substance Use Topics    Alcohol use: Not Currently     Alcohol/week: 0 0 standard drinks    Drug use: No        Review of Systems   Constitutional: Negative for chills and fever  HENT: Positive for sore throat  Negative for congestion and rhinorrhea  Respiratory: Negative for cough and shortness of breath  Cardiovascular: Negative for chest pain and palpitations  Gastrointestinal: Negative for abdominal pain, diarrhea, nausea and vomiting  Genitourinary: Negative for dysuria and hematuria  Musculoskeletal: Negative for back pain and neck pain  Neurological: Negative for dizziness, weakness, light-headedness, numbness and headaches  All other systems reviewed and are negative        Physical Exam  ED Triage Vitals [02/22/22 0439]   Temperature Pulse Respirations Blood Pressure SpO2   98 3 °F (36 8 °C) 93 18 148/89 95 %      Temp Source Heart Rate Source Patient Position - Orthostatic VS BP Location FiO2 (%)   Oral Monitor Sitting Left arm --      Pain Score       --             Orthostatic Vital Signs  Vitals:    02/22/22 0439   BP: 148/89   Pulse: 93   Patient Position - Orthostatic VS: Sitting       Physical Exam  Vitals and nursing note reviewed  Constitutional:       General: She is not in acute distress  Appearance: Normal appearance  She is normal weight  She is not ill-appearing  HENT:      Head: Normocephalic and atraumatic  Right Ear: Tympanic membrane, ear canal and external ear normal       Left Ear: Tympanic membrane, ear canal and external ear normal       Nose: Nose normal  No congestion or rhinorrhea  Mouth/Throat:      Mouth: Mucous membranes are moist       Pharynx: Oropharynx is clear  Posterior oropharyngeal erythema present  No oropharyngeal exudate  Eyes:      Extraocular Movements: Extraocular movements intact  Conjunctiva/sclera: Conjunctivae normal       Pupils: Pupils are equal, round, and reactive to light  Cardiovascular:      Rate and Rhythm: Normal rate and regular rhythm  Pulses: Normal pulses  Heart sounds: Normal heart sounds  No murmur heard  Pulmonary:      Effort: Pulmonary effort is normal  No respiratory distress  Breath sounds: Normal breath sounds  No wheezing or rales  Abdominal:      General: Abdomen is flat  Bowel sounds are normal  There is no distension  Palpations: Abdomen is soft  Tenderness: There is no abdominal tenderness  There is no right CVA tenderness, left CVA tenderness or guarding  Musculoskeletal:         General: No swelling or tenderness  Normal range of motion  Cervical back: Normal range of motion and neck supple  No tenderness  Skin:     General: Skin is warm and dry  Capillary Refill: Capillary refill takes less than 2 seconds  Neurological:      General: No focal deficit present  Mental Status: She is alert and oriented to person, place, and time           ED Medications  Medications   Lidocaine Viscous HCl (XYLOCAINE) 2 % mucosal solution 15 mL (15 mL Swish & Swallow Given 2/22/22 0538)   dexamethasone oral liquid 10 mg 1 mL (10 mg Oral Given 2/22/22 0538) Diagnostic Studies  Results Reviewed     Procedure Component Value Units Date/Time    COVID/FLU - 24 hour TAT [[id number]]  (Abnormal) Collected: 02/22/22 0538    Lab Status: Final result Specimen: Nares from Nose Updated: 02/22/22 1407     SARS-CoV-2 Positive     INFLUENZA A PCR Negative     INFLUENZA B PCR Negative    Narrative:      FOR PEDIATRIC PATIENTS - copy/paste COVID Guidelines URL to browser: [email or web address]  [employer]    SARS-CoV-2 assay is a Nucleic Acid Amplification assay intended for the  qualitative detection of nucleic acid from SARS-CoV-2 in nasopharyngeal  swabs  Results are for the presumptive identification of SARS-CoV-2 RNA  Positive results are indicative of infection with SARS-CoV-2, the virus  causing COVID-19, but do not rule out bacterial infection or co-infection  with other viruses  Laboratories within the [place] and its  territories are required to report all positive results to the appropriate  public health authorities  Negative results do not preclude SARS-CoV-2  infection and should not be used as the sole basis for treatment or other  patient management decisions  Negative results must be combined with  clinical observations, patient history, and epidemiological information  This test has not been FDA cleared or approved  This test has been authorized by FDA under an Emergency Use Authorization  (EUA)  This test is only authorized for the duration of time the  declaration that circumstances exist justifying the authorization of the  emergency use of an in vitro diagnostic tests for detection of SARS-CoV-2  virus and/or diagnosis of COVID-19 infection under section 564(b)(1) of  the Act, 21 U  S C  683BDR-4(Y)(7), unless the authorization is terminated  or revoked sooner  The test has been validated but independent review by FDA  and CLIA is pending  Test performed using the [employer] Soft [employer]0 System:  This RT-PCR assay  targets ORF1, a region unique to SARS-CoV-2  A conserved region in the  E-gene was chosen for pan-Sarbecovirus detection which includes  SARS-CoV-2  No orders to display         Procedures  Procedures      ED Course                                       MDM  Number of Diagnoses or Management Options  Pharyngitis  Viral syndrome  Diagnosis management comments: This is a 32 y/o female presenting to the ED for evaluation of sore throat that started 4 days ago  She states that her symptoms have gradually worsened since onset and tonight she has had difficulty sleeping due to her sore throat  She has had contact with people with similar symptoms  She is not vaccinated against COVID-19  No fever, chills, cough, SOB, chest pain, abdominal pain, N/V/D, urinary symptoms, or headaches  Afebrile, VSS, no acute distress  Mild pharyngeal erythema without exudates  Exam otherwise within normal limits  Will treat symptomatically and check lateral multiplex  Patient informed that she will be contacted regarding viral test results or she may also check online  Discussed all findings, treatment, red flags/return precautions, and outpatient follow-up and the patient/family understands and agrees  Stable for discharge  Disposition  Final diagnoses:   Pharyngitis   Viral syndrome     Time reflects when diagnosis was documented in both MDM as applicable and the Disposition within this note     Time User Action Codes Description Comment    2/22/2022  6:53 AM Edouard Theo Add [J02 9] Pharyngitis     2/22/2022  6:54 AM Edouard Theo Add [B34 9] Viral syndrome       ED Disposition     ED Disposition Condition Date/Time Comment    Discharge Stable Tue Feb 22, 2022  6:53 AM James Ramirez discharge to home/self care              Follow-up Information     Follow up With Specialties Details Why Contact Info Additional [address] [facility] Internal Medicine Call today As needed [address] [facility] [address]  [facility] [address], [address] [facility] [address], [address], [place], [place], [address] [phone]    Infol[employer]  Call today As needed [address] Emergency Department Emergency Medicine Go to  If symptoms worsen [address]  [address] [facility] [address] Emergency Department, [address] [place] [address], [address]          Discharge Medication List as of 2/22/2022  6:55 AM      START taking these medications    Details   al mag oxide-diphenhydramine-lidocaine viscous (MAGIC MOUTHWASH) 1:1:1 suspension Swish and spit 10 mL every 4 (four) hours as needed for mouth pain or discomfort, Starting Tue 2/22/2022, Normal         CONTINUE these medications which have NOT CHANGED    Details   Multiple Vitamin (multivitamin) capsule Take 1 capsule by mouth daily, Starting Wed 1/5/2022, Until Tue 4/5/2022, No Print      terbinafine (LamISIL) 250 mg tablet Take 1 tablet (250 mg total) by mouth daily, Starting Wed 1/5/2022, Until Tue 4/5/2022, Normal           No discharge procedures on file  PDMP Review       Value Time User    PDMP Reviewed  Yes 9/26/2021  1:27 PM Nupur Gayle MD           ED Provider  Attending physically available and evaluated Luis Inman I managed the patient along with the ED Attending      Electronically Signed by         Forbes Severin, MD  03/03/22 2039

## 2022-02-22 NOTE — ED ATTENDING ATTESTATION
2022  IJamar MD, saw and evaluated the patient  I have discussed the patient with the resident/non-physician practitioner and agree with the resident's/non-physician practitioner's findings, Plan of Care, and MDM as documented in the resident's/non-physician practitioner's note, except where noted  All available labs and Radiology studies were reviewed  I was present for key portions of any procedure(s) performed by the resident/non-physician practitioner and I was immediately available to provide assistance  At this point I agree with the current assessment done in the Emergency Department  I have conducted an independent evaluation of this patient a history and physical is as follows:    ED Course      HPI: Patient is a 33 y o  female who presents with 3 days of sore throat which the patient describes at mild The patient has had contact with people with similar symptoms  The patient has not taken any medication      No Known Allergies    Past Medical History:   Diagnosis Date    Anemia     History of cold sores     last assessed: 10/12/2016     Hypertension     History of pre-eclampsia     Urinary tract infection     Hx of UTI during last pregnancy    Varicella     Positive Hx      Past Surgical History:   Procedure Laterality Date     SECTION  2015    ND  DELIVERY ONLY N/A 2019    Procedure:  SECTION () REPEAT;  Surgeon: Iam Norris MD;  [facility]: [facility];  Service: Obstetrics    ND  DELIVERY ONLY N/A 2021    Procedure:  SECTION () REPEAT;  Surgeon: Radha Mixon MD;  [facility]: [facility];  Service: Obstetrics    ND Madelaine Francois TUBE W/ Bilateral 2021    Procedure: LIGATION/COAGULATION TUBAL;  Surgeon: Radha Mixon MD;  [facility]: [facility];  Service: Obstetrics     Social History     Tobacco Use    Smoking status: Never Smoker    Smokeless tobacco: Never Used   Vaping Use    Vaping Use: Never used   Substance Use Topics    Alcohol use: Not Currently     Alcohol/week: 0 0 standard drinks    Drug use: No       Nursing notes reviewed  Physical Exam:  ED Triage Vitals [02/22/22 0439]   Temperature Pulse Respirations Blood Pressure SpO2   98 3 °F (36 8 °C) 93 18 148/89 95 %      Temp Source Heart Rate Source Patient Position - Orthostatic VS BP Location FiO2 (%)   Oral Monitor Sitting Left arm --      Pain Score       --           ROS: Positive for sore throat and muscle ache, the remainder of a 10 organ system ROS was otherwise unremarkable  General: awake, alert, no acute distress    Head: normocephalic, atraumatic    Eyes: no scleral icterus  Ears: external ears normal, hearing grossly intact  Nose: external exam grossly normal, negative nasal discharge  Neck: symmetric, No JVD noted, trachea midline  Pulmonary: no respiratory distress, no tachypnea noted  Cardiovascular: appears well perfused  Abdomen: no distention noted  Musculoskeletal: no deformities noted, tone normal  Neuro: grossly non-focal  Psych: mood and affect appropriate    The patient is stable and has a history and physical exam consistent with a viral illness  COVID19 testing has been performed  I considered the patient's other medical conditions as applicable/noted above in my medical decision making  The patient is stable upon discharge  The plan is for supportive care at home  The patient (and any family present) verbalized understanding of the discharge instructions and warnings that would necessitate return to the Emergency Department  All questions were answered prior to discharge      Medications   Lidocaine Viscous HCl (XYLOCAINE) 2 % mucosal solution 15 mL (has no administration in time range)   dexamethasone oral liquid 10 mg 1 mL (has no administration in time range)     Final diagnoses:   None     ED Disposition     None      Follow-up Information    None       Patient's Medications   Discharge Prescriptions    No medications on file     No discharge procedures on file      Electronically Signed by      Critical Care Time  Procedures

## 2022-02-24 NOTE — RESULT ENCOUNTER NOTE
Attempted to call patient with positive COVID-19 test results  No answer  Left message to return the call

## 2022-06-01 ENCOUNTER — HOSPITAL ENCOUNTER (EMERGENCY)
Facility: HOSPITAL | Age: 32
Discharge: HOME | End: 2022-06-02
Attending: EMERGENCY MEDICINE | Admitting: EMERGENCY MEDICINE
Payer: COMMERCIAL

## 2022-06-01 VITALS
RESPIRATION RATE: 18 BRPM | OXYGEN SATURATION: 98 % | SYSTOLIC BLOOD PRESSURE: 147 MMHG | DIASTOLIC BLOOD PRESSURE: 76 MMHG | HEART RATE: 80 BPM | TEMPERATURE: 97.6 F

## 2022-06-01 DIAGNOSIS — S61.309A NAIL AVULSION, FINGER, INITIAL ENCOUNTER: Primary | ICD-10-CM

## 2022-06-01 PROCEDURE — 99283 EMERGENCY DEPT VISIT LOW MDM: CPT

## 2022-06-01 PROCEDURE — 99284 EMERGENCY DEPT VISIT MOD MDM: CPT | Performed by: EMERGENCY MEDICINE

## 2022-06-01 RX ORDER — CEPHALEXIN 500 MG/1
500 CAPSULE ORAL EVERY 12 HOURS SCHEDULED
Qty: 10 CAPSULE | Refills: 0 | Status: SHIPPED | OUTPATIENT
Start: 2022-06-01 | End: 2022-06-06

## 2022-06-01 RX ORDER — CEPHALEXIN 500 MG/1
500 CAPSULE ORAL ONCE
Status: COMPLETED | OUTPATIENT
Start: 2022-06-01 | End: 2022-06-01

## 2022-06-01 RX ORDER — IBUPROFEN 400 MG/1
400 TABLET ORAL ONCE
Status: COMPLETED | OUTPATIENT
Start: 2022-06-01 | End: 2022-06-01

## 2022-06-01 RX ADMIN — CEPHALEXIN 500 MG: 500 CAPSULE ORAL at 23:49

## 2022-06-01 RX ADMIN — IBUPROFEN 400 MG: 400 TABLET ORAL at 23:49

## 2022-06-02 NOTE — DISCHARGE INSTRUCTIONS
You were seen in the ED today with a partial nail avulsion  We wrote you a prescription for antibiotics  This is at your pharmacy  Please take as instructed  You can take over the counter tylenol/ibuprofen for pain as instructed on the label  Please return to the ED if you have signs of infection including fevers, chills, redness, warmth, or any other concerns
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
## 2022-06-06 ENCOUNTER — APPOINTMENT (EMERGENCY)
Dept: RADIOLOGY | Facility: HOSPITAL | Age: 32
End: 2022-06-06
Payer: COMMERCIAL

## 2022-06-06 ENCOUNTER — HOSPITAL ENCOUNTER (EMERGENCY)
Facility: HOSPITAL | Age: 32
Discharge: HOME | End: 2022-06-06
Attending: EMERGENCY MEDICINE
Payer: COMMERCIAL

## 2022-06-06 VITALS
SYSTOLIC BLOOD PRESSURE: 145 MMHG | RESPIRATION RATE: 16 BRPM | TEMPERATURE: 98 F | DIASTOLIC BLOOD PRESSURE: 85 MMHG | HEART RATE: 81 BPM | OXYGEN SATURATION: 98 %
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
## 2022-06-06 DIAGNOSIS — S61.309A NAIL AVULSION, FINGER, INITIAL ENCOUNTER: ICD-10-CM

## 2022-06-06 DIAGNOSIS — S16.1XXA STRAIN OF NECK MUSCLE, INITIAL ENCOUNTER: Primary | ICD-10-CM

## 2022-06-06 DIAGNOSIS — M79.646 FINGER PAIN: ICD-10-CM

## 2022-06-06 PROCEDURE — 99284 EMERGENCY DEPT VISIT MOD MDM: CPT | Performed by: EMERGENCY MEDICINE

## 2022-06-06 PROCEDURE — 73130 X-RAY EXAM OF HAND: CPT

## 2022-06-06 PROCEDURE — 96372 THER/PROPH/DIAG INJ SC/IM: CPT

## 2022-06-06 PROCEDURE — 99283 EMERGENCY DEPT VISIT LOW MDM: CPT

## 2022-06-06 RX ORDER — METHOCARBAMOL 500 MG/1
500 TABLET, FILM COATED ORAL ONCE
Status: COMPLETED | OUTPATIENT
Start: 2022-06-06 | End: 2022-06-06

## 2022-06-06 RX ORDER — LIDOCAINE 50 MG/G
1 PATCH TOPICAL ONCE
Status: DISCONTINUED | OUTPATIENT
Start: 2022-06-06 | End: 2022-06-06 | Stop reason: HOSPADM

## 2022-06-06 RX ORDER — ACETAMINOPHEN 325 MG/1
650 TABLET ORAL ONCE
Status: COMPLETED | OUTPATIENT
Start: 2022-06-06 | End: 2022-06-06

## 2022-06-06 RX ORDER — CEPHALEXIN 500 MG/1
500 CAPSULE ORAL EVERY 12 HOURS SCHEDULED
Qty: 10 CAPSULE | Refills: 0 | OUTPATIENT
Start: 2022-06-06 | End: 2022-06-11

## 2022-06-06 RX ORDER — KETOROLAC TROMETHAMINE 30 MG/ML
30 INJECTION, SOLUTION INTRAMUSCULAR; INTRAVENOUS ONCE
Status: COMPLETED | OUTPATIENT
Start: 2022-06-06 | End: 2022-06-06

## 2022-06-06 RX ORDER — METHOCARBAMOL 500 MG/1
500 TABLET, FILM COATED ORAL 2 TIMES DAILY PRN
Qty: 14 TABLET | Refills: 0 | Status: SHIPPED | OUTPATIENT
Start: 2022-06-06

## 2022-06-06 RX ADMIN — METHOCARBAMOL TABLETS 500 MG: 500 TABLET, COATED ORAL at 16:06

## 2022-06-06 RX ADMIN — ACETAMINOPHEN 650 MG: 325 TABLET, FILM COATED ORAL at 16:06

## 2022-06-06 RX ADMIN — KETOROLAC TROMETHAMINE 30 MG: 30 INJECTION, SOLUTION INTRAMUSCULAR; INTRAVENOUS at 16:06

## 2022-06-06 RX ADMIN — LIDOCAINE 5% 1 PATCH: 700 PATCH TOPICAL at 16:06
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
## 2022-06-06 NOTE — DISCHARGE INSTRUCTIONS
Recommend Tylenol and ibuprofen as needed for pain  Take Robaxin as needed for muscle spasm, do not operate machinery or car while taking     Recommend over-the-counter Lidoderm patches
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
## 2022-06-10 NOTE — TELEPHONE ENCOUNTER
Called patient to schedule - patient states she went back to the ED and was given the same antibiotics     Will call back when medication is finished to schedule PCP appointment

## 2022-06-16 NOTE — TELEPHONE ENCOUNTER
Patient notified of normal 1 hour glucose Telephone encounter placed, no answer, unable to leave message, will send MyChart message and continue to try to call.

## 2022-07-05 NOTE — LACTATION NOTE
This note was copied from a baby's chart  Mom C/O nipples too painful to nurse infant  Reviewed sore nipple care  Given Lanolin cream with instructions  Mom to call for latch assessment with next feeding  Demonstrated alerting techniques  Infant assisted to breast in football hold  Infant sleepy, no attempts made  To try again in 1 hour  Tiff wetzel

## 2022-07-15 ENCOUNTER — OFFICE VISIT (OUTPATIENT)
Dept: OBGYN CLINIC | Facility: CLINIC | Age: 32
End: 2022-07-15

## 2022-07-15 VITALS
DIASTOLIC BLOOD PRESSURE: 74 MMHG | SYSTOLIC BLOOD PRESSURE: 106 MMHG | BODY MASS INDEX: 28.09 KG/M2 | WEIGHT: 133.8 LBS | HEART RATE: 73 BPM | HEIGHT: 58 IN

## 2022-07-15 DIAGNOSIS — R10.2 PELVIC PRESSURE IN FEMALE: Primary | ICD-10-CM

## 2022-07-15 DIAGNOSIS — R10.2 PELVIC PAIN: ICD-10-CM

## 2022-07-15 DIAGNOSIS — R35.0 URINARY FREQUENCY: ICD-10-CM

## 2022-07-15 LAB
SL AMB  POCT GLUCOSE, UA: NORMAL
SL AMB LEUKOCYTE ESTERASE,UA: NORMAL
SL AMB POCT BILIRUBIN,UA: NORMAL
SL AMB POCT BLOOD,UA: NORMAL
SL AMB POCT CLARITY,UA: CLEAR
SL AMB POCT COLOR,UA: YELLOW
SL AMB POCT KETONES,UA: NORMAL
SL AMB POCT NITRITE,UA: NORMAL
SL AMB POCT PH,UA: 7
SL AMB POCT SPECIFIC GRAVITY,UA: 1
SL AMB POCT URINE HCG: NORMAL
SL AMB POCT URINE PROTEIN: NORMAL
SL AMB POCT UROBILINOGEN: 1

## 2022-07-15 PROCEDURE — 81002 URINALYSIS NONAUTO W/O SCOPE: CPT | Performed by: NURSE PRACTITIONER

## 2022-07-15 PROCEDURE — 81025 URINE PREGNANCY TEST: CPT | Performed by: NURSE PRACTITIONER

## 2022-07-15 PROCEDURE — 99213 OFFICE O/P EST LOW 20 MIN: CPT | Performed by: NURSE PRACTITIONER

## 2022-07-15 NOTE — PROGRESS NOTES
PROBLEM GYNECOLOGICAL VISIT    Jong James is a 27 y o  female who presents today with complaint of pelvic pressure    Her general medical history has been reviewed and she reports it as follows:    Past Medical History:   Diagnosis Date    Anemia     History of cold sores     last assessed: 10/12/2016     Hypertension     History of pre-eclampsia     Urinary tract infection     Hx of UTI during last pregnancy    Varicella     Positive Hx     Past Surgical History:   Procedure Laterality Date     SECTION  2015    ME  DELIVERY ONLY N/A 2019    Procedure:  SECTION () REPEAT;  Surgeon: Stacy Rondon MD;  [facility];  Service: Obstetrics    ME  DELIVERY ONLY N/A 2021    Procedure:  SECTION () REPEAT;  Surgeon: Paco Zambrano MD;  [facility];  Service: Obstetrics    ME LIGATION,FALLOPIAN TUBE W/ Bilateral 2021    Procedure: LIGATION/COAGULATION TUBAL;  Surgeon: Paco Zambrano MD;  [facility];  Service: Obstetrics     OB History        3    Para   3    Term   3            AB        Living   3       SAB        IAB        Ectopic        Multiple   0    Live Births   3           Obstetric Comments   Menstrual cycle: 15           Social History     Tobacco Use    Smoking status: Never Smoker    Smokeless tobacco: Never Used   Vaping Use    Vaping Use: Never used   Substance Use Topics    Alcohol use: Not Currently     Alcohol/week: 0 0 standard drinks    Drug use: No     Social History     Substance and Sexual Activity   Sexual Activity Yes    Partners: Male    Birth control/protection: None       Current Outpatient Medications   Medication Instructions    al mag oxide-diphenhydramine-lidocaine viscous (MAGIC MOUTHWASH) 1:1:1 suspension 10 mL, Swish & Spit, Every 4 hours PRN    methocarbamol (ROBAXIN) 500 mg, Oral, 2 times daily PRN    Multiple Vitamin (multivitamin) capsule 1 capsule, Oral, Daily       History of Present Illness:   Bora Polanco presents today reporting 2 months of pelvic pressure and urinary frequency  She report the pressure is constant, but is significantly worse when she stands for long periods of time, has intercourse or hold her bladder  She reports that she voids around every 1 5 hours during the day  She wakes up at night to void around every 4 hours  Denies any dysuria, hematuria or flank pain  She denies any vaginal concerns  She has occasional constipation, though this is a long standing issue  Review of Systems:  Review of Systems   Constitutional: Negative  Gastrointestinal: Negative  Genitourinary: Positive for frequency and pelvic pain  Negative for dysuria, flank pain, hematuria and urgency  Physical Exam:  /74   Pulse 73   Ht 4' 10" (1 473 m)   Wt 60 7 kg (133 lb 12 8 oz)   LMP  (LMP Unknown)   BMI 27 96 kg/m²   Physical Exam  Constitutional:       General: She is not in acute distress  Appearance: Normal appearance  Genitourinary:      Vulva normal       No lesions in the vagina  No vaginal discharge or bleeding  Right Adnexa: not tender, not full and no mass present  Left Adnexa: not tender, not full and no mass present  No cervical motion tenderness or lesion  Uterus is not enlarged or tender  No uterine mass detected  Abdominal:      General: Abdomen is flat  Palpations: Abdomen is soft  Tenderness: There is no right CVA tenderness or left CVA tenderness  Neurological:      Mental Status: She is alert  Skin:     General: Skin is warm and dry  Psychiatric:         Mood and Affect: Mood normal          Behavior: Behavior normal    Vitals reviewed  Point of Care Testing:   -Wet mount: negative    -KOH mount: negative    -Whiff: negative    -pH 4 5   -urine dip: negative     Assessment:   1  Pelvic pressure   2  Urinary frequency     Plan:   1  Urine dip negative   Not convincing for UTI  Will send for culture to confirm  2  Wet mount negative  3  Pelvic ultrasound ordered    4  Discussed possible Pelvic Floor PT  If pelvic US and urine culture negative patient is open to starting PT  5  Will return after imaging for follow up

## 2022-07-17 ENCOUNTER — HOSPITAL ENCOUNTER (OUTPATIENT)
Dept: RADIOLOGY | Facility: HOSPITAL | Age: 32
Discharge: HOME | End: 2022-07-17
Payer: COMMERCIAL

## 2022-07-17 DIAGNOSIS — R10.2 PELVIC PAIN: ICD-10-CM

## 2022-07-17 PROCEDURE — 76830 TRANSVAGINAL US NON-OB: CPT

## 2022-07-17 PROCEDURE — 76856 US EXAM PELVIC COMPLETE: CPT

## 2022-07-25 ENCOUNTER — TELEPHONE (OUTPATIENT)
Dept: OBGYN CLINIC | Facility: CLINIC | Age: 32
End: 2022-07-25

## 2022-07-25 NOTE — TELEPHONE ENCOUNTER
Patient notified of results  She declined therapy at this time and agreed to call back if symptoms change  She stated that she is feeling much better right now  No further questions at this time

## 2022-07-25 NOTE — TELEPHONE ENCOUNTER
----- Message from Lamon Sever, [address] sent at 7/25/2022  7:43 AM EDT -----  Please let her know that her pelvic ultrasound is normal  If she would like to proceed with pelvic floor physical therapy as we discussed at her visit I can place the referral order  Thank you!

## 2022-07-27 DIAGNOSIS — R10.2 PELVIC PAIN: Primary | ICD-10-CM

## 2022-08-10 ENCOUNTER — APPOINTMENT (OUTPATIENT)
Dept: LAB | Facility: HOSPITAL | Age: 32
End: 2022-08-10
Attending: STUDENT IN AN ORGANIZED HEALTH CARE EDUCATION/TRAINING PROGRAM
Payer: COMMERCIAL

## 2022-08-10 DIAGNOSIS — R10.2 PELVIC PAIN: ICD-10-CM

## 2022-08-10 DIAGNOSIS — D50.0 BLOOD LOSS ANEMIA: ICD-10-CM

## 2022-08-10 LAB
ERYTHROCYTE [DISTWIDTH] IN BLOOD BY AUTOMATED COUNT: 12.7 % (ref 11.6–15.1)
HCT VFR BLD AUTO: 35.2 % (ref 34.8–46.1)
HGB BLD-MCNC: 11.7 G/DL (ref 11.5–15.4)
MCH RBC QN AUTO: 29.4 PG (ref 26.8–34.3)
MCHC RBC AUTO-ENTMCNC: 33.2 G/DL (ref 31.4–37.4)
MCV RBC AUTO: 88 FL (ref 82–98)
PLATELET # BLD AUTO: 301 THOUSANDS/UL (ref 149–390)
PMV BLD AUTO: 9.9 FL (ref 8.9–12.7)
RBC # BLD AUTO: 3.98 MILLION/UL (ref 3.81–5.12)
WBC # BLD AUTO: 5.46 THOUSAND/UL (ref 4.31–10.16)

## 2022-08-10 PROCEDURE — 87186 SC STD MICRODIL/AGAR DIL: CPT

## 2022-08-10 PROCEDURE — 87077 CULTURE AEROBIC IDENTIFY: CPT

## 2022-08-10 PROCEDURE — 36415 COLL VENOUS BLD VENIPUNCTURE: CPT

## 2022-08-10 PROCEDURE — 85027 COMPLETE CBC AUTOMATED: CPT

## 2022-08-10 PROCEDURE — 87086 URINE CULTURE/COLONY COUNT: CPT

## 2022-08-12 DIAGNOSIS — N30.00 ACUTE CYSTITIS WITHOUT HEMATURIA: Primary | ICD-10-CM

## 2022-08-12 LAB
BACTERIA UR CULT: ABNORMAL

## 2022-08-12 RX ORDER — NITROFURANTOIN 25; 75 MG/1; MG/1
100 CAPSULE ORAL 2 TIMES DAILY
Qty: 10 CAPSULE | Refills: 0 | Status: SHIPPED | OUTPATIENT
Start: 2022-08-12 | End: 2022-08-17

## 2022-09-15 ENCOUNTER — OFFICE VISIT (OUTPATIENT)
Dept: OBGYN CLINIC | Facility: CLINIC | Age: 32
End: 2022-09-15

## 2022-09-15 VITALS
HEIGHT: 58 IN | DIASTOLIC BLOOD PRESSURE: 72 MMHG | HEART RATE: 71 BPM | BODY MASS INDEX: 27.08 KG/M2 | SYSTOLIC BLOOD PRESSURE: 105 MMHG | WEIGHT: 129 LBS

## 2022-09-15 DIAGNOSIS — R10.2 PELVIC PRESSURE IN FEMALE: ICD-10-CM

## 2022-09-15 DIAGNOSIS — R35.0 URINARY FREQUENCY: Primary | ICD-10-CM

## 2022-09-15 LAB
SL AMB LEUKOCYTE ESTERASE,UA: NORMAL
SL AMB POCT BLOOD,UA: NORMAL
SL AMB POCT NITRITE,UA: NORMAL

## 2022-09-15 PROCEDURE — 99213 OFFICE O/P EST LOW 20 MIN: CPT | Performed by: NURSE PRACTITIONER

## 2022-09-15 PROCEDURE — 87086 URINE CULTURE/COLONY COUNT: CPT | Performed by: NURSE PRACTITIONER

## 2022-09-15 PROCEDURE — 81002 URINALYSIS NONAUTO W/O SCOPE: CPT | Performed by: NURSE PRACTITIONER

## 2022-09-15 NOTE — PROGRESS NOTES
PROBLEM GYNECOLOGICAL VISIT    Jackie Yang is a 27 y o  female who presents today with complaint of urinary frequency    Her general medical history has been reviewed and she reports it as follows:    Past Medical History:   Diagnosis Date    Anemia     History of cold sores     last assessed: 10/12/2016     Hypertension     History of pre-eclampsia     Urinary tract infection     Hx of UTI during last pregnancy    Varicella     Positive Hx     Past Surgical History:   Procedure Laterality Date     SECTION  2015    MT  DELIVERY ONLY N/A 2019    Procedure:  SECTION () REPEAT;  Surgeon: Latasha Kolb MD;  [facility];  Service: Obstetrics    MT  DELIVERY ONLY N/A 2021    Procedure:  SECTION () REPEAT;  Surgeon: Suha Griggs MD;  [facility];  Service: Obstetrics    MT LIGATION,FALLOPIAN TUBE W/ Bilateral 2021    Procedure: LIGATION/COAGULATION TUBAL;  Surgeon: uSha Griggs MD;  [facility];  Service: Obstetrics     OB History        3    Para   3    Term   3            AB        Living   3       SAB        IAB        Ectopic        Multiple   0    Live Births   3           Obstetric Comments   Menstrual cycle: 15           Social History     Tobacco Use    Smoking status: Never Smoker    Smokeless tobacco: Never Used   Vaping Use    Vaping Use: Never used   Substance Use Topics    Alcohol use: Not Currently     Alcohol/week: 0 0 standard drinks    Drug use: No     Social History     Substance and Sexual Activity   Sexual Activity Not Currently    Partners: Male    Birth control/protection: None       Current Outpatient Medications   Medication Instructions    al mag oxide-diphenhydramine-lidocaine viscous (MAGIC MOUTHWASH) 1:1:1 suspension 10 mL, Swish & Spit, Every 4 hours PRN    methocarbamol (ROBAXIN) 500 mg, Oral, 2 times daily PRN    Multiple Vitamin (multivitamin) capsule 1 capsule, Oral, Daily       History of Present Illness:   Antonia Mckeon presents today reporting complaints of multiple years of urinary frequency and pelvic pressure, but worse over the past year since her third child was born  She denies any vaginal concerns, no dysuria, urgency, hematuria  States she voids every 1 5 hours and wakes up a few times over night to void  She also has a longstanding issue with constipation  She was evaluated for these concerns two months ago and had a normal pelvic ultrasound  She had a +urine culture one month ago and she completed antibiotic treatment  Review of Systems:  Review of Systems   Constitutional: Negative  Gastrointestinal: Negative  Genitourinary: Positive for frequency  Negative for difficulty urinating, dysuria, flank pain, hematuria, pelvic pain, urgency, vaginal discharge and vaginal pain  Physical Exam:  /72   Pulse 71   Ht 4' 10" (1 473 m)   Wt 58 5 kg (129 lb)   LMP 09/07/2022 (Approximate)   BMI 26 96 kg/m²   Physical Exam  Constitutional:       General: She is not in acute distress  Appearance: Normal appearance  Abdominal:      Tenderness: There is no right CVA tenderness or left CVA tenderness  Neurological:      Mental Status: She is alert  Skin:     General: Skin is warm and dry  Psychiatric:         Mood and Affect: Mood normal          Behavior: Behavior normal    Vitals reviewed  Point of Care Testing:   -urine dip: negative        Assessment:   1  Urinary frequency   2  Pelvic pressure     Plan:   1  Urine dip negative for signs of UTI  Urine culture sent to confirm  2  Recommended pelvic floor PT  Referral placed  3  Return for annual exam or sooner if needed  Reviewed with patient that [facility], but that they will not necessarily be reviewed by me immediately    Explained that I will review results at my earliest opportunity and contact patient appropriately

## 2022-09-16 LAB — BACTERIA UR CULT: NORMAL

## 2022-09-16 NOTE — PROGRESS NOTES
PT Evaluation     Today's date: 2022  Patient name: Elaine Campoverde  : 1990  MRN: [id number]  Referring provider: ASHISH Scherer  Dx:   Encounter Diagnosis     ICD-10-CM    1  Abnormal increased muscle tone  M62 89    2  Urinary frequency  R35 0 Ambulatory Referral to Physical Therapy   3  Pelvic pressure in female  R10 2 Ambulatory Referral to Physical Therapy       Start Time:   Stop Time: 1500  Total time in clinic (min): 45 minutes    Assessment  Assessment details: Fanny Craft is a 27y o  year old female with complaints of urinary frequency and pelvic pain  The following impairments were found on evaluation: significant increased muscle tension in PFMs  Poor bladder habits  Patient's presentation is consistent with pelvic floor dysfunction  These impairments contribute to the following functional limitations: decreased tolerance to activity and functional mobility, limited sleep; and the following disability: decreased quality of life  Fanny Craft  is a good candidate for therapy and would benefit from skilled physical therapy to address the above impairments in order to allow the patient to achieve below goals and return to her prior level of function  Patient education provided on PFM anatomy and function, exam findings  Patient educated on diagnosis, plan of care and prognosis  Aj Gtz are in agreement with recommended plan of care, goals for therapy and demonstrates motivation for active participation in proposed plan of care  Thank you Cheryl for this referral!    Impairments: impaired physical strength, lacks appropriate home exercise program, poor posture  and poor body mechanics  Barriers to therapy: none  Understanding of Dx/Px/POC: good   Prognosis: good    Goals  STG to be completed in 8 weeks from 2022 :  1  Fanny Craft will be independent with initial home exercise program    2  Fanny Craft will demonstrate ability to contract, relax and actively lengthen PFM     3  Fanny Craft will report pain no greater than 7/10 with all functional activity to allow James Forward to return to prior level of function  4  James Forward will complete bladder diary activity  5  James Forward will sleep 6 hours or more most nights without waking to urinate  LTG to be completed in 12 weeks from 9/20/2022 or upon discharge from PFPT:  1  James Forward will be independent with advanced home exercise program for self-management of symptoms  2  James Forward will demonstrate ability to appropriately activate deep core muscles with functional mobility tasks  3  James Forward report 90% improvement or better in UI    4  James Forward will report pain no greater than 5/10 with all functional activity to allow James Forward to return to prior level of function  11  James Forward will report being able to participate in vaginal intercourse with no pain to allow patient improved quality of life  6  James Forward will sleep 7 hours or more most nights without waking to urinate  7  James Forward will have 75% PFM relaxation with palpation  8  James Forward will report voiding every 2-4 hours to indicate improved bladder health and function            Plan  Patient would benefit from: skilled physical therapy  Planned modality interventions: biofeedback, cryotherapy, thermotherapy: hydrocollator packs and ultrasound  Planned therapy interventions: abdominal trunk stabilization, activity modification, ADL retraining, balance, balance/weight bearing training, behavior modification, body mechanics training, breathing training, coordination, fine motor coordination training, flexibility, functional ROM exercises, graded activity, gait training, graded exercise, graded motor, home exercise program, joint mobilization, manual therapy, massage, motor coordination training, neuromuscular re-education, patient education, postural training, strengthening, stretching, therapeutic activities and therapeutic exercise  Frequency: 1x week  Duration in weeks: 12  Plan of Care beginning date: 9/20/2022  Plan of Care expiration date: 12/13/2022  Treatment plan discussed with: patient, PTA and referring physician        PT Pelvic Floor Subjective:   History of Present Illness:   Xochitl Steven is a 27 y o  female who presents to OPPT with primary complaint of urinary frequency and pelvic pressure  They are referred to OPPT by Vleia Francisco reports abdominal pressure and urge  She was on an antibiotic about 2 weeks ago  She did have urine culture which was then negative  She reports she gets a lot of UTIs over the last 2 years (since the birth of her last child)  Reports pain began about 5 years ago  Pain has gotten worse with each pregnancy  UTIs began after last pregnancy (1 year)      Social Support:     Lives in:  Multiple-level home (no issues on stairs )    Lives with:  Luke edward    Relationship status: /committed    Work status: unemployed  Diet and Exercise:    Diet:balanced nutrition    Exercise type: no activity  Co-morbidities:    Patient Active Problem List:     Onychomycosis     History of cold sores     38 weeks gestation of pregnancy     History of  delivery     Hx of preeclampsia, prior pregnancy, currently pregnant     Urinary tract infection affecting pregnancy     S/P repeat low transverse      Status post bilateral salpingectomy     Blood loss anemia  OB/ gyn History    Gestational History:     Prior Pregnancy: Yes      Number of prior pregnancies: 3    Number of term pregnancies: 3    Delivery Type:  section      Number of caesarian sections: 3    Delivery Complications:  2nd had pre-eclampsia, medicated t/o 3rd pregnancy and monitored closely     Menstrual History:    Date of last menstrual period: 2022    Menstrual irregularities irregular menses    Painful periods:  Difficulty managing menstrual pain    Tolerates tampons: no and no by choice    Contraceptive use: tubes tied   Bladder Function:     Voiding Difficulties positive for: frequent urination and incomplete emptying      Voiding Difficulties negative for: urgency, hesitancy and straining      Voiding Difficulties comments:     Voiding frequency: every 1-2 hours (every hour and a half )    Urinary leakage not aggravated by: coughing, sneezing, bending, standing up, walking to the bathroom, hearing running water, key-in-the-door syndrome, seeing a toilet and post-void dribble    Nocturia (episodes per night): 1 (will sleep 5-6 hours at the most )    Painful urination: No      Intake (ounces): Intake (ounces) comment: Water 16oz bottles x4 at least (64 oz)    Incontinence Management:     Pads/Diaper Use:  None  Bowel Function:     Voiding DIfficulties: constipation      Bowel frequency: daily and every 2 days    [place] Stool Scale: type 3 and type 4    Stool softener use: no stool softeners  Sexual Function:     Sexually Active:  Not sexually active    Pain during intercourse: Yes      Lubrication Use: Yes    pain causes abstinence    Patient wishes to return to having intercourse: currently unable to have intercourse but wants to  Pain:     Current pain ratin    At best pain ratin (with empty bladder )    At worst pain rating:  10 (before antibiotic)    Location:  Lower abdomen     Quality:  Pressure (fullness )    Exacerbated by: urination  Treatments:     None    Patient Goals:     Patient goals for therapy:  Improved pain management, decreased pain, fully empty bladder or bowels, improved bladder or bowel function, improved comfort, improved quality of life and relaxation    Other patient goals:  "I would like to sleep more hours and have less pain"       Objective   Pelvic Floor Exam   Position: supine exam    Diastatis   Diastasis recti present: yes  3" above umbilicus (# fingers): 2 5  Umbilicus (# fingers): 1 5  3" below umbilicus (# fingers): 1 5  Connective tissue integrity at linea alba: boggy    Skin inspection:   scars present     Number of scars: 1  Scar 1 location:  Sensitivity level low Restriction level medium General Perineum Exam:   perineum intact     Negative for swelling, descent, lesion, gaping introitus, hemorrhoids, no pelvic organ prolapse at rest and perianal erythema    Visual Inspection of Perineum:   Excursion of perineal body in cephalad direction with contraction of pelvic floor muscles (PFM): weak  Excursion of perineal body in caudal direction with relaxation of pelvic floor muscles (PFM): weak  Cough reflex: no cough reflex  Sensation: intact    Pelvic Organ Prolapse no pelvic organ prolapse at rest    Pelvic Floor Muscle Exam     Palpation   Severe increased muscle tension in the bulbospongiosus and super transverse perineal      PERFECT Score   Power right: 4/5  Power left: 4/5    to TTP to PFMs- significant increase muscle tone to superficial PFMs, did not fully assess deep PFMs     to TTP to abdomen, good mobility of  scar        Precautions: standard     2022          Patient Ed           PFM anatomy and function           Bladder diary            Urge deferral            Healthy bladder habits                                             Neuro Re-Ed           DB           PFM ROM           Guided meditation for PFM relaxation                                                       Ther Ex           Warm-up            Butterfly stretch            Happy baby stretch            yashira pose                                                        Ther Activity                                 Manual           PFM MFR                                                       Modalities

## 2022-09-19 ENCOUNTER — TELEPHONE (OUTPATIENT)
Dept: OBGYN CLINIC | Facility: CLINIC | Age: 32
End: 2022-09-19

## 2022-09-19 NOTE — TELEPHONE ENCOUNTER
----- Message from Lisa [place] sent at 9/19/2022  7:47 AM EDT -----  Please let her know the urine culture is negative for UTI  Thank you!

## 2022-09-19 NOTE — TELEPHONE ENCOUNTER
Called and spoke to patient, and informed her the urine culture came back negative for UTI  Patient verbalized understanding, and did not have any questions

## 2022-09-20 ENCOUNTER — TELEPHONE (OUTPATIENT)
Dept: INTERNAL MEDICINE CLINIC | Facility: CLINIC | Age: 32
End: 2022-09-20

## 2022-09-20 ENCOUNTER — EVALUATION (OUTPATIENT)
Dept: PHYSICAL THERAPY | Facility: REHABILITATION | Age: 32
End: 2022-09-20
Payer: COMMERCIAL

## 2022-09-20 DIAGNOSIS — R10.2 PELVIC PRESSURE IN FEMALE: ICD-10-CM

## 2022-09-20 DIAGNOSIS — R35.0 URINARY FREQUENCY: ICD-10-CM

## 2022-09-20 DIAGNOSIS — M62.89 ABNORMAL INCREASED MUSCLE TONE: Primary | ICD-10-CM

## 2022-09-20 PROCEDURE — 97530 THERAPEUTIC ACTIVITIES: CPT

## 2022-09-20 PROCEDURE — 97162 PT EVAL MOD COMPLEX 30 MIN: CPT

## 2022-09-30 ENCOUNTER — APPOINTMENT (OUTPATIENT)
Dept: PHYSICAL THERAPY | Facility: REHABILITATION | Age: 32
End: 2022-09-30
Payer: COMMERCIAL

## 2022-10-11 NOTE — PROGRESS NOTES
Discharge    Patient discharged on 10/11/2022 due to not seen for follow up/self-discharge  She was evaluated 9/20/2022  Pt no show for appointment 9/29/2022  Called and left   Pt then no show to appointment 10/7 and she stated that time was too early  Pt agreeable to 11:00am on 10/11/2022  PTA called and confirmed visit 10/10/2022 for 10/11/2022 at 11:00am   Pt no show to appointment  All remaining visits to be cancelled and pt will be discharged per attendance policy

## 2022-10-12 PROBLEM — O23.40 URINARY TRACT INFECTION AFFECTING PREGNANCY: Status: RESOLVED | Noted: 2021-06-22 | Resolved: 2022-10-12

## 2022-10-18 ENCOUNTER — APPOINTMENT (OUTPATIENT)
Dept: PHYSICAL THERAPY | Facility: REHABILITATION | Age: 32
End: 2022-10-18

## 2022-10-21 ENCOUNTER — APPOINTMENT (OUTPATIENT)
Dept: PHYSICAL THERAPY | Facility: REHABILITATION | Age: 32
End: 2022-10-21

## 2022-10-24 ENCOUNTER — APPOINTMENT (OUTPATIENT)
Dept: PHYSICAL THERAPY | Facility: REHABILITATION | Age: 32
End: 2022-10-24

## 2022-10-28 ENCOUNTER — APPOINTMENT (OUTPATIENT)
Dept: PHYSICAL THERAPY | Facility: REHABILITATION | Age: 32
End: 2022-10-28

## 2022-11-01 ENCOUNTER — EVALUATION (OUTPATIENT)
Dept: PHYSICAL THERAPY | Facility: REHABILITATION | Age: 32
End: 2022-11-01

## 2022-11-01 DIAGNOSIS — R10.2 PELVIC PAIN: Primary | ICD-10-CM

## 2022-11-01 DIAGNOSIS — R10.10 PAIN OF UPPER ABDOMEN: ICD-10-CM

## 2022-11-01 NOTE — PROGRESS NOTES
PT Re-Evaluation     Today's date: 2022  Patient name: Simon Kee  : 1990  MRN: [id number]  Referring provider: ASHISH Hughes  Dx:   Encounter Diagnosis     ICD-10-CM    1  Pelvic pain  R10 2    2  Pain of upper abdomen  R10 10                   Assessment  Assessment details: Simon Kee is a 27 y o  female who presents with concerns of persistent abdominal pain  Symptoms are: daily and constant  Simon Kee is limited with the following activities: walking, standing, HH activities  Examination reveals postural faults, poor abdominal pressure management, diminished abdominal strength    The plan of care was discussed and given previous history of no shows (x3 consecutively in September and 2022), patient signed notice of $30 charge for a no show and fact that she will be discharged from our care and no longer able to be seen at this facility  Impairments: impaired physical strength, lacks appropriate home exercise program, poor posture  and poor body mechanics  Barriers to therapy: none  Understanding of Dx/Px/POC: good   Prognosis: good    Goals  STG to be completed in 8 weeks:  1  Stephani Hayward will be independent with initial home exercise program    2  Stephani Hayward will report pain no greater than 5/10 with all functional activity to allow Stephani Hayward to return to prior level of function  3  Stephani Hayward will sleep 6 hours or more most nights without waking to urinate  LTG to be completed upon discharge:  1  Stephani Hayward will be independent with advanced home exercise program for self-management of symptoms  2  Stephani Hayward will demonstrate ability to appropriately activate deep core muscles with functional mobility tasks  3  Stephani Hayward will report pain no greater than 3/10 with work tasks, childcare and [facility] activities  4  Stephani Hayward will report voiding every 2-4 hours to indicate improved bladder health and function            Plan  Patient would benefit from: skilled physical therapy  Planned therapy interventions: abdominal trunk stabilization, balance, balance/weight bearing training, behavior modification, body mechanics training, breathing training, coordination, graded exercise, manual therapy, motor coordination training, neuromuscular re-education, patient education, postural training, strengthening, stretching, therapeutic activities, therapeutic exercise and home exercise program  Frequency: 1x week  Duration in weeks: 12  Plan of Care beginning date: 2022  Plan of Care expiration date: 2023  Treatment plan discussed with: patient, PTA and referring physician        PT Pelvic Floor Subjective:   History of Present Illness:   Maya Palencia is a 27 y o  female who presents to OPPT with primary complaint of abdominal and pelvic pain  She has not been able to hold a job because of her pain levels  Describes as stabbing and "like I cut it " She points to a diffuse area of her upper abdomen  3 children - 7, 4, 3 yo  Childrens father and grandparents are watching them regularly because she feels unable due to pain levels  Pain has worsened since birth of last child       Mechanism of injury: childbirth    Social Support:     Lives in:  Multiple-level home (no issues on stairs )    Lives with:  Harpal Lewis children (spouse)    Relationship status: /committed    Work status: employed full time (currently employed by [employer] but unable to walk)  Diet and Exercise:    Diet:balanced nutrition    Exercise type: no activity  Co-morbidities:    Patient Active Problem List:     Onychomycosis     History of cold sores     38 weeks gestation of pregnancy     History of  delivery     Hx of preeclampsia, prior pregnancy, currently pregnant     Urinary tract infection affecting pregnancy     S/P repeat low transverse      Status post bilateral salpingectomy     Blood loss anemia  OB/ gyn History    Gestational History:     Prior Pregnancy: Yes      Number of prior pregnancies: 3    Number of term pregnancies: 3    Delivery Type:  section      Number of caesarian sections: 3    Delivery Complications:  2nd had pre-eclampsia, medicated t/o 3rd pregnancy and monitored closely     Menstrual History:      Menstrual irregularities irregular menses    Painful periods:  Difficulty managing menstrual pain    Tolerates tampons: no and no by choice    Contraceptive use: tubes tied   Abdominal cramping with periods  Unsure of last period   Bladder Function:     Voiding Difficulties positive for: frequent urination and incomplete emptying      Voiding Difficulties negative for: urgency, hesitancy and straining      Voiding Difficulties comments:     Voiding frequency: every 1-2 hours (every hour and a half )    Urinary leakage not aggravated by: coughing, sneezing, bending, standing up, walking to the bathroom, hearing running water, key-in-the-door syndrome, seeing a toilet and post-void dribble    Nocturia (episodes per night): 1 (will sleep 5-6 hours at the most )    Painful urination: No      Intake (ounces): Juice intake (oz): occ orange juice  Intake (ounces) comment: Water 16oz bottles x4 at least (64 oz)    Incontinence Management:     Pads/Diaper Use:  None  Bowel Function:     Voiding DIfficulties: constipation      Bowel frequency: daily and every 2 days    Hughes Stool Scale: type 3 and type 4    Stool softener use: no stool softeners  Sexual Function:     Sexually Active:  Not sexually active    Pain during intercourse: Yes      Lubrication Use: Yes    pain causes abstinence    Patient wishes to return to having intercourse: currently unable to have intercourse but wants to  Pain:     Current pain ratin    At best pain ratin (with empty bladder )    At worst pain rating:  10    Location:  Lower abdomen     Quality:  Burning, sharp and cramping (fullness )    Exacerbated by: prolonged walking      Duration of symptoms:  Does not go away    Relieving factors:  Medications (Tylenol - twice daily; Advil)    Progression:  Worsening  Diagnostic Tests:     None    Treatments:     None    Patient Goals:     Patient goals for therapy:  Improved pain management, decreased pain, fully empty bladder or bowels, improved bladder or bowel function, improved comfort, improved quality of life and relaxation    Other patient goals:  "I would like to sleep more hours and have less pain"       Objective     Neurological Testing     Reflexes   Left   Patellar (L4): normal (2+)    Right   Patellar (L4): normal (2+)    Active Range of Motion   Left Hip   Normal active range of motion    Right Hip   Normal active range of motion    General Comments:      Hip Comments   MMT wfl except knee flexion 4-/5 bilaterally  Hip AROM wnl  Pelvic Floor Exam   Position: supine exam  Abdominal assessment: Mild TTP supraumbilical region as well as along lower abdominal incision  Some fascial organ restriction in region of R>L kidney as well as diaphragm  Diastatis   Diastasis recti present: yes  3" above umbilicus (# fingers): 2 5  Umbilicus (# fingers): 2  3" below umbilicus (# fingers): 1    Skin inspection:   scars present  Number of scars: 1  Scar 1 location:  Sensitivity level low Restriction level medium     to TTP to PFMs- significant increase muscle tone to superficial PFMs, did not fully assess deep PFMs     to TTP to abdomen, good mobility of  scar        Precautions: standard    Access Code: [email or web address]  URL: [email or web address]  Date: 2022  Prepared by: Gerry Ortega    Exercises  · Supine Posterior Pelvic Tilt - 1 x daily - 7 x weekly - 1 sets - 12 reps  · Supine Posterior Pelvic Tilt with [place]nee [place] - 1 x daily - 7 x weekly - 1 sets - 12 reps           2022         Patient Ed           PFM anatomy and function           Bladder diary            Urge deferral            Healthy bladder habits            POC   10'                               Neuro Re-Ed           DB PFM ROM           Guided meditation for PFM relaxation                                                       Ther Ex           Warm-up            Butterfly stretch            Happy baby stretch            yashira pose            PPT  10         LTR  10                               Ther Activity                                 Manual           PFM MFR           Gentle fascial release periumbilical  10'                                          Modalities

## 2022-11-06 ENCOUNTER — HOSPITAL ENCOUNTER (EMERGENCY)
Facility: HOSPITAL | Age: 32
Discharge: HOME | End: 2022-11-06
Attending: EMERGENCY MEDICINE

## 2022-11-06 VITALS
HEIGHT: 58 IN | TEMPERATURE: 98 F | DIASTOLIC BLOOD PRESSURE: 86 MMHG | SYSTOLIC BLOOD PRESSURE: 121 MMHG | HEART RATE: 72 BPM | WEIGHT: 120 LBS | OXYGEN SATURATION: 100 % | RESPIRATION RATE: 18 BRPM | BODY MASS INDEX: 25.19 KG/M2

## 2022-11-06 DIAGNOSIS — R30.0 DYSURIA: Primary | ICD-10-CM

## 2022-11-06 DIAGNOSIS — R10.2 PELVIC PAIN: ICD-10-CM

## 2022-11-06 LAB
BILIRUB UR QL STRIP: NEGATIVE
CLARITY UR: CLEAR
CLARITY, POC: CLEAR
COLOR UR: YELLOW
COLOR, POC: YELLOW
EXT BILIRUBIN, UA: NEGATIVE
EXT BLOOD URINE: NEGATIVE
EXT GLUCOSE, UA: NEGATIVE
EXT KETONES: NEGATIVE
EXT NITRITE, UA: NEGATIVE
EXT PH, UA: 7
EXT PREG TEST URINE: NEGATIVE
EXT PROTEIN, UA: NEGATIVE
EXT SPECIFIC GRAVITY, UA: 1.01
EXT UROBILINOGEN: 0.2
EXT. CONTROL ED NAV: NORMAL
GLUCOSE UR STRIP-MCNC: NEGATIVE MG/DL
HGB UR QL STRIP.AUTO: NEGATIVE
KETONES UR STRIP-MCNC: NEGATIVE MG/DL
LEUKOCYTE ESTERASE UR QL STRIP: NEGATIVE
NITRITE UR QL STRIP: NEGATIVE
PH UR STRIP.AUTO: 7 [PH] (ref 4.5–8)
PROT UR STRIP-MCNC: NEGATIVE MG/DL
SP GR UR STRIP.AUTO: 1.01 (ref 1–1.03)
UROBILINOGEN UR QL STRIP.AUTO: 0.2 E.U./DL
WBC # BLD EST: NEGATIVE 10*3/UL

## 2022-11-06 RX ORDER — OXYCODONE HYDROCHLORIDE 5 MG/1
5 TABLET ORAL EVERY 4 HOURS PRN
Qty: 6 TABLET | Refills: 0 | Status: SHIPPED | OUTPATIENT
Start: 2022-11-06 | End: 2022-12-02 | Stop reason: ALTCHOICE

## 2022-11-06 RX ORDER — IBUPROFEN 400 MG/1
400 TABLET ORAL ONCE
Status: COMPLETED | OUTPATIENT
Start: 2022-11-06 | End: 2022-11-06

## 2022-11-06 RX ORDER — CEPHALEXIN 500 MG/1
500 CAPSULE ORAL EVERY 12 HOURS SCHEDULED
Qty: 10 CAPSULE | Refills: 0 | Status: SHIPPED | OUTPATIENT
Start: 2022-11-06 | End: 2022-11-11

## 2022-11-06 RX ORDER — CEPHALEXIN 500 MG/1
500 CAPSULE ORAL ONCE
Status: COMPLETED | OUTPATIENT
Start: 2022-11-06 | End: 2022-11-06

## 2022-11-06 RX ADMIN — IBUPROFEN 400 MG: 400 TABLET, FILM COATED ORAL at 02:31

## 2022-11-06 RX ADMIN — CEPHALEXIN 500 MG: 500 CAPSULE ORAL at 02:34

## 2022-11-06 NOTE — Clinical Note
Sarah Valentin was seen and treated in our emergency department on 11/6/2022  No restrictions        none    Diagnosis: pelvic pain,  dysuria    Lor Chandler  may return to work on return date  She may return on this date: 11/09/2022         If you have any questions or concerns, please don't hesitate to call        Graham Butler MD    ______________________________           _______________          _______________  Hospital Representative                              Date                                Time

## 2022-11-06 NOTE — DISCHARGE INSTRUCTIONS
Please follow-up with your OBGYN as scheduled on Monday  Return immediately for worsening pain, fever, blood in your urine nausea vomiting or area other concerns

## 2022-11-07 NOTE — ED PROVIDER NOTES
History  Chief Complaint   Patient presents with   • Possible UTI     Pt presents to the ED with c/o pelvic pain that she thinks is a UTI  States that she had the same exact symptoms 7 months ago and was given ABX and felt relief  States that she is feeling the same pain again  Urinary Frequency  Location:  Super pubic   Quality:  Burning  Severity:  Moderate  Onset quality:  Sudden  Timing:  Constant  Progression:  Worsening  Chronicity:  New  Context:  Patinet states sx worstened in the next 3 days   Relieved by:  Nothing   Worsened by:  Nothing   Ineffective treatments:  None tried  Associated symptoms: no abdominal pain, no chest pain, no congestion, no cough, no diarrhea, no ear pain, no fatigue, no fever, no headaches, no loss of consciousness, no myalgias, no nausea, no rash, no rhinorrhea, no shortness of breath, no sore throat, no vomiting and no wheezing    Risk factors:  Prior uti       Prior to Admission Medications   Prescriptions Last Dose Informant Patient Reported? Taking?    Multiple Vitamin (multivitamin) capsule   No No   Sig: Take 1 capsule by mouth daily   al mag oxide-diphenhydramine-lidocaine viscous (MAGIC MOUTHWASH) 1:1:1 suspension   No No   Sig: Swish and spit 10 mL every 4 (four) hours as needed for mouth pain or discomfort   Patient not taking: No sig reported   methocarbamol (ROBAXIN) 500 mg tablet   No No   Sig: Take 1 tablet (500 mg total) by mouth 2 (two) times a day as needed for muscle spasms   Patient not taking: No sig reported      Facility-Administered Medications: None       Past Medical History:   Diagnosis Date   • Anemia    • History of cold sores     last assessed: 10/12/2016    • Hypertension     History of pre-eclampsia    • Urinary tract infection     Hx of UTI during last pregnancy   • Varicella     Positive Hx       Past Surgical History:   Procedure Laterality Date   •  SECTION  2015   • [place]  DELIVERY ONLY N/A 2019    Procedure:  SECTION () REPEAT;  Surgeon: Rui Rai MD;  Location: [facility];  Service: Obstetrics   • [place]  DELIVERY ONLY N/A 2021    Procedure:  SECTION () REPEAT;  Surgeon: Bharath Mercado MD;  [facility] ;  Service: Obstetrics   • [place] LIGATION,FALLOPIAN TUBE W/ Bilateral 2021    Procedure: LIGATION/COAGULATION TUBAL;  Surgeon: Bharath Mercado MD;  Location: [facility] ;  Service: Obstetrics       Family History   Problem Relation Age of Onset   • Arthritis Mother    • Hyperlipidemia Mother    • Osteoporosis Mother    • Hypertension Father    • Heart disease Father    • No Known Problems Sister    • No Known Problems Brother    • No Known Problems Son    • No Known Problems Maternal Grandmother    • No Known Problems Maternal Grandfather    • No Known Problems Paternal Grandmother    • No Known Problems Paternal Grandfather    • No Known Problems Sister    • No Known Problems Sister    • No Known Problems Brother    • No Known Problems Brother    • No Known Problems Daughter      I have reviewed and agree with the history as documented  E-Cigarette/Vaping   • E-Cigarette Use Never User      E-Cigarette/Vaping Substances   • Nicotine No    • THC No    • CBD No    • Flavoring No    • Other No    • Unknown No      Social History     Tobacco Use   • Smoking status: Never Smoker   • Smokeless tobacco: Never Used   Vaping Use   • Vaping Use: Never used   Substance Use Topics   • Alcohol use: Not Currently     Alcohol/week: 0 0 standard drinks   • Drug use: No       Review of Systems   Constitutional: Negative for activity change, chills, fatigue and fever  HENT: Negative for congestion, ear pain, rhinorrhea, sore throat, trouble swallowing and voice change  Eyes: Negative for pain and visual disturbance  Respiratory: Negative for cough, choking, shortness of breath and wheezing  Cardiovascular: Negative for chest pain and leg swelling  Gastrointestinal: Negative for abdominal distention, abdominal pain, diarrhea, nausea and vomiting  Endocrine: Negative for polydipsia and polyuria  Genitourinary: Positive for dysuria and frequency  Negative for difficulty urinating, flank pain, pelvic pain, vaginal bleeding, vaginal discharge and vaginal pain  Musculoskeletal: Negative for myalgias and neck stiffness  Skin: Negative for color change and rash  Allergic/Immunologic: Negative for immunocompromised state  Neurological: Negative for dizziness, loss of consciousness, speech difficulty and headaches  Hematological: Does not bruise/bleed easily  Psychiatric/Behavioral: Negative for agitation, behavioral problems, hallucinations and suicidal ideas  Physical Exam  Physical Exam  HENT:      Head: Normocephalic and atraumatic  Eyes:      Conjunctiva/sclera: Conjunctivae normal       Pupils: Pupils are equal, round, and reactive to light  Cardiovascular:      Rate and Rhythm: Normal rate and regular rhythm  Heart sounds: No murmur heard  Pulmonary:      Effort: Pulmonary effort is normal  No respiratory distress  Breath sounds: Normal breath sounds  Abdominal:      General: Bowel sounds are normal  There is no distension  Palpations: Abdomen is soft  Tenderness: There is no abdominal tenderness  Comments: No Signs of Peritonitis    Musculoskeletal:         General: Normal range of motion  Cervical back: Normal range of motion and neck supple  Skin:     General: Skin is warm and dry  Capillary Refill: Capillary refill takes less than 2 seconds  Coloration: Skin is not pale  Findings: No rash  Neurological:      Mental Status: She is alert and oriented to person, place, and time  GCS: GCS eye subscore is 4  GCS verbal subscore is 5  GCS motor subscore is 6        Comments: Normal speech, Normal gait, No Focal neurologic deficits    Psychiatric:         Behavior: Behavior normal  Vital Signs  ED Triage Vitals [11/06/22 0126]   Temperature Pulse Respirations Blood Pressure SpO2   98 °F (36 7 °C) 72 18 121/86 100 %      Temp Source Heart Rate Source Patient Position - Orthostatic VS BP Location FiO2 (%)   Oral Monitor Sitting Right arm --      Pain Score       10 - Worst Possible Pain           Vitals:    11/06/22 0126   BP: 121/86   Pulse: 72   Patient Position - Orthostatic VS: Sitting         Visual Acuity      ED Medications  Medications   ibuprofen (MOTRIN) tablet 400 mg (400 mg Oral Given 11/6/22 0231)   cephalexin (KEFLEX) capsule 500 mg (500 mg Oral Given 11/6/22 0234)       Diagnostic Studies  Results Reviewed     Procedure Component Value Units Date/Time    Urine Macroscopic, POC [[id number]] Collected: 11/06/22 0247    Lab Status: Final result Specimen: Urine Updated: 11/06/22 0207     Color, UA Yellow     Clarity, UA Clear     pH, UA 7 0     Leukocytes, UA Negative     Nitrite, UA Negative     Protein, UA Negative mg/dl      Glucose, UA Negative mg/dl      Ketones, UA Negative mg/dl      Urobilinogen, UA 0 2 E U /dl      Bilirubin, UA Negative     Occult Blood, UA Negative     Specific Gravity, UA 1 010    Narrative:      CLINITEK RESULT    POCT urinalysis dipstick [[id number]]  (Normal) Resulted: 11/06/22 0153    Lab Status: Final result Specimen: Urine Updated: 11/06/22 0154     Color, UA Yellow     Clarity, UA Clear     Glucose, UA (Ref: Negative) Negative     Bilirubin, UA (Ref: Negative) Negative     Ketones, UA (Ref: Negative) Negative     Spec Grav, UA (Ref:1 003-1 030) 1 010     Blood, UA (Ref: Negative) Negative     pH, UA (Ref: 4 5-8 0) 7 0     Protein, UA (Ref: Negative) Negative     Urobilinogen, UA (Ref: 0 2- 1 0) 0 2      Leukocytes, UA (Ref: Negative) Negative     Nitrite, UA (Ref: Negative) Negative    POCT pregnancy, urine [[id number]]  (Normal) Resulted: 11/06/22 0149    Lab Status: Final result Updated: 11/06/22 0149     EXT PREG TEST UR (Ref: Negative) Negative     Control Valid                 No orders to display              Procedures  Procedures         ED Course                   Patient presents to be symptomatic denies any vaginal lesions or vaginal complaints at this time as follow-up appoint with OBGYN as outpatient on Monday  Plan for pain control return precautions given                          MDM  Number of Diagnoses or Management Options  Dysuria: new and requires workup  Pelvic pain: new and requires workup  Diagnosis management comments: Patient presents with dysuria, increased urinary frequency and urgency patient denies hematuria denies vaginal bleeding or discharge  Patient states symptoms are identical to prior UTI  Plan to check urinalysis urine pregnancy test pain control reassess       Amount and/or Complexity of Data Reviewed  Clinical lab tests: ordered and reviewed  Tests in the medicine section of CPT®: reviewed and ordered    Risk of Complications, Morbidity, and/or Mortality  Presenting problems: moderate  Diagnostic procedures: moderate  Management options: moderate    Patient Progress  Patient progress: stable      Disposition  Final diagnoses:   Dysuria   Pelvic pain     Time reflects when diagnosis was documented in both MDM as applicable and the Disposition within this note     Time User Action Codes Description Comment    11/6/2022  2:22 AM Earlene Womack Add [R30 0] Dysuria     11/6/2022  2:22 AM Earlene Avelar [R10 2] Pelvic pain       ED Disposition     ED Disposition   Discharge    Condition   Stable    Date/Time   Sun Nov 6, 2022  2:23 AM    Desirae discharge to home/self care                 Follow-up Information     Follow up With Specialties Details Why Contact Info Additional [address] Emergency Department Emergency Medicine  If symptoms worsen [address] Emergency Department, [address] [facility] [place], [place], [place], [address]          Discharge Medication List as of 11/6/2022  2:31 AM      START taking these medications    Details   cephalexin (KEFLEX) 500 mg capsule Take 1 capsule (500 mg total) by mouth every 12 (twelve) hours for 5 days, Starting Sun 11/6/2022, Until Fri 11/11/2022, Normal      oxyCODONE (Roxicodone) 5 immediate release tablet Take 1 tablet (5 mg total) by mouth every 4 (four) hours as needed for moderate pain for up to 6 doses Max Daily Amount: 30 mg, Starting Sun 11/6/2022, Normal         CONTINUE these medications which have NOT CHANGED    Details   al mag oxide-diphenhydramine-lidocaine viscous (MAGIC MOUTHWASH) 1:1:1 suspension Swish and spit 10 mL every 4 (four) hours as needed for mouth pain or discomfort, Starting Tue 2/22/2022, Normal      methocarbamol (ROBAXIN) 500 mg tablet Take 1 tablet (500 mg total) by mouth 2 (two) times a day as needed for muscle spasms, Starting Mon 6/6/2022, Normal      Multiple Vitamin (multivitamin) capsule Take 1 capsule by mouth daily, Starting Wed 1/5/2022, Until Tue 4/5/2022, No Print             No discharge procedures on file      PDMP Review       Value Time User    PDMP Reviewed  Yes 9/26/2021  1:27 PM Esther Dowell MD          ED Provider  Electronically Signed by           Fahad Savage MD  11/07/22 3172

## 2022-11-08 ENCOUNTER — OFFICE VISIT (OUTPATIENT)
Dept: OBGYN CLINIC | Facility: CLINIC | Age: 32
End: 2022-11-08

## 2022-11-08 VITALS
HEIGHT: 58 IN | HEART RATE: 79 BPM | BODY MASS INDEX: 26.66 KG/M2 | DIASTOLIC BLOOD PRESSURE: 65 MMHG | SYSTOLIC BLOOD PRESSURE: 116 MMHG | WEIGHT: 127 LBS

## 2022-11-08 DIAGNOSIS — R10.2 PELVIC PAIN: ICD-10-CM

## 2022-11-08 DIAGNOSIS — R35.0 URINARY FREQUENCY: Primary | ICD-10-CM

## 2022-11-08 PROBLEM — Z3A.38 38 WEEKS GESTATION OF PREGNANCY: Chronic | Status: RESOLVED | Noted: 2021-03-26 | Resolved: 2022-11-08

## 2022-11-08 PROBLEM — O09.299 HX OF PREECLAMPSIA, PRIOR PREGNANCY, CURRENTLY PREGNANT: Status: RESOLVED | Noted: 2021-04-23 | Resolved: 2022-11-08

## 2022-11-08 RX ORDER — PHENAZOPYRIDINE HYDROCHLORIDE 100 MG/1
100 TABLET, FILM COATED ORAL 3 TIMES DAILY PRN
Qty: 30 TABLET | Refills: 0 | Status: SHIPPED | OUTPATIENT
Start: 2022-11-08 | End: 2022-11-18

## 2022-11-08 NOTE — LETTER
November 8, 2022     Patient: Rohit Hagen  YOB: 1990  Date of Visit: 11/8/2022      To Whom it May Concern:    Rohit Hagen is under my professional care  Ramirez Wilson was seen in my office on 11/8/2022  Please excuse her from work during this time  Additionally, please excuse her for physical therapy 1-2 times weekly until therapy is completed  She also has a lifting restriction of >15 pounds  If you have any questions or concerns, please don't hesitate to call           Sincerely,          Michael Moreno MD        CC: No Recipients

## 2022-11-08 NOTE — PROGRESS NOTES
OB/GYN VISIT  Sandra Aj  2022  9:10 AM      Assessment/Plan:    Problem List Items Addressed This Visit        Other    Pelvic pain     Discussed treatment options with patient  Pelvic pain likely multifactorial due hx of C/S x3 and symptoms consistent with overactive bladder  Abdominal adhesions noted in review of  c/s operative report  Recommend continue physical therapy  Patient reports she feels most of her symptoms are associated with bladder pain and frequency  Handouts provided on OAB/bladder training and dietary changes to avoid bladder irritants  Will trial course of pyridium TID for treatment of possible bladder spasms  Recommend continued alternating scheduled tylenol and ibuprofen use  If pain is not related to OAB and exacerbated by dysmenorrhea, could consider OCPs  Patient to f/u in one month following pyridium and bladder training to reassess symptoms  Other Visit Diagnoses     Urinary frequency    -  Primary    Relevant Medications    phenazopyridine (PYRIDIUM) 100 mg tablet            Subjective: Sandra Aj is a 27 y o   female who presents for evaluation of pelvic pain and urinary frequency  Patient reports increased abdominopelvic pain over the last year since her RLTCS with bilateral salpingectomy in 2021  Describes pain as deep and across her entire abdomen  Pain is constant and daily  She reports regular menstrual cycles and has worsening dysmenorrhea since her bilateral salpingectomy  She also reports increased urinary frequency  She reports needing to void every hour  Recently went to ED where she was prescribed Keflex - urine culture and dip has resulted as negative  Denies urinary incontinence or urgency  Does not drink significant bladder irritants  States she has improvement in pain briefly following voiding  Denies hematuria  Denies feeling of incomplete emptying   She recently started pelvic floor/abdominal physical therapy last week and went to one appointment  She is planning on continuing with physical therapy appointments  She takes tylenol at night  Tried robaxin once but did not like the way it made her feel  Objective:    Vitals: Blood pressure 116/65, pulse 79, height 4' 10" (1 473 m), weight 57 6 kg (127 lb), last menstrual period 2022, currently breastfeeding  Body mass index is 26 54 kg/m²  Past Medical History:   Diagnosis Date   • Anemia    • History of cold sores     last assessed: 10/12/2016    • Hx of preeclampsia, prior pregnancy, currently pregnant 2021   • Hypertension     History of pre-eclampsia    • Urinary tract infection     Hx of UTI during last pregnancy   • Varicella     Positive Hx     Past Surgical History:   Procedure Laterality Date   •  SECTION  2015   • [place]  DELIVERY ONLY N/A 2019    Procedure:  SECTION () REPEAT;  Surgeon: Jevon Gavin MD;  Location: [facility];  Service: Obstetrics   • [place]  DELIVERY ONLY N/A 2021    Procedure:  SECTION () REPEAT;  Surgeon: Teri De Santiago MD;  [facility];  Service: Obstetrics   • [place] LIGATION,FALLOPIAN TUBE W/ Bilateral 2021    Procedure: LIGATION/COAGULATION TUBAL;  Surgeon: Teri De Santiago MD;  [facility];  Service: Obstetrics       Physical Exam  Constitutional:       General: She is not in acute distress  Appearance: Normal appearance  She is well-developed and normal weight  She is not ill-appearing, toxic-appearing or diaphoretic  HENT:      Head: Normocephalic and atraumatic  Cardiovascular:      Rate and Rhythm: Normal rate  Pulmonary:      Effort: Pulmonary effort is normal  No respiratory distress  Abdominal:      General: Abdomen is flat  There is no distension  Palpations: Abdomen is soft  There is no mass  Tenderness: There is no abdominal tenderness  There is no guarding or rebound  Comments: Pfannenstiel incision c/d/i  Minimal tenderness to deep palpation of abdomen inferior to umbilicus  Skin:     General: Skin is warm and dry  Neurological:      General: No focal deficit present  Mental Status: She is alert and oriented to person, place, and time     Psychiatric:         Mood and Affect: Mood normal          Behavior: Behavior normal            Jackelyn Landrum  11/8/2022  9:10 AM

## 2022-11-08 NOTE — ASSESSMENT & PLAN NOTE
Discussed treatment options with patient  Pelvic pain likely multifactorial due hx of C/S x3 and symptoms consistent with overactive bladder  Abdominal adhesions noted in review of 2021 c/s operative report  Recommend continue physical therapy  Patient reports she feels most of her symptoms are associated with bladder pain and frequency  Handouts provided on OAB/bladder training and dietary changes to avoid bladder irritants  Will trial course of pyridium TID for treatment of possible bladder spasms  Recommend continued alternating scheduled tylenol and ibuprofen use  If pain is not related to OAB and exacerbated by dysmenorrhea, could consider OCPs  Patient to f/u in one month following pyridium and bladder training to reassess symptoms

## 2022-11-21 ENCOUNTER — APPOINTMENT (OUTPATIENT)
Dept: PHYSICAL THERAPY | Facility: REHABILITATION | Age: 32
End: 2022-11-21

## 2022-11-29 ENCOUNTER — APPOINTMENT (OUTPATIENT)
Dept: PHYSICAL THERAPY | Facility: REHABILITATION | Age: 32
End: 2022-11-29

## 2022-12-02 ENCOUNTER — OFFICE VISIT (OUTPATIENT)
Dept: INTERNAL MEDICINE CLINIC | Facility: CLINIC | Age: 32
End: 2022-12-02

## 2022-12-02 VITALS
DIASTOLIC BLOOD PRESSURE: 66 MMHG | OXYGEN SATURATION: 98 % | BODY MASS INDEX: 28.09 KG/M2 | HEART RATE: 72 BPM | SYSTOLIC BLOOD PRESSURE: 110 MMHG | HEIGHT: 58 IN | TEMPERATURE: 97.3 F | WEIGHT: 133.8 LBS

## 2022-12-02 DIAGNOSIS — Z59.9 FINANCIAL DIFFICULTIES: ICD-10-CM

## 2022-12-02 DIAGNOSIS — R10.2 PELVIC PAIN: Primary | ICD-10-CM

## 2022-12-02 RX ORDER — DICYCLOMINE HCL 20 MG
20 TABLET ORAL EVERY 6 HOURS
Qty: 120 TABLET | Refills: 2 | Status: SHIPPED | OUTPATIENT
Start: 2022-12-02 | End: 2023-03-02

## 2022-12-02 RX ORDER — DULOXETIN HYDROCHLORIDE 20 MG/1
20 CAPSULE, DELAYED RELEASE ORAL DAILY
Qty: 30 CAPSULE | Refills: 2 | Status: SHIPPED | OUTPATIENT
Start: 2022-12-02 | End: 2023-03-02

## 2022-12-02 SDOH — ECONOMIC STABILITY - INCOME SECURITY: PROBLEM RELATED TO HOUSING AND ECONOMIC CIRCUMSTANCES, UNSPECIFIED: Z59.9

## 2022-12-02 NOTE — PROGRESS NOTES
Name: Julisa Taylor      : 1990      MRN: [id number]  Encounter Provider: Radha Bravo MD  Encounter Date: 2022   Encounter department: [address]     1   Pelvic pain  Patient presented with complains of pelvic pain since her last  and tubal ligation in 2021  She has a history of 3 C-sections and significant adhesions were noted during the last   She mentions that the pain is deep, constant and sharp in nature; no variation during the day and affecting her ability to work  She also mentions of urinary urgency every hour and passes minimal urine; does not mention of pain getting worse with the urinary urgency  She has irregular menstrual periods every 2-3 months with normal bleeding but severe pain  No associated vaginal bleeding, back pain, leg pain, tingling or numbness, constipation, weight loss  On abdominal exam she mentions of mild deep tenderness just below the umbilicus; otherwise soft abdomen  She has been following with OBGYN regarding the pain and has been tried on multiple medications including methocarbamol, ibuprofen, phenazopyridine but she mentions that none of these helped    With history of multiple C sections the pain can be neuropathic from nerve entrapments/injuries during the surgery  Considering the chronic nature of pain and multiple social stressors including separation from partner and children and being jobless, there can be a psychosomatic component of the pain - will start on low dose Duloxetine  Will also trial dicyclomine to cover any spasmodic component of pain including bladder spasms  Will also obtain CT abdomen pelvis to rule out any intra-abdominal anatomic/pathologic condition  Will consider pain management referral next visit if pain persistent    Follow up with OBGYN scheduled appointment on 2022  Advised the patient to start the medications as soon as possible so the response can be assessed during the OBGYN appointment and further plan can be formulated based on that    -     DULoxetine (CYMBALTA) 20 mg capsule; Take 1 capsule (20 mg total) by mouth daily  -     dicyclomine (BENTYL) 20 mg tablet; Take 1 tablet (20 mg total) by mouth every 6 (six) hours  -     CT abdomen pelvis w wo contrast; Future; Expected date: 2022    2  Financial difficulties  -     Ambulatory referral to social work care management program; Future; Expected date: 2022       Scheduled follow-up in 3 months    Subjective     Jeanette Thorne is a 32/F with PMH of anemia, 3  deliveries, tubal ligation, chronic pelvic pain  Patient presents with complains of chronic progressive pelvic pain since her last  in 2021  She mentions of deep, sharp, constant pain in the lower abdomen which does not vary during the day  No associated vaginal bleeding, back pain, leg pain, tingling / numbness, constipation  She has been following with OBGYN regarding the same and has received trials of multiple medications including ibuprofen, robaxin, phenazopyridine without any benefit  She also mentions of urinary urgency every hour and passes minimal urine; does not mention of pain getting worse with the urinary urgency  She also mentions of social stressors including separation from  and children and being jobless due to the pain  Review of Systems   Constitutional: Positive for activity change  Negative for appetite change, chills, fatigue, fever and unexpected weight change  Eyes: Negative for photophobia and visual disturbance  Respiratory: Negative for cough, shortness of breath and wheezing  Cardiovascular: Negative for chest pain, palpitations and leg swelling  Gastrointestinal: Positive for abdominal pain  Negative for abdominal distention, anal bleeding, constipation, diarrhea, nausea and vomiting  Endocrine: Negative for polydipsia, polyphagia and polyuria     Musculoskeletal: Negative for arthralgias, back pain, gait problem, myalgias and neck pain  Skin: Negative for color change and pallor  Allergic/Immunologic: Negative for immunocompromised state  Neurological: Negative for dizziness, seizures, weakness and headaches  Hematological: Does not bruise/bleed easily  Psychiatric/Behavioral: Negative for agitation and behavioral problems         Past Medical History:   Diagnosis Date   • Anemia    • History of cold sores     last assessed: 10/12/2016    • Hx of preeclampsia, prior pregnancy, currently pregnant 2021   • Hypertension     History of pre-eclampsia    • Urinary tract infection     Hx of UTI during last pregnancy   • Varicella     Positive Hx     Past Surgical History:   Procedure Laterality Date   •  SECTION  2015   • [place]  DELIVERY ONLY N/A 2019    Procedure:  SECTION () REPEAT;  Surgeon: Oksana Castro MD;  Location: [facility] ;  Service: Obstetrics   • [place]  DELIVERY ONLY N/A 2021    Procedure:  SECTION () REPEAT;  Surgeon: Nakul Haney MD;  Location: [facility];  Service: Obstetrics   • [place] LIGATION,FALLOPIAN TUBE W/ Bilateral 2021    Procedure: LIGATION/COAGULATION TUBAL;  Surgeon: Nakul Haney MD;  Location: [facility] ;  Service: Obstetrics     Family History   Problem Relation Age of Onset   • Arthritis Mother    • Hyperlipidemia Mother    • Osteoporosis Mother    • Hypertension Father    • Heart disease Father    • No Known Problems Sister    • No Known Problems Brother    • No Known Problems Son    • No Known Problems Maternal Grandmother    • No Known Problems Maternal Grandfather    • No Known Problems Paternal Grandmother    • No Known Problems Paternal Grandfather    • No Known Problems Sister    • No Known Problems Sister    • No Known Problems Brother    • No Known Problems Brother    • No Known Problems Daughter      Social History     Socioeconomic History   • Marital status: Single     Spouse name: None   • Number of children: 2   • Years of education: 15   • Highest education level: Some college, no degree   Occupational History   • None   Tobacco Use   • Smoking status: Never   • Smokeless tobacco: Never   Vaping Use   • Vaping Use: Never used   Substance and Sexual Activity   • Alcohol use: Not Currently     Alcohol/week: 0 0 standard drinks   • Drug use: No   • Sexual activity: Not Currently     Partners: Male     Birth control/protection: None   Other Topics Concern   • None   Social History Narrative    Always uses seatbelts    Caffeine use     Exercises rarely    Lives with family    No secondhand smoke exposure     Social Determinants of Health     Financial Resource Strain: Medium Risk   • Difficulty of Paying Living Expenses: Somewhat hard   Food Insecurity: No Food Insecurity   • Worried About Running Out of Food in the Last Year: Never true   • Ran Out of Food in the Last Year: Never true   Transportation Needs: No Transportation Needs   • Lack of Transportation (Medical): No   • Lack of Transportation (Non-Medical):  No   Physical Activity: Not on file   Stress: Not on file   Social Connections: Not on file   Intimate Partner Violence: Not on file   Housing Stability: Not on file     Current Outpatient Medications on File Prior to Visit   Medication Sig   • [DISCONTINUED] oxyCODONE (Roxicodone) 5 immediate release tablet Take 1 tablet (5 mg total) by mouth every 4 (four) hours as needed for moderate pain for up to 6 doses Max Daily Amount: 30 mg   • al mag oxide-diphenhydramine-lidocaine viscous (MAGIC MOUTHWASH) 1:1:1 suspension Swish and spit 10 mL every 4 (four) hours as needed for mouth pain or discomfort (Patient not taking: Reported on 7/15/2022)   • methocarbamol (ROBAXIN) 500 mg tablet Take 1 tablet (500 mg total) by mouth 2 (two) times a day as needed for muscle spasms (Patient not taking: Reported on 7/15/2022)   • Multiple Vitamin (multivitamin) capsule Take 1 capsule by mouth daily (Patient not taking: Reported on 12/2/2022)     No Known Allergies  Immunization History   Administered Date(s) Administered   • INFLUENZA 11/02/2018   • Influenza Quadrivalent Preservative Free 3 years and older IM 10/20/2015   • Influenza, injectable, quadrivalent, preservative free 0 5 mL 11/02/2018, 01/10/2020, 01/05/2022   • Tdap 06/08/2015, 08/01/2015, 11/30/2018, 07/09/2021       Objective     /66 (BP Location: Right arm, Patient Position: Sitting, Cuff Size: Standard)   Pulse 72   Temp (!) 97 3 °F (36 3 °C) (Temporal)   Ht 4' 10" (1 473 m)   Wt 60 7 kg (133 lb 12 8 oz)   LMP 11/07/2022   SpO2 98%   BMI 27 96 kg/m²     Physical Exam  Constitutional:       General: She is not in acute distress  Appearance: Normal appearance  HENT:      Head: Normocephalic and atraumatic  Mouth/Throat:      Mouth: Mucous membranes are moist       Pharynx: Oropharynx is clear  Eyes:      Conjunctiva/sclera: Conjunctivae normal       Pupils: Pupils are equal, round, and reactive to light  Cardiovascular:      Rate and Rhythm: Normal rate and regular rhythm  Pulses: Normal pulses  Heart sounds: Normal heart sounds  Pulmonary:      Effort: Pulmonary effort is normal       Breath sounds: Normal breath sounds  No wheezing or rales  Abdominal:      General: Bowel sounds are normal  There is no distension  Palpations: Abdomen is soft  There is no mass  Tenderness: There is abdominal tenderness (mild deep tenderness below umbilicus)  There is no rebound  Musculoskeletal:      Right lower leg: No edema  Left lower leg: No edema  Skin:     General: Skin is warm  Capillary Refill: Capillary refill takes less than 2 seconds  Coloration: Skin is not jaundiced or pale  Findings: No bruising  Neurological:      General: No focal deficit present  Mental Status: She is alert and oriented to person, place, and time  Psychiatric:         Mood and Affect: Mood normal          Behavior: Behavior normal        Helder Huang MD

## 2022-12-16 ENCOUNTER — PATIENT OUTREACH (OUTPATIENT)
Dept: INTERNAL MEDICINE CLINIC | Facility: CLINIC | Age: 32
End: 2022-12-16

## 2022-12-16 NOTE — PROGRESS NOTES
OLIVE received a new referral in regard to pt that was identified as having financial difficulties at [facility] on 12/02  OLIVE contacted pt and introduced self and role  Per pt she was unable to talk at this time and requested I text her a call back phone number  OLIVE texted pt to call my phone number today and onsite [facility] phone number if she returns call next week  OLIVE will remain available

## 2022-12-20 ENCOUNTER — PATIENT OUTREACH (OUTPATIENT)
Dept: INTERNAL MEDICINE CLINIC | Facility: CLINIC | Age: 32
End: 2022-12-20

## 2022-12-20 DIAGNOSIS — Z59.9 FINANCIAL DIFFICULTIES: Primary | ICD-10-CM

## 2022-12-20 SDOH — ECONOMIC STABILITY - INCOME SECURITY: PROBLEM RELATED TO HOUSING AND ECONOMIC CIRCUMSTANCES, UNSPECIFIED: Z59.9

## 2022-12-20 NOTE — PROGRESS NOTES
SW has reached out to pt again to assist with [employer]  Pt was in her car and it was somewhat difficult to hear pt   However pt does express great frustration re being  from 2 of her 3 children  She shares she has a 8 y/o son who lives with her,  her 3 y/o dgt resides with her Father who has custody of her   and her 3 y/o dgt is living in [place] with the nicolas's father  Pt relates she was not able to go down to [place]  Apparently there has been some custody disbutes  Pt share she resides mostly with her brother but it is a small  Apt    She sometimes stays with a friend as well  She was not able  to maintain her own an apt when she got sick  and she then lost her children  Support provided  Pt does share she is feeling better now and has more energy  She is working part time making deliveries  Pt shares she feels the " the government messed up her life"  SHERRY notes through Chart review that pt was seen in the  ED for a Psychological evaluation on 2/18/22 but there were no grounds for a 302  SW has offered support and and also OP [address] [place] to provide her additional support but she declines as she does not feel it would be helpful  Pt indicated she can read her bible  Pt is interested in SNAP benefits if she is eligible  She is concerned as the " government " may not feel she is  SW offered to ask our [facility] to reach out to see if she may qualify  Pt agrees to same  SHERRY has also provided pt info re the Food Bank at the [address] [facility] which is very close to her brother's apt     CM Team to f/u with pt re SNAP and other resources as indicated

## 2023-01-10 ENCOUNTER — OFFICE VISIT (OUTPATIENT)
Dept: OBGYN CLINIC | Facility: CLINIC | Age: 33
End: 2023-01-10

## 2023-01-10 VITALS — BODY MASS INDEX: 26.87 KG/M2 | HEIGHT: 58 IN | WEIGHT: 128 LBS

## 2023-01-10 DIAGNOSIS — N32.81 OVERACTIVE BLADDER: Primary | ICD-10-CM

## 2023-01-10 DIAGNOSIS — R10.2 PELVIC PAIN: ICD-10-CM

## 2023-01-10 LAB
SL AMB  POCT GLUCOSE, UA: NORMAL
SL AMB LEUKOCYTE ESTERASE,UA: NORMAL
SL AMB POCT BILIRUBIN,UA: NORMAL
SL AMB POCT BLOOD,UA: NORMAL
SL AMB POCT CLARITY,UA: NORMAL
SL AMB POCT COLOR,UA: YELLOW
SL AMB POCT KETONES,UA: NORMAL
SL AMB POCT NITRITE,UA: NORMAL
SL AMB POCT PH,UA: 6
SL AMB POCT SPECIFIC GRAVITY,UA: 1.02
SL AMB POCT URINE PROTEIN: NORMAL
SL AMB POCT UROBILINOGEN: 0.2

## 2023-01-10 NOTE — ASSESSMENT & PLAN NOTE
Reviewed dietary changes, limiting fluids at least 2-3 hours before bed and bladder training  Will trial 3 months of mirabegron 50 mg XR qd

## 2023-01-10 NOTE — PROGRESS NOTES
OB/GYN VISIT  Maco Black  1/10/2023  11:52 AM      Assessment/Plan:    Problem List Items Addressed This Visit        Genitourinary    Overactive bladder - Primary     Reviewed dietary changes, limiting fluids at least 2-3 hours before bed and bladder training  Will trial 3 months of mirabegron 50 mg XR qd         Relevant Medications    Mirabegron ER 50 MG TB24    Other Relevant Orders    POCT urine dip (Completed)       Other    Pelvic pain     Multifactorial pelvic pain, may have component from OAB  Recommend scheduling appointment with pelvic floor PT - referral with phone number re-printed              Subjective: Maco Black is a 27 y o   female who presents for follow-up evaluation of pelvic pain and urinary frequency  Patient has had multiple urine dips which have been negative for UTI  She states her urinary frequency is worse than before and is drastically impacting her quality of life  She states she cannot go more than 2 hours without urination is 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  She reports her frequency and urgency without incontinence symptoms  Denies stress urinary incontinence symptoms  Nocturia 2-3 times a night  He has tried dietary modifications as well as bladder training without relief  She had some brief relief with use of Pyridium  No prolapse symptoms  He is also still having pelvic pain and has not yet been able to do pelvic floor physical therapy because of her work schedule  Objective:    Vitals: Height 4' 10" (1 473 m), weight 58 1 kg (128 lb), last menstrual period 2023, currently breastfeeding  Body mass index is 26 75 kg/m²      Past Medical History:   Diagnosis Date   • Anemia    • History of cold sores     last assessed: 10/12/2016    • Hx of preeclampsia, prior pregnancy, currently pregnant 2021   • Hypertension     History of pre-eclampsia    • Urinary tract infection     Hx of UTI during last pregnancy   • Varicella     Positive Hx Past Surgical History:   Procedure Laterality Date   •  SECTION  2015   • [place]  DEL[place] N/A 2019    Procedure:  SECTION () REPEAT;  Surgeon: David Nuñez MD;  Location: [facility];  Service: Obstetrics   • [place] N/A 2021    Procedure:  SECTION () REPEAT;  Surgeon: Hodan Almaraz MD;  Location: [facility];  Service: Obstetrics   • [place] LIG/TRNSXJ FALOPIAN TUBE  DEL/ABDML SURG Bilateral 2021    Procedure: LIGATION/COAGULATION TUBAL;  Surgeon: Hodan Almaraz MD;  [facility]: [facility];  Service: Obstetrics       Physical Exam  Constitutional:       General: She is not in acute distress  Appearance: Normal appearance  She is well-developed and normal weight  She is not ill-appearing, toxic-appearing or diaphoretic  HENT:      Head: Normocephalic and atraumatic  Cardiovascular:      Rate and Rhythm: Normal rate  Pulmonary:      Effort: Pulmonary effort is normal  No respiratory distress  Skin:     General: Skin is warm and dry  Neurological:      General: No focal deficit present  Mental Status: She is alert and oriented to person, place, and time     Psychiatric:         Mood and Affect: Mood normal          Behavior: Behavior normal            Peng Dotson MD  1/10/2023  11:52 AM

## 2023-01-10 NOTE — LETTER
January 10, 2023     Patient: Xochitl Steven  YOB: 1990  Date of Visit: 1/10/2023      To Whom it May Concern:    Xochitl Steven is under my professional care  Angel Francisco was seen in my office on 1/10/2023  Please allow her to work a 30 hour work week during the next 3 months as patient needs to be able to attend various appointments for physical therapy and other medically necessary appointments  If you have any questions or concerns, please don't hesitate to call           Sincerely,          Yogesh Mcneal MD        CC: No Recipients

## 2023-01-10 NOTE — ASSESSMENT & PLAN NOTE
Multifactorial pelvic pain, may have component from OAB  Recommend scheduling appointment with pelvic floor PT - referral with phone number re-printed

## 2023-01-20 ENCOUNTER — PATIENT OUTREACH (OUTPATIENT)
Dept: INTERNAL MEDICINE CLINIC | Facility: CLINIC | Age: 33
End: 2023-01-20

## 2023-01-24 ENCOUNTER — PATIENT OUTREACH (OUTPATIENT)
Dept: INTERNAL MEDICINE CLINIC | Facility: CLINIC | Age: 33
End: 2023-01-24

## 2023-01-24 NOTE — PROGRESS NOTES
CMOC called the patient to introduced herself and her role  The patient agreed to services  [facility] asked patient if we can complete the application by phone  the patient agree to start the application by phone  Unfortunately, patient have to go and complete the application later  [facility] will wait for patient call to complete application

## 2023-02-01 DIAGNOSIS — R10.2 PELVIC PAIN: ICD-10-CM

## 2023-02-01 RX ORDER — DICYCLOMINE HCL 20 MG
TABLET ORAL
Qty: 360 TABLET | Refills: 1 | Status: SHIPPED | OUTPATIENT
Start: 2023-02-01

## 2023-02-01 RX ORDER — DULOXETIN HYDROCHLORIDE 20 MG/1
CAPSULE, DELAYED RELEASE ORAL
Qty: 90 CAPSULE | Refills: 1 | Status: SHIPPED | OUTPATIENT
Start: 2023-02-01

## 2023-02-07 ENCOUNTER — PATIENT OUTREACH (OUTPATIENT)
Dept: INTERNAL MEDICINE CLINIC | Facility: CLINIC | Age: 33
End: 2023-02-07

## 2023-02-07 NOTE — PROGRESS NOTES
CMOC called the patient to complete the SNAP application  the patient informed [facility] that she was no longer interested in applying for SNAP or anything to do with government help  [facility] try to convince the patient but the patient did not agree to complete the application  CMOC will like to communicate with team to see the best way to help the patient or close the case  CMOC will continue work in the case

## 2023-02-09 ENCOUNTER — PATIENT OUTREACH (OUTPATIENT)
Dept: INTERNAL MEDICINE CLINIC | Facility: CLINIC | Age: 33
End: 2023-02-09

## 2023-02-09 NOTE — PROGRESS NOTES
CMOC completed SNAP application by patient authorization through the [employer] will call patient to informed her about the verification document required by [address] scanned in chart the verification document required for future need

## 2023-02-09 NOTE — PROGRESS NOTES
SHERRY has reviewed case with [facility] and has also called pt to verify if she wants to apply for SNAP or not  Pt does now confirm she does want to try for SNAP  SW reviewed with her [employer] info and pt shares she has the # of [employer] [address] and will f/u if needed  Pt denied other needs at present  SHERRY has updated [facility] and she will f/u on the [facility] application

## 2023-02-24 ENCOUNTER — VBI (OUTPATIENT)
Dept: ADMINISTRATIVE | Facility: OTHER | Age: 33
End: 2023-02-24

## 2023-03-06 ENCOUNTER — PATIENT OUTREACH (OUTPATIENT)
Dept: INTERNAL MEDICINE CLINIC | Facility: CLINIC | Age: 33
End: 2023-03-06

## 2023-03-06 NOTE — PROGRESS NOTES
SNAP - Denied     Patient was denied for SNAP by the [facility]  [facility] tried to communicate with the patient but unfortunately patient was unable to answer the call  [facility] left a voices mail informing that she will closes the cases and that if she needs any other help please contact the PCP offices       At this time [facility] will be closing the case

## 2023-03-08 ENCOUNTER — APPOINTMENT (OUTPATIENT)
Dept: LAB | Facility: CLINIC | Age: 33
End: 2023-03-08

## 2023-03-08 ENCOUNTER — OFFICE VISIT (OUTPATIENT)
Dept: OBGYN CLINIC | Facility: CLINIC | Age: 33
End: 2023-03-08

## 2023-03-08 ENCOUNTER — TELEPHONE (OUTPATIENT)
Dept: OBGYN CLINIC | Facility: CLINIC | Age: 33
End: 2023-03-08

## 2023-03-08 VITALS
BODY MASS INDEX: 26.7 KG/M2 | HEIGHT: 58 IN | HEART RATE: 68 BPM | DIASTOLIC BLOOD PRESSURE: 70 MMHG | WEIGHT: 127.2 LBS | SYSTOLIC BLOOD PRESSURE: 109 MMHG

## 2023-03-08 DIAGNOSIS — Z11.3 SCREEN FOR STD (SEXUALLY TRANSMITTED DISEASE): ICD-10-CM

## 2023-03-08 DIAGNOSIS — Z11.3 SCREEN FOR STD (SEXUALLY TRANSMITTED DISEASE): Primary | ICD-10-CM

## 2023-03-08 LAB
HBV SURFACE AG SER QL: NORMAL
HCV AB SER QL: NORMAL
HIV 1+2 AB+HIV1 P24 AG SERPL QL IA: NORMAL
HIV 2 AB SERPL QL IA: NORMAL
HIV1 AB SERPL QL IA: NORMAL
HIV1 P24 AG SERPL QL IA: NORMAL
TREPONEMA PALLIDUM IGG+IGM AB [PRESENCE] IN SERUM OR PLASMA BY IMMUNOASSAY: NORMAL

## 2023-03-08 NOTE — PROGRESS NOTES
PROBLEM GYNECOLOGICAL VISIT    Musa Hodgson is a 26 y o  female who presents today for STI screening  Her general medical history has been reviewed and she reports it as follows:    Past Medical History:   Diagnosis Date   • Anemia    • History of cold sores     last assessed: 10/12/2016    • Hx of preeclampsia, prior pregnancy, currently pregnant 2021   • Hypertension     History of pre-eclampsia    • Urinary tract infection     Hx of UTI during last pregnancy   • Varicella     Positive Hx     Past Surgical History:   Procedure Laterality Date   •  SECTION  2015   • [place]  DELIVERY ONLY N/A 2019    Procedure:  SECTION () REPEAT;  Surgeon: Blu Segundo MD;  [facility]: [facility];  Service: Obstetrics   • HI  DELIVERY ONLY N/A 2021    Procedure:  SECTION () REPEAT;  Surgeon: Maru Bravo MD;  [facility];  Service: Obstetrics   • [place] LIG/TRNSXJ FALOPIAN TUBE  DEL/ABDML SURG Bilateral 2021    Procedure: LIGATION/COAGULATION TUBAL;  Surgeon: Maru Bravo MD;  [facility];  Service: Obstetrics     OB History        3    Para   3    Term   3            AB        Living   3       SAB        IAB        Ectopic        Multiple   0    Live Births   3           Obstetric Comments   Menstrual cycle: 15           Social History     Tobacco Use   • Smoking status: Never   • Smokeless tobacco: Never   Vaping Use   • Vaping Use: Never used   Substance Use Topics   • Alcohol use: Not Currently     Alcohol/week: 0 0 standard drinks   • Drug use: No     Social History     Substance and Sexual Activity   Sexual Activity Not Currently   • Partners: Male   • Birth control/protection: None, Female Sterilization       Current Outpatient Medications   Medication Instructions   • dicyclomine (BENTYL) 20 mg tablet TAKE 1 TABLET BY MOUTH EVERY 6 HOURS     • DULoxetine (CYMBALTA) 20 mg capsule TAKE 1 CAPSULE BY MOUTH EVERY DAY   • Mirabegron ER 50 mg, Oral, Daily       History of Present Illness:   Pee Edmonds presents today for STI screenings  She denies any symptoms  Has had a new partner recently and would like screenings to be safe  Review of Systems:  Review of Systems   Constitutional: Negative  Gastrointestinal: Negative  Genitourinary: Negative  Physical Exam:  /70   Pulse 68   Ht 4' 10" (1 473 m)   Wt 57 7 kg (127 lb 3 2 oz)   LMP 02/01/2023 (Approximate)   BMI 26 58 kg/m²   Physical Exam  Constitutional:       General: She is not in acute distress  Appearance: Normal appearance  Neurological:      Mental Status: She is alert  Skin:     General: Skin is warm and dry  Psychiatric:         Mood and Affect: Mood normal          Behavior: Behavior normal    Vitals reviewed  Assessment:   1  STI screening     Plan:   1  GC/CT culture collected via urine  2  HIV, hep B, hep C and RPR    3  Keep scheduled follow up visit next month     Reviewed with patient that [facility], but that they will not necessarily be reviewed by me immediately  Explained that I will review results at my earliest opportunity and contact patient appropriately

## 2023-03-08 NOTE — TELEPHONE ENCOUNTER
----- Message from Rayray Morris, [address] sent at 3/8/2023  3:16 PM EST -----  Please let her know the STI blood panel is negative  Thank you!

## 2023-03-09 ENCOUNTER — TELEPHONE (OUTPATIENT)
Dept: OBGYN CLINIC | Facility: CLINIC | Age: 33
End: 2023-03-09

## 2023-03-09 LAB
C TRACH DNA SPEC QL NAA+PROBE: NEGATIVE
N GONORRHOEA DNA SPEC QL NAA+PROBE: NEGATIVE

## 2023-03-09 NOTE — TELEPHONE ENCOUNTER
----- Message from [address] [place] [address] sent at 3/9/2023  7:42 AM EST -----  Please let her know the sti culture is negative  Thank you!

## 2023-03-13 ENCOUNTER — HOSPITAL ENCOUNTER (EMERGENCY)
Facility: HOSPITAL | Age: 33
Discharge: HOME | End: 2023-03-13
Attending: EMERGENCY MEDICINE

## 2023-03-13 ENCOUNTER — APPOINTMENT (EMERGENCY)
Dept: RADIOLOGY | Facility: HOSPITAL | Age: 33
End: 2023-03-13

## 2023-03-13 VITALS
TEMPERATURE: 98.3 F | OXYGEN SATURATION: 100 % | SYSTOLIC BLOOD PRESSURE: 133 MMHG | HEART RATE: 73 BPM | DIASTOLIC BLOOD PRESSURE: 93 MMHG | RESPIRATION RATE: 18 BRPM

## 2023-03-13 DIAGNOSIS — M79.601 RIGHT ARM PAIN: Primary | ICD-10-CM

## 2023-03-13 DIAGNOSIS — R07.9 CHEST PAIN: ICD-10-CM

## 2023-03-13 DIAGNOSIS — M54.9 BACK PAIN: ICD-10-CM

## 2023-03-13 RX ORDER — KETOROLAC TROMETHAMINE 30 MG/ML
15 INJECTION, SOLUTION INTRAMUSCULAR; INTRAVENOUS ONCE
Status: COMPLETED | OUTPATIENT
Start: 2023-03-13 | End: 2023-03-13

## 2023-03-13 RX ORDER — ACETAMINOPHEN 325 MG/1
650 TABLET ORAL ONCE
Status: COMPLETED | OUTPATIENT
Start: 2023-03-13 | End: 2023-03-13

## 2023-03-13 RX ADMIN — ACETAMINOPHEN 650 MG: 325 TABLET ORAL at 21:08

## 2023-03-13 RX ADMIN — KETOROLAC TROMETHAMINE 15 MG: 30 INJECTION, SOLUTION INTRAMUSCULAR; INTRAVENOUS at 21:09

## 2023-03-14 LAB
ATRIAL RATE: 66 BPM
ATRIAL RATE: 70 BPM
P AXIS: 62 DEGREES
P AXIS: 69 DEGREES
PR INTERVAL: 154 MS
PR INTERVAL: 170 MS
QRS AXIS: 76 DEGREES
QRS AXIS: 78 DEGREES
QRSD INTERVAL: 84 MS
QRSD INTERVAL: 86 MS
QT INTERVAL: 388 MS
QT INTERVAL: 400 MS
QTC INTERVAL: 406 MS
QTC INTERVAL: 432 MS
T WAVE AXIS: 57 DEGREES
T WAVE AXIS: 70 DEGREES
VENTRICULAR RATE: 66 BPM
VENTRICULAR RATE: 70 BPM

## 2023-03-14 NOTE — DISCHARGE INSTRUCTIONS
Please use the following pain medications as prescribed:  - Tylenol 650mg every 6 hours  - Motrin 400mg every 6 hours  They work in different ways so can be used together at the same time  Your evaluation was negative  Follow up with your primary care physician  Return to the ED if symptoms worsen

## 2023-03-14 NOTE — ED ATTENDING ATTESTATION
3/13/2023  I, Sonal Da Silva MD, saw and evaluated the patient  I have discussed the patient with the resident/non-physician practitioner and agree with the resident's/non-physician practitioner's findings, Plan of Care, and MDM as documented in the resident's/non-physician practitioner's note, except where noted  All available labs and Radiology studies were reviewed  I was present for key portions of any procedure(s) performed by the resident/non-physician practitioner and I was immediately available to provide assistance  At this point I agree with the current assessment done in the Emergency Department    I have conducted an independent evaluation of this patient a history and physical is as follows:  Pt has r shoulder pain that started yesterday Pt works out in gym and feels may be related Pt states pain worse with movement and breathing no sob PE: alert tender r anterior chest wall ext nad MDM:Will do cxr and ekg   ED Course         Critical Care Time  Procedures

## 2023-03-22 ENCOUNTER — PATIENT OUTREACH (OUTPATIENT)
Dept: INTERNAL MEDICINE CLINIC | Facility: CLINIC | Age: 33
End: 2023-03-22

## 2023-03-22 NOTE — PROGRESS NOTES
SW received update from [employer] that unfortunately pt was denied SNAP and has left VM message for pt  Pt has received [employer] info  Patient does not have any further questions, concerns, or other needs at this time  Patient has my contact # and PCP office # if needed  Social Work to remain available to assist as indicated  Please re-consult Social Work if needed

## 2023-04-06 NOTE — ED PROVIDER NOTES
History  Chief Complaint   Patient presents with   • Arm Pain     Pt reports R arm pain that radiates to shoulder; unknown injury  Denies numbness/tingling   • Shortness of Breath     Pt reports SOB when trying to take deep breaths, makes R arm pain worse     HPI    25-year-old male presenting for evaluation of right-sided shoulder, chest pain  That started yesterday  Pain is worse with movement and deep breaths  Relieved when she stays still  Patient works out in [employer] and believes that this may be related  Has not taken any medications for her symptoms  Denies fever, chills, cough, shortness of breath, abdominal pain, nausea, vomiting, numbness, weakness  Prior to Admission Medications   Prescriptions Last Dose Informant Patient Reported? Taking? DULoxetine (CYMBALTA) 20 mg capsule   No No   Sig: TAKE 1 CAPSULE BY MOUTH EVERY DAY   Mirabegron ER 50 MG TB24   No No   Sig: Take 1 tablet (50 mg total) by mouth in the morning   dicyclomine (BENTYL) 20 mg tablet   No No   Sig: TAKE 1 TABLET BY MOUTH EVERY 6 HOURS        Facility-Administered Medications: None       Past Medical History:   Diagnosis Date   • Anemia    • History of cold sores     last assessed: 10/12/2016    • Hx of preeclampsia, prior pregnancy, currently pregnant 2021   • Hypertension     History of pre-eclampsia    • Urinary tract infection     Hx of UTI during last pregnancy   • Varicella     Positive Hx       Past Surgical History:   Procedure Laterality Date   •  SECTION  2015   • [place]  DELIVERY ONLY N/A 2019    Procedure:  SECTION () REPEAT;  Surgeon: Ortiz Stinson MD;  Location: [facility] ;  Service: Obstetrics   • [place]  DELIVERY ONLY N/A 2021    Procedure:  SECTION () REPEAT;  Surgeon: Guanako Powers MD;  Location: [facility] ;  Service: Obstetrics   • [place] LIG/TRNSXJ FALOPIAN TUBE  DEL/ABDML SURG Bilateral 2021    Procedure: LIGATION/COAGULATION TUBAL; Surgeon: Benjamin Pérez MD;  Location: [facility] ;  Service: Obstetrics       Family History   Problem Relation Age of Onset   • Arthritis Mother    • Hyperlipidemia Mother    • Osteoporosis Mother    • Hypertension Father    • Heart disease Father    • No Known Problems Sister    • No Known Problems Brother    • No Known Problems Son    • No Known Problems Maternal Grandmother    • No Known Problems Maternal Grandfather    • No Known Problems Paternal Grandmother    • No Known Problems Paternal Grandfather    • No Known Problems Sister    • No Known Problems Sister    • No Known Problems Brother    • No Known Problems Brother    • No Known Problems Daughter      I have reviewed and agree with the history as documented  E-Cigarette/Vaping   • E-Cigarette Use Never User      E-Cigarette/Vaping Substances   • Nicotine No    • THC No    • CBD No    • Flavoring No    • Other No    • Unknown No      Social History     Tobacco Use   • Smoking status: Some Days     Types: Cigarettes   • Smokeless tobacco: Never   Vaping Use   • Vaping Use: Never used   Substance Use Topics   • Alcohol use: Not Currently     Alcohol/week: 0 0 standard drinks   • Drug use: No        Review of Systems   Constitutional: Negative for chills and fever  HENT: Negative for sore throat  Respiratory: Negative for cough and shortness of breath  Cardiovascular: Positive for chest pain  Negative for palpitations and leg swelling  Gastrointestinal: Negative for abdominal pain, diarrhea, nausea and vomiting  Genitourinary: Negative for dysuria and frequency  Musculoskeletal: Negative for myalgias  Skin: Negative for rash and wound  Neurological: Negative for dizziness, light-headedness and headaches  All other systems reviewed and are negative        Physical Exam  ED Triage Vitals [03/13/23 2014]   Temperature Pulse Respirations Blood Pressure SpO2   98 3 °F (36 8 °C) 73 18 133/93 100 %      Temp src Heart Rate Source Patient Position - Orthostatic VS BP Location FiO2 (%)   -- -- -- -- --      Pain Score       8             Orthostatic Vital Signs  Vitals:    03/13/23 2014   BP: 133/93   Pulse: 73       Physical Exam  Vitals and nursing note reviewed  Constitutional:       General: She is not in acute distress  Appearance: Normal appearance  She is not ill-appearing or toxic-appearing  HENT:      Head: Normocephalic and atraumatic  Right Ear: External ear normal       Left Ear: External ear normal       Nose: Nose normal       Mouth/Throat:      Mouth: Mucous membranes are moist       Pharynx: Oropharynx is clear  Eyes:      General: No scleral icterus  Extraocular Movements: Extraocular movements intact  Cardiovascular:      Rate and Rhythm: Normal rate and regular rhythm  Pulses: Normal pulses  Pulmonary:      Effort: Pulmonary effort is normal  No respiratory distress  Breath sounds: Normal breath sounds  Chest:      Chest wall: Tenderness (R upper chest) present  Abdominal:      Palpations: Abdomen is soft  Tenderness: There is no abdominal tenderness  Musculoskeletal:         General: Normal range of motion  Cervical back: Normal range of motion and neck supple  Right lower leg: No tenderness  No edema  Left lower leg: No tenderness  No edema  Skin:     General: Skin is warm  Capillary Refill: Capillary refill takes less than 2 seconds  Neurological:      General: No focal deficit present  Mental Status: She is alert and oriented to person, place, and time  ED Medications  Medications   ketorolac (TORADOL) injection 15 mg (15 mg Intramuscular Given 3/13/23 2109)   acetaminophen (TYLENOL) tablet 650 mg (650 mg Oral Given 3/13/23 2108)       Diagnostic Studies  Results Reviewed     None                 XR chest 2 views   ED Interpretation by Ursula Lopez DO (03/13 2239)   No acute cardiopulmonary processes         Final Result by Bipin Fernandez MD (03/14 1040)      No acute cardiopulmonary disease  Workstation performed: [id number]               Procedures  Procedures      ED Course  ED Course as of 04/06/23 1617   Mon Mar 13, 2023   2211 ECG 12 lead  Procedure Note: EKG  Date/Time: 03/13/23 10:11 PM   Interpreted by: Alan Nicholas DO  Indications / Diagnosis: CP  ECG reviewed by me, the ED Physician: yes   The EKG demonstrates:  Rhythm: normal sinus  Intervals: normal intervals  Axis: normal axis  QRS/Blocks: normal QRS  ST Changes: No acute ST Changes, no STD/NICCI  SBIRT 20yo+    Flowsheet Row Most Recent Value   SBIRT (25 yo +)    In order to provide better care to our patients, we are screening all of our patients for alcohol and drug use  Would it be okay to ask you these screening questions? No Filed at: 03/13/2023 2052                Medical Decision Making  57-year-old female presenting for evaluation of right-sided shoulder, chest pain  Pain is worsened with movement and deep breaths  On exam patient is overall well-appearing, vitals within normal limits, regular rate and rhythm, no murmurs gallops or rubs, lungs clear to auscultation bilaterally  Patient has some tenderness over her right upper chest wall and has worsening of her pain with range of motion of the right upper extremity  Suspect that the patient's symptoms are due to a muscle strain  Will get EKG to assess for ischemia, chest x-ray to assess for pneumothorax  Doubt ACS, pulmonary embolus, dissection  Symptomatic treatment  Patient reports symptomatic improvement following medication administration  Patient's EKG was nonischemic, chest x-ray negative for acute cardiopulmonary disease  Results were discussed with the patient, recommended outpatient follow-up, gave anticipatory guidance as well as instructions for symptom control at home, gave strict return to ED precautions  All questions were answered at this time      I reviewed all testing with the patient: see above  I gave oral return precautions for what to return for in addition to the written return precautions  The patient (and any family present: n/a) verbalized understanding of the discharge instructions and warnings that would necessitate return to the Emergency Department  I specifically highlighted areas of special concern regarding the written and verbal discharge instructions and return precautions  All questions were answered prior to discharge  Amount and/or Complexity of Data Reviewed  Radiology: ordered and independent interpretation performed  ECG/medicine tests:  Decision-making details documented in ED Course  Risk  OTC drugs  Prescription drug management  Disposition  Final diagnoses:   Right arm pain   Chest pain   Back pain     Time reflects when diagnosis was documented in both MDM as applicable and the Disposition within this note     Time User Action Codes Description Comment    3/13/2023 10:40 PM Naa Oleary Add [D54 340] Right arm pain     3/13/2023 10:40 PM Naa Oleary Add [R07 9] Chest pain     3/13/2023 10:40 PM Naa Oleary Add [M54 9] Back pain       ED Disposition     ED Disposition   Discharge    Condition   Stable    Date/Time   Mon Mar 13, 2023 10:40 PM    Comment   Gary Vasquez discharge to home/self care  Follow-up Information    None         Discharge Medication List as of 3/13/2023 10:41 PM      CONTINUE these medications which have NOT CHANGED    Details   dicyclomine (BENTYL) 20 mg tablet TAKE 1 TABLET BY MOUTH EVERY 6 HOURS , Normal      DULoxetine (CYMBALTA) 20 mg capsule TAKE 1 CAPSULE BY MOUTH EVERY DAY, Normal      Mirabegron ER 50 MG TB24 Take 1 tablet (50 mg total) by mouth in the morning, Starting Tue 1/10/2023, Normal           No discharge procedures on file      PDMP Review       Value Time User    PDMP Reviewed  Yes 9/26/2021  1:27 Fermin Hampton MD           ED Provider  Attending physically available and evaluated Vane Mckeon I managed the patient along with the ED Attending      Electronically Signed by         Macarena Amaya DO  04/06/23 3721

## 2023-05-13 ENCOUNTER — TELEPHONE (OUTPATIENT)
Dept: OTHER | Facility: OTHER | Age: 33
End: 2023-05-13

## 2023-05-13 NOTE — TELEPHONE ENCOUNTER
Patient is requesting a call back to see if her appointment for 6/6/23 at 1130 and be moved later in the afternoon  Patient states this is too early for her

## 2023-10-03 NOTE — PROGRESS NOTES
Progress Note - Critical Care   David Cortez 25 y o  female MRN: [id number]  Unit/Bed#: MICU 01 Encounter: [id number]    Attending Physician: Martha Scruggs MD      ______________________________________________________________________  Assessment and Plan:   Principal Problem:    Preeclampsia  Active Problems:    Hypertension  Resolved Problems:    * No resolved hospital problems  *    Neuro:     1  Headache  · Status post here set x1  · AAO x3, neurologically intact, GCS 15  · No other acute issues  · No visual changes/deficits    2  Sedation & Analgesia:  None    Neuro checks as per unit protocol  Take precautions to prevent ICU delirium  Monitor GCS    CV:   1) elevated blood pressures:  Preeclampsia  · Blood pressure is well controlled status post labetalol 10 mg x1  · Maintain SBP less than 140  · Treat underlying cause    2) Chest pain  · Tender to palpation, worse with inhalation  · Troponins negative x1  · Follow-up EKG    Continuous telemetry  Pulm:   1) shortness of breath  · CT PE study: bibasiler atelectasis  · Dyspnea improving  · No other acute issues  · Incentive spirometry ordered    GI:  No active issues  · GI ppx:  None  · Bowel regimen p r n  :   1) Preeclampsia with severe features  · Continue maintenance Magnesium gtt  For 24 hours  · Reevaluate at 505pm  · lebetalol PRN  · Monitor blood pressures closely: <140 sbp  · OBgyn consulted: follow up recs    2) urinary tract infection  · Day 2 of Ceftriaxone  · Urine cx: >100,000 e coli  · UOP 1950 8 mL over past 24H (81 3 mL/hr)  · Monitor I&Os, net 1950 8 mL over past 24H  · Cr 0 43,     F/E/N:   1  Fluids/Electrolytes  · Fluid restriction 3L  · Replete as needed  Hypokalemia  K: 3 8 --> 3 5  · S/p 40 meq Kcl  · Follow up mg/phos     2  Nutrition  · Regular diet    ID:   1) Urinary tract infection  afebrile, no leukocytosis  Day 2 of ceftriaxone      Heme:  No active issues  1   DVT ppx:  Heparin subcu, SCDs bilateral    Endo: No acute issues     Msk/Skin:  · Out of bed with assistance    Disposition:  Continue ICU care and transfer as per OB gyn recommendations  Code Status: Level 1 - Full Code    Counseling / Coordination of Care  Total Critical Care time spent 15 minutes excluding procedures, teaching and family updates  ______________________________________________________________________    Chief Complaint: Headache, dizziness, and fatigue    24 Hour Events:  Headache reacquiring 1 dose Fioricet  BP controlled on 1 dose labetalol 10mg  Review of Systems      ______________________________________________________________________    Physical Exam:   Physical Exam   Constitutional: She is oriented to person, place, and time  She appears well-developed and well-nourished  No distress  HENT:   Head: Normocephalic and atraumatic  Nose: Nose normal    Mouth/Throat: Oropharynx is clear and moist  No oropharyngeal exudate  Eyes: Pupils are equal, round, and reactive to light  EOM are normal    Neck: Normal range of motion  Neck supple  No JVD present  No tracheal deviation present  Cardiovascular: Normal rate, regular rhythm, normal heart sounds and intact distal pulses  Exam reveals no gallop and no friction rub  No murmur heard  Pulmonary/Chest: Breath sounds normal  No stridor  No respiratory distress  She has no wheezes  She exhibits tenderness  Abdominal: Soft  Bowel sounds are normal  She exhibits no distension and no mass  There is no tenderness  There is no rebound and no guarding  Low transverse  incision healing well, no erythema, dry intact, and no drainage   Musculoskeletal: She exhibits no edema or tenderness  Neurological: She is alert and oriented to person, place, and time  She displays normal reflexes  No cranial nerve deficit  She exhibits normal muscle tone  Coordination normal    Reflex Scores:       Brachioradialis reflexes are 3+ on the right side and 3+ on the left side  Patellar reflexes are 3+ on the right side and 3+ on the left side  Achilles reflexes are 3+ on the right side and 3+ on the left side  Skin: She is not diaphoretic          ______________________________________________________________________  Vitals:    19 0357 19 0400 19 0437 19 0602   BP:  132/77 131/79 132/81   BP Location:  Right arm     Pulse: 86 84 78 88   Resp: 17 15 15 16   Temp: 97 9 °F (36 6 °C)      TempSrc: Oral      SpO2: 98% 98% 97% 98%   Weight:       Height:           Temperature:   Temp (24hrs), Av 7 °F (36 5 °C), Min:97 3 °F (36 3 °C), Max:97 9 °F (36 6 °C)    Current Temperature: 97 9 °F (36 6 °C)  Weights:   IBW: 40 9 kg    Body mass index is 25 34 kg/m²  Weight (last 2 days)     Date/Time   Weight    19 2245   55 (121 25)    19 1546   57 6 (127)            Hemodynamic Monitoring:  PAP:  , PAP mean:        Non-Invasive/Invasive Ventilation Settings:  Respiratory    Lab Data (Last 4 hours)    None         O2/Vent Data (Last 4 hours)    None              No results found for: PHART, QJI5QDD, PO2ART, XOE3ARL, M3GQRCGX, BEART, SOURCE  SpO2: SpO2: 98 %, SpO2 Activity: SpO2 Activity: At Rest, SpO2 Device: O2 Device: None (Room air), Capnography:    Intake and Outputs:  I/O        07 -  0700  07 -  0700    P  O   1410    I V  (mL/kg)  440 8 (8)    IV Piggyback  100    Total Intake(mL/kg)  1950 8 (35 5)    Net  +1950 8          Unmeasured Urine Occurrence  2 x        UOP: 81 3ml/hour   Nutrition:        Diet Orders   (From admission, onward)            Start     Ordered    194  Diet Regular; Regular House  Diet effective now     Question Answer Comment   Diet Type Regular    Regular Regular House    RD to adjust diet per protocol?  Yes        19        Labs:   Results from last 7 days   Lab Units 19  2346 19  1640 19  0544   WBC Thousand/uL 7 82 6 88 10 52*   HEMOGLOBIN g/dL 13 4 12 6 10 0* HEMATOCRIT % 38 8 36 5 29 4*   PLATELETS Thousands/uL 402*  401* 374 215   NEUTROS PCT %  --  70 76*   MONOS PCT %  --  5 6     Results from last 7 days   Lab Units 02/26/19  2346 02/26/19  1640   POTASSIUM mmol/L 3 8 3 8   CHLORIDE mmol/L 104 105   CO2 mmol/L 22 23   BUN mg/dL 9 8   CREATININE mg/dL 0 38* 0 42*   CALCIUM mg/dL 8 8 9 3   ALK PHOS U/L 217* 222*   ALT U/L 126* 126*   AST U/L 122* 145*         No results found for: PHOS   Results from last 7 days   Lab Units 02/26/19  1640   INR  0 93   PTT seconds 33     0   Lab Value Date/Time    TROPONINI <0 02 02/27/2019 0045         ABG:  Lab Results   Component Value Date    PHART 7 278 08/28/2015    [id number] 55 2 08/28/2015    PO2ART <10 0 08/28/2015    [id number] 25 2 08/28/2015    BEART -2 5 (L) 08/28/2015     Imaging:  I have personally reviewed pertinent reports  CTA ED chest PE study   Final Result by Kian Ruvalcaba MD (02/26 1759)      Trace left pleural effusion with bibasilar subsegmental atelectasis  Indeterminate 8 mm nodule left lower lobe possibly granuloma  Workstation performed: [id number]             EKG:    Micro:  Lab Results   Component Value Date    URINECX >100,000 cfu/ml Escherichia coli (A) 02/08/2019    URINECX >100,000 cfu/ml Escherichia coli (A) 01/25/2019    URINECX 10,000-19,000 cfu/ml Escherichia coli (A) 10/12/2018     Allergies: No Known Allergies  Medications:   Scheduled Meds:  Current Facility-Administered Medications:  cefTRIAXone 1,000 mg Intravenous Q24H Kanu Bryant MD    chlorhexidine 15 mL Swish & Spit Q12H [facility] Kanu Bryant MD    heparin (porcine) 5,000 Units Subcutaneous [facility] Kanu Bryant MD    Labetalol HCl 10 mg Intravenous Q4H PRN Kanu Bryant MD    magnesium sulfate 2 g/hr Intravenous Continuous Caty Jacobs MD Last Rate: 2 g/hr (02/26/19 2113)     Continuous Infusions:  magnesium sulfate 2 g/hr Last Rate: 2 g/hr (02/26/19 2113)     PRN Meds:    Labetalol HCl 10 mg Q4H PRN VTE Pharmacologic Prophylaxis: Heparin  VTE Mechanical Prophylaxis: sequential compression device  Invasive lines and devices: Invasive Devices     Peripheral Intravenous Line            Peripheral IV 02/26/19 Left Antecubital less than 1 day                     Portions of the record may have been created with voice recognition software  Occasional wrong word or "sound a like" substitutions may have occurred due to the inherent limitations of voice recognition software  Read the chart carefully and recognize, using context, where substitutions have occurred      [address], DO Unknown if ever smoked

## 2023-10-04 ENCOUNTER — OFFICE VISIT (OUTPATIENT)
Dept: OBGYN CLINIC | Facility: CLINIC | Age: 33
End: 2023-10-04

## 2023-10-04 ENCOUNTER — PATIENT OUTREACH (OUTPATIENT)
Dept: OBGYN CLINIC | Facility: CLINIC | Age: 33
End: 2023-10-04

## 2023-10-04 VITALS
WEIGHT: 115 LBS | DIASTOLIC BLOOD PRESSURE: 80 MMHG | HEART RATE: 76 BPM | SYSTOLIC BLOOD PRESSURE: 117 MMHG | HEIGHT: 58 IN | BODY MASS INDEX: 24.14 KG/M2

## 2023-10-04 DIAGNOSIS — Z59.9 FINANCIAL DIFFICULTIES: ICD-10-CM

## 2023-10-04 DIAGNOSIS — N32.81 OVERACTIVE BLADDER: Primary | ICD-10-CM

## 2023-10-04 DIAGNOSIS — Z11.3 SCREEN FOR STD (SEXUALLY TRANSMITTED DISEASE): ICD-10-CM

## 2023-10-04 PROCEDURE — 87086 URINE CULTURE/COLONY COUNT: CPT | Performed by: NURSE PRACTITIONER

## 2023-10-04 PROCEDURE — 99213 OFFICE O/P EST LOW 20 MIN: CPT | Performed by: NURSE PRACTITIONER

## 2023-10-04 PROCEDURE — 87186 SC STD MICRODIL/AGAR DIL: CPT | Performed by: NURSE PRACTITIONER

## 2023-10-04 PROCEDURE — 87077 CULTURE AEROBIC IDENTIFY: CPT | Performed by: NURSE PRACTITIONER

## 2023-10-04 SDOH — ECONOMIC STABILITY - INCOME SECURITY: PROBLEM RELATED TO HOUSING AND ECONOMIC CIRCUMSTANCES, UNSPECIFIED: Z59.9

## 2023-10-04 NOTE — PROGRESS NOTES
SHERRY ELI met with 25 y/o-S-P3-  Bilingual woman to address her insurance question. Pt reported she is residing with a friend and is not facing any housing issues. Pt's kids are with the paternal mother by parents agreement. Pt main issues today is getting MA. SHERRY ELI provided the information to apply on line. Pt denies other needs.

## 2023-10-04 NOTE — PROGRESS NOTES
PROBLEM GYNECOLOGICAL VISIT    Eduardo Guzman is a 26 y.o. female who presents today with complaint of urinary frequency. Her general medical history has been reviewed and she reports it as follows:    Past Medical History:   Diagnosis Date   • Anemia    • History of cold sores     last assessed: 10/12/2016    • Hx of preeclampsia, prior pregnancy, currently pregnant 2021   • Hypertension     History of pre-eclampsia    • Urinary tract infection     Hx of UTI during last pregnancy   • Varicella     Positive Hx     Past Surgical History:   Procedure Laterality Date   •  SECTION  2015   • [place]  DELIVERY ONLY N/A 2019    Procedure:  SECTION () REPEAT;  Surgeon: Saint Reid, MD;  [facility];  Service: Obstetrics   • [place]  DELIVERY ONLY N/A 2021    Procedure:  SECTION () REPEAT;  Surgeon: Gabo Jay MD;  [facility];  Service: Obstetrics   • [place] LIG/TRNSXJ FALOPIAN TUBE  DEL/ABDML SURG Bilateral 2021    Procedure: LIGATION/COAGULATION TUBAL;  Surgeon: Gabo Jay MD;  [facility];  Service: Obstetrics     OB History        3    Para   3    Term   3            AB        Living   3       SAB        IAB        Ectopic        Multiple   0    Live Births   3           Obstetric Comments   Menstrual cycle: 15           Social History     Tobacco Use   • Smoking status: Some Days     Types: Cigarettes   • Smokeless tobacco: Never   Vaping Use   • Vaping Use: Never used   Substance Use Topics   • Alcohol use: Not Currently     Alcohol/week: 0.0 standard drinks of alcohol   • Drug use: No     Social History     Substance and Sexual Activity   Sexual Activity Yes   • Partners: Male   • Birth control/protection: None, Female Sterilization       Current Outpatient Medications   Medication Instructions   • dicyclomine (BENTYL) 20 mg tablet TAKE 1 TABLET BY MOUTH EVERY 6 HOURS.    • DULoxetine (CYMBALTA) 20 mg capsule TAKE 1 CAPSULE BY MOUTH EVERY DAY   • Mirabegron ER 50 mg, Oral, Daily       History of Present Illness:   Noah Martínez presents today reporting continued issues with urinary frequency due to overactive bladder. She had been prescribed Mirabegron in January. She reports that this medication was successful in treating her symptoms, but she was unable to continue this medication due to her social situation which made her unable to get to the pharmacy. She also lost her medical insurance and was unable to come into the office for follow up. Review of Systems:  Review of Systems   Constitutional: Negative. Gastrointestinal: Negative. Genitourinary: Positive for frequency. Negative for dysuria, urgency and vaginal discharge. Physical Exam:  /80 (BP Location: Right arm)   Pulse 76   Ht 4' 10" (1.473 m)   Wt 52.2 kg (115 lb)   LMP 09/20/2023 (Approximate)   BMI 24.04 kg/m²   Physical Exam  Constitutional:       General: She is not in acute distress. Appearance: Normal appearance. Neurological:      Mental Status: She is alert. Skin:     General: Skin is dry. Psychiatric:         Mood and Affect: Mood normal.         Behavior: Behavior normal.   Vitals reviewed. Assessment:   1. Overactive bladder    2. STI screening     Plan:   1. Refills for Mirabegron approved. 2. STI screenings ordered to complete at outpatient lab. 3. Patient is meeting with Social Work today to assist with reestablishing health insurance     Reviewed with patient that [facility], but that they will not necessarily be reviewed by me immediately. Explained that I will review results at my earliest opportunity and contact patient appropriately.

## 2023-10-06 ENCOUNTER — TELEPHONE (OUTPATIENT)
Dept: OBGYN CLINIC | Facility: CLINIC | Age: 33
End: 2023-10-06

## 2023-10-06 DIAGNOSIS — N30.00 ACUTE CYSTITIS WITHOUT HEMATURIA: Primary | ICD-10-CM

## 2023-10-06 LAB — BACTERIA UR CULT: ABNORMAL

## 2023-10-06 RX ORDER — NITROFURANTOIN 25; 75 MG/1; MG/1
100 CAPSULE ORAL 2 TIMES DAILY
Qty: 10 CAPSULE | Refills: 0 | Status: SHIPPED | OUTPATIENT
Start: 2023-10-06 | End: 2023-10-11

## 2023-10-06 NOTE — TELEPHONE ENCOUNTER
----- Message from Valentin Vanessa, [address] [place] [address] sent at 10/6/2023  9:33 AM EDT -----  Please let her know that the urine culture shows bacterial growth and an antibiotic was sent to the pharmacy. Thank you!

## 2023-10-06 NOTE — TELEPHONE ENCOUNTER
lvm for patient to advise of test results and medication that was sent to the pharmacy for treatment. Given office call back number for when available.

## 2024-01-12 ENCOUNTER — OFFICE VISIT (OUTPATIENT)
Dept: OBGYN CLINIC | Facility: CLINIC | Age: 34
End: 2024-01-12

## 2024-01-12 VITALS
DIASTOLIC BLOOD PRESSURE: 80 MMHG | HEIGHT: 58 IN | WEIGHT: 115.2 LBS | SYSTOLIC BLOOD PRESSURE: 118 MMHG | BODY MASS INDEX: 24.18 KG/M2 | HEART RATE: 68 BPM

## 2024-01-12 DIAGNOSIS — R39.82 CHRONIC BLADDER PAIN: ICD-10-CM

## 2024-01-12 DIAGNOSIS — N39.0 RECURRENT UTI: ICD-10-CM

## 2024-01-12 DIAGNOSIS — R53.82 CHRONIC FATIGUE: ICD-10-CM

## 2024-01-12 DIAGNOSIS — N32.81 OVERACTIVE BLADDER: Primary | ICD-10-CM

## 2024-01-12 PROCEDURE — 87086 URINE CULTURE/COLONY COUNT: CPT | Performed by: NURSE PRACTITIONER

## 2024-01-12 PROCEDURE — 87077 CULTURE AEROBIC IDENTIFY: CPT | Performed by: NURSE PRACTITIONER

## 2024-01-12 PROCEDURE — 87186 SC STD MICRODIL/AGAR DIL: CPT | Performed by: NURSE PRACTITIONER

## 2024-01-12 NOTE — PROGRESS NOTES
PROBLEM GYNECOLOGICAL VISIT    Geovanna Mirza is a 33 y.o. female who presents today with complaint of urinary issues.  Her general medical history has been reviewed and she reports it as follows:    Past Medical History:   Diagnosis Date    Anemia     History of cold sores     last assessed: 10/12/2016     Hx of preeclampsia, prior pregnancy, currently pregnant 2021    Hypertension     History of pre-eclampsia     Urinary tract infection     Hx of UTI during last pregnancy    Varicella     Positive Hx     Past Surgical History:   Procedure Laterality Date     SECTION  2015    [place]  DELIVERY ONLY N/A 2019    Procedure:  SECTION () REPEAT;  Surgeon: Samy Howard MD;  [facility];  Service: Obstetrics    [place]  DELIVERY ONLY N/A 2021    Procedure:  SECTION () REPEAT;  Surgeon: Noah Valenzuela MD;  [facility];  Service: Obstetrics    [place] LIG/TRNSXJ FALOPIAN TUBE  DEL/ABDML SURG Bilateral 2021    Procedure: LIGATION/COAGULATION TUBAL;  Surgeon: Noah Valenzuela MD;  [facility];  Service: Obstetrics     OB History          3    Para   3    Term   3            AB        Living   3         SAB        IAB        Ectopic        Multiple   0    Live Births   3           Obstetric Comments   Menstrual cycle: 15             Social History     Tobacco Use    Smoking status: Some Days     Types: Cigarettes    Smokeless tobacco: Never   Vaping Use    Vaping status: Never Used   Substance Use Topics    Alcohol use: Not Currently     Alcohol/week: 0.0 standard drinks of alcohol    Drug use: No     Social History     Substance and Sexual Activity   Sexual Activity Not Currently    Partners: Male    Birth control/protection: Female Sterilization       Current Outpatient Medications   Medication Instructions    dicyclomine (BENTYL) 20 mg tablet TAKE 1 TABLET BY MOUTH EVERY 6 HOURS.    DULoxetine (CYMBALTA) 20 mg  "capsule TAKE 1 CAPSULE BY MOUTH EVERY DAY    Mirabegron ER 50 mg, Oral, Daily       History of Present Illness:   Geovanna presents today reporting continued issues with urinary frequency, bladder pain and UTI symptoms. She had been started on Mirabegron last year for overactive bladder which she had reported as successful in treating urinary frequency, though had to stop treatment at one point last year due to difficulty getting to the pharmacy for refills. She was restarted on this medication in October and reports no improvement in frequency. She was referred to pelvic floor PT but was ultimately dismissed from treatment due to multiple no shows. She had multiple e. Coli UTIs in 2021, a klebsiella pneumoniae UTI in August 2022 and an e. Coli UTI in October 2023. She also reports issues with chronic fatigue. The issues with fatigue and overactive bladder have impacted her daily life and ability to work.     Review of Systems:  Review of Systems   Constitutional: Negative.    Gastrointestinal: Negative.    Genitourinary:  Positive for dysuria, frequency and urgency.       Physical Exam:  /80 (BP Location: Right arm)   Pulse 68   Ht 4' 10\" (1.473 m)   Wt 52.3 kg (115 lb 3.2 oz)   LMP 12/20/2023 (Approximate)   BMI 24.08 kg/m²   Physical Exam  Constitutional:       General: She is not in acute distress.     Appearance: Normal appearance.   Neurological:      Mental Status: She is alert.   Skin:     General: Skin is warm and dry.   Psychiatric:         Mood and Affect: Mood normal.         Behavior: Behavior normal.   Vitals reviewed.         Point of Care Testing:   -urine dipstick: negative     Assessment:   1. Overactive bladder    2. Recurrent UTI    3. Chronic bladder pain   4. Chronic fatigue     Plan:   1. Urine dip without evidence of UTI, urine culture collected.    2. Referral placed for Urology for overactive bladder and recurrent UTI.    3. Referral placed for Primary Care for follow up for chronic " fatigue     Reviewed with patient that test results are available in MyChart immediately, but that they will not necessarily be reviewed by me immediately.  Explained that I will review results at my earliest opportunity and contact patient appropriately.

## 2024-01-12 NOTE — LETTER
January 12, 2024     Patient: Geovanna Mirza  YOB: 1990  Date of Visit: 1/12/2024      To Whom it May Concern:    Geovanna Mirza is under my professional care. Geovanna was seen in my office on 1/12/2024. Geovanna was referred to a Urologist for continued care for her overactive bladder, chronic bladder pain and recurrent UTIs. She was also referred to her Primary Care office for follow up for chronic fatigue.     If you have any questions or concerns, please don't hesitate to call.         Sincerely,          ASHISH Grubbs        CC: No Recipients

## 2024-01-14 LAB — BACTERIA UR CULT: ABNORMAL

## 2024-01-15 ENCOUNTER — TELEPHONE (OUTPATIENT)
Dept: OBGYN CLINIC | Facility: CLINIC | Age: 34
End: 2024-01-15

## 2024-01-15 DIAGNOSIS — N30.00 ACUTE CYSTITIS WITHOUT HEMATURIA: Primary | ICD-10-CM

## 2024-01-15 RX ORDER — NITROFURANTOIN 25; 75 MG/1; MG/1
100 CAPSULE ORAL 2 TIMES DAILY
Qty: 10 CAPSULE | Refills: 0 | Status: SHIPPED | OUTPATIENT
Start: 2024-01-15 | End: 2024-01-20

## 2024-01-15 NOTE — TELEPHONE ENCOUNTER
----- Message from ASHISH Romero sent at 1/15/2024  7:44 AM EST -----  Please let her know the urine culture is positive and I sent her treatment. She should follow up with Urology. Thank you!

## 2024-01-15 NOTE — TELEPHONE ENCOUNTER
Lvm for patient to advise of available test results and medication that was sent to the pharmacy. Given office call back number to contact.

## 2024-03-20 ENCOUNTER — OFFICE VISIT (OUTPATIENT)
Dept: OBGYN CLINIC | Facility: CLINIC | Age: 34
End: 2024-03-20

## 2024-03-20 VITALS
HEART RATE: 68 BPM | HEIGHT: 58 IN | DIASTOLIC BLOOD PRESSURE: 65 MMHG | SYSTOLIC BLOOD PRESSURE: 99 MMHG | WEIGHT: 117.6 LBS | BODY MASS INDEX: 24.68 KG/M2

## 2024-03-20 DIAGNOSIS — Z11.3 SCREENING FOR STD (SEXUALLY TRANSMITTED DISEASE): Primary | ICD-10-CM

## 2024-03-20 DIAGNOSIS — B37.31 VULVOVAGINAL CANDIDIASIS: ICD-10-CM

## 2024-03-20 LAB
BV WHIFF TEST VAG QL: NEGATIVE
CLUE CELLS SPEC QL WET PREP: NEGATIVE
PH SMN: 3.5 [PH]
SL AMB POCT WET MOUNT: ABNORMAL
T VAGINALIS VAG QL WET PREP: NEGATIVE
YEAST VAG QL WET PREP: POSITIVE

## 2024-03-20 PROCEDURE — 87491 CHLMYD TRACH DNA AMP PROBE: CPT | Performed by: NURSE PRACTITIONER

## 2024-03-20 PROCEDURE — 87591 N.GONORRHOEAE DNA AMP PROB: CPT | Performed by: NURSE PRACTITIONER

## 2024-03-20 PROCEDURE — 87210 SMEAR WET MOUNT SALINE/INK: CPT | Performed by: NURSE PRACTITIONER

## 2024-03-20 PROCEDURE — 99213 OFFICE O/P EST LOW 20 MIN: CPT | Performed by: NURSE PRACTITIONER

## 2024-03-20 RX ORDER — FLUCONAZOLE 150 MG/1
150 TABLET ORAL ONCE
Qty: 1 TABLET | Refills: 0 | Status: SHIPPED | OUTPATIENT
Start: 2024-03-20 | End: 2024-03-20

## 2024-03-20 RX ORDER — NYSTATIN 100000 U/G
CREAM TOPICAL 2 TIMES DAILY
Qty: 30 G | Refills: 0 | Status: SHIPPED | OUTPATIENT
Start: 2024-03-20

## 2024-03-20 NOTE — PROGRESS NOTES
PROBLEM GYNECOLOGICAL VISIT    Geovanna Mirza is a 34 y.o. female who presents today with complaint of vaginal itching/irritation.  Her general medical history has been reviewed and she reports it as follows:    Past Medical History:   Diagnosis Date    Anemia     History of cold sores     last assessed: 10/12/2016     Hx of preeclampsia, prior pregnancy, currently pregnant 2021    Hypertension     History of pre-eclampsia     Urinary tract infection     Hx of UTI during last pregnancy    Varicella     Positive Hx     Past Surgical History:   Procedure Laterality Date     SECTION  2015    [place]  DELIVERY ONLY N/A 2019    Procedure:  SECTION () REPEAT;  Surgeon: Samy Howard MD;  [facility];  Service: Obstetrics    [place]  DELIVERY ONLY N/A 2021    Procedure:  SECTION () REPEAT;  Surgeon: Noah Valenzuela MD;  [facility];  Service: Obstetrics    [place] LIG/TRNSXJ FALOPIAN TUBE  DEL/ABDML SURG Bilateral 2021    Procedure: LIGATION/COAGULATION TUBAL;  Surgeon: Noah Valenzuela MD;  [facility];  Service: Obstetrics     OB History          3    Para   3    Term   3            AB        Living   3         SAB        IAB        Ectopic        Multiple   0    Live Births   3           Obstetric Comments   Menstrual cycle: 15             Social History     Tobacco Use    Smoking status: Some Days     Types: Cigarettes    Smokeless tobacco: Never   Vaping Use    Vaping status: Never Used   Substance Use Topics    Alcohol use: Not Currently     Alcohol/week: 0.0 standard drinks of alcohol    Drug use: No     Social History     Substance and Sexual Activity   Sexual Activity Yes    Partners: Male    Birth control/protection: Female Sterilization, Condom Male       Current Outpatient Medications   Medication Instructions    dicyclomine (BENTYL) 20 mg tablet TAKE 1 TABLET BY MOUTH EVERY 6 HOURS.    DULoxetine  "(CYMBALTA) 20 mg capsule TAKE 1 CAPSULE BY MOUTH EVERY DAY    fluconazole (DIFLUCAN) 150 mg, Oral, Once    Mirabegron ER 50 mg, Oral, Daily    nystatin (MYCOSTATIN) cream Topical, 2 times daily       History of Present Illness:   Geovanna presents today reporting 3 days of vaginal itching/irritation. She denies any odor. Reports no vaginal discharge. She reports she has been sexually active with a new partner, has used condoms. She denies any new hygiene products, though does report using baby shampoo to wash vulva and internal vaginal canal.     Review of Systems:  Review of Systems   Constitutional: Negative.    Gastrointestinal: Negative.    Genitourinary:         Vaginal itching/irritation        Physical Exam:  BP 99/65 (BP Location: Right arm, Patient Position: Sitting)   Pulse 68   Ht 4' 10\" (1.473 m)   Wt 53.3 kg (117 lb 9.6 oz)   LMP 02/20/2024 (Approximate)   BMI 24.58 kg/m²   Physical Exam  Constitutional:       General: She is not in acute distress.     Appearance: She is not ill-appearing.   Genitourinary:      Vulva normal.      No lesions in the vagina.      Vaginal discharge and erythema present.      No cervical motion tenderness or lesion.   Neurological:      Mental Status: She is alert.   Skin:     General: Skin is warm and dry.   Psychiatric:         Mood and Affect: Mood normal.         Behavior: Behavior normal.   Vitals reviewed.       Point of Care Testing:   -Wet mount: +yeast, -clue cells, -trich    -KOH mount: +yeast   -Whiff: negative    -pH 3.5    Assessment:   1. Vulvovaginal candidiasis    2. STI testing     Plan:   1. Rx for Diflucan and topical nystatin cream.    2. GC/CT culture collected.    3. Reviewed vulvar/vaginal skin care measures. Recommended d/c use of scented soaps and d/c washing internal vaginal canal     Reviewed with patient that test results are available in MyChart immediately, but that they will not necessarily be reviewed by me immediately.  Explained that I will " review results at my earliest opportunity and contact patient appropriately.

## 2024-03-21 LAB
C TRACH DNA SPEC QL NAA+PROBE: NEGATIVE
N GONORRHOEA DNA SPEC QL NAA+PROBE: NEGATIVE

## 2024-03-22 ENCOUNTER — TELEPHONE (OUTPATIENT)
Dept: OBGYN CLINIC | Facility: CLINIC | Age: 34
End: 2024-03-22

## 2024-03-22 NOTE — TELEPHONE ENCOUNTER
Lvm for patient to advise of available neg test results. Given office call back number if needed to contact.

## 2024-03-22 NOTE — TELEPHONE ENCOUNTER
----- Message from ASHISH Romero sent at 3/21/2024 12:38 PM EDT -----  Please let her know the sti testing is negative. Thank you!

## 2024-05-15 PROBLEM — R30.0 DYSURIA: Status: ACTIVE | Noted: 2024-05-15

## 2024-05-17 ENCOUNTER — OFFICE VISIT (OUTPATIENT)
Dept: OBGYN CLINIC | Facility: CLINIC | Age: 34
End: 2024-05-17

## 2024-05-17 VITALS
WEIGHT: 117.6 LBS | HEIGHT: 58 IN | BODY MASS INDEX: 24.68 KG/M2 | HEART RATE: 65 BPM | SYSTOLIC BLOOD PRESSURE: 118 MMHG | DIASTOLIC BLOOD PRESSURE: 75 MMHG

## 2024-05-17 DIAGNOSIS — R30.0 DYSURIA: Primary | ICD-10-CM

## 2024-05-17 DIAGNOSIS — N32.81 OVERACTIVE BLADDER: ICD-10-CM

## 2024-05-17 PROCEDURE — 87086 URINE CULTURE/COLONY COUNT: CPT

## 2024-05-17 RX ORDER — CEPHALEXIN 500 MG/1
500 CAPSULE ORAL EVERY 8 HOURS SCHEDULED
Qty: 21 CAPSULE | Refills: 0 | Status: SHIPPED | OUTPATIENT
Start: 2024-05-17 | End: 2024-05-24

## 2024-05-17 NOTE — ASSESSMENT & PLAN NOTE
- Likely 2/2 UTI    Plan:  - Will start pt on medication, Keflex 500mg Q8 x7 days  - Will send urine for cx and tailor treatment  - Recommended pt continue to stay hydrated, consider cranberry supplement and bladder-friendly diet with reduced caffeine, spicy foods, acidic foods  - Recommend pt establish with urology and establish with PCP to coordinate insurance issues with their

## 2024-05-17 NOTE — PROGRESS NOTES
ASSESSMENT/PLAN:  Problem List          Musculoskeletal and Integument    Onychomycosis (Chronic)       Genitourinary    Overactive bladder    Current Assessment & Plan     - Will provide refill of Mirabegron, pharmacy confirmed            Urinary    Dysuria    Current Assessment & Plan     - Likely 2/2 UTI    Plan:  - Will start pt on medication, Keflex 500mg Q8 x7 days  - Will send urine for cx and tailor treatment  - Recommended pt continue to stay hydrated, consider cranberry supplement and bladder-friendly diet with reduced caffeine, spicy foods, acidic foods  - Recommend pt establish with urology and establish with PCP to coordinate insurance issues with their             Blood    Blood loss anemia       Surgery/Wound/Pain    History of  delivery    Overview     Birth plan RLTCS with tubal ligation. MA-31 signed 21. Needs c/s date.         Pelvic pain    S/P repeat low transverse     Status post bilateral salpingectomy       Other    History of cold sores    Overview     last assessed: 10/12/2016             Problem Visit     SUBJECTIVE:  CC:  HPI: Geovanna Mirza is a 34 y.o.  female who presents with recurrent UTI's. Per chart review[facility] symptoms, pt started on Macrobid per UCX results showing some sensitivity. Did have resolution of symptoms. However, is now reporting pain with urination and difficulty holding urine. Reports that her urine has foul smell. This all started 1 month ago. Reports associated abdominal cramping, denies flank pain. Denies fever & chills. Reports running out of Myrbetriq and would like a refill.    Past Medical History:   Diagnosis Date    Anemia     History of cold sores     last assessed: 10/12/2016     Hx of preeclampsia, prior pregnancy, currently pregnant 2021    Hypertension     History of pre-eclampsia     Urinary tract infection     Hx of UTI during last pregnancy    Varicella     Positive Hx  "      Past Surgical History:   Procedure Laterality Date     SECTION  2015    [place]  DELIVERY ONLY N/A 2019    Procedure:  SECTION () REPEAT;  Surgeon: Samy Howard MD;  [facility];  Service: Obstetrics    [place]  DELIVERY ONLY N/A 2021    Procedure:  SECTION () REPEAT;  Surgeon: Noah Valenzuela MD;  [facility];  Service: Obstetrics    [place] LIG/TRNSXJ FALOPIAN TUBE  DEL/ABDML SURG Bilateral 2021    Procedure: LIGATION/COAGULATION TUBAL;  Surgeon: Noah Valenzuela MD;  [facility];  Service: Obstetrics       Social History     Tobacco Use    Smoking status: Former     Types: Cigarettes    Smokeless tobacco: Never   Vaping Use    Vaping status: Never Used   Substance Use Topics    Alcohol use: Not Currently     Alcohol/week: 0.0 standard drinks of alcohol    Drug use: No         Current Outpatient Medications:     cephalexin (KEFLEX) 500 mg capsule, Take 1 capsule (500 mg total) by mouth every 8 (eight) hours for 7 days, Disp: 21 capsule, Rfl: 0    Mirabegron ER 50 MG TB24, Take 1 tablet (50 mg total) by mouth in the morning, Disp: 30 tablet, Rfl: 11    dicyclomine (BENTYL) 20 mg tablet, TAKE 1 TABLET BY MOUTH EVERY 6 HOURS. (Patient not taking: Reported on 2024), Disp: 360 tablet, Rfl: 1    DULoxetine (CYMBALTA) 20 mg capsule, TAKE 1 CAPSULE BY MOUTH EVERY DAY (Patient not taking: Reported on 2024), Disp: 90 capsule, Rfl: 1      OBJECTIVE:  Vitals:    24 1049   BP: 118/75   BP Location: Right arm   Patient Position: Sitting   Cuff Size: Standard   Pulse: 65   Weight: 53.3 kg (117 lb 9.6 oz)   Height: 4' 10\" (1.473 m)       Physical Exam  Constitutional:       General: She is not in acute distress.     Appearance: Normal appearance. She is normal weight. She is not ill-appearing.   HENT:      Head: Normocephalic and atraumatic.   Cardiovascular:      Rate and Rhythm: Normal rate and regular rhythm. "      Heart sounds: Normal heart sounds.   Pulmonary:      Breath sounds: Normal breath sounds.   Abdominal:      General: Bowel sounds are normal.      Palpations: Abdomen is soft.   Skin:     General: Skin is warm and dry.   Neurological:      Mental Status: She is alert.   Psychiatric:         Mood and Affect: Mood normal.         Behavior: Behavior normal.           Jennifer Crystal DO   PGY-3,   05/17/24 11:20 AM

## 2024-05-18 LAB — BACTERIA UR CULT: NORMAL

## 2024-07-09 ENCOUNTER — OFFICE VISIT (OUTPATIENT)
Dept: OBGYN CLINIC | Facility: CLINIC | Age: 34
End: 2024-07-09

## 2024-07-09 VITALS
DIASTOLIC BLOOD PRESSURE: 76 MMHG | HEART RATE: 73 BPM | WEIGHT: 120.4 LBS | HEIGHT: 58 IN | RESPIRATION RATE: 18 BRPM | SYSTOLIC BLOOD PRESSURE: 116 MMHG | BODY MASS INDEX: 25.27 KG/M2

## 2024-07-09 DIAGNOSIS — Z72.51 HIGH RISK HETEROSEXUAL BEHAVIOR: ICD-10-CM

## 2024-07-09 DIAGNOSIS — R30.0 DYSURIA: Primary | ICD-10-CM

## 2024-07-09 DIAGNOSIS — Z11.3 SCREENING FOR STD (SEXUALLY TRANSMITTED DISEASE): ICD-10-CM

## 2024-07-09 LAB
BV WHIFF TEST VAG QL: NEGATIVE
CLUE CELLS SPEC QL WET PREP: NEGATIVE
PH SMN: 4 [PH]
SL AMB  POCT GLUCOSE, UA: NORMAL
SL AMB LEUKOCYTE ESTERASE,UA: NORMAL
SL AMB POCT BILIRUBIN,UA: NORMAL
SL AMB POCT BLOOD,UA: NORMAL
SL AMB POCT CLARITY,UA: CLEAR
SL AMB POCT COLOR,UA: YELLOW
SL AMB POCT KETONES,UA: NORMAL
SL AMB POCT NITRITE,UA: NORMAL
SL AMB POCT PH,UA: 7
SL AMB POCT SPECIFIC GRAVITY,UA: 1.01
SL AMB POCT URINE PROTEIN: NORMAL
SL AMB POCT UROBILINOGEN: 0.2
T VAGINALIS VAG QL WET PREP: NEGATIVE
YEAST VAG QL WET PREP: NEGATIVE

## 2024-07-09 PROCEDURE — 87210 SMEAR WET MOUNT SALINE/INK: CPT | Performed by: OBSTETRICS & GYNECOLOGY

## 2024-07-09 PROCEDURE — 87591 N.GONORRHOEAE DNA AMP PROB: CPT

## 2024-07-09 PROCEDURE — 81003 URINALYSIS AUTO W/O SCOPE: CPT | Performed by: OBSTETRICS & GYNECOLOGY

## 2024-07-09 PROCEDURE — 99213 OFFICE O/P EST LOW 20 MIN: CPT | Performed by: OBSTETRICS & GYNECOLOGY

## 2024-07-09 PROCEDURE — 87491 CHLMYD TRACH DNA AMP PROBE: CPT

## 2024-07-09 RX ORDER — DIAPER,BRIEF,INFANT-TODD,DISP
EACH MISCELLANEOUS 3 TIMES DAILY PRN
Qty: 28 G | Refills: 0 | Status: SHIPPED | OUTPATIENT
Start: 2024-07-09

## 2024-07-09 NOTE — ASSESSMENT & PLAN NOTE
Increasing urinary discomfort and vaginal irritation x1 week. New Partner x1 mo, no barrier protection. POCT wet mount testing negative, UA unremarkable. GC NAAT collected, will follow results. Will trial hydrocortisone ointment for symptomatic control. Patient may return if symptoms worsen or fail to improve. Recommended establishing with Urology as has been discussed with patient previously.

## 2024-07-09 NOTE — PROGRESS NOTES
PROBLEM GYNECOLOGICAL VISIT    Geovanna Mirza is a 34 y.o. female who presents today with complaints of Vaginal discomfort and increased urinary frequency.  Her general medical history has been reviewed and she reports it as follows:    Past Medical History:   Diagnosis Date    Anemia     History of cold sores     last assessed: 10/12/2016     Hx of preeclampsia, prior pregnancy, currently pregnant 2021    Hypertension     History of pre-eclampsia     Urinary tract infection     Hx of UTI during last pregnancy    Varicella     Positive Hx     Past Surgical History:   Procedure Laterality Date     SECTION  2015    [place]  DELIVERY ONLY N/A 2019    Procedure:  SECTION () REPEAT;  Surgeon: Samy Howard MD;  [facility];  Service: Obstetrics    [place]  DELIVERY ONLY N/A 2021    Procedure:  SECTION () REPEAT;  Surgeon: Noah Valenzuela MD;  [facility];  Service: Obstetrics    [place] LIG/TRNSXJ FALOPIAN TUBE  DEL/ABDML SURG Bilateral 2021    Procedure: LIGATION/COAGULATION TUBAL;  Surgeon: Noah Valenzuela MD;  [facility];  Service: Obstetrics     OB History          3    Para   3    Term   3            AB        Living   3         SAB        IAB        Ectopic        Multiple   0    Live Births   3           Obstetric Comments   Menstrual cycle: 15             Social History     Tobacco Use    Smoking status: Former     Types: Cigarettes    Smokeless tobacco: Never   Vaping Use    Vaping status: Never Used   Substance Use Topics    Alcohol use: Not Currently     Alcohol/week: 0.0 standard drinks of alcohol    Drug use: No     Social History     Substance and Sexual Activity   Sexual Activity Yes    Partners: Male    Birth control/protection: Female Sterilization, Condom Male       Current Outpatient Medications   Medication Instructions    dicyclomine (BENTYL) 20 mg tablet TAKE 1 TABLET BY MOUTH EVERY 6  "HOURS.    DULoxetine (CYMBALTA) 20 mg capsule TAKE 1 CAPSULE BY MOUTH EVERY DAY    hydrocortisone 1 % ointment Topical, 3 times daily PRN    Mirabegron ER 50 mg, Oral, Daily       History of Present Illness:  34 year old , s/p  b/l tubal ligation , prior UTI, yeast infections, presenting with primary complaint of increasing vaginal discomfort and urinary frequency for the past week. Symptoms became apparent last Wednesday 7/3. Patient states she always has frequency and some level of discomfort, but increasing frequency and discomfort on that Wednesday. Since symptoms have become more noticeable patient says they are constantly cumbersome and are limiting her day to day activity. Does not note any discharge but does have pyuria, dysuria and vaginal pruritus. Does note she has a new male partner for the last month. Does not use any barrier contraception. She has been using a cream she received in march for a yeast infection but states she has not much improvement.     Review of Systems:  Review of Systems   Constitutional:  Positive for fatigue. Negative for activity change and fever.   HENT:  Negative for congestion.    Eyes:  Negative for visual disturbance.   Respiratory:  Negative for chest tightness and shortness of breath.    Cardiovascular:  Negative for chest pain and palpitations.   Gastrointestinal:  Negative for abdominal pain.   Genitourinary:  Positive for dysuria and frequency. Negative for difficulty urinating, hematuria, pelvic pain, vaginal discharge and vaginal pain.        Vaginal pruritis   Musculoskeletal:  Negative for back pain and neck pain.   Skin:  Negative for rash and wound.   Neurological:  Positive for headaches. Negative for weakness, light-headedness and numbness.       Physical Exam:  /76 (BP Location: Right arm, Patient Position: Sitting, Cuff Size: Adult)   Pulse 73   Resp 18   Ht 4' 10\" (1.473 m)   Wt 54.6 kg (120 lb 6.4 oz)   BMI 25.16 kg/m²   Physical " Exam  Constitutional:       General: She is not in acute distress.     Appearance: She is normal weight.   Genitourinary:      No vaginal discharge, erythema, tenderness or bleeding.      No cervical discharge.   HENT:      Head: Normocephalic and atraumatic.   Cardiovascular:      Rate and Rhythm: Normal rate and regular rhythm.   Pulmonary:      Effort: No respiratory distress.      Breath sounds: Normal breath sounds.   Abdominal:      General: There is no distension.   Musculoskeletal:         General: Normal range of motion.      Cervical back: Normal range of motion.   Neurological:      General: No focal deficit present.      Mental Status: She is alert.   Skin:     General: Skin is warm and dry.   Psychiatric:         Mood and Affect: Mood normal.   Vitals and nursing note reviewed. Exam conducted with a chaperone present.         Point of Care Testing:   -Wet mount: Neg   -KOH mount: Neg   -Whiff: Neg   -urine pregnancy test: N/A   -urine dipstick: Negative for nitrites, leukocytes, bacteria    Assessment/Plan:   Dysuria  Increasing urinary discomfort and vaginal irritation x1 week. New Partner x1 mo, no barrier protection. POCT wet mount testing negative, UA unremarkable.  NAAT collected, will follow results. Will trial hydrocortisone ointment for symptomatic control. Patient may return if symptoms worsen or fail to improve. Recommended establishing with Urology as has been discussed with patient previously.     Reviewed with patient that test results are available in MyChart immediately, but that they will not necessarily be reviewed by me immediately.  Explained that I will review results at my earliest opportunity and contact patient appropriately.

## 2024-07-10 LAB
C TRACH DNA SPEC QL NAA+PROBE: NEGATIVE
N GONORRHOEA DNA SPEC QL NAA+PROBE: NEGATIVE

## 2024-07-17 ENCOUNTER — HOSPITAL ENCOUNTER (EMERGENCY)
Facility: HOSPITAL | Age: 34
Discharge: HOME | End: 2024-07-17
Attending: EMERGENCY MEDICINE

## 2024-07-17 VITALS
HEART RATE: 78 BPM | DIASTOLIC BLOOD PRESSURE: 55 MMHG | SYSTOLIC BLOOD PRESSURE: 106 MMHG | OXYGEN SATURATION: 98 % | TEMPERATURE: 98.4 F | RESPIRATION RATE: 18 BRPM

## 2024-07-17 DIAGNOSIS — L02.31 CELLULITIS AND ABSCESS OF BUTTOCK: Primary | ICD-10-CM

## 2024-07-17 DIAGNOSIS — L03.317 CELLULITIS AND ABSCESS OF BUTTOCK: Primary | ICD-10-CM

## 2024-07-17 DIAGNOSIS — R51.9 HEADACHE: ICD-10-CM

## 2024-07-17 PROCEDURE — 99283 EMERGENCY DEPT VISIT LOW MDM: CPT

## 2024-07-17 PROCEDURE — 96365 THER/PROPH/DIAG IV INF INIT: CPT

## 2024-07-17 PROCEDURE — 99284 EMERGENCY DEPT VISIT MOD MDM: CPT | Performed by: EMERGENCY MEDICINE

## 2024-07-17 PROCEDURE — 76882 US LMTD JT/FCL EVL NVASC XTR: CPT | Performed by: EMERGENCY MEDICINE

## 2024-07-17 PROCEDURE — 96372 THER/PROPH/DIAG INJ SC/IM: CPT

## 2024-07-17 PROCEDURE — 96366 THER/PROPH/DIAG IV INF ADDON: CPT

## 2024-07-17 RX ORDER — CEPHALEXIN 500 MG/1
500 CAPSULE ORAL EVERY 6 HOURS SCHEDULED
Qty: 40 CAPSULE | Refills: 0 | Status: SHIPPED | OUTPATIENT
Start: 2024-07-17 | End: 2024-07-27

## 2024-07-17 RX ORDER — METOCLOPRAMIDE 10 MG/1
10 TABLET ORAL ONCE
Status: COMPLETED | OUTPATIENT
Start: 2024-07-17 | End: 2024-07-17

## 2024-07-17 RX ORDER — KETOROLAC TROMETHAMINE 30 MG/ML
15 INJECTION, SOLUTION INTRAMUSCULAR; INTRAVENOUS ONCE
Status: COMPLETED | OUTPATIENT
Start: 2024-07-17 | End: 2024-07-17

## 2024-07-17 RX ORDER — MAGNESIUM SULFATE HEPTAHYDRATE 40 MG/ML
2 INJECTION, SOLUTION INTRAVENOUS ONCE
Status: COMPLETED | OUTPATIENT
Start: 2024-07-17 | End: 2024-07-17

## 2024-07-17 RX ORDER — ACETAMINOPHEN 325 MG/1
975 TABLET ORAL ONCE
Status: COMPLETED | OUTPATIENT
Start: 2024-07-17 | End: 2024-07-17

## 2024-07-17 RX ORDER — OXYCODONE HYDROCHLORIDE 5 MG/1
5 TABLET ORAL EVERY 4 HOURS PRN
Qty: 8 TABLET | Refills: 0 | Status: SHIPPED | OUTPATIENT
Start: 2024-07-17

## 2024-07-17 RX ORDER — SULFAMETHOXAZOLE AND TRIMETHOPRIM 800; 160 MG/1; MG/1
1 TABLET ORAL ONCE
Status: COMPLETED | OUTPATIENT
Start: 2024-07-17 | End: 2024-07-17

## 2024-07-17 RX ORDER — SULFAMETHOXAZOLE AND TRIMETHOPRIM 800; 160 MG/1; MG/1
1 TABLET ORAL 2 TIMES DAILY
Qty: 20 TABLET | Refills: 0 | Status: SHIPPED | OUTPATIENT
Start: 2024-07-17 | End: 2024-07-27

## 2024-07-17 RX ORDER — CEPHALEXIN 500 MG/1
500 CAPSULE ORAL ONCE
Status: COMPLETED | OUTPATIENT
Start: 2024-07-17 | End: 2024-07-17

## 2024-07-17 RX ADMIN — MAGNESIUM SULFATE HEPTAHYDRATE 2 G: 40 INJECTION, SOLUTION INTRAVENOUS at 17:41

## 2024-07-17 RX ADMIN — CEPHALEXIN 500 MG: 500 CAPSULE ORAL at 16:53

## 2024-07-17 RX ADMIN — SODIUM CHLORIDE 1000 ML: 0.9 INJECTION, SOLUTION INTRAVENOUS at 17:42

## 2024-07-17 RX ADMIN — ACETAMINOPHEN 975 MG: 325 TABLET, FILM COATED ORAL at 16:53

## 2024-07-17 RX ADMIN — METOCLOPRAMIDE 10 MG: 10 TABLET ORAL at 16:53

## 2024-07-17 RX ADMIN — KETOROLAC TROMETHAMINE 15 MG: 30 INJECTION, SOLUTION INTRAMUSCULAR; INTRAVENOUS at 16:53

## 2024-07-17 RX ADMIN — SULFAMETHOXAZOLE AND TRIMETHOPRIM 1 TABLET: 800; 160 TABLET ORAL at 16:53

## 2024-07-17 NOTE — ED PROVIDER NOTES
History  Chief Complaint   Patient presents with    Leg Swelling     Pt c/o R upper leg swelling, denies any known trauma to the area but states that the swelling is getting worse since Saturday. Also c/o HA since Saturday     Patient is a 34-year-old female with past medical history of anemia and hypertension presenting for headache and right gluteal swelling.  Patient states that she is felt some discomfort in the right gluteal area over the last week, but yesterday she noticed significant swelling and pain.  She applied pressure to the area and had a significant amount of pus drainage.  Since then, she has had increased pain.  Patient also noticed that the swelling was moving towards her genitalia which made her nervous.  Patient denies systemic symptoms such as fever, chills, diaphoresis.  She denies pain with defecation.  She denies urinary symptoms.        Prior to Admission Medications   Prescriptions Last Dose Informant Patient Reported? Taking?   DULoxetine (CYMBALTA) 20 mg capsule   No No   Sig: TAKE 1 CAPSULE BY MOUTH EVERY DAY   Patient not taking: Reported on 2024   Mirabegron ER 50 MG TB24   No No   Sig: Take 1 tablet (50 mg total) by mouth in the morning   dicyclomine (BENTYL) 20 mg tablet   No No   Sig: TAKE 1 TABLET BY MOUTH EVERY 6 HOURS.   Patient not taking: Reported on 2024   hydrocortisone 1 % ointment   No No   Sig: Apply topically 3 (three) times a day as needed for irritation      Facility-Administered Medications: None       Past Medical History:   Diagnosis Date    Anemia     History of cold sores     last assessed: 10/12/2016     Hx of preeclampsia, prior pregnancy, currently pregnant 2021    Hypertension     History of pre-eclampsia     Urinary tract infection     Hx of UTI during last pregnancy    Varicella     Positive Hx       Past Surgical History:   Procedure Laterality Date     SECTION  2015    [place] N/A 2019    Procedure:   SECTION () REPEAT;  Surgeon: Samy Howard MD;  Location: [facility];  Service: Obstetrics    [place]    Procedure:  SECTION () REPEAT;  Surgeon: Noah Valenzuela MD;  Location: [facility];  Service: Obstetrics    [place] LIG/TRNSXJ FALOPIAN TUBE  DEL/ABDML SURG Bilateral 2021    Procedure: LIGATION/COAGULATION TUBAL;  Surgeon: Noah Valenzuela MD;  [facility];  Service: Obstetrics       Family History   Problem Relation Age of Onset    Arthritis Mother     Hyperlipidemia Mother     Osteoporosis Mother     Hypertension Father     Heart disease Father     No Known Problems Sister     No Known Problems Brother     No Known Problems Son     No Known Problems Maternal Grandmother     No Known Problems Maternal Grandfather     No Known Problems Paternal Grandmother     No Known Problems Paternal Grandfather     No Known Problems Sister     No Known Problems Sister     No Known Problems Brother     No Known Problems Brother     No Known Problems Daughter      I have reviewed and agree with the history as documented.    E-Cigarette/Vaping    E-Cigarette Use Never User      E-Cigarette/Vaping Substances    Nicotine No     THC No     CBD No     Flavoring No     Other No     Unknown No      Social History     Tobacco Use    Smoking status: Former     Types: Cigarettes    Smokeless tobacco: Never   Vaping Use    Vaping status: Never Used   Substance Use Topics    Alcohol use: Not Currently     Alcohol/week: 0.0 standard drinks of alcohol    Drug use: No        Review of Systems    Physical Exam  ED Triage Vitals   Temperature Pulse Respirations Blood Pressure SpO2   24 1421 24 1421 24 1421 24 1421 24 1421   98.4 °F (36.9 °C) 98 18 105/72 99 %      Temp src Heart Rate Source Patient Position - Orthostatic VS BP Location FiO2 (%)   -- 24 1654 24 1654 24 165 --    Monitor Sitting Left arm       Pain  Score       07/17/24 1653       8             Orthostatic Vital Signs  Vitals:    07/17/24 1421 07/17/24 1654   BP: 105/72 106/55   Pulse: 98 78   Patient Position - Orthostatic VS:  Sitting       Physical Exam  Vitals and nursing note reviewed.   Constitutional:       General: She is not in acute distress.     Appearance: Normal appearance. She is not ill-appearing, toxic-appearing or diaphoretic.   Cardiovascular:      Rate and Rhythm: Normal rate.   Pulmonary:      Effort: Pulmonary effort is normal.   Abdominal:      General: Abdomen is flat. There is no distension.      Palpations: Abdomen is soft.      Tenderness: There is no abdominal tenderness.   Musculoskeletal:      Cervical back: Normal range of motion and neck supple.   Skin:     General: Skin is warm and dry.      Capillary Refill: Capillary refill takes less than 2 seconds.      Comments: 4 cm area of erythema/induration on the right gluteal fold, not perianal.   Neurological:      General: No focal deficit present.      Mental Status: She is alert and oriented to person, place, and time.         ED Medications  Medications   acetaminophen (TYLENOL) tablet 975 mg (975 mg Oral Given 7/17/24 1653)   ketorolac (TORADOL) injection 15 mg (15 mg Intramuscular Given 7/17/24 1653)   metoclopramide (REGLAN) tablet 10 mg (10 mg Oral Given 7/17/24 1653)   sulfamethoxazole-trimethoprim (BACTRIM DS) 800-160 mg per tablet 1 tablet (1 tablet Oral Given 7/17/24 1653)   cephalexin (KEFLEX) capsule 500 mg (500 mg Oral Given 7/17/24 1653)   magnesium sulfate 2 g/50 mL IVPB (premix) 2 g (0 g Intravenous Stopped 7/17/24 1923)   sodium chloride 0.9 % bolus 1,000 mL (0 mL Intravenous Stopped 7/17/24 1923)       Diagnostic Studies  Results Reviewed       None                   No orders to display         Procedures  POC MSK/Soft Tissue US    Date/Time: 7/17/2024 5:40 PM    Performed by: Jose Malave MD  Authorized by: Jose Malave MD    Patient location:   ED  Performed by:  Resident  Other Assisting Provider: No    Procedure:     Performed: soft tissue ultrasound    Procedure details:     Exam Type:  Diagnostic    Longitudinal view:  Obtained    Transverse view:  Obtained    Image quality: diagnostic      Image availability:  Images available in PACS  Soft tissue ultrasound:     Soft tissue indications: swelling, erythema and suspected abscess      Anatomic location:  Buttock    Soft tissue findings: subcutaneous collection (comment)      Soft tissue findings comment:  Patient drained on her own, remaining edema and some fluid, distinct collection for further I&D not found  Interpretation:     Soft tissue impressions: consistent with cellulitis and consistent with abscess          ED Course                                       Medical Decision Making  Patient is a 34-year-old female presenting for gluteal swelling and headache.    Differential includes but not limited to primary versus secondary headache, abscess versus cellulitis or a combination.  Patient treated symptomatically for headache.  Signs and symptoms consistent with primary headache.  Patient felt significantly better prior to discharge.  Ultrasound of the erythematous/indurated area shows some mild fluid collection, but nothing significant enough to be drained.  Consistent with recently drained abscess with surrounding cellulitis.  Patient will be treated with antibiotics.    Patient was cleared for discharge with PCP follow-up and return precautions.    Risk  OTC drugs.  Prescription drug management.          Disposition  Final diagnoses:   Cellulitis and abscess of buttock   Headache     Time reflects when diagnosis was documented in both MDM as applicable and the Disposition within this note       Time User Action Codes Description Comment    7/17/2024  4:40 PM Familia Jean Add [L02.31,  L03.317] Cellulitis and abscess of buttock     7/17/2024  4:50 PM Jose Malave Add [R51.9] Headache            ED Disposition       ED Disposition   Discharge    Condition   Stable    Date/Time   Wed Jul 17, 2024  7:17 PM    Comment   Geovanna Mirza discharge to home/self care.                   Follow-up Information       Follow up With Specialties Details Why Contact Info Additional Information    Emilie Foss DO Gastroenterology   [address] [facility] [address]  [phone]       [facility] Emergency Medicine   [address] [facility] [address]  [phone] [facility], [address] [place], [place], [address]   [phone]    [facility]   [address] [id number] [phone] [facility], [address] [place] [address], [id number] [phone]            Discharge Medication List as of 7/17/2024  7:17 PM        START taking these medications    Details   cephalexin (KEFLEX) 500 mg capsule Take 1 capsule (500 mg total) by mouth every 6 (six) hours for 10 days, Starting Wed 7/17/2024, Until Sat 7/27/2024, Normal      oxyCODONE (Roxicodone) 5 immediate release tablet Take 1 tablet (5 mg total) by mouth every 4 (four) hours as needed for moderate pain Max Daily Amount: 30 mg, Starting Wed 7/17/2024, Normal      sulfamethoxazole-trimethoprim (BACTRIM DS) 800-160 mg per tablet Take 1 tablet by mouth 2 (two) times a day for 10 days smx-tmp DS (BACTRIM) 800-160 mg tabs (1tab q12 D10), Starting Wed 7/17/2024, Until Sat 7/27/2024, Normal           CONTINUE these medications which have NOT CHANGED    Details   dicyclomine (BENTYL) 20 mg tablet TAKE 1 TABLET BY MOUTH EVERY 6 HOURS., Normal      DULoxetine (CYMBALTA) 20 mg capsule TAKE 1 CAPSULE BY MOUTH EVERY DAY, Normal      hydrocortisone 1 % ointment Apply topically 3 (three) times a day as needed for irritation, Starting Tue 7/9/2024, Normal       Mirabegron ER 50 MG TB24 Take 1 tablet (50 mg total) by mouth in the morning, Starting Fri 5/17/2024, Normal           No discharge procedures on file.    PDMP Review         Value Time User    PDMP Reviewed  Yes 9/26/2021  1:27 PM Casi Quiñones MD             ED Provider  Attending physically available and evaluated Geovanna Mirza. I managed the patient along with the ED Attending.    Electronically Signed by           Jose Malave MD  07/20/24 8257

## 2024-07-17 NOTE — ED ATTENDING ATTESTATION
7/17/2024  I, Familia Jean MD, saw and evaluated the patient. I have discussed the patient with the resident/non-physician practitioner and agree with the resident's/non-physician practitioner's findings, Plan of Care, and MDM as documented in the resident's/non-physician practitioner's note, except where noted. All available labs and Radiology studies were reviewed.  I was present for key portions of any procedure(s) performed by the resident/non-physician practitioner and I was immediately available to provide assistance.       At this point I agree with the current assessment done in the Emergency Department.  I have conducted an independent evaluation of this patient a history and physical is as follows:    ED Course         Critical Care Time  Procedures    33 yo female with right gluteal fold swelling and pain for two days. Pt noted pus drainage and squeezed out more. No pain with bm. No constipation.  No n/v/d, no fever.  Pt also with headache for few days, no relief with tylenol.  No numbness, tingling, weakness. Pt with hx of tension headaches.  Vss, afebrile, lungs cta, rrr, abdomen soft nontender, right gluteal cellulitis, no obvious abscess noted on u/s, no neuro deficits.  Pain meds, abx.

## 2024-07-17 NOTE — DISCHARGE INSTRUCTIONS
Please follow-up with primary care provider.  Please take antibiotics as prescribed, they were sent to the pharmacy for you.  Please call general surgery using the phone number provided if your swelling is not resolving with antibiotics.  Please return to the ED with new or worsening symptoms-see attached.

## 2024-09-18 ENCOUNTER — HOSPITAL ENCOUNTER (EMERGENCY)
Facility: HOSPITAL | Age: 34
Discharge: HOME | End: 2024-09-18
Attending: EMERGENCY MEDICINE
Payer: COMMERCIAL

## 2024-09-18 VITALS
TEMPERATURE: 98 F | SYSTOLIC BLOOD PRESSURE: 113 MMHG | RESPIRATION RATE: 20 BRPM | HEART RATE: 66 BPM | OXYGEN SATURATION: 98 % | DIASTOLIC BLOOD PRESSURE: 85 MMHG

## 2024-09-18 DIAGNOSIS — S29.011A INTERCOSTAL MUSCLE STRAIN, INITIAL ENCOUNTER: Primary | ICD-10-CM

## 2024-09-18 PROCEDURE — 99284 EMERGENCY DEPT VISIT MOD MDM: CPT | Performed by: EMERGENCY MEDICINE

## 2024-09-18 PROCEDURE — 99282 EMERGENCY DEPT VISIT SF MDM: CPT

## 2024-09-18 RX ORDER — METHOCARBAMOL 500 MG/1
500 TABLET, FILM COATED ORAL 2 TIMES DAILY
Qty: 20 TABLET | Refills: 0 | Status: SHIPPED | OUTPATIENT
Start: 2024-09-18

## 2024-09-18 RX ORDER — METHOCARBAMOL 500 MG/1
500 TABLET, FILM COATED ORAL ONCE
Status: COMPLETED | OUTPATIENT
Start: 2024-09-18 | End: 2024-09-18

## 2024-09-18 RX ORDER — IBUPROFEN 600 MG/1
600 TABLET, FILM COATED ORAL ONCE
Status: COMPLETED | OUTPATIENT
Start: 2024-09-18 | End: 2024-09-18

## 2024-09-18 RX ORDER — ACETAMINOPHEN 325 MG/1
975 TABLET ORAL ONCE
Status: COMPLETED | OUTPATIENT
Start: 2024-09-18 | End: 2024-09-18

## 2024-09-18 RX ADMIN — IBUPROFEN 600 MG: 600 TABLET, FILM COATED ORAL at 03:57

## 2024-09-18 RX ADMIN — ACETAMINOPHEN 975 MG: 325 TABLET ORAL at 03:57

## 2024-09-18 RX ADMIN — METHOCARBAMOL 500 MG: 500 TABLET ORAL at 03:57

## 2024-09-18 NOTE — ED PROVIDER NOTES
1. Intercostal muscle strain, initial encounter      ED Disposition       ED Disposition   Discharge    Condition   Stable    Date/Time   Wed Sep 18, 2024  3:54 AM    Comment   Geovanna Mirza discharge to home/self care.                   Assessment & Plan       Medical Decision Making  34-year-old female presents to the emergency department for evaluation of left-sided rib pain which occurred while dancing.  Physical examination and history most consistent with intercostal muscle strain.  Doubt rib fracture or pulmonary etiology based on mechanism of injury and physical exam findings with normal vital signs.  Plan to treat according with ibuprofen, Tylenol, and Robaxin here in the emergency department.  The remainder of the Robaxin muscle relaxant will be called into pharmacy of choice.  We discussed expectations of this injury and all questions were answered.  She remained hemodynamically stable during her time in the emergency department and is appropriate discharge home with outpatient follow-up instructions.    Risk  OTC drugs.  Prescription drug management.                     Medications   acetaminophen (TYLENOL) tablet 975 mg (975 mg Oral Given 9/18/24 0357)   ibuprofen (MOTRIN) tablet 600 mg (600 mg Oral Given 9/18/24 0357)   methocarbamol (ROBAXIN) tablet 500 mg (500 mg Oral Given 9/18/24 0357)       History of Present Illness       34-year-old female with no significant past medical history presents to the emergency department today for evaluation of left-sided rib pain.  She states that she was dancing this past Friday (approximately 4 days ago)  when she felt a pop in the left side posterior aspect of her rib cage.  Since that time she has noted tenderness to the touch in the area and pain with deep inspirations.  Denies other chest pain or shortness of breath.  She does not feel like she is a cough or has not been recently ill.  Denies any bruising or other overlying skin changes.        Review of  Systems   Constitutional:  Negative for activity change and fever.   Eyes:  Negative for visual disturbance.   Respiratory:  Negative for cough, shortness of breath, wheezing and stridor.    Cardiovascular:  Negative for chest pain and palpitations.   Gastrointestinal:  Negative for abdominal pain.   Musculoskeletal:  Negative for back pain.           Objective     ED Triage Vitals [09/18/24 0318]   Temperature Pulse Blood Pressure Respirations SpO2 Patient Position - Orthostatic VS   98 °F (36.7 °C) 66 113/85 20 98 % Sitting      Temp Source Heart Rate Source BP Location FiO2 (%) Pain Score    Tympanic Monitor Left arm -- 8        Physical Exam  Constitutional:       General: She is not in acute distress.     Appearance: Normal appearance.   HENT:      Head: Normocephalic and atraumatic.   Cardiovascular:      Rate and Rhythm: Normal rate and regular rhythm.      Pulses: Normal pulses.      Heart sounds: Normal heart sounds. No murmur heard.  Pulmonary:      Effort: Pulmonary effort is normal. No respiratory distress.      Breath sounds: Normal breath sounds. No stridor. No wheezing, rhonchi or rales.      Comments: Mild chest wall tenderness posterior aspect of 10th 11th rib.  Abdominal:      General: Abdomen is flat.      Palpations: Abdomen is soft.      Tenderness: There is no abdominal tenderness. There is no guarding.   Skin:     General: Skin is warm and dry.      Findings: No bruising or rash.   Neurological:      Mental Status: She is alert and oriented to person, place, and time.         Labs Reviewed - No data to display  No orders to display       Procedures         Charlie Anderson MD  09/18/24 0400

## 2024-09-18 NOTE — ED ATTENDING ATTESTATION
9/18/2024  I, Niles Mcallister MD, saw and evaluated the patient. I have discussed the patient with the resident/non-physician practitioner and agree with the resident's/non-physician practitioner's findings, Plan of Care, and MDM as documented in the resident's/non-physician practitioner's note, except where noted. All available labs and Radiology studies were reviewed.  I was present for key portions of any procedure(s) performed by the resident/non-physician practitioner and I was immediately available to provide assistance.       At this point I agree with the current assessment done in the Emergency Department.  I have conducted an independent evaluation of this patient a history and physical is as follows:    34-year-old female presents to the emergency department for evaluation of left posterior rib pain.  She states she initially felt the pain while she was dancing.  Since then has had worsening pain.  The pain is made worse with movement and deep inspiration.  She denies any trauma or falls.  No difficulty breathing.  No hemoptysis.    On exam, patient was comfortably in bed in no acute distress, and is normocephalic atraumatic, pupils equal and reactive, heart is regular rate and rhythm with intact distal pulses, no increased work of breathing, respiratory distress, or stridor.  Abdomen is soft, nontender nondistended without rebound or guarding.  She has left posterior rib pain.  No crepitus, no obvious deformity, no overlying skin changes.    Suspect symptoms likely secondary to musculoskeletal strain, unlikely to represent fracture or pneumothorax.  Plan to treat symptomatically and discharged home.    ED Course         Critical Care Time  Procedures

## 2024-10-15 ENCOUNTER — OFFICE VISIT (OUTPATIENT)
Dept: INTERNAL MEDICINE CLINIC | Facility: CLINIC | Age: 34
End: 2024-10-15

## 2024-10-15 VITALS
SYSTOLIC BLOOD PRESSURE: 115 MMHG | TEMPERATURE: 97.6 F | HEART RATE: 73 BPM | DIASTOLIC BLOOD PRESSURE: 79 MMHG | HEIGHT: 58 IN | WEIGHT: 121 LBS | BODY MASS INDEX: 25.4 KG/M2

## 2024-10-15 DIAGNOSIS — Z23 NEED FOR INFLUENZA VACCINATION: ICD-10-CM

## 2024-10-15 DIAGNOSIS — N32.81 OVERACTIVE BLADDER: ICD-10-CM

## 2024-10-15 DIAGNOSIS — Z23 NEED FOR COVID-19 VACCINE: Primary | ICD-10-CM

## 2024-10-15 DIAGNOSIS — Z13.9 SCREENING DUE: ICD-10-CM

## 2024-10-15 DIAGNOSIS — Z00.00 ANNUAL PHYSICAL EXAM: ICD-10-CM

## 2024-10-15 PROCEDURE — 90480 ADMN SARSCOV2 VAC 1/ONLY CMP: CPT | Performed by: STUDENT IN AN ORGANIZED HEALTH CARE EDUCATION/TRAINING PROGRAM

## 2024-10-15 PROCEDURE — 90656 IIV3 VACC NO PRSV 0.5 ML IM: CPT | Performed by: STUDENT IN AN ORGANIZED HEALTH CARE EDUCATION/TRAINING PROGRAM

## 2024-10-15 PROCEDURE — 90471 IMMUNIZATION ADMIN: CPT | Performed by: STUDENT IN AN ORGANIZED HEALTH CARE EDUCATION/TRAINING PROGRAM

## 2024-10-15 PROCEDURE — 99395 PREV VISIT EST AGE 18-39: CPT | Performed by: STUDENT IN AN ORGANIZED HEALTH CARE EDUCATION/TRAINING PROGRAM

## 2024-10-15 PROCEDURE — 91320 SARSCV2 VAC 30MCG TRS-SUC IM: CPT | Performed by: STUDENT IN AN ORGANIZED HEALTH CARE EDUCATION/TRAINING PROGRAM

## 2024-10-15 RX ORDER — MIRABEGRON 50 MG/1
50 TABLET, FILM COATED, EXTENDED RELEASE ORAL DAILY
Qty: 30 TABLET | Refills: 11 | Status: SHIPPED | OUTPATIENT
Start: 2024-10-15

## 2024-10-15 NOTE — PATIENT INSTRUCTIONS
"Patient Education     Dr. Taty Robertson MD UroGynecology  [phone]    Routine physical for adults   The Basics   Written by the doctors and editors at [facility]   What is a physical? -- A physical is a routine visit, or \"check-up,\" with your doctor. You might also hear it called a \"wellness visit\" or \"preventive visit.\"  During each visit, the doctor will:   Ask about your physical and mental health   Ask about your habits, behaviors, and lifestyle   Do an exam   Give you vaccines if needed   Talk to you about any medicines you take   Give advice about your health   Answer your questions  Getting regular check-ups is an important part of taking care of your health. It can help your doctor find and treat any problems you have. But it's also important for preventing health problems.  A routine physical is different from a \"sick visit.\" A sick visit is when you see a doctor because of a health concern or problem. Since physicals are scheduled ahead of time, you can think about what you want to ask the doctor.  How often should I get a physical? -- It depends on your age and health. In general, for people age 21 years and older:   If you are younger than 50 years, you might be able to get a physical every 3 years.   If you are 50 years or older, your doctor might recommend a physical every year.  If you have an ongoing health condition, like diabetes or high blood pressure, your doctor will probably want to see you more often.  What happens during a physical? -- In general, each visit will include:   Physical exam - The doctor or nurse will check your height, weight, heart rate, and blood pressure. They will also look at your eyes and ears. They will ask about how you are feeling and whether you have any symptoms that bother you.   Medicines - It's a good idea to bring a list of all the medicines you take to each doctor visit. Your doctor will talk to you about your medicines and answer any questions. Tell them if " "you are having any side effects that bother you. You should also tell them if you are having trouble paying for any of your medicines.   Habits and behaviors - This includes:   Your diet   Your exercise habits   Whether you smoke, drink alcohol, or use drugs   Whether you are sexually active   Whether you feel safe at home  Your doctor will talk to you about things you can do to improve your health and lower your risk of health problems. They will also offer help and support. For example, if you want to quit smoking, they can give you advice and might prescribe medicines. If you want to improve your diet or get more physical activity, they can help you with this, too.   Lab tests, if needed - The tests you get will depend on your age and situation. For example, your doctor might want to check your:   Cholesterol   Blood sugar   Iron level   Vaccines - The recommended vaccines will depend on your age, health, and what vaccines you already had. Vaccines are very important because they can prevent certain serious or deadly infections.   Discussion of screening - \"Screening\" means checking for diseases or other health problems before they cause symptoms. Your doctor can recommend screening based on your age, risk, and preferences. This might include tests to check for:   Cancer, such as breast, prostate, cervical, ovarian, colorectal, prostate, lung, or skin cancer   Sexually transmitted infections, such as chlamydia and gonorrhea   Mental health conditions like depression and anxiety  Your doctor will talk to you about the different types of screening tests. They can help you decide which screenings to have. They can also explain what the results might mean.   Answering questions - The physical is a good time to ask the doctor or nurse questions about your health. If needed, they can refer you to other doctors or specialists, too.  Adults older than 65 years often need other care, too. As you get older, your doctor " will talk to you about:   How to prevent falling at home   Hearing or vision tests   Memory testing   How to take your medicines safely   Making sure that you have the help and support you need at home  All topics are updated as new evidence becomes available and our peer review process is complete.  This topic retrieved from [employer] on: May 02, 2024.  Topic 846007 Version 1.0  Release: 32.4.3 - C32.122  © 2024 UpToDate, Inc. and/or its affiliates. All rights reserved.  Consumer Information Use and Disclaimer   Disclaimer: This generalized information is a limited summary of diagnosis, treatment, and/or medication information. It is not meant to be comprehensive and should be used as a tool to help the user understand and/or assess potential diagnostic and treatment options. It does NOT include all information about conditions, treatments, medications, side effects, or risks that may apply to a specific patient. It is not intended to be medical advice or a substitute for the medical advice, diagnosis, or treatment of a health care provider based on the health care provider's examination and assessment of a patient's specific and unique circumstances. Patients must speak with a health care provider for complete information about their health, medical questions, and treatment options, including any risks or benefits regarding use of medications. This information does not endorse any treatments or medications as safe, effective, or approved for treating a specific patient. UpToDate, Inc. and its affiliates disclaim any warranty or liability relating to this information or the use thereof.The use of this information is governed by the Terms of Use, available at https://www.wolterskluwer.com/en/know/clinical-effectiveness-terms. 2024© UpToDate, Inc. and its affiliates and/or licensors. All rights reserved.  Copyright   © 2024 Up[employer]Date, Inc. and/or its affiliates. All rights reserved.

## 2024-10-15 NOTE — PROGRESS NOTES
Adult Annual Physical  Name: Geovanna Mirza      : 1990      MRN: [id number]  Encounter Provider: Fiordaliza Hill DO  Encounter Date: 10/15/2024   Encounter department: [facility]    Assessment & Plan  Need for COVID-19 vaccine  COVID-19 vaccine administered at today's visit.   Orders:    COVID-19 Pfizer mRNA vaccine 12 yr and older (Comirnaty pre-filled syringe)    Need for influenza vaccination  Flu vaccine administered at today's visit.   Orders:    influenza vaccine preservative-free 0.5 mL IM (Fluzone, Afluria, Fluarix, Flulaval)    Annual physical exam  Annual physical exam today:  Patient doing overall well, exam is WNL  Up to date with , received x2 vaccines today as noted above  Check basic labs as it has been some time  Plan to see her in 1 year again for annual physical unless acute concerns arise       Overactive bladder  Suspect a component of patient's discomfort is related to scar tissue from previous  sites that interacts with pressure from distended bladder prior to micturition and causes component of neuropathic pain. This may explain why Mirabegron has been helping her. She has urgency and frequency without loss of urine and without sxs consistent with stress incontinence.    Orders:    Ambulatory Referral to Urogynecology; Future    Mirabegron ER 50 MG TB24; Take 1 tablet (50 mg total) by mouth in the morning    Screening due  History of GEOVANNA after third , will check CBC. Check sugar, electrolytes, kidney function with BMP. Last performed [id number].  Orders:    CBC and differential; Future    Basic metabolic panel; Future    Immunizations and preventive care screenings were discussed with patient today. Appropriate education was printed on patient's after visit summary.    Counseling:  Sexual health: discussed sexually transmitted diseases, partner selection, use of condoms, avoidance of unintended pregnancy, and contraceptive  alternatives.    BMI Counseling: Body mass index is 25.29 kg/m². The BMI is above normal. Nutrition recommendations include decreasing portion sizes and limiting drinks that contain sugar. Exercise recommendations include exercising 3-5 times per week. Rationale for BMI follow-up plan is due to patient being overweight or obese.         History of Present Illness     Adult Annual Physical:  Patient presents for annual physical. Patient is a 33yo female with history of x3 C-sections (children age 8,5,2) and overactive bladder. She has been followed by OBGYN previously for overactive bladder. Has not followed with Urology/UroGyn as she did not have insurance previously, but was recommended to see them via OBGYN. Symptoms are controlled with Mirabegron but still experiencing pain when bladder is full. Otherwise she is doing very well and reports feeling very content with home life, working, and overall mental health. .     Diet and Physical Activity:  - Diet/Nutrition: well balanced diet and consuming 3-5 servings of fruits/vegetables daily.  - Exercise: no formal exercise.    General Health:  - Sleep: 4-6 hours of sleep on average.  - Hearing: normal hearing right ear and normal hearing left ear.  - Vision: no vision problems.  - Dental: regular dental visits.    /GYN Health:  - Follows with GYN: yes.   - Menopause: premenopausal.   - Last menstrual cycle: 10/11/2024.   - History of STDs: no  - Contraception:. s/p tubal ligation      Advanced Care Planning:  - Has an advanced directive?: no    - Has a durable medical POA?: no    - ACP document given to patient?: yes      Review of Systems   Constitutional:  Negative for chills and fever.   HENT:  Negative for ear pain and sore throat.    Eyes:  Negative for pain and visual disturbance.   Respiratory:  Negative for cough and shortness of breath.    Cardiovascular:  Negative for chest pain and palpitations.   Gastrointestinal:  Negative for abdominal pain and  "vomiting.   Genitourinary:  Positive for frequency and urgency. Negative for difficulty urinating, dyspareunia, dysuria, hematuria, vaginal bleeding, vaginal discharge and vaginal pain.   Musculoskeletal:  Negative for arthralgias and back pain.   Skin:  Negative for color change and rash.   Neurological:  Negative for seizures and syncope.   All other systems reviewed and are negative.      Objective     /79 (BP Location: Right arm, Patient Position: Sitting, Cuff Size: Adult)   Pulse 73   Temp 97.6 °F (36.4 °C) (Temporal)   Ht 4' 10\" (1.473 m)   Wt 54.9 kg (121 lb)   BMI 25.29 kg/m²     Physical Exam  Vitals and nursing note reviewed.   Constitutional:       General: She is not in acute distress.     Appearance: She is well-developed.   HENT:      Head: Normocephalic and atraumatic.   Eyes:      Conjunctiva/sclera: Conjunctivae normal.   Cardiovascular:      Rate and Rhythm: Normal rate and regular rhythm.      Heart sounds: No murmur heard.  Pulmonary:      Effort: Pulmonary effort is normal. No respiratory distress.      Breath sounds: Normal breath sounds.   Abdominal:      Palpations: Abdomen is soft.      Tenderness: There is no abdominal tenderness.   Musculoskeletal:         General: No swelling.      Cervical back: Neck supple.   Skin:     General: Skin is warm and dry.      Capillary Refill: Capillary refill takes less than 2 seconds.   Neurological:      Mental Status: She is alert.   Psychiatric:         Mood and Affect: Mood normal.       Fiordaliza Hill,   Internal Medicine Residency, PGY-2  [facility]  "

## 2024-10-22 ENCOUNTER — TELEPHONE (OUTPATIENT)
Dept: INTERNAL MEDICINE CLINIC | Facility: CLINIC | Age: 34
End: 2024-10-22

## 2024-10-22 PROBLEM — N76.0 VAGINITIS: Status: ACTIVE | Noted: 2024-10-22

## 2024-10-22 NOTE — TELEPHONE ENCOUNTER
Called and left detailed VM with a verbal script or for them to call our office with any issues regarding the medication as it appears we sent medication on 10/15 and receipt ws confirmed by pharmacy.

## 2024-10-22 NOTE — TELEPHONE ENCOUNTER
Please resend prescription for Mirabegron ER 50 MG. Patient went to pharmacy several times and they have yet to receive script,

## 2024-10-28 NOTE — TELEPHONE ENCOUNTER
Called and spoke to the pharmacist from [facility] pharmacy and per pharmacist the patient picked up the medication 10/24/24.   Called and spoke to patient and per patient she was able to resolve the issue and was able to  her medication.

## 2024-11-04 PROBLEM — Z90.79 STATUS POST BILATERAL SALPINGECTOMY: Status: RESOLVED | Noted: 2021-09-23 | Resolved: 2024-11-04

## 2024-11-04 PROBLEM — Z98.891 S/P REPEAT LOW TRANSVERSE C-SECTION: Status: RESOLVED | Noted: 2021-09-23 | Resolved: 2024-11-04

## 2024-11-04 PROBLEM — D50.0 BLOOD LOSS ANEMIA: Status: RESOLVED | Noted: 2022-01-04 | Resolved: 2024-11-04

## 2024-11-04 PROBLEM — R30.0 DYSURIA: Status: RESOLVED | Noted: 2024-05-15 | Resolved: 2024-11-04

## 2024-11-04 PROBLEM — Z98.891 HISTORY OF CESAREAN DELIVERY: Status: RESOLVED | Noted: 2021-04-23 | Resolved: 2024-11-04

## 2024-11-11 ENCOUNTER — OFFICE VISIT (OUTPATIENT)
Dept: OBGYN CLINIC | Facility: CLINIC | Age: 34
End: 2024-11-11

## 2024-11-11 VITALS
BODY MASS INDEX: 25.57 KG/M2 | SYSTOLIC BLOOD PRESSURE: 134 MMHG | DIASTOLIC BLOOD PRESSURE: 77 MMHG | WEIGHT: 121.8 LBS | HEIGHT: 58 IN | HEART RATE: 71 BPM

## 2024-11-11 DIAGNOSIS — R39.9 UTI SYMPTOMS: ICD-10-CM

## 2024-11-11 DIAGNOSIS — B37.31 VULVOVAGINAL CANDIDIASIS: Primary | ICD-10-CM

## 2024-11-11 LAB
SL AMB  POCT GLUCOSE, UA: NORMAL
SL AMB LEUKOCYTE ESTERASE,UA: NORMAL
SL AMB POCT BILIRUBIN,UA: NORMAL
SL AMB POCT BLOOD,UA: NORMAL
SL AMB POCT CLARITY,UA: CLEAR
SL AMB POCT COLOR,UA: YELLOW
SL AMB POCT KETONES,UA: NORMAL
SL AMB POCT NITRITE,UA: NORMAL
SL AMB POCT PH,UA: 7
SL AMB POCT SPECIFIC GRAVITY,UA: 1
SL AMB POCT URINE PROTEIN: NORMAL
SL AMB POCT UROBILINOGEN: NORMAL

## 2024-11-11 PROCEDURE — 99213 OFFICE O/P EST LOW 20 MIN: CPT | Performed by: OBSTETRICS & GYNECOLOGY

## 2024-11-11 PROCEDURE — 81002 URINALYSIS NONAUTO W/O SCOPE: CPT | Performed by: OBSTETRICS & GYNECOLOGY

## 2024-11-11 PROCEDURE — 3075F SYST BP GE 130 - 139MM HG: CPT | Performed by: OBSTETRICS & GYNECOLOGY

## 2024-11-11 PROCEDURE — 3078F DIAST BP <80 MM HG: CPT | Performed by: OBSTETRICS & GYNECOLOGY

## 2024-11-11 RX ORDER — BENZOCAINE/MENTHOL 6 MG-10 MG
LOZENGE MUCOUS MEMBRANE 4 TIMES DAILY PRN
Qty: 28 G | Refills: 1 | Status: SHIPPED | OUTPATIENT
Start: 2024-11-11

## 2024-11-11 RX ORDER — FLUCONAZOLE 150 MG/1
150 TABLET ORAL ONCE
Qty: 1 TABLET | Refills: 1 | Status: SHIPPED | OUTPATIENT
Start: 2024-11-11 | End: 2024-11-11

## 2024-11-11 NOTE — PROGRESS NOTES
OB/GYN VISIT  Geovanna Mirza  2024  2:54 PM    ASSESSMENT / PLAN:    Geovanna Mirza is a 34 y.o.  female presenting for vulvar itching. Patient was seen in March of this year with similar concern and prescribed Diflucan and Nystatin cream. Patient did not  her prescription. She reports she has been having persistent itching for months.     Candida vulvovaginitis  -Diflucan and hydrocortisone cream sent to her pharmacy  -Hgb A1C ordered given persistent itching and yeast infection  -Patient currently menstruating, unable to get proper microscopy slides    SUBJECTIVE:    Geovanna Mirza is a 34 y.o.  female presenting for vulvovaginal itching. She reports this has been happening for months. She was seen in the office back in March with itching and diagnosed with candidiasis. She never picked up her Nystatin and Diflucan, but she has been using hydrocortisone cream and that gives her some relief. She reports it also burns when she urinates.     Past Medical History:   Diagnosis Date    Anemia     History of  delivery 2021    Birth plan RLTCS with tubal ligation. MA-31 signed 21. Needs c/s date.      History of cold sores     last assessed: 10/12/2016     Hx of preeclampsia, prior pregnancy, currently pregnant 2021    Hypertension     History of pre-eclampsia     S/P repeat low transverse  2021    Status post bilateral salpingectomy 2021    Urinary tract infection     Hx of UTI during last pregnancy    Varicella     Positive Hx       Past Surgical History:   Procedure Laterality Date     SECTION  2015    [place]  DELIVERY ONLY N/A 2019    Procedure:  SECTION () REPEAT;  Surgeon: Samy Howard MD;  Location: [facility];  Service: Obstetrics    CA  DELIVERY ONLY N/A 2021    Procedure:  SECTION () REPEAT;  Surgeon: Noah Valenzuela MD;  Location: [facility];  Service: Obstetrics    CA  "LIG/TRNSXJ FALOPIAN TUBE  DEL/ABDML SURG Bilateral 2021    Procedure: LIGATION/COAGULATION TUBAL;  Surgeon: Noah Valenzuela MD;  Location: [facility] ;  Service: Obstetrics       OBJECTIVE:    Vitals:  Blood pressure 134/77, pulse 71, height 4' 10\" (1.473 m), weight 55.2 kg (121 lb 12.8 oz), last menstrual period 2024, not currently breastfeeding.Body mass index is 25.46 kg/m².    Physical Exam:    Physical Exam  Constitutional:       Appearance: Normal appearance.   Genitourinary:      Rectum normal.      Right Labia: No rash, tenderness, lesions or skin changes.     Left Labia: No tenderness, lesions, skin changes or rash.     No labial fusion noted.      Pelvic Jun Score: 5/5.     Vaginal bleeding present.      No vaginal discharge, erythema, tenderness or ulceration.      No vaginal prolapse present.     No vaginal atrophy present.       Right Adnexa: not tender and no mass present.     Left Adnexa: not tender and no mass present.     No cervical friability or lesion.   HENT:      Head: Atraumatic.   Eyes:      Extraocular Movements: Extraocular movements intact.      Conjunctiva/sclera: Conjunctivae normal.   Cardiovascular:      Rate and Rhythm: Normal rate and regular rhythm.      Pulses: Normal pulses.   Pulmonary:      Effort: Pulmonary effort is normal. No respiratory distress.      Breath sounds: Normal breath sounds.   Abdominal:      Palpations: Abdomen is soft.      Tenderness: There is no abdominal tenderness.   Neurological:      General: No focal deficit present.      Mental Status: She is alert and oriented to person, place, and time.   Skin:     General: Skin is warm and dry.   Psychiatric:         Mood and Affect: Mood normal.         Behavior: Behavior normal.   Vitals reviewed. Exam conducted with a chaperone present.         Daphnie Julian MD  2024  2:54 PM       "

## 2025-02-11 DIAGNOSIS — N32.81 OVERACTIVE BLADDER: ICD-10-CM

## 2025-02-12 RX ORDER — MIRABEGRON 50 MG/1
50 TABLET, FILM COATED, EXTENDED RELEASE ORAL DAILY
Qty: 30 TABLET | Refills: 11 | OUTPATIENT
Start: 2025-02-12

## 2025-02-12 NOTE — TELEPHONE ENCOUNTER
Contacted patient's [facility] pharmacy. Spoke with pharmacist. Patient has 11 refills available and has not picked up medication since October. Attempted to reach patient at . Left detailed voice message for patient and office contact number for any questions/concerns.

## 2025-04-07 ENCOUNTER — VBI (OUTPATIENT)
Dept: ADMINISTRATIVE | Facility: OTHER | Age: 35
End: 2025-04-07

## 2025-04-07 NOTE — TELEPHONE ENCOUNTER
04/07/25 1:17 PM     Chart reviewed for Pap Smear (HPV) aka Cervical Cancer Screening ; nothing is submitted to the patient's insurance at this time.     Ashley Urbina   [facility]

## 2025-04-09 ENCOUNTER — HOSPITAL ENCOUNTER (EMERGENCY)
Facility: HOSPITAL | Age: 35
Discharge: HOME | End: 2025-04-09
Attending: EMERGENCY MEDICINE
Payer: COMMERCIAL

## 2025-04-09 VITALS
BODY MASS INDEX: 25.08 KG/M2 | DIASTOLIC BLOOD PRESSURE: 67 MMHG | RESPIRATION RATE: 18 BRPM | OXYGEN SATURATION: 98 % | HEART RATE: 78 BPM | TEMPERATURE: 97.4 F | WEIGHT: 120 LBS | SYSTOLIC BLOOD PRESSURE: 109 MMHG

## 2025-04-09 DIAGNOSIS — N75.0 BARTHOLIN GLAND CYST: Primary | ICD-10-CM

## 2025-04-09 PROCEDURE — 99283 EMERGENCY DEPT VISIT LOW MDM: CPT

## 2025-04-09 PROCEDURE — 56420 I&D BARTHOLINS GLAND ABSCESS: CPT | Performed by: EMERGENCY MEDICINE

## 2025-04-09 PROCEDURE — 99284 EMERGENCY DEPT VISIT MOD MDM: CPT | Performed by: EMERGENCY MEDICINE

## 2025-04-09 RX ORDER — DOXYCYCLINE 100 MG/1
100 CAPSULE ORAL 2 TIMES DAILY
Qty: 14 CAPSULE | Refills: 0 | Status: SHIPPED | OUTPATIENT
Start: 2025-04-09 | End: 2025-04-16

## 2025-04-09 RX ORDER — LIDOCAINE HYDROCHLORIDE 10 MG/ML
10 INJECTION, SOLUTION EPIDURAL; INFILTRATION; INTRACAUDAL; PERINEURAL ONCE
Status: COMPLETED | OUTPATIENT
Start: 2025-04-09 | End: 2025-04-09

## 2025-04-09 RX ADMIN — LIDOCAINE HYDROCHLORIDE 10 ML: 10 INJECTION, SOLUTION EPIDURAL; INFILTRATION; INTRACAUDAL at 18:10

## 2025-04-09 NOTE — DISCHARGE INSTRUCTIONS
"Patient Education     Quiste de la glándula de Bartolino   Conceptos Básicos   Redactado por los médicos y editores de UpToDate   ¿Qué es un quiste de la glándula de Bartolino? -- Se trata de un pequeño saco de líquido que se forma cuando se bloquea la abertura de ike glándula de Bartolino. Hay dos glándulas de Bartolino, ike de cada lado, lexis debajo de la abertura de la vagina (figura 1).  Las glándulas de Bartolino producen pequeñas cantidades de líquido. El líquido ayuda a que la vulva (la veronica alrededor de la abertura de la vagina) se mantenga húmeda. Si algo bloquea la abertura de ike glándula de Bartolino, puede acumularse líquido y formar un quiste. [place] suele suceder en solo ike glándula, no en ambas a la vez.  ¿Cuáles son los síntomas de un quiste de la glándula de Bartolino? -- Podría notar que tiene un nódulo en la vulva, greg los quistes de la glándula de Bartolino no suelen causar ningún otro síntoma. Si lo hacen, los principales síntomas son dolor o molestia al caminar, sentarse o tener relaciones sexuales.  Si el quiste de la glándula de Bartolino se infecta, puede formar un absceso. Un absceso es un depósito de pus. Los síntomas de un absceso de Bartolino incluyen:   Dolor intenso - Podría tener dolor al caminar. También es posible que no pueda sentarse o tener relaciones sexuales.   Inflamación   Enrojecimiento  ¿Ana consultar a un médico o enfermero? -- Consulte a wilson médico o enfermero si:   Ve o siente un nódulo en la vulva.   Siente dolor al caminar, sentarse o tener relaciones sexuales.  ¿Es necesario que me realice pruebas? -- Wilson médico o enfermero podría realizar ike prueba llamada \"biopsia\" para detectar la presencia de cáncer, greg esto solo se realiza en algunas situaciones. El cáncer en la glándula de [facility] es poco frecuente, greg puede ocurrir. En la biopsia, el médico lionel ike muestra pequeña de tejido de la veronica. Luego, envía el tejido a un laboratorio. Otro médico la observa en " un microscopio en busca de indicios de cáncer.  ¿Cómo se trata un quiste de la glándula de Danilo? -- El tratamiento depende del tamaño del quiste, si lance causa síntomas y si está infectado (absceso). Si no tiene síntomas, es posible que no necesite tratamiento. De lo contrario, los tratamientos pueden incluir:   Manejo expectante - Consiste en intentar que el quiste drene por sí solo. Es posible que el médico o enfermero le pida que aplique compresas tibias (no calientes) en la veronica afectada o que tome hoda de asiento para ayudar a que esto suceda.   Drenaje del quiste o absceso - El médico podría hacer un pequeño jaden en el quiste para que pueda salir el líquido o pus. También puede colocar un globo diminuto en el jaden para evitar que se cierre completamente. El globo se conecta a un tubo muy pequeño llamado “catéter” que ayuda a drenar el líquido de la glándula de Bartolino. El médico retira el globo en aproximadamente un mes y dylan ike pequeña abertura por la que se puede drenar el líquido. Lance procedimiento con frecuencia se realiza en el consultorio del médico, greg si tiene un absceso profundo o de gran tamaño, es posible que deba recibir tratamiento en un hospital.  Se enviará ike muestra del pus o del líquido a un laboratorio para realizar pruebas. Si en la prueba se detectan ciertos tipos de bacterias o infecciones, es posible que necesite antibióticos. A menudo, los antibióticos no son necesarios, greg es posible que se los indiquen en algunos casos, por ejemplo, si ya tuvo un absceso, si tiene otros síntomas (cata fiebre) o si tiene ike infección de transmisión sexual.   Cirugía - Los médicos también pueden realizar ike pequeña cirugía si el drenaje del quiste y la colocación de un globo no funcionan hansa. Los médicos pueden realizar ike nueva abertura para ayudar a drenar el líquido de la glándula, o pueden extirpar la glándula así cata el quiste o absceso. Sin embargo, la cirugía tiene mayores  riesgos de causar efectos secundarios que otros tratamientos, por lo que los médicos solo la practican si los demás tratamientos no lewis funcionado.  Todos los artículos se actualizan a medida que se descubre nueva evidencia y culmina nuestro proceso de evaluación por homólogos   Inez artículo se recuperó de UpToDate el: Apr 17, 2024.  Artículo 11918 Versión [id number].es-419.1  Release: 32.3.2 - C32.106  © 2024 UpToDate, Inc. Todos los derechos reservados.  figura 1: Genitales exteriores de ike refugio adulta     En inez dibujo se muestran las distintas partes de los genitales.  Gráfico 48776 Versión 9.0  Exención de responsabilidad y uso de la información del consumidor   Descargo de responsabilidad: esta información generalizada es un resumen limitado de información sobre el diagnóstico, el tratamiento y/o los medicamentos. No pretende ser exhaustiva y se debe utilizar cata herramienta para ayudar al usuario a comprender y/o evaluar las posibles opciones de diagnóstico y tratamiento. No incluye toda la información sobre afecciones, tratamientos, medicamentos, efectos secundarios o riesgos puedan ser aplicables a un paciente específico. No tiene el propósito de servir cata recomendación médica ni de sustituir la recomendación médica, el diagnóstico o el tratamiento de un profesional de atención médica que se base en el examen y la evaluación de inez profesional de la marry respecto a las circunstancias específicas y únicas del paciente. Los pacientes deben hablar con un profesional de atención médica para obtener información completa sobre wilson marry, cuestiones médicas y opciones de tratamiento, incluidos los riesgos o los beneficios relacionados con el uso de medicamentos. Esta información no certifica que los tratamientos o medicamentos lina seguros, eficaces o estén aprobados para tratar a un paciente específico. UpToDate, Inc. y kelvin afiliados renuncian a cualquier garantía o responsabilidad relacionada con esta  información o el uso de la misma.El uso de esta información está sujeto a las Condiciones de uso, disponibles en https://www.Vestorlyuwer.com/en/know/clinical-effectiveness-terms. 2024© [employer] y kelvin afiliados y/o licenciantes. Todos los derechos reservados.  Copyright   © 2024 [employer]. Todos los derechos reservados.

## 2025-04-09 NOTE — ED ATTENDING ATTESTATION
4/9/2025  IGerry DO, saw and evaluated the patient. I have discussed the patient with the resident/non-physician practitioner and agree with the resident's/non-physician practitioner's findings, Plan of Care, and MDM as documented in the resident's/non-physician practitioner's note, except where noted. All available labs and Radiology studies were reviewed.  I was present for key portions of any procedure(s) performed by the resident/non-physician practitioner and I was immediately available to provide assistance.       At this point I agree with the current assessment done in the Emergency Department.  I have conducted an independent evaluation of this patient a history and physical is as follows:    35-year-old female presents for evaluation of pain and swelling of her left groin over the past few days.  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  No history of similar symptoms.  No systemic symptoms.  No vaginal discharge or bleeding.  No urinary complaints at this time.  She is sexually active and does use toys but does not recall any aggressive acts recently.    No recent travel or sick contacts.    ROS: Denies f/c, HA, CP, SOB, abdominal pain, n/v/d or dysuria. 12 system ROS o/w negative.    PE: NAD, appears mildly uncomfortable, alert; HRR, no murmur; lungs CTA w/o w/r/r, POx 98% on RA (nl); skin on left labia majora has 4 x 3 cm area of induration with small central area of fluctuance w/o drainage or surrounding erythema, no reactive lymph nodes noted in the inguinal creases; o/w skin is p/w/d.    MDM/DDx: Bartholin cyst, at risk for abscess w/o surrounding cellulitis, no clinical evidence of sepsis.    A/P: Will I&D, start antibiotics, recommend close f/u w/GYN.    ED Course         Critical Care Time  Procedures

## 2025-04-09 NOTE — ED PROVIDER NOTES
Time reflects when diagnosis was documented in both MDM as applicable and the Disposition within this note       Time User Action Codes Description Comment    2025  6:55 PM Abby Glasgow [N75.0] Bartholin gland cyst           ED Disposition       ED Disposition   Discharge    Condition   Stable    Date/Time     6:54 PM    Comment   Geovanna Mirza discharge to home/self care.                   Assessment & Plan       Medical Decision Making  Geovanna Mirza is a 35 y.o. who presents with complaints of vaginal pain and swelling    Vital signs are HD stable, afebrile    Ddx: bartholin gland cyst    Plan: I&D performed, word catheter placed  7 day course of doxycycline prescribed  Recommend follow up with OB/GYN within 1 week for reevaluation   Supportive care instructions and return precautions provided  Patient understands and is agreeable to plan    Disposition: patient stable for discharge home.            Risk  Prescription drug management.             Medications   lidocaine (PF) (XYLOCAINE-MPF) 1 % injection 10 mL (10 mL Infiltration Given by Other 25 181)       ED Risk Strat Scores                    No data recorded                            History of Present Illness       Chief Complaint   Patient presents with    Groin Injury     Pt stated that she has been having pain for a few days.  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  Denies any any urinary symptoms. Unsure if there is any bruising, but stated that it is swollen.        Past Medical History:   Diagnosis Date    Anemia     History of  delivery 2021    Birth plan RLTCS with tubal ligation. MA-31 signed 21. Needs c/s date.      History of cold sores     last assessed: 10/12/2016     Hx of preeclampsia, prior pregnancy, currently pregnant 2021    Hypertension     History of pre-eclampsia     S/P repeat low transverse   2021    Status post bilateral salpingectomy 2021    Urinary tract infection     Hx of UTI during last pregnancy    Varicella     Positive Hx      Past Surgical History:   Procedure Laterality Date     SECTION  2015    HI  DELIVERY ONLY N/A 2019    Procedure:  SECTION () REPEAT;  Surgeon: Samy Howard MD;  Location: [facility] ;  Service: Obstetrics    HI  DELIVERY ONLY N/A 2021    Procedure:  SECTION () REPEAT;  Surgeon: Noah Valenzuela MD;  Location: [facility];  Service: Obstetrics    HI LIG/TRNSXJ FALOPIAN TUBE  DEL/ABDML SURG Bilateral 2021    Procedure: LIGATION/COAGULATION TUBAL;  Surgeon: Noah Valenzuela MD;  Location: [facility] ;  Service: Obstetrics      Family History   Problem Relation Age of Onset    Arthritis Mother     Hyperlipidemia Mother     Osteoporosis Mother     Hypertension Father     Heart disease Father     No Known Problems Sister     No Known Problems Brother     No Known Problems Son     No Known Problems Maternal Grandmother     No Known Problems Maternal Grandfather     No Known Problems Paternal Grandmother     No Known Problems Paternal Grandfather     No Known Problems Sister     No Known Problems Sister     No Known Problems Brother     No Known Problems Brother     No Known Problems Daughter       Social History     Tobacco Use    Smoking status: Former     Types: Cigarettes    Smokeless tobacco: Never   Vaping Use    Vaping status: Never Used   Substance Use Topics    Alcohol use: Not Currently     Alcohol/week: 0.0 standard drinks of alcohol    Drug use: No      E-Cigarette/Vaping    E-Cigarette Use Never User       E-Cigarette/Vaping Substances    Nicotine No     THC No     CBD No     Flavoring No     Other No     Unknown No       I have reviewed and agree with the history as documented.     Patient is a 35-year-old female who presents for evaluation of pain and swelling in  her vagina.  Patient states that for the past week she has had vaginal pain.  Yesterday, she felt an area of swelling within her labia minora.  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 States that she last had vaginal intercourse >1 week ago and denies any trauma during. Denies vaginal bleeding or discharge.  Denies fever, chills, pelvic pain, dysuria, hematuria, diarrhea, constipation, or blood in stool.  Endorses history of similar pain and swelling, for which she was prescribed antibiotics and states that she popped the area at home by herself which resulted in leakage of pus.        Review of Systems   Genitourinary:  Positive for vaginal pain. Negative for dysuria, hematuria, pelvic pain, vaginal bleeding and vaginal discharge.   All other systems reviewed and are negative.          Objective       ED Triage Vitals [04/09/25 1717]   Temperature Pulse Blood Pressure Respirations SpO2 Patient Position - Orthostatic VS   (!) 97.4 °F (36.3 °C) 78 109/67 18 98 % --      Temp Source Heart Rate Source BP Location FiO2 (%) Pain Score    Temporal -- -- -- 8      Vitals      Date and Time Temp Pulse SpO2 Resp BP Pain Score FACES Pain Rating User   04/09/25 1717 97.4 °F (36.3 °C) 78 98 % 18 109/67 8 -- BMM            Physical Exam  Exam conducted with a chaperone present.   Constitutional:       General: She is not in acute distress.     Appearance: Normal appearance. She is not ill-appearing, toxic-appearing or diaphoretic.   HENT:      Head: Normocephalic and atraumatic.   Eyes:      Extraocular Movements: Extraocular movements intact.      Conjunctiva/sclera: Conjunctivae normal.   Cardiovascular:      Rate and Rhythm: Normal rate.   Pulmonary:      Effort: Pulmonary effort is normal.   Abdominal:      Hernia: There is no hernia in the left inguinal area or right inguinal area.   Genitourinary:     Exam position: Supine.      Pubic Area:  No rash.       Labia:         Right: No rash, tenderness, lesion or injury.         Left: Tenderness present. No rash, lesion or injury.       Vagina: No vaginal discharge.      Comments: Left sided bartholin gland cyst    Musculoskeletal:         General: Normal range of motion.   Lymphadenopathy:      Lower Body: No right inguinal adenopathy. No left inguinal adenopathy.   Skin:     General: Skin is warm and dry.   Neurological:      Mental Status: She is alert and oriented to person, place, and time.   Psychiatric:         Mood and Affect: Mood normal.         Behavior: Behavior normal.         Results Reviewed       None            No orders to display       Incision and drain    Date/Time: 4/9/2025 7:38 PM    Performed by: Abby Glasgow MD  Authorized by: Abby Glasgow MD  Universal Protocol:  procedure performed by consultantConsent: Verbal consent obtained.  Risks and benefits: risks, benefits and alternatives were discussed  Consent given by: patient  Patient identity confirmed: arm band    Patient location:  ED  Location:     Type:  Bartholin cyst    Size:  2 cm    Location:  Anogenital    Anogenital location:  Bartholin's gland  Anesthesia (see MAR for exact dosages):     Anesthesia method:  Local infiltration    Local anesthetic:  Lidocaine 1% w/o epi  Procedure details:     Complexity:  Simple    Needle aspiration: no      Incision types:  Stab incision    Scalpel blade:  10    Approach:  Puncture    Incision depth:  Subcutaneous    Wound management:  Probed and deloculated    Drainage:  Bloody and purulent    Drainage amount:  Moderate    Packing materials:  Word catheter  Post-procedure details:     Patient tolerance of procedure:  Tolerated well, no immediate complications      ED Medication and Procedure Management   Prior to Admission Medications   Prescriptions Last Dose Informant Patient Reported? Taking?   Mirabegron ER 50 MG TB24   No No   Sig: Take 1 tablet (50 mg total) by mouth in  the morning   hydrocortisone 1 % cream   No No   Sig: Apply topically 4 (four) times a day as needed for irritation   hydrocortisone 1 % ointment   No No   Sig: Apply topically 3 (three) times a day as needed for irritation   methocarbamol (ROBAXIN) 500 mg tablet   No No   Sig: Take 1 tablet (500 mg total) by mouth 2 (two) times a day      Facility-Administered Medications: None     Discharge Medication List as of 4/9/2025  7:02 PM        START taking these medications    Details   doxycycline hyclate (VIBRAMYCIN) 100 mg capsule Take 1 capsule (100 mg total) by mouth 2 (two) times a day for 7 days, Starting Wed 4/9/2025, Until Wed 4/16/2025, Normal           CONTINUE these medications which have NOT CHANGED    Details   hydrocortisone 1 % cream Apply topically 4 (four) times a day as needed for irritation, Starting Mon 11/11/2024, Normal      hydrocortisone 1 % ointment Apply topically 3 (three) times a day as needed for irritation, Starting Tue 7/9/2024, Normal      methocarbamol (ROBAXIN) 500 mg tablet Take 1 tablet (500 mg total) by mouth 2 (two) times a day, Starting Wed 9/18/2024, Normal      Mirabegron ER 50 MG TB24 Take 1 tablet (50 mg total) by mouth in the morning, Starting Tue 10/15/2024, Normal           No discharge procedures on file.  ED SEPSIS DOCUMENTATION   Time reflects when diagnosis was documented in both MDM as applicable and the Disposition within this note       Time User Action Codes Description Comment    4/9/2025  6:55 PM Abby Glasgow Add [N75.0] Bartholin gland cyst                  Abby Glasgow MD  04/09/25 1944

## 2025-04-10 NOTE — PROGRESS NOTES
Name: Geovanna Mirza      : 1990      MRN: [id number]  Encounter Provider: Janusz Ellis MD  Encounter Date: 2025   Encounter department: [facility] [place]  :  Assessment & Plan  Bartholin gland cyst  Left Bartholin gland cyst s/p I&D in ED on  with Word catheter placement  Word catheter dislodged at home  Continuing 7-day course of PO Doxycycline started in ED  Area draining serosanguinous fluid, non-purulent, no areas of fluctuance or erythema  Recommended continued dry dressing changes and warm compresses  Recommended follow-up in office in 1 week if lesion does not continue to heal or if symptoms return or worsen           History of Present Illness   HPI  Geovanna Mirza is a 35 y.o. female who presents for follow-up after recent left Bartholin gland cyst I&D and Word catheter placement on  in the ED. Today, Geovanna reports feeling well. Reports continued mild blood-tinged drainage from area. We discussed expected drainage while healing given recent I&D and goal of continued drainage while healing via secondary intention. Geovanna denies fevers, chills, increasing pain or redness around affected area, or purulent discharge. We reviewed monitoring for signs of infection and encouraged follow-up if lesion does not continue to improve in 1 week. All questions answered.      Review of Systems   Constitutional:  Negative for chills and fever.   HENT:  Negative for congestion, sinus pressure and sinus pain.    Eyes:  Negative for visual disturbance.   Respiratory:  Negative for cough, shortness of breath and wheezing.    Cardiovascular:  Negative for chest pain, palpitations and leg swelling.   Gastrointestinal:  Negative for abdominal pain, constipation, diarrhea, nausea and vomiting.   Genitourinary:  Positive for vaginal discharge (from Bartholin gland cyst). Negative for dysuria and vaginal bleeding.   Skin:  Negative for color change, pallor and rash.   Neurological:  Negative  "for weakness and light-headedness.   Psychiatric/Behavioral:  Negative for agitation and behavioral problems.           Objective   /73 (BP Location: Left arm, Patient Position: Sitting, Cuff Size: Adult)   Pulse 81   Resp 18   Ht 4' 10\" (1.473 m)   Wt 55.1 kg (121 lb 6.4 oz)   LMP 03/10/2025 (Approximate)   BMI 25.37 kg/m²      Physical Exam  Vitals and nursing note reviewed. Exam conducted with a chaperone present.   Constitutional:       General: She is not in acute distress.  HENT:      Head: Normocephalic.      Right Ear: External ear normal.      Left Ear: External ear normal.   Eyes:      General: No scleral icterus.        Right eye: No discharge.         Left eye: No discharge.      Conjunctiva/sclera: Conjunctivae normal.   Cardiovascular:      Rate and Rhythm: Normal rate and regular rhythm.      Pulses: Normal pulses.      Heart sounds: Normal heart sounds.   Pulmonary:      Effort: Pulmonary effort is normal. No respiratory distress.      Breath sounds: Normal breath sounds.   Abdominal:      General: Abdomen is flat. There is no distension.      Palpations: Abdomen is soft.      Tenderness: There is no abdominal tenderness. There is no guarding.   Genitourinary:     Comments: Pelvic exam with left side Bartholin gland cyst s/p I&D with approximately 1 cm incision  Area non-tender, non-erythematous, non-caloric, with mild serosanguinous output  No areas of fluctuance or induration appreciated  Musculoskeletal:         General: No swelling or tenderness. Normal range of motion.      Cervical back: Normal range of motion.      Right lower leg: No edema.      Left lower leg: No edema.   Skin:     General: Skin is warm and dry.      Capillary Refill: Capillary refill takes less than 2 seconds.   Neurological:      Mental Status: She is alert and oriented to person, place, and time. Mental status is at baseline.   Psychiatric:         Mood and Affect: Mood normal.         Behavior: Behavior " normal.         Administrative Statements   I have spent a total time of 15 minutes in caring for this patient on the day of the visit/encounter including Diagnostic results, Prognosis, Risks and benefits of tx options, Instructions for management, Patient and family education, Importance of tx compliance, Risk factor reductions, Impressions, Counseling / Coordination of care, Documenting in the medical record, Reviewing/placing orders in the medical record (including tests, medications, and/or procedures), Obtaining or reviewing history  , and Communicating with other healthcare professionals .

## 2025-04-11 ENCOUNTER — OFFICE VISIT (OUTPATIENT)
Dept: OBGYN CLINIC | Facility: CLINIC | Age: 35
End: 2025-04-11

## 2025-04-11 VITALS
SYSTOLIC BLOOD PRESSURE: 111 MMHG | DIASTOLIC BLOOD PRESSURE: 73 MMHG | WEIGHT: 121.4 LBS | RESPIRATION RATE: 18 BRPM | HEIGHT: 58 IN | BODY MASS INDEX: 25.48 KG/M2 | HEART RATE: 81 BPM

## 2025-04-11 DIAGNOSIS — N75.0 BARTHOLIN GLAND CYST: Primary | ICD-10-CM

## 2025-04-24 ENCOUNTER — OFFICE VISIT (OUTPATIENT)
Dept: INTERNAL MEDICINE CLINIC | Facility: CLINIC | Age: 35
End: 2025-04-24

## 2025-04-24 VITALS
HEART RATE: 80 BPM | WEIGHT: 116 LBS | SYSTOLIC BLOOD PRESSURE: 98 MMHG | TEMPERATURE: 98.2 F | BODY MASS INDEX: 24.24 KG/M2 | DIASTOLIC BLOOD PRESSURE: 62 MMHG

## 2025-04-24 DIAGNOSIS — N89.8 VAGINAL IRRITATION: ICD-10-CM

## 2025-04-24 DIAGNOSIS — B35.3 TINEA PEDIS OF BOTH FEET: Primary | ICD-10-CM

## 2025-04-24 RX ORDER — PRAMOXINE HCL 1 %
1 TOWELETTE (EA) TOPICAL AS NEEDED
Qty: 12 EACH | Refills: 0 | Status: SHIPPED | OUTPATIENT
Start: 2025-04-24

## 2025-04-24 RX ORDER — CLOTRIMAZOLE 1 %
CREAM (GRAM) TOPICAL 2 TIMES DAILY
Qty: 60 G | Refills: 1 | Status: SHIPPED | OUTPATIENT
Start: 2025-04-24

## 2025-04-24 NOTE — PROGRESS NOTES
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  Has been using hydrocortisone cream which helps, had similar issue 3 years ago and improved with clotrimazole  Physical exam reveals white scaly/flaky skin between the toes suggesting dry skin, no skin hyper or hypopigmentation  Will send for antifungal cream and symptomatic support with benadryl cream  Orders:    clotrimazole (LOTRIMIN) 1 % cream; Apply topically 2 (two) times a day    diphenhydrAMINE (BENADRYL) 2 % cream; Apply topically 3 (three) times a day as needed for itching    Vaginal irritation  Associated with tampon use near the end of her period, has been using hydrocortisone on her vulva for this  Advised her to stop using hydrocortisone, either switch menstrual product or trial vagisil, advised her to follow up with her OBGYN if continues  Orders:    Pramoxine HCl (Vagisil Anti-Itch Medicated) 1 % MISC; Apply 1 each topically if needed (for vaginal itching)           History of Present Illness   35 year old female w/ no pertinent hx presents for sick visit.       Review of Systems   Constitutional:  Negative for chills and fever.   HENT:  Negative for ear pain and sore throat.    Eyes:  Negative for pain and visual disturbance.   Respiratory:  Negative for cough and shortness of breath.    Cardiovascular:  Negative for chest pain and palpitations.   Gastrointestinal:  Negative for abdominal pain and vomiting.   Genitourinary:  Negative for dysuria and hematuria.   Musculoskeletal:  Negative for arthralgias and back pain.   Skin:  Positive for rash. Negative for color change.   Neurological:  Negative for seizures and syncope.   All other systems reviewed and  are negative.      Objective   BP 98/62 (BP Location: Left arm, Patient Position: Sitting, Cuff Size: Standard)   Pulse 80   Temp 98.2 °F (36.8 °C) (Temporal)   Wt 52.6 kg (116 lb)   LMP 03/10/2025 (Approximate)   BMI 24.24 kg/m²      Physical Exam  Vitals and nursing note reviewed.   Constitutional:       General: She is not in acute distress.     Appearance: She is well-developed.   HENT:      Head: Normocephalic and atraumatic.   Eyes:      Conjunctiva/sclera: Conjunctivae normal.   Cardiovascular:      Rate and Rhythm: Normal rate and regular rhythm.      Heart sounds: No murmur heard.  Pulmonary:      Effort: Pulmonary effort is normal. No respiratory distress.      Breath sounds: Normal breath sounds.   Abdominal:      Palpations: Abdomen is soft.      Tenderness: There is no abdominal tenderness.   Musculoskeletal:         General: No swelling.      Cervical back: Neck supple.      Right lower leg: No edema.      Left lower leg: No edema.   Skin:     General: Skin is warm and dry.      Capillary Refill: Capillary refill takes less than 2 seconds.      Comments: Rash present in between the toes of both feet, all toes, appears white and scaly/flaky, no hyper or hypopigmentation, soles of feet are without rash   Neurological:      Mental Status: She is alert and oriented to person, place, and time.   Psychiatric:         Mood and Affect: Mood normal.

## 2025-05-10 ENCOUNTER — HOSPITAL ENCOUNTER (EMERGENCY)
Facility: HOSPITAL | Age: 35
Discharge: HOME | End: 2025-05-10
Attending: EMERGENCY MEDICINE
Payer: COMMERCIAL

## 2025-05-10 VITALS
DIASTOLIC BLOOD PRESSURE: 68 MMHG | TEMPERATURE: 97.7 F | SYSTOLIC BLOOD PRESSURE: 112 MMHG | OXYGEN SATURATION: 100 % | HEART RATE: 63 BPM | RESPIRATION RATE: 18 BRPM

## 2025-05-10 DIAGNOSIS — G43.909 MIGRAINE HEADACHE: ICD-10-CM

## 2025-05-10 DIAGNOSIS — N75.0 BARTHOLIN GLAND CYST: Primary | ICD-10-CM

## 2025-05-10 PROCEDURE — 99284 EMERGENCY DEPT VISIT MOD MDM: CPT | Performed by: EMERGENCY MEDICINE

## 2025-05-10 PROCEDURE — 99283 EMERGENCY DEPT VISIT LOW MDM: CPT

## 2025-05-10 PROCEDURE — 96372 THER/PROPH/DIAG INJ SC/IM: CPT

## 2025-05-10 RX ORDER — METOCLOPRAMIDE 10 MG/1
10 TABLET ORAL ONCE
Status: COMPLETED | OUTPATIENT
Start: 2025-05-10 | End: 2025-05-10

## 2025-05-10 RX ORDER — KETOROLAC TROMETHAMINE 30 MG/ML
15 INJECTION, SOLUTION INTRAMUSCULAR; INTRAVENOUS ONCE
Status: COMPLETED | OUTPATIENT
Start: 2025-05-10 | End: 2025-05-10

## 2025-05-10 RX ORDER — KETOROLAC TROMETHAMINE 30 MG/ML
15 INJECTION, SOLUTION INTRAMUSCULAR; INTRAVENOUS ONCE
Status: DISCONTINUED | OUTPATIENT
Start: 2025-05-10 | End: 2025-05-10

## 2025-05-10 RX ORDER — ACETAMINOPHEN 325 MG/1
975 TABLET ORAL ONCE
Status: COMPLETED | OUTPATIENT
Start: 2025-05-10 | End: 2025-05-10

## 2025-05-10 RX ADMIN — KETOROLAC TROMETHAMINE 15 MG: 30 INJECTION, SOLUTION INTRAMUSCULAR; INTRAVENOUS at 21:36

## 2025-05-10 RX ADMIN — ACETAMINOPHEN 975 MG: 325 TABLET ORAL at 21:36

## 2025-05-10 RX ADMIN — METOCLOPRAMIDE 10 MG: 10 TABLET ORAL at 21:36

## 2025-05-11 NOTE — ED PROVIDER NOTES
"Time reflects when diagnosis was documented in both MDM as applicable and the Disposition within this note       Time User Action Codes Description Comment    5/10/2025  9:37 PM You Leos Add [N75.0] Bartholin gland cyst     5/10/2025  9:37 PM You Leos Add [G43.909] Migraine headache           ED Disposition       ED Disposition   Discharge    Condition   Stable    Date/Time   Sat May 10, 2025  9:37 PM    Comment   Geovanna Mirza discharge to home/self care.                   Assessment & Plan       Medical Decision Making  Patient is a 35 y.o. female with PMH of left Bartholin cyst presenting with swelling in the previous site of Bartholin cyst.    Vital signs: Stable    Pertinent physical exam: Left Bartholin cyst.    Differential diagnosis and plan:   Left Bartholin cyst.  Patient without evidence of infection, no fevers, chills, nausea, vomiting.  No erythema or purulent drainage around the Bartholin cyst site.  Will defer incision and drainage and have patient follow-up with OB/GYN for potential marsupialization procedure for recurrent Bartholin cyst.  Will symptomatically treat for migraine headache with Toradol, Tylenol, p.o. Reglan.    View ED course above for further discussion on patient workup.     Review of Previous Medical Records: Reviewed    All labs reviewed and utilized in the medical decision making process  All radiology studies independently viewed by me and interpreted by the radiologist.  I reviewed all testing with the patient.     ED course:  Patient tolerated medications well.   exam performed with RN chaperone.  Patient stable for discharge    Reevaluation: Stable    Disposition: Discharge    Portions of the record may have been created with voice recognition software. Occasional wrong word or \"sound a like\" substitutions may have occurred due to the inherent limitations of voice recognition software. Read the chart carefully and recognize, using context, where " "substitutions have occurred.      Risk  OTC drugs.  Prescription drug management.             Medications   acetaminophen (TYLENOL) tablet 975 mg (975 mg Oral Given 5/10/25 2136)   metoclopramide (REGLAN) tablet 10 mg (10 mg Oral Given 5/10/25 2136)   ketorolac (TORADOL) injection 15 mg (15 mg Intramuscular Given 5/10/25 2136)       ED Risk Strat Scores                    No data recorded                            History of Present Illness       Chief Complaint   Patient presents with    Vaginal Injury     Pt had a yeast infx/abscess about a month ago. States the area is swollen again with \"a lump\". Denies pain, discharge or bleeding.       Past Medical History:   Diagnosis Date    Anemia     History of  delivery 2021    Birth plan RLTCS with tubal ligation. MA-31 signed 21. Needs c/s date.      History of cold sores     last assessed: 10/12/2016     Hx of preeclampsia, prior pregnancy, currently pregnant 2021    Hypertension     History of pre-eclampsia     S/P repeat low transverse  2021    Status post bilateral salpingectomy 2021    Urinary tract infection     Hx of UTI during last pregnancy    Varicella     Positive Hx      Past Surgical History:   Procedure Laterality Date     SECTION  2015    MD  DELIVERY ONLY N/A 2019    Procedure:  SECTION () REPEAT;  Surgeon: Samy Howard MD;  Location: [facility] ;  Service: Obstetrics    MD  DELIVERY ONLY N/A 2021    Procedure:  SECTION () REPEAT;  Surgeon: Noah Valenzuela MD;  [facility];  Service: Obstetrics    MD LIG/TRNSXJ FALOPIAN TUBE  DEL/ABDML SURG Bilateral 2021    Procedure: LIGATION/COAGULATION TUBAL;  Surgeon: Noah Valenzuela MD;  Location[facility];  Service: Obstetrics      Family History   Problem Relation Age of Onset    Arthritis Mother     Hyperlipidemia Mother     Osteoporosis Mother     Hypertension Father     " Heart disease Father     No Known Problems Sister     No Known Problems Brother     No Known Problems Son     No Known Problems Maternal Grandmother     No Known Problems Maternal Grandfather     No Known Problems Paternal Grandmother     No Known Problems Paternal Grandfather     No Known Problems Sister     No Known Problems Sister     No Known Problems Brother     No Known Problems Brother     No Known Problems Daughter       Social History     Tobacco Use    Smoking status: Former     Types: Cigarettes    Smokeless tobacco: Never   Vaping Use    Vaping status: Never Used   Substance Use Topics    Alcohol use: Not Currently     Alcohol/week: 0.0 standard drinks of alcohol    Drug use: No      E-Cigarette/Vaping    E-Cigarette Use Never User       E-Cigarette/Vaping Substances    Nicotine No     THC No     CBD No     Flavoring No     Other No     Unknown No       I have reviewed and agree with the history as documented.     35-year-old female with history of left Bartholin cyst presents to emergency department for request for evaluation of a vaginal lump she noticed earlier today.  Patient was previously diagnosed with Bartholin cyst last month in the same spot.  I&D had been performed, Word catheter was placed.  Patient reports immediately after discharge the Word catheter fell out.  Patient was evaluated by OB/GYN, who recommended letting Bartholin incision and drainage heal by secondary intention and to represent if symptoms recurred.    Today noticed the vaginal lump, states it is smaller than it was before.  Lump is in the same spot as previously diagnosed Bartholin cyst.  Denies vaginal pain, bleeding, discharge, fever, chills.    Additionally reports headache, cough, sneezing, runny nose, sore throat.          Review of Systems   Constitutional:  Negative for chills and fever.   HENT:  Positive for congestion, rhinorrhea and sore throat. Negative for ear pain.    Respiratory:  Positive for cough. Negative  for shortness of breath.    Cardiovascular:  Negative for chest pain, palpitations and leg swelling.   Gastrointestinal:  Negative for abdominal pain, constipation, diarrhea, nausea and vomiting.   Genitourinary:  Negative for vaginal bleeding, vaginal discharge and vaginal pain.   Skin:  Negative for rash.   Neurological:  Positive for headaches. Negative for light-headedness.   All other systems reviewed and are negative.          Objective       ED Triage Vitals [05/10/25 2046]   Temperature Pulse Blood Pressure Respirations SpO2 Patient Position - Orthostatic VS   97.7 °F (36.5 °C) 63 112/68 18 100 % Sitting      Temp Source Heart Rate Source BP Location FiO2 (%) Pain Score    Tympanic Monitor Left arm -- No Pain      Vitals      Date and Time Temp Pulse SpO2 Resp BP Pain Score FACES Pain Rating User   05/10/25 2136 -- -- -- -- -- 8 -- JT   05/10/25 2046 97.7 °F (36.5 °C) 63 100 % 18 112/68 No Pain -- SRH            Physical Exam  Vitals and nursing note reviewed. Exam conducted with a chaperone present.   Constitutional:       General: She is not in acute distress.     Appearance: Normal appearance. She is not ill-appearing.   HENT:      Head: Normocephalic and atraumatic.      Mouth/Throat:      Mouth: Mucous membranes are moist.   Eyes:      Extraocular Movements: Extraocular movements intact.      Conjunctiva/sclera: Conjunctivae normal.      Pupils: Pupils are equal, round, and reactive to light.   Cardiovascular:      Rate and Rhythm: Normal rate and regular rhythm.      Pulses: Normal pulses.      Heart sounds: No murmur heard.     No gallop.   Pulmonary:      Effort: Pulmonary effort is normal. No respiratory distress.      Breath sounds: Normal breath sounds. No wheezing or rales.   Abdominal:      General: There is no distension.      Palpations: Abdomen is soft.      Tenderness: There is no abdominal tenderness. There is no guarding or rebound.   Genitourinary:         Comments: Noted enlarged  Bartholin cyst in the left 4 o'clock position.  Musculoskeletal:         General: Normal range of motion.   Skin:     General: Skin is warm and dry.   Neurological:      General: No focal deficit present.      Mental Status: She is alert.      GCS: GCS eye subscore is 4. GCS verbal subscore is 5. GCS motor subscore is 6.      Cranial Nerves: No cranial nerve deficit.      Sensory: No sensory deficit.      Motor: No weakness.      Coordination: Finger-Nose-Finger Test and Heel to Shin Test normal.      Gait: Gait and tandem walk normal.   Psychiatric:         Mood and Affect: Mood normal.         Behavior: Behavior normal.         Results Reviewed       None            No orders to display       Procedures    ED Medication and Procedure Management   Prior to Admission Medications   Prescriptions Last Dose Informant Patient Reported? Taking?   Mirabegron ER 50 MG TB24   No No   Sig: Take 1 tablet (50 mg total) by mouth in the morning   Pramoxine HCl (Vagisil Anti-Itch Medicated) 1 % MISC   No No   Sig: Apply 1 each topically if needed (for vaginal itching)   clotrimazole (LOTRIMIN) 1 % cream   No No   Sig: Apply topically 2 (two) times a day   diphenhydrAMINE (BENADRYL) 2 % cream   No No   Sig: Apply topically 3 (three) times a day as needed for itching   methocarbamol (ROBAXIN) 500 mg tablet   No No   Sig: Take 1 tablet (500 mg total) by mouth 2 (two) times a day      Facility-Administered Medications: None     Discharge Medication List as of 5/10/2025  9:39 PM        CONTINUE these medications which have NOT CHANGED    Details   clotrimazole (LOTRIMIN) 1 % cream Apply topically 2 (two) times a day, Starting Thu 4/24/2025, Normal      diphenhydrAMINE (BENADRYL) 2 % cream Apply topically 3 (three) times a day as needed for itching, Starting Thu 4/24/2025, Normal      methocarbamol (ROBAXIN) 500 mg tablet Take 1 tablet (500 mg total) by mouth 2 (two) times a day, Starting Wed 9/18/2024, Normal      Mirabegron ER 50 MG  TB24 Take 1 tablet (50 mg total) by mouth in the morning, Starting Tue 10/15/2024, Normal      Pramoxine HCl (Vagisil Anti-Itch Medicated) 1 % MISC Apply 1 each topically if needed (for vaginal itching), Starting Thu 4/24/2025, Normal           No discharge procedures on file.  ED SEPSIS DOCUMENTATION   Time reflects when diagnosis was documented in both MDM as applicable and the Disposition within this note       Time User Action Codes Description Comment    5/10/2025  9:37 PM You Leos [N75.0] Bartholin gland cyst     5/10/2025  9:37 PM You Leos [G43.909] Migraine headache                  You Leos DO  05/11/25 4888

## 2025-05-11 NOTE — ED ATTENDING ATTESTATION
5/10/2025  I, Familia Jean MD, saw and evaluated the patient. I have discussed the patient with the resident/non-physician practitioner and agree with the resident's/non-physician practitioner's findings, Plan of Care, and MDM as documented in the resident's/non-physician practitioner's note, except where noted. All available labs and Radiology studies were reviewed.  I was present for key portions of any procedure(s) performed by the resident/non-physician practitioner and I was immediately available to provide assistance.       At this point I agree with the current assessment done in the Emergency Department.  I have conducted an independent evaluation of this patient a history and physical is as follows:    ED Course         Critical Care Time  Procedures    36 yo female with hx of bartholins cyst one month ago s/p drainage and word catheter. Pt since saw gyn and then for one day noted lump in same area returning.  Pt with no fever, no vaginal discharger, no urinary compalints.  Vss, afebrile, lungs cta, rrr, abodmen soft nontender, bartholins cyst noted with no signs of infection. Will have pt follow up with gyn

## 2025-05-11 NOTE — DISCHARGE INSTRUCTIONS
You were seen and evaluated in the emergency department for a Bartholin gland cyst.  You were also found to have a migraine headache.  You were given pain medications.    You need to follow-up with your OB/GYN for further management of the Bartholin gland cyst.  You potentially need a marsupialization procedure.    Please return to the emergency department if you develop fever, chills, persistent nausea and vomiting, persistent vaginal bleeding/pain/discharge.

## 2025-06-03 ENCOUNTER — OFFICE VISIT (OUTPATIENT)
Dept: OBGYN CLINIC | Facility: CLINIC | Age: 35
End: 2025-06-03

## 2025-06-03 VITALS
DIASTOLIC BLOOD PRESSURE: 52 MMHG | SYSTOLIC BLOOD PRESSURE: 95 MMHG | HEIGHT: 58 IN | HEART RATE: 72 BPM | WEIGHT: 121 LBS | BODY MASS INDEX: 25.4 KG/M2

## 2025-06-03 DIAGNOSIS — N75.0 BARTHOLIN GLAND CYST: Primary | ICD-10-CM

## 2025-06-03 PROCEDURE — 3074F SYST BP LT 130 MM HG: CPT | Performed by: OBSTETRICS & GYNECOLOGY

## 2025-06-03 PROCEDURE — 3078F DIAST BP <80 MM HG: CPT | Performed by: OBSTETRICS & GYNECOLOGY

## 2025-06-03 PROCEDURE — 99213 OFFICE O/P EST LOW 20 MIN: CPT | Performed by: OBSTETRICS & GYNECOLOGY

## 2025-06-03 NOTE — PROGRESS NOTES
Name: Geovanna Mirza      : 1990      MRN: [id number]  Encounter Provider: Janusz Ellis MD  Encounter Date: 6/3/2025   Encounter department: [facility] [place]  :  Assessment & Plan  Bartholin gland cyst  Improving since I&D on   Lesion is now approximately 2cm deep and lateral to left labia majora, approximately 1cm in size  Non-tender, non-draining  Reviewed expectant management vs. Repeat I&D vs. Surgical management (marsupialization)  Plan for continued observation  In the event of further intervention, plan for marsupialization per patient preference  RTO in 3 months for continued surveillance           History of Present Illness   HPI  Geovanna Mirza is a 35 y.o. female who presents for follow-up in the setting of left Bartholin gland cyst. Initial I&D on  with improvement in symptoms and completion of 7-day course of Doxycycline for infection prophylaxis. Word catheter dislodged <48h after placement. Presented to ED on 5/10 for return of symptoms with recommendation for follow-up with ObGyn. Today, Geovanna reports feeling well. She reports feeling a small bulge where the cyst is located but denies pain, bleeding, or continued drainage from the site. After examination, we discussed her overall improved Bartholin gland cyst. We discussed that due to the depth of the remaining cyst, it would be difficult to drain at this time but possible. Geovanna prefers to continue to monitor for resolution given her improving course and resolution in pain. She also reports that she would prefer proceeding with surgical management in the OR with marsupialization if further intervention is warranted. All questions answered.      Review of Systems   Constitutional:  Negative for chills and fever.   HENT:  Negative for congestion, sinus pressure and sinus pain.    Eyes:  Negative for visual disturbance.   Respiratory:  Negative for cough, shortness of breath and wheezing.    Cardiovascular:   "Negative for chest pain, palpitations and leg swelling.   Gastrointestinal:  Negative for abdominal pain, constipation, diarrhea, nausea and vomiting.   Genitourinary:  Negative for dysuria, vaginal bleeding and vaginal discharge.   Skin:  Negative for color change, pallor and rash.   Neurological:  Negative for weakness and light-headedness.   Psychiatric/Behavioral:  Negative for agitation and behavioral problems.           Objective   BP 95/52 (BP Location: Right arm, Patient Position: Sitting, Cuff Size: Standard)   Pulse 72   Ht 4' 10\" (1.473 m)   Wt 54.9 kg (121 lb)   LMP 05/24/2025   BMI 25.29 kg/m²      Physical Exam  Vitals and nursing note reviewed. Exam conducted with a chaperone present.   Constitutional:       General: She is not in acute distress.  HENT:      Head: Normocephalic.      Right Ear: External ear normal.      Left Ear: External ear normal.     Eyes:      General: No scleral icterus.        Right eye: No discharge.         Left eye: No discharge.      Conjunctiva/sclera: Conjunctivae normal.       Cardiovascular:      Rate and Rhythm: Normal rate.      Pulses: Normal pulses.   Pulmonary:      Effort: Pulmonary effort is normal. No respiratory distress.   Abdominal:      General: Abdomen is flat. There is no distension.      Palpations: Abdomen is soft.      Tenderness: There is no abdominal tenderness. There is no guarding.   Genitourinary:     Comments: Resolving left Bartholin gland cyst, approximately 1cm in size, located approximately 2cm deep and lateral to left labia majora  Non-tender  No drainage or bleeding noted    Musculoskeletal:         General: Normal range of motion.      Cervical back: Normal range of motion.     Skin:     General: Skin is warm and dry.     Neurological:      Mental Status: She is alert and oriented to person, place, and time. Mental status is at baseline.     Psychiatric:         Mood and Affect: Mood normal.         Behavior: Behavior normal. "         Administrative Statements   I have spent a total time of 15 minutes in caring for this patient on the day of the visit/encounter including Risks and benefits of tx options, Instructions for management, Patient and family education, Importance of tx compliance, Risk factor reductions, Impressions, Counseling / Coordination of care, Documenting in the medical record, Reviewing/placing orders in the medical record (including tests, medications, and/or procedures), Obtaining or reviewing history  , and Communicating with other healthcare professionals .

## 2025-06-25 ENCOUNTER — HOSPITAL ENCOUNTER (EMERGENCY)
Facility: HOSPITAL | Age: 35
Discharge: HOME | End: 2025-06-25
Attending: EMERGENCY MEDICINE | Admitting: EMERGENCY MEDICINE
Payer: COMMERCIAL

## 2025-06-25 ENCOUNTER — APPOINTMENT (EMERGENCY)
Dept: RADIOLOGY | Facility: HOSPITAL | Age: 35
End: 2025-06-25
Payer: COMMERCIAL

## 2025-06-25 VITALS
SYSTOLIC BLOOD PRESSURE: 107 MMHG | OXYGEN SATURATION: 100 % | DIASTOLIC BLOOD PRESSURE: 56 MMHG | TEMPERATURE: 99 F | HEART RATE: 74 BPM | RESPIRATION RATE: 20 BRPM

## 2025-06-25 DIAGNOSIS — T14.8XXA ABRASION: ICD-10-CM
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
## 2025-06-25 DIAGNOSIS — M79.673 FOOT PAIN: ICD-10-CM

## 2025-06-25 PROCEDURE — 99284 EMERGENCY DEPT VISIT MOD MDM: CPT | Performed by: EMERGENCY MEDICINE

## 2025-06-25 PROCEDURE — 90715 TDAP VACCINE 7 YRS/> IM: CPT

## 2025-06-25 PROCEDURE — 90471 IMMUNIZATION ADMIN: CPT

## 2025-06-25 PROCEDURE — 73630 X-RAY EXAM OF FOOT: CPT

## 2025-06-25 PROCEDURE — 99284 EMERGENCY DEPT VISIT MOD MDM: CPT

## 2025-06-25 RX ORDER — GINSENG 100 MG
1 CAPSULE ORAL ONCE
Status: COMPLETED | OUTPATIENT
Start: 2025-06-25 | End: 2025-06-25

## 2025-06-25 RX ORDER — IBUPROFEN 600 MG/1
600 TABLET, FILM COATED ORAL ONCE
Status: COMPLETED | OUTPATIENT
Start: 2025-06-25 | End: 2025-06-25

## 2025-06-25 RX ADMIN — TETANUS TOXOID, REDUCED DIPHTHERIA TOXOID AND ACELLULAR PERTUSSIS VACCINE, ADSORBED 0.5 ML: 5; 2.5; 8; 8; 2.5 SUSPENSION INTRAMUSCULAR at 08:08

## 2025-06-25 RX ADMIN — IBUPROFEN 600 MG: 600 TABLET ORAL at 08:38

## 2025-06-25 RX ADMIN — BACITRACIN 1 LARGE APPLICATION: 500 OINTMENT TOPICAL at 08:39
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
## (undated) DEVICE — SUT MONOCRYL 4-0 PS-2 27 IN Y426H

## (undated) DEVICE — SKIN MARKER DUAL TIP WITH RULER CAP, FLEXIBLE RULER AND LABELS: Brand: DEVON

## (undated) DEVICE — ABDOMINAL PAD: Brand: DERMACEA

## (undated) DEVICE — Device

## (undated) DEVICE — LARGE, DISPOSABLE ALEXIS O C-SECTION PROTECTOR - RETRACTOR: Brand: ALEXIS ® O C-SECTION PROTECTOR - RETRACTOR

## (undated) DEVICE — MEDI-VAC YANKAUER SUCTION HANDLE W/STRAIGHT TIP & CONTROL VENT: Brand: CARDINAL HEALTH

## (undated) DEVICE — SUT VICRYL 0 CT-1 27 IN J260H

## (undated) DEVICE — CHLORAPREP HI-LITE 26ML ORANGE

## (undated) DEVICE — GAUZE SPONGES,16 PLY: Brand: CURITY

## (undated) DEVICE — SUT VICRYL 0 CT-1 36 IN J946H

## (undated) DEVICE — PACK C-SECTION PBDS

## (undated) DEVICE — HEMOSTAT POWDER ADSORB SURGICEL 3GM

## (undated) DEVICE — SUT MONOCRYL 0 CTX 36 IN Y398H

## (undated) DEVICE — GLOVE SRG BIOGEL ECLIPSE 8

## (undated) DEVICE — SUT VICRYL 4-0 KS 27 IN J662H

## (undated) DEVICE — TELFA NON-ADHERENT ABSORBENT DRESSING: Brand: TELFA

## (undated) DEVICE — GLOVE INDICATOR PI UNDERGLOVE SZ 8 BLUE

## (undated) DEVICE — SUT PLAIN 2-0 CTX 27 IN 872H

## (undated) DEVICE — ADHESIVE SKN CLSR HISTOACRYL FLEX 0.5ML LF

## (undated) DEVICE — ADHESIVE SKIN HIGH VISCOSITY EXOFIN 1ML